# Patient Record
Sex: MALE | Race: WHITE | NOT HISPANIC OR LATINO | Employment: OTHER | URBAN - METROPOLITAN AREA
[De-identification: names, ages, dates, MRNs, and addresses within clinical notes are randomized per-mention and may not be internally consistent; named-entity substitution may affect disease eponyms.]

---

## 2021-01-10 ENCOUNTER — HOSPITAL ENCOUNTER (EMERGENCY)
Facility: CLINIC | Age: 86
Discharge: ANOTHER HOSPITAL | End: 2021-01-10
Attending: FAMILY MEDICINE | Admitting: FAMILY MEDICINE
Payer: MEDICARE

## 2021-01-10 ENCOUNTER — ANCILLARY PROCEDURE (OUTPATIENT)
Dept: ULTRASOUND IMAGING | Facility: CLINIC | Age: 86
End: 2021-01-10
Attending: FAMILY MEDICINE
Payer: COMMERCIAL

## 2021-01-10 ENCOUNTER — TRANSFERRED RECORDS (OUTPATIENT)
Dept: HEALTH INFORMATION MANAGEMENT | Facility: CLINIC | Age: 86
End: 2021-01-10

## 2021-01-10 ENCOUNTER — APPOINTMENT (OUTPATIENT)
Dept: GENERAL RADIOLOGY | Facility: CLINIC | Age: 86
End: 2021-01-10
Attending: FAMILY MEDICINE
Payer: MEDICARE

## 2021-01-10 VITALS
SYSTOLIC BLOOD PRESSURE: 140 MMHG | HEIGHT: 67 IN | DIASTOLIC BLOOD PRESSURE: 87 MMHG | TEMPERATURE: 97.9 F | HEART RATE: 78 BPM | BODY MASS INDEX: 27.62 KG/M2 | WEIGHT: 176 LBS | OXYGEN SATURATION: 94 % | RESPIRATION RATE: 19 BRPM

## 2021-01-10 DIAGNOSIS — I50.23 ACUTE ON CHRONIC SYSTOLIC HEART FAILURE (H): ICD-10-CM

## 2021-01-10 DIAGNOSIS — I21.4 NSTEMI (NON-ST ELEVATED MYOCARDIAL INFARCTION) (H): ICD-10-CM

## 2021-01-10 PROBLEM — N40.0 BPH (BENIGN PROSTATIC HYPERPLASIA): Status: ACTIVE | Noted: 2018-04-11

## 2021-01-10 PROBLEM — G43.719 INTRACTABLE CHRONIC MIGRAINE WITHOUT AURA: Status: ACTIVE | Noted: 2020-08-06

## 2021-01-10 LAB
ALBUMIN SERPL-MCNC: 3.3 G/DL (ref 3.4–5)
ALP SERPL-CCNC: 69 U/L (ref 40–150)
ALT SERPL W P-5'-P-CCNC: 41 U/L (ref 0–70)
ANION GAP SERPL CALCULATED.3IONS-SCNC: 5 MMOL/L (ref 3–14)
APTT PPP: 27 SEC (ref 22–37)
AST SERPL W P-5'-P-CCNC: 33 U/L (ref 0–45)
BASE EXCESS BLDV CALC-SCNC: 1.2 MMOL/L
BASOPHILS # BLD AUTO: 0.1 10E9/L (ref 0–0.2)
BASOPHILS NFR BLD AUTO: 0.8 %
BILIRUB SERPL-MCNC: 0.8 MG/DL (ref 0.2–1.3)
BUN SERPL-MCNC: 17 MG/DL (ref 7–30)
CALCIUM SERPL-MCNC: 9.1 MG/DL (ref 8.5–10.1)
CHLORIDE SERPL-SCNC: 107 MMOL/L (ref 94–109)
CO2 SERPL-SCNC: 26 MMOL/L (ref 20–32)
CREAT SERPL-MCNC: 1.21 MG/DL (ref 0.66–1.25)
D DIMER PPP FEU-MCNC: 0.5 UG/ML FEU (ref 0–0.5)
DIFFERENTIAL METHOD BLD: ABNORMAL
EOSINOPHIL # BLD AUTO: 0.4 10E9/L (ref 0–0.7)
EOSINOPHIL NFR BLD AUTO: 4.2 %
ERYTHROCYTE [DISTWIDTH] IN BLOOD BY AUTOMATED COUNT: 13.2 % (ref 10–15)
FLUAV RNA RESP QL NAA+PROBE: NEGATIVE
FLUBV RNA RESP QL NAA+PROBE: NEGATIVE
GFR SERPL CREATININE-BSD FRML MDRD: 55 ML/MIN/{1.73_M2}
GLUCOSE SERPL-MCNC: 161 MG/DL (ref 70–99)
HCO3 BLDV-SCNC: 29 MMOL/L (ref 21–28)
HCT VFR BLD AUTO: 53.2 % (ref 40–53)
HGB BLD-MCNC: 17.7 G/DL (ref 13.3–17.7)
IMM GRANULOCYTES # BLD: 0.1 10E9/L (ref 0–0.4)
IMM GRANULOCYTES NFR BLD: 1.1 %
INR PPP: 1.06 (ref 0.86–1.14)
LABORATORY COMMENT REPORT: NORMAL
LYMPHOCYTES # BLD AUTO: 2 10E9/L (ref 0.8–5.3)
LYMPHOCYTES NFR BLD AUTO: 21.2 %
MCH RBC QN AUTO: 32.5 PG (ref 26.5–33)
MCHC RBC AUTO-ENTMCNC: 33.3 G/DL (ref 31.5–36.5)
MCV RBC AUTO: 98 FL (ref 78–100)
MONOCYTES # BLD AUTO: 0.8 10E9/L (ref 0–1.3)
MONOCYTES NFR BLD AUTO: 8.7 %
NEUTROPHILS # BLD AUTO: 6 10E9/L (ref 1.6–8.3)
NEUTROPHILS NFR BLD AUTO: 64 %
NRBC # BLD AUTO: 0 10*3/UL
NRBC BLD AUTO-RTO: 0 /100
NT-PROBNP SERPL-MCNC: 394 PG/ML (ref 0–1800)
O2/TOTAL GAS SETTING VFR VENT: 1.5 %
PCO2 BLDV: 53 MM HG (ref 40–50)
PH BLDV: 7.34 PH (ref 7.32–7.43)
PLATELET # BLD AUTO: 153 10E9/L (ref 150–450)
PO2 BLDV: 24 MM HG (ref 25–47)
POTASSIUM SERPL-SCNC: 4.7 MMOL/L (ref 3.4–5.3)
PROT SERPL-MCNC: 7.1 G/DL (ref 6.8–8.8)
RBC # BLD AUTO: 5.44 10E12/L (ref 4.4–5.9)
RSV RNA SPEC QL NAA+PROBE: NORMAL
SARS-COV-2 RNA RESP QL NAA+PROBE: NEGATIVE
SODIUM SERPL-SCNC: 138 MMOL/L (ref 133–144)
SPECIMEN SOURCE: NORMAL
TROPONIN I SERPL-MCNC: 0.23 UG/L (ref 0–0.04)
WBC # BLD AUTO: 9.3 10E9/L (ref 4–11)

## 2021-01-10 PROCEDURE — 83880 ASSAY OF NATRIURETIC PEPTIDE: CPT | Performed by: FAMILY MEDICINE

## 2021-01-10 PROCEDURE — 93005 ELECTROCARDIOGRAM TRACING: CPT | Performed by: FAMILY MEDICINE

## 2021-01-10 PROCEDURE — 96374 THER/PROPH/DIAG INJ IV PUSH: CPT | Mod: 59 | Performed by: FAMILY MEDICINE

## 2021-01-10 PROCEDURE — 85025 COMPLETE CBC W/AUTO DIFF WBC: CPT | Performed by: FAMILY MEDICINE

## 2021-01-10 PROCEDURE — C9803 HOPD COVID-19 SPEC COLLECT: HCPCS | Performed by: FAMILY MEDICINE

## 2021-01-10 PROCEDURE — 87636 SARSCOV2 & INF A&B AMP PRB: CPT | Performed by: FAMILY MEDICINE

## 2021-01-10 PROCEDURE — 80053 COMPREHEN METABOLIC PANEL: CPT | Performed by: FAMILY MEDICINE

## 2021-01-10 PROCEDURE — 76604 US EXAM CHEST: CPT | Performed by: FAMILY MEDICINE

## 2021-01-10 PROCEDURE — 76604 US EXAM CHEST: CPT | Mod: 26 | Performed by: FAMILY MEDICINE

## 2021-01-10 PROCEDURE — 71045 X-RAY EXAM CHEST 1 VIEW: CPT

## 2021-01-10 PROCEDURE — 250N000013 HC RX MED GY IP 250 OP 250 PS 637: Performed by: FAMILY MEDICINE

## 2021-01-10 PROCEDURE — 93010 ELECTROCARDIOGRAM REPORT: CPT | Performed by: FAMILY MEDICINE

## 2021-01-10 PROCEDURE — 99285 EMERGENCY DEPT VISIT HI MDM: CPT | Mod: 25 | Performed by: FAMILY MEDICINE

## 2021-01-10 PROCEDURE — 85379 FIBRIN DEGRADATION QUANT: CPT | Performed by: FAMILY MEDICINE

## 2021-01-10 PROCEDURE — 85730 THROMBOPLASTIN TIME PARTIAL: CPT | Performed by: FAMILY MEDICINE

## 2021-01-10 PROCEDURE — 93308 TTE F-UP OR LMTD: CPT | Performed by: FAMILY MEDICINE

## 2021-01-10 PROCEDURE — 85610 PROTHROMBIN TIME: CPT | Performed by: FAMILY MEDICINE

## 2021-01-10 PROCEDURE — 93308 TTE F-UP OR LMTD: CPT | Mod: 26 | Performed by: FAMILY MEDICINE

## 2021-01-10 PROCEDURE — 82803 BLOOD GASES ANY COMBINATION: CPT | Performed by: FAMILY MEDICINE

## 2021-01-10 PROCEDURE — 250N000011 HC RX IP 250 OP 636: Performed by: FAMILY MEDICINE

## 2021-01-10 PROCEDURE — 96375 TX/PRO/DX INJ NEW DRUG ADDON: CPT | Performed by: FAMILY MEDICINE

## 2021-01-10 PROCEDURE — 84484 ASSAY OF TROPONIN QUANT: CPT | Performed by: FAMILY MEDICINE

## 2021-01-10 RX ORDER — LATANOPROST 50 UG/ML
1 SOLUTION/ DROPS OPHTHALMIC AT BEDTIME
COMMUNITY
Start: 2020-10-19

## 2021-01-10 RX ORDER — CLOPIDOGREL BISULFATE 75 MG/1
75 TABLET ORAL DAILY
Status: ON HOLD | COMMUNITY
End: 2024-01-03

## 2021-01-10 RX ORDER — ATORVASTATIN CALCIUM 80 MG/1
80 TABLET, FILM COATED ORAL AT BEDTIME
COMMUNITY
Start: 2020-10-22

## 2021-01-10 RX ORDER — FINASTERIDE 5 MG/1
1 TABLET, FILM COATED ORAL AT BEDTIME
COMMUNITY
Start: 2020-10-29

## 2021-01-10 RX ORDER — OMEPRAZOLE 40 MG/1
1 CAPSULE, DELAYED RELEASE ORAL DAILY
COMMUNITY
Start: 2020-11-07 | End: 2023-12-07

## 2021-01-10 RX ORDER — SACUBITRIL AND VALSARTAN 24; 26 MG/1; MG/1
1 TABLET, FILM COATED ORAL 2 TIMES DAILY
COMMUNITY
Start: 2021-01-07 | End: 2023-12-07

## 2021-01-10 RX ORDER — LEVOTHYROXINE SODIUM 100 UG/1
100 TABLET ORAL DAILY
COMMUNITY
Start: 2020-12-10

## 2021-01-10 RX ORDER — HEPARIN SODIUM 10000 [USP'U]/100ML
0-5000 INJECTION, SOLUTION INTRAVENOUS CONTINUOUS
Status: DISCONTINUED | OUTPATIENT
Start: 2021-01-10 | End: 2021-01-10 | Stop reason: HOSPADM

## 2021-01-10 RX ORDER — MULTIVIT WITH MINERALS/LUTEIN
1 TABLET ORAL DAILY
COMMUNITY

## 2021-01-10 RX ORDER — CITALOPRAM HYDROBROMIDE 20 MG/1
40 TABLET ORAL DAILY
COMMUNITY
Start: 2020-12-09 | End: 2023-12-07

## 2021-01-10 RX ORDER — FUROSEMIDE 10 MG/ML
20 INJECTION INTRAMUSCULAR; INTRAVENOUS ONCE
Status: COMPLETED | OUTPATIENT
Start: 2021-01-10 | End: 2021-01-10

## 2021-01-10 RX ORDER — PRAMIPEXOLE DIHYDROCHLORIDE 0.5 MG/1
1 TABLET ORAL EVERY EVENING
COMMUNITY
Start: 2020-11-10 | End: 2023-12-07

## 2021-01-10 RX ORDER — ACETAMINOPHEN 325 MG/1
650 TABLET ORAL EVERY 6 HOURS PRN
Status: DISCONTINUED | OUTPATIENT
Start: 2021-01-10 | End: 2021-01-10 | Stop reason: HOSPADM

## 2021-01-10 RX ORDER — NITROGLYCERIN 0.4 MG/1
1 TABLET SUBLINGUAL EVERY 5 MIN PRN
COMMUNITY
Start: 2020-07-31

## 2021-01-10 RX ORDER — TRIAZOLAM 0.25 MG
1 TABLET ORAL AT BEDTIME
COMMUNITY
Start: 2020-11-12 | End: 2023-12-07

## 2021-01-10 RX ORDER — CARVEDILOL 12.5 MG/1
1 TABLET ORAL 2 TIMES DAILY
COMMUNITY
Start: 2020-10-26

## 2021-01-10 RX ADMIN — NITROGLYCERIN 15 MG: 20 OINTMENT TOPICAL at 11:01

## 2021-01-10 RX ADMIN — FUROSEMIDE 20 MG: 10 INJECTION, SOLUTION INTRAMUSCULAR; INTRAVENOUS at 11:55

## 2021-01-10 RX ADMIN — ACETAMINOPHEN 650 MG: 325 TABLET, FILM COATED ORAL at 11:00

## 2021-01-10 RX ADMIN — HEPARIN SODIUM 950 UNITS/HR: 10000 INJECTION, SOLUTION INTRAVENOUS at 11:45

## 2021-01-10 ASSESSMENT — ENCOUNTER SYMPTOMS
HEADACHES: 0
PALPITATIONS: 0
DIARRHEA: 0
VOMITING: 0
CONSTIPATION: 0
SORE THROAT: 0
FREQUENCY: 0
BLOOD IN STOOL: 0
WHEEZING: 0
SINUS PRESSURE: 0
DYSURIA: 0
FEVER: 0
ABDOMINAL PAIN: 0
DIAPHORESIS: 0
CHILLS: 0
NAUSEA: 0
SHORTNESS OF BREATH: 1
COUGH: 0

## 2021-01-10 ASSESSMENT — MIFFLIN-ST. JEOR: SCORE: 1446.96

## 2021-01-10 NOTE — ED NOTES
Pt reports burning sensation in abdomen.  Pt was given muscle relaxer in November for weakness/pain in legs.   Pt reports fell from shower chair on yesterday and unable to get self up and had to crawl to door due to weakness in arms and shoulders.  Sob.   Pt placed on 1.5 liters of oxygen via nasal cannula due to oxygen sats dropped to 89% on room air.  Pt uses CPAP machine during the night.   Slight swelling in lower extremities.   povider in room.

## 2021-01-10 NOTE — ED PROVIDER NOTES
History     Chief Complaint   Patient presents with     Shortness of Breath     Chest Pain     HPI  Garry Mckay is a 86 year old male who with a history of hypertension coronary disease panic disorder secondary hyperparathyroidism stage III chronic kidney disease, and obstructive sleep apnea hyperlipidemia presents with 2 weeks of non-progressive shortness of breath no fever on presentation hypertensive on presentation mildly hypoxic.  His record in the care everywhere of history in October 2017 of with carotid artery occlusion.     Reviewing outside records I see the use of aspirin and Plavix, atorvastatin and carvedilol, losartan, nitrates, omeprazole.    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  He has no local care here other than occasionally seeing an Amanda randall at the [facility].  He is living with his daughter having come here November by driving here.  No history of VTE and noted no lower extremity swelling with this.  Presents with 2 weeks of dyspnea without significant cough chest pain.  He is experienced some arm ache but appears to be unrelated to this.  He has had no diaphoresis nausea vomiting sweats.  He sleeps on 2 pillows and that has not changed substantially.  He has no obvious new leg edema.  He has no marked decrease in walk distance, he has no weight gain that he is aware of recently.  He has no history of congestive heart failure but status post CABG in 1995 and 6-7 stents done since that time I believe the last 1 is in the last 3 years and apparently has had echocardiogram in the fall and he tells me this was wityhout significant change.    He has no upper respiratory symptoms, cough, fever.  Has no loss of taste or smell.  No exposure to Covid.  He has been  quarantining with his daughter.  .  He does note that he has been experiencing increased eructation.    He did have a fall yesterday that made it difficult to get up.  He tells me he fell in the shower from a shower chair no significant injury associated no head injury or neck injury.    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 08/06/2020     Priority: Medium     BPH (benign prostatic hyperplasia) 04/11/2018     Priority: Medium     Secondary hyperparathyroidism of renal origin (H) 06/21/2016     Priority: Medium     Stage 3 chronic kidney disease 06/21/2016     Priority: Medium     Glaucoma 05/22/2013     Priority: Medium     Benign essential hypertension 03/26/2013     Priority: Medium     Coronary atherosclerosis 03/26/2013     Priority: Medium     Panic disorder with agoraphobia 03/26/2013     Priority: Medium     Description: pt says he no longer has this       Hyperlipidemia 03/26/2013     Priority: Medium        Past Medical History:    No past medical history on file.    Past Surgical History:    No past surgical history on file.    Family History:    No family history on file.    Social History:  Marital Status:    Social History     Tobacco Use     Smoking status: Not on file   Substance Use Topics     Alcohol use: Not on file     Drug use: Not on file        Medications:    No current outpatient medications on file.        Review of Systems   Constitutional: Negative for chills, diaphoresis and fever.   HENT: Negative for ear pain, sinus pressure and sore throat.    Eyes: Negative for visual disturbance.   Respiratory: Positive for shortness of breath. Negative for cough and wheezing.    Cardiovascular: Negative for chest pain and palpitations.   Gastrointestinal: Negative for abdominal pain, blood in stool, constipation, diarrhea, nausea and vomiting.   Genitourinary: Negative for dysuria, frequency and  "urgency.   Skin: Negative for rash.   Neurological: Negative for headaches.   All other systems reviewed and are negative.      Physical Exam   BP: (!) 190/116  Pulse: 100  Temp: 97.9  F (36.6  C)  Resp: 20  Height: 170.2 cm (5' 7\")  Weight: 79.8 kg (176 lb)  SpO2: (!) 89 %      Physical Exam  Constitutional:       General: He is in acute distress.      Appearance: He is not diaphoretic.   Eyes:      Conjunctiva/sclera: Conjunctivae normal.   Neck:      Musculoskeletal: Neck supple.   Cardiovascular:      Rate and Rhythm: Normal rate and regular rhythm.      Heart sounds: No murmur.   Pulmonary:      Effort: Pulmonary effort is normal. No respiratory distress.      Breath sounds: No stridor. Rales present. No wheezing or rhonchi.   Abdominal:      General: Abdomen is flat. Bowel sounds are normal. There is distension.      Palpations: There is no mass.      Tenderness: There is no abdominal tenderness. There is no guarding.   Musculoskeletal:      Right lower leg: No edema.      Left lower leg: No edema.   Skin:     Coloration: Skin is not pale.      Findings: No rash.   Neurological:      General: No focal deficit present.      Mental Status: He is alert.      Motor: No weakness.         ED Course        Procedures                EKG Interpretation:      Interpreted by Arnoldo Montez MD  EKG done at 1005 hrs. demonstrates a sinus rhythm 94 bpm normal axis.  There is no ST change.  Other than T wave inversion laterally.  There appears to be very marginal lateral ST depression in these leads as well.  T wave flattening also in the inferior leads.  No ectopy.  Normal conduction.  Normal intervals.  This is with the exception of a borderline QRS at 116.  Impression sinus rhythm with lateral and inferior T wave change minimal ST depression.  And no old EKG to compare.      Critical Care time:  none                  Results for orders placed or performed during the hospital encounter of 01/10/21 (from the past 24 " hour(s))   POC US CHEST B-SCAN    Impression    [facility] Procedure Note      Limited Bedside ED Cardiac Ultrasound:    PROCEDURE: PERFORMED BY: Dr. Arnoldo Montez MD  INDICATIONS/SYMPTOM:  Shortness of Breath  PROBE: Cardiac phased array probe and High frequency linear probe  BODY LOCATION: Chest  FINDINGS:   The ultrasound was performed utilizing the parasternal long axis, parasternal short axis and apical 4 chamber views.  Cardiac contractility:  Present  Gross estimation of cardiac kinesis: depressed  Pericardial Effusion:  None  RV:LV ratio: LV > RV  IVC: Unable  INTERPRETATION:    Chamber size and motion were grossly normal with LV > RV, depressed left cardiac kinesis.  No pericardial effusion was found.  IVC visualized and findings indicate unable to estimate.  IMAGE DOCUMENTATION: Images were archived to PACs system.      [facility] Procedure Note      Limited Bedside ED Ultrasound of Thorax:    PROCEDURE: PERFORMED BY: Dr. Arnoldo Montez MD  INDICATIONS/SYMPTOM:  shortness of breath  PROBE: High frequency linear probe  BODY LOCATION: Chest  FINDINGS:  Images of both lung hemithoracies taken in 2D in multiple rib spaces        Right side:  Lung sliding artifact  Present     Comet tail artifacts  Absent - some artifacts but <3   Left side:  Lung sliding artifact  Present     Comet tail artifacts  Absent - some artifacts but <3   Hemothorax: Right side Absent     Left side Absent   Pleural effusion: Right side Absent      Left side Absent    INTERPRETATION: The exam was normal. There was no free fluid identified in the chest cavity. No evidence of pneumothorax, hemothorax or pleural effusion.  IMAGE DOCUMENTATION: Images were archived to PACs system.       CBC with platelets differential   Result Value Ref Range    WBC 9.3 4.0 - 11.0 10e9/L    RBC Count 5.44 4.4 - 5.9 10e12/L    Hemoglobin 17.7 13.3 - 17.7 g/dL    Hematocrit 53.2 (H) 40.0 - 53.0 %    MCV 98 78 - 100 fl    MCH 32.5 26.5 - 33.0  pg    MCHC 33.3 31.5 - 36.5 g/dL    RDW 13.2 10.0 - 15.0 %    Platelet Count 153 150 - 450 10e9/L    Diff Method Automated Method     % Neutrophils 64.0 %    % Lymphocytes 21.2 %    % Monocytes 8.7 %    % Eosinophils 4.2 %    % Basophils 0.8 %    % Immature Granulocytes 1.1 %    Nucleated RBCs 0 0 /100    Absolute Neutrophil 6.0 1.6 - 8.3 10e9/L    Absolute Lymphocytes 2.0 0.8 - 5.3 10e9/L    Absolute Monocytes 0.8 0.0 - 1.3 10e9/L    Absolute Eosinophils 0.4 0.0 - 0.7 10e9/L    Absolute Basophils 0.1 0.0 - 0.2 10e9/L    Abs Immature Granulocytes 0.1 0 - 0.4 10e9/L    Absolute Nucleated RBC 0.0    Comprehensive metabolic panel   Result Value Ref Range    Sodium 138 133 - 144 mmol/L    Potassium 4.7 3.4 - 5.3 mmol/L    Chloride 107 94 - 109 mmol/L    Carbon Dioxide 26 20 - 32 mmol/L    Anion Gap 5 3 - 14 mmol/L    Glucose 161 (H) 70 - 99 mg/dL    Urea Nitrogen 17 7 - 30 mg/dL    Creatinine 1.21 0.66 - 1.25 mg/dL    GFR Estimate 55 (L) >60 mL/min/[1.73_m2]    GFR Estimate If Black 63 >60 mL/min/[1.73_m2]    Calcium 9.1 8.5 - 10.1 mg/dL    Bilirubin Total 0.8 0.2 - 1.3 mg/dL    Albumin 3.3 (L) 3.4 - 5.0 g/dL    Protein Total 7.1 6.8 - 8.8 g/dL    Alkaline Phosphatase 69 40 - 150 U/L    ALT 41 0 - 70 U/L    AST 33 0 - 45 U/L   Troponin I   Result Value Ref Range    Troponin I ES 0.234 (HH) 0.000 - 0.045 ug/L   Blood gas venous   Result Value Ref Range    Ph Venous 7.34 7.32 - 7.43 pH    PCO2 Venous 53 (H) 40 - 50 mm Hg    PO2 Venous 24 (L) 25 - 47 mm Hg    Bicarbonate Venous 29 (H) 21 - 28 mmol/L    Base Excess Venous 1.2 mmol/L    FIO2 1.5    Symptomatic Influenza A/B & SARS-CoV2 (COVID-19) Virus PCR Multiplex    Specimen: Nasopharyngeal   Result Value Ref Range    Flu A/B & SARS-COV-2 PCR Source Nasopharyngeal     SARS-CoV-2 PCR Result NEGATIVE     Influenza A PCR Negative NEG^Negative    Influenza B PCR Negative NEG^Negative    Respiratory Syncytial Virus PCR (Note)     Flu A/B & SARS-CoV-2 PCR Comment (Note)    Nt  probnp inpatient   Result Value Ref Range    N-Terminal Pro BNP Inpatient 394 0 - 1,800 pg/mL   D dimer quantitative   Result Value Ref Range    D Dimer 0.5 0.0 - 0.50 ug/ml FEU   INR   Result Value Ref Range    INR 1.06 0.86 - 1.14   Partial thromboplastin time   Result Value Ref Range    PTT 27 22 - 37 sec   XR Chest Port 1 View    Narrative    CHEST ONE VIEW  1/10/2021 11:23 AM     HISTORY: Dyspnea    COMPARISON: None.      Impression    IMPRESSION: Cardiomegaly and cardiac device noted. No acute  infiltrates.    MORENO BLANKENSHIP MD       Medications - No data to display    Assessments & Plan (with Medical Decision Making)     NMDM: Garry Mckay is a 86 year old male m who presents with a history of coronary disease, hypertension, panic disorder, secondary hyperparathyroidism, stage III chronic kidney disease and obstructive sleep apnea.  His findings on bedside ultrasound demonstrate a enlarged left ventricle much larger than the right with decreased contractility overall.  He has possible minimal ischemic change laterally with Kennedy very marginal ST depression laterally T waves are inverted laterally and inferiorly at least flattened.  He is experiencing no chest pain.  His pressure on presentation was 190 systolic.  I suspect much of what he is describing may be congestive heart failure although I get a mixed exam.  I do hear rales on exam.  His IVC is very difficult to visualize and I am therefore holding on administering IV Lasix but I will apply nitroglycerin to the chest which will have both an antianginal effect as well as a decrease in blood pressure and a effect on systolic function with decreased preload.  Will perform our testing and imaging.  If his troponin is elevated to any significant extent would recommend that we transfer to the [facility] system in which his primary provider practices either [facility] or [facility] otherwise I would keep him to at this facility.  We will also test  for differential diagnosis including Covid, pneumonia, pulmonary embolism as he just traveled from [place] in the last 2 months by car.     Findings on laboratory testing consistent with possible NSTEMI with a troponin increased to 0.2.  The patient's blood pressure dropped on Nitropaste to approximately 130s 140.  He has had no chest pain while here.  His oxygen requirements have not increased affected by 1 L/min O2 currently.  He feels comfortable.  I spoke with Dr. Scott at [facility] who accepts for transfer but asks that I administer 20 mg IV Lasix.  We did discuss that I could not identify the IVC well and could not see overall fluid status well.  I see no obvious interstitial fluid and would be concerned of potential hypotension.  He does recommend the lasix.  I have given that but if noted for the nurses to wipe off nitroglycerin paste if he should have any hypotension with pressures dropping below 120 systolic.  The left-sided wall motion is significantly reduced weeks by my exam and I am suspicious this may be stunning although I have not seen his prior echo results.  There is significant dilatation of this chamber.  He is on heparin now.       I have reviewed the nursing notes.    I have reviewed the findings, diagnosis, plan and need for follow up with the patient.       New Prescriptions    No medications on file       Final diagnoses:   NSTEMI (non-ST elevated myocardial infarction) (H)   Acute on chronic systolic heart failure (H)       1/10/2021   [facility] EMERGENCY DEPT     Arnoldo Montez MD  01/10/21 4791

## 2021-01-10 NOTE — ED NOTES
DATE:  1/10/2021   TIME OF RECEIPT FROM LAB:  1126  LAB TEST:  trop  LAB VALUE:  0.234  RESULTS GIVEN WITH READ-BACK TO Dr. Montez  TIME LAB VALUE REPORTED TO PROVIDER:   1126

## 2023-12-07 ENCOUNTER — APPOINTMENT (OUTPATIENT)
Dept: CT IMAGING | Facility: CLINIC | Age: 88
DRG: 853 | End: 2023-12-07
Attending: EMERGENCY MEDICINE
Payer: MEDICARE

## 2023-12-07 ENCOUNTER — HOSPITAL ENCOUNTER (EMERGENCY)
Facility: CLINIC | Age: 88
Discharge: ANOTHER HOSPITAL | DRG: 853 | End: 2023-12-07
Attending: EMERGENCY MEDICINE | Admitting: EMERGENCY MEDICINE
Payer: MEDICARE

## 2023-12-07 VITALS
BODY MASS INDEX: 28.09 KG/M2 | OXYGEN SATURATION: 92 % | HEIGHT: 67 IN | SYSTOLIC BLOOD PRESSURE: 96 MMHG | HEART RATE: 85 BPM | DIASTOLIC BLOOD PRESSURE: 68 MMHG | WEIGHT: 179 LBS | RESPIRATION RATE: 16 BRPM

## 2023-12-07 DIAGNOSIS — K35.30 ACUTE APPENDICITIS WITH LOCALIZED PERITONITIS, WITHOUT PERFORATION, ABSCESS, OR GANGRENE: ICD-10-CM

## 2023-12-07 DIAGNOSIS — I25.5 ISCHEMIC CARDIOMYOPATHY: ICD-10-CM

## 2023-12-07 DIAGNOSIS — Z79.01 CHRONIC ANTICOAGULATION: ICD-10-CM

## 2023-12-07 PROBLEM — I50.9 HEART FAILURE (H): Status: ACTIVE | Noted: 2021-01-12

## 2023-12-07 PROBLEM — I50.42 CHRONIC COMBINED SYSTOLIC AND DIASTOLIC HEART FAILURE (H): Status: ACTIVE | Noted: 2022-11-16

## 2023-12-07 PROBLEM — I10 HYPERTENSION: Status: ACTIVE | Noted: 2021-01-10

## 2023-12-07 PROBLEM — F32.9 MAJOR DEPRESSION, MELANCHOLIC TYPE: Status: ACTIVE | Noted: 2021-10-17

## 2023-12-07 PROBLEM — E03.9 HYPOTHYROIDISM (ACQUIRED): Status: ACTIVE | Noted: 2017-11-24

## 2023-12-07 PROBLEM — I21.4 NSTEMI (NON-ST ELEVATED MYOCARDIAL INFARCTION) (H): Status: ACTIVE | Noted: 2023-12-07

## 2023-12-07 PROBLEM — M75.81 RIGHT ROTATOR CUFF TENDINITIS: Status: ACTIVE | Noted: 2017-08-19

## 2023-12-07 PROBLEM — D69.6 THROMBOCYTOPENIA (H): Status: ACTIVE | Noted: 2023-07-07

## 2023-12-07 PROBLEM — F41.9 ANXIETY: Chronic | Status: ACTIVE | Noted: 2021-01-10

## 2023-12-07 PROBLEM — Z95.1 HISTORY OF CORONARY ARTERY BYPASS SURGERY: Status: ACTIVE | Noted: 2017-11-24

## 2023-12-07 PROBLEM — I65.23 OCCLUSION AND STENOSIS OF BILATERAL CAROTID ARTERIES: Status: ACTIVE | Noted: 2017-10-19

## 2023-12-07 PROBLEM — M48.02 DEGENERATIVE CERVICAL SPINAL STENOSIS: Status: ACTIVE | Noted: 2018-02-28

## 2023-12-07 PROBLEM — I61.9 STROKE, HEMORRHAGIC (H): Status: ACTIVE | Noted: 2017-11-24

## 2023-12-07 PROBLEM — Z95.5 S/P CORONARY ARTERY STENT PLACEMENT: Status: ACTIVE | Noted: 2023-12-07

## 2023-12-07 PROBLEM — G47.33 OBSTRUCTIVE SLEEP APNEA: Status: ACTIVE | Noted: 2021-01-10

## 2023-12-07 PROBLEM — I65.23 BILATERAL CAROTID ARTERY STENOSIS: Status: ACTIVE | Noted: 2017-11-24

## 2023-12-07 PROBLEM — R35.1 NOCTURIA: Status: ACTIVE | Noted: 2018-04-11

## 2023-12-07 LAB
ALBUMIN SERPL BCG-MCNC: 4.2 G/DL (ref 3.5–5.2)
ALBUMIN SERPL BCG-MCNC: 4.2 G/DL (ref 3.5–5.2)
ALBUMIN UR-MCNC: 30 MG/DL
ALP SERPL-CCNC: 89 U/L (ref 40–150)
ALP SERPL-CCNC: 89 U/L (ref 40–150)
ALT SERPL W P-5'-P-CCNC: 29 U/L (ref 0–70)
ALT SERPL W P-5'-P-CCNC: 29 U/L (ref 0–70)
ANION GAP SERPL CALCULATED.3IONS-SCNC: 13 MMOL/L (ref 7–15)
ANION GAP SERPL CALCULATED.3IONS-SCNC: 15 MMOL/L (ref 7–15)
ANION GAP SERPL CALCULATED.3IONS-SCNC: 20 MMOL/L (ref 7–15)
APPEARANCE UR: ABNORMAL
AST SERPL W P-5'-P-CCNC: 38 U/L (ref 0–45)
AST SERPL W P-5'-P-CCNC: 38 U/L (ref 0–45)
BASOPHILS # BLD AUTO: 0 10E3/UL (ref 0–0.2)
BASOPHILS NFR BLD AUTO: 0 %
BILIRUB DIRECT SERPL-MCNC: 0.33 MG/DL (ref 0–0.3)
BILIRUB SERPL-MCNC: 1.4 MG/DL
BILIRUB SERPL-MCNC: 1.4 MG/DL
BILIRUB UR QL STRIP: NEGATIVE
BUN SERPL-MCNC: 22.9 MG/DL (ref 8–23)
BUN SERPL-MCNC: 23 MG/DL (ref 8–23)
BUN SERPL-MCNC: 29.8 MG/DL (ref 8–23)
CALCIUM SERPL-MCNC: 9.4 MG/DL (ref 8.8–10.2)
CALCIUM SERPL-MCNC: 9.8 MG/DL (ref 8.8–10.2)
CALCIUM SERPL-MCNC: 9.8 MG/DL (ref 8.8–10.2)
CHLORIDE SERPL-SCNC: 100 MMOL/L (ref 98–107)
CHLORIDE SERPL-SCNC: 100 MMOL/L (ref 98–107)
CHLORIDE SERPL-SCNC: 98 MMOL/L (ref 98–107)
COLOR UR AUTO: ABNORMAL
CREAT SERPL-MCNC: 1.37 MG/DL (ref 0.67–1.17)
CREAT SERPL-MCNC: 1.39 MG/DL (ref 0.67–1.17)
CREAT SERPL-MCNC: 1.8 MG/DL (ref 0.67–1.17)
DEPRECATED HCO3 PLAS-SCNC: 14 MMOL/L (ref 22–29)
DEPRECATED HCO3 PLAS-SCNC: 20 MMOL/L (ref 22–29)
DEPRECATED HCO3 PLAS-SCNC: 20 MMOL/L (ref 22–29)
EGFRCR SERPLBLD CKD-EPI 2021: 36 ML/MIN/1.73M2
EGFRCR SERPLBLD CKD-EPI 2021: 49 ML/MIN/1.73M2
EGFRCR SERPLBLD CKD-EPI 2021: 50 ML/MIN/1.73M2
EOSINOPHIL # BLD AUTO: 0 10E3/UL (ref 0–0.7)
EOSINOPHIL NFR BLD AUTO: 0 %
ERYTHROCYTE [DISTWIDTH] IN BLOOD BY AUTOMATED COUNT: 14 % (ref 10–15)
GLUCOSE SERPL-MCNC: 169 MG/DL (ref 70–99)
GLUCOSE SERPL-MCNC: 172 MG/DL (ref 70–99)
GLUCOSE SERPL-MCNC: 172 MG/DL (ref 70–99)
GLUCOSE UR STRIP-MCNC: NEGATIVE MG/DL
HCT VFR BLD AUTO: 49.3 % (ref 40–53)
HGB BLD-MCNC: 16.8 G/DL (ref 13.3–17.7)
HGB UR QL STRIP: NEGATIVE
HYALINE CASTS: 3 /LPF
IMM GRANULOCYTES # BLD: 0.1 10E3/UL
IMM GRANULOCYTES NFR BLD: 1 %
KETONES UR STRIP-MCNC: NEGATIVE MG/DL
LEUKOCYTE ESTERASE UR QL STRIP: NEGATIVE
LIPASE SERPL-CCNC: 20 U/L (ref 13–60)
LYMPHOCYTES # BLD AUTO: 0.7 10E3/UL (ref 0.8–5.3)
LYMPHOCYTES NFR BLD AUTO: 7 %
MCH RBC QN AUTO: 33.1 PG (ref 26.5–33)
MCHC RBC AUTO-ENTMCNC: 34.1 G/DL (ref 31.5–36.5)
MCV RBC AUTO: 97 FL (ref 78–100)
MONOCYTES # BLD AUTO: 0.9 10E3/UL (ref 0–1.3)
MONOCYTES NFR BLD AUTO: 9 %
MUCOUS THREADS #/AREA URNS LPF: PRESENT /LPF
NEUTROPHILS # BLD AUTO: 8.1 10E3/UL (ref 1.6–8.3)
NEUTROPHILS NFR BLD AUTO: 83 %
NITRATE UR QL: NEGATIVE
NRBC # BLD AUTO: 0 10E3/UL
NRBC BLD AUTO-RTO: 0 /100
NT-PROBNP SERPL-MCNC: 2613 PG/ML (ref 0–1800)
PH UR STRIP: 5 [PH] (ref 5–7)
PLATELET # BLD AUTO: 160 10E3/UL (ref 150–450)
POTASSIUM SERPL-SCNC: 5.1 MMOL/L (ref 3.4–5.3)
POTASSIUM SERPL-SCNC: 5.5 MMOL/L (ref 3.4–5.3)
POTASSIUM SERPL-SCNC: 5.6 MMOL/L (ref 3.4–5.3)
PROT SERPL-MCNC: 7.1 G/DL (ref 6.4–8.3)
PROT SERPL-MCNC: 7.1 G/DL (ref 6.4–8.3)
RADIOLOGIST FLAGS: ABNORMAL
RBC # BLD AUTO: 5.08 10E6/UL (ref 4.4–5.9)
RBC URINE: 1 /HPF
SODIUM SERPL-SCNC: 133 MMOL/L (ref 135–145)
SODIUM SERPL-SCNC: 133 MMOL/L (ref 135–145)
SODIUM SERPL-SCNC: 134 MMOL/L (ref 135–145)
SP GR UR STRIP: 1.02 (ref 1–1.03)
SQUAMOUS EPITHELIAL: <1 /HPF
UROBILINOGEN UR STRIP-MCNC: NORMAL MG/DL
WBC # BLD AUTO: 9.8 10E3/UL (ref 4–11)
WBC URINE: 4 /HPF

## 2023-12-07 PROCEDURE — 258N000003 HC RX IP 258 OP 636: Performed by: EMERGENCY MEDICINE

## 2023-12-07 PROCEDURE — 83690 ASSAY OF LIPASE: CPT | Performed by: EMERGENCY MEDICINE

## 2023-12-07 PROCEDURE — 82247 BILIRUBIN TOTAL: CPT | Performed by: EMERGENCY MEDICINE

## 2023-12-07 PROCEDURE — 80048 BASIC METABOLIC PNL TOTAL CA: CPT | Performed by: EMERGENCY MEDICINE

## 2023-12-07 PROCEDURE — 96365 THER/PROPH/DIAG IV INF INIT: CPT | Mod: 59 | Performed by: EMERGENCY MEDICINE

## 2023-12-07 PROCEDURE — 83880 ASSAY OF NATRIURETIC PEPTIDE: CPT | Performed by: EMERGENCY MEDICINE

## 2023-12-07 PROCEDURE — 96361 HYDRATE IV INFUSION ADD-ON: CPT | Performed by: EMERGENCY MEDICINE

## 2023-12-07 PROCEDURE — 93005 ELECTROCARDIOGRAM TRACING: CPT | Performed by: EMERGENCY MEDICINE

## 2023-12-07 PROCEDURE — 36415 COLL VENOUS BLD VENIPUNCTURE: CPT | Performed by: EMERGENCY MEDICINE

## 2023-12-07 PROCEDURE — 99285 EMERGENCY DEPT VISIT HI MDM: CPT | Mod: 25 | Performed by: EMERGENCY MEDICINE

## 2023-12-07 PROCEDURE — 250N000011 HC RX IP 250 OP 636: Mod: JZ | Performed by: EMERGENCY MEDICINE

## 2023-12-07 PROCEDURE — 93010 ELECTROCARDIOGRAM REPORT: CPT | Performed by: EMERGENCY MEDICINE

## 2023-12-07 PROCEDURE — 82040 ASSAY OF SERUM ALBUMIN: CPT | Performed by: EMERGENCY MEDICINE

## 2023-12-07 PROCEDURE — 85025 COMPLETE CBC W/AUTO DIFF WBC: CPT | Performed by: EMERGENCY MEDICINE

## 2023-12-07 PROCEDURE — 81001 URINALYSIS AUTO W/SCOPE: CPT | Performed by: EMERGENCY MEDICINE

## 2023-12-07 PROCEDURE — 96376 TX/PRO/DX INJ SAME DRUG ADON: CPT | Performed by: EMERGENCY MEDICINE

## 2023-12-07 PROCEDURE — 96374 THER/PROPH/DIAG INJ IV PUSH: CPT | Mod: 59 | Performed by: EMERGENCY MEDICINE

## 2023-12-07 PROCEDURE — 74177 CT ABD & PELVIS W/CONTRAST: CPT | Mod: MA

## 2023-12-07 PROCEDURE — 250N000011 HC RX IP 250 OP 636: Performed by: EMERGENCY MEDICINE

## 2023-12-07 PROCEDURE — 250N000009 HC RX 250: Performed by: EMERGENCY MEDICINE

## 2023-12-07 PROCEDURE — 96375 TX/PRO/DX INJ NEW DRUG ADDON: CPT | Performed by: EMERGENCY MEDICINE

## 2023-12-07 RX ORDER — CLINDAMYCIN PHOSPHATE 900 MG/50ML
900 INJECTION, SOLUTION INTRAVENOUS SEE ADMIN INSTRUCTIONS
Status: CANCELLED | OUTPATIENT
Start: 2023-12-07

## 2023-12-07 RX ORDER — GABAPENTIN 300 MG/1
300 CAPSULE ORAL AT BEDTIME
Status: ON HOLD | COMMUNITY
End: 2024-01-03

## 2023-12-07 RX ORDER — SACUBITRIL AND VALSARTAN 49; 51 MG/1; MG/1
1 TABLET, FILM COATED ORAL 2 TIMES DAILY
Status: ON HOLD | COMMUNITY
Start: 2023-10-12 | End: 2024-01-03

## 2023-12-07 RX ORDER — DEXTROSE MONOHYDRATE, SODIUM CHLORIDE, AND POTASSIUM CHLORIDE 50; 1.49; 4.5 G/1000ML; G/1000ML; G/1000ML
INJECTION, SOLUTION INTRAVENOUS CONTINUOUS
Status: DISCONTINUED | OUTPATIENT
Start: 2023-12-07 | End: 2023-12-08 | Stop reason: HOSPADM

## 2023-12-07 RX ORDER — EZETIMIBE 10 MG/1
10 TABLET ORAL DAILY
COMMUNITY

## 2023-12-07 RX ORDER — CLINDAMYCIN PHOSPHATE 900 MG/50ML
900 INJECTION, SOLUTION INTRAVENOUS
Status: CANCELLED | OUTPATIENT
Start: 2023-12-07

## 2023-12-07 RX ORDER — IOPAMIDOL 755 MG/ML
88 INJECTION, SOLUTION INTRAVASCULAR ONCE
Status: COMPLETED | OUTPATIENT
Start: 2023-12-07 | End: 2023-12-07

## 2023-12-07 RX ORDER — HYDROMORPHONE HYDROCHLORIDE 1 MG/ML
0.5 INJECTION, SOLUTION INTRAMUSCULAR; INTRAVENOUS; SUBCUTANEOUS ONCE
Status: COMPLETED | OUTPATIENT
Start: 2023-12-07 | End: 2023-12-07

## 2023-12-07 RX ORDER — FLUTICASONE PROPIONATE 50 MCG
SPRAY, SUSPENSION (ML) NASAL
COMMUNITY
Start: 2023-11-27 | End: 2023-12-07

## 2023-12-07 RX ORDER — ACETAMINOPHEN 325 MG/1
975 TABLET ORAL ONCE
Status: CANCELLED | OUTPATIENT
Start: 2023-12-07 | End: 2023-12-07

## 2023-12-07 RX ORDER — ESCITALOPRAM OXALATE 20 MG/1
20 TABLET ORAL DAILY
COMMUNITY

## 2023-12-07 RX ORDER — LANOLIN ALCOHOL/MO/W.PET/CERES
3 CREAM (GRAM) TOPICAL
COMMUNITY

## 2023-12-07 RX ORDER — AMPICILLIN AND SULBACTAM 2; 1 G/1; G/1
3 INJECTION, POWDER, FOR SOLUTION INTRAMUSCULAR; INTRAVENOUS ONCE
Status: COMPLETED | OUTPATIENT
Start: 2023-12-07 | End: 2023-12-07

## 2023-12-07 RX ORDER — FUROSEMIDE 20 MG
20 TABLET ORAL DAILY
COMMUNITY

## 2023-12-07 RX ORDER — GABAPENTIN 100 MG/1
200 CAPSULE ORAL EVERY MORNING
Status: ON HOLD | COMMUNITY
Start: 2023-04-24 | End: 2024-01-03

## 2023-12-07 RX ADMIN — SODIUM CHLORIDE 62 ML: 9 INJECTION, SOLUTION INTRAVENOUS at 18:47

## 2023-12-07 RX ADMIN — AMPICILLIN SODIUM AND SULBACTAM SODIUM 3 G: 2; 1 INJECTION, POWDER, FOR SOLUTION INTRAMUSCULAR; INTRAVENOUS at 20:55

## 2023-12-07 RX ADMIN — HYDROMORPHONE HYDROCHLORIDE 0.5 MG: 1 INJECTION, SOLUTION INTRAMUSCULAR; INTRAVENOUS; SUBCUTANEOUS at 18:24

## 2023-12-07 RX ADMIN — HYDROMORPHONE HYDROCHLORIDE 0.5 MG: 1 INJECTION, SOLUTION INTRAMUSCULAR; INTRAVENOUS; SUBCUTANEOUS at 23:36

## 2023-12-07 RX ADMIN — SODIUM CHLORIDE 500 ML: 9 INJECTION, SOLUTION INTRAVENOUS at 19:49

## 2023-12-07 RX ADMIN — IOPAMIDOL 88 ML: 755 INJECTION, SOLUTION INTRAVENOUS at 18:47

## 2023-12-07 RX ADMIN — POTASSIUM CHLORIDE, DEXTROSE MONOHYDRATE AND SODIUM CHLORIDE: 150; 5; 450 INJECTION, SOLUTION INTRAVENOUS at 23:36

## 2023-12-07 ASSESSMENT — ACTIVITIES OF DAILY LIVING (ADL)
ADLS_ACUITY_SCORE: 35

## 2023-12-07 NOTE — ED TRIAGE NOTES
Did try some miralax, states he feels like he's constipated, lower abd pain; started this am;      Triage Assessment (Adult)       Row Name 12/07/23 1125          Triage Assessment    Airway WDL WDL        Cardiac WDL    Cardiac WDL WDL        Cognitive/Neuro/Behavioral WDL    Cognitive/Neuro/Behavioral WDL WDL

## 2023-12-08 ENCOUNTER — ANESTHESIA EVENT (OUTPATIENT)
Dept: SURGERY | Facility: CLINIC | Age: 88
DRG: 853 | End: 2023-12-08
Payer: MEDICARE

## 2023-12-08 ENCOUNTER — APPOINTMENT (OUTPATIENT)
Dept: ULTRASOUND IMAGING | Facility: CLINIC | Age: 88
DRG: 853 | End: 2023-12-08
Attending: INTERNAL MEDICINE
Payer: MEDICARE

## 2023-12-08 ENCOUNTER — ANESTHESIA (OUTPATIENT)
Dept: SURGERY | Facility: CLINIC | Age: 88
DRG: 853 | End: 2023-12-08
Payer: MEDICARE

## 2023-12-08 ENCOUNTER — APPOINTMENT (OUTPATIENT)
Dept: GENERAL RADIOLOGY | Facility: CLINIC | Age: 88
DRG: 853 | End: 2023-12-08
Attending: EMERGENCY MEDICINE
Payer: MEDICARE

## 2023-12-08 ENCOUNTER — APPOINTMENT (OUTPATIENT)
Dept: GENERAL RADIOLOGY | Facility: CLINIC | Age: 88
DRG: 853 | End: 2023-12-08
Attending: ANESTHESIOLOGY
Payer: MEDICARE

## 2023-12-08 ENCOUNTER — HOSPITAL ENCOUNTER (INPATIENT)
Facility: CLINIC | Age: 88
LOS: 26 days | Discharge: NURSING, REHAB OR LONG-TERM CARE | DRG: 853 | End: 2024-01-03
Attending: EMERGENCY MEDICINE | Admitting: INTERNAL MEDICINE
Payer: MEDICARE

## 2023-12-08 DIAGNOSIS — Z95.5 S/P CORONARY ARTERY STENT PLACEMENT: ICD-10-CM

## 2023-12-08 DIAGNOSIS — A41.9 ACUTE SEPSIS (H): ICD-10-CM

## 2023-12-08 DIAGNOSIS — G47.09 OTHER INSOMNIA: ICD-10-CM

## 2023-12-08 DIAGNOSIS — K35.30 ACUTE APPENDICITIS WITH LOCALIZED PERITONITIS, WITHOUT PERFORATION, ABSCESS, OR GANGRENE: ICD-10-CM

## 2023-12-08 DIAGNOSIS — R05.3 CHRONIC COUGH: ICD-10-CM

## 2023-12-08 DIAGNOSIS — I21.4 NSTEMI (NON-ST ELEVATED MYOCARDIAL INFARCTION) (H): Primary | ICD-10-CM

## 2023-12-08 DIAGNOSIS — K21.00 GASTROESOPHAGEAL REFLUX DISEASE WITH ESOPHAGITIS, UNSPECIFIED WHETHER HEMORRHAGE: ICD-10-CM

## 2023-12-08 DIAGNOSIS — I50.42 CHRONIC COMBINED SYSTOLIC AND DIASTOLIC HEART FAILURE (H): ICD-10-CM

## 2023-12-08 DIAGNOSIS — K59.09 OTHER CONSTIPATION: ICD-10-CM

## 2023-12-08 DIAGNOSIS — N17.9 AKI (ACUTE KIDNEY INJURY) (H): ICD-10-CM

## 2023-12-08 LAB
ALLEN'S TEST: ABNORMAL
ANION GAP SERPL CALCULATED.3IONS-SCNC: 13 MMOL/L (ref 7–15)
ANION GAP SERPL CALCULATED.3IONS-SCNC: 15 MMOL/L (ref 7–15)
ATRIAL RATE - MUSE: 89 BPM
BASE EXCESS BLDA CALC-SCNC: -6 MMOL/L (ref -9–1.8)
BASOPHILS # BLD AUTO: 0.1 10E3/UL (ref 0–0.2)
BASOPHILS NFR BLD AUTO: 0 %
BUN SERPL-MCNC: 29.3 MG/DL (ref 8–23)
BUN SERPL-MCNC: 40 MG/DL (ref 8–23)
CA-I BLD-MCNC: 4.7 MG/DL (ref 4.4–5.2)
CALCIUM SERPL-MCNC: 8.6 MG/DL (ref 8.8–10.2)
CALCIUM SERPL-MCNC: 9 MG/DL (ref 8.8–10.2)
CHLORIDE SERPL-SCNC: 100 MMOL/L (ref 98–107)
CHLORIDE SERPL-SCNC: 104 MMOL/L (ref 98–107)
CREAT SERPL-MCNC: 1.96 MG/DL (ref 0.67–1.17)
CREAT SERPL-MCNC: 2.27 MG/DL (ref 0.67–1.17)
DEPRECATED HCO3 PLAS-SCNC: 18 MMOL/L (ref 22–29)
DEPRECATED HCO3 PLAS-SCNC: 20 MMOL/L (ref 22–29)
DIASTOLIC BLOOD PRESSURE - MUSE: NORMAL MMHG
EGFRCR SERPLBLD CKD-EPI 2021: 27 ML/MIN/1.73M2
EGFRCR SERPLBLD CKD-EPI 2021: 33 ML/MIN/1.73M2
EOSINOPHIL # BLD AUTO: 0.1 10E3/UL (ref 0–0.7)
EOSINOPHIL NFR BLD AUTO: 1 %
ERYTHROCYTE [DISTWIDTH] IN BLOOD BY AUTOMATED COUNT: 14 % (ref 10–15)
ERYTHROCYTE [DISTWIDTH] IN BLOOD BY AUTOMATED COUNT: 14.4 % (ref 10–15)
GLUCOSE BLDC GLUCOMTR-MCNC: 134 MG/DL (ref 70–99)
GLUCOSE BLDC GLUCOMTR-MCNC: 136 MG/DL (ref 70–99)
GLUCOSE BLDC GLUCOMTR-MCNC: 162 MG/DL (ref 70–99)
GLUCOSE BLDC GLUCOMTR-MCNC: 163 MG/DL (ref 70–99)
GLUCOSE SERPL-MCNC: 159 MG/DL (ref 70–99)
GLUCOSE SERPL-MCNC: 164 MG/DL (ref 70–99)
HCO3 BLD-SCNC: 20 MMOL/L (ref 21–28)
HCO3 BLDV-SCNC: 21 MMOL/L (ref 21–28)
HCO3 BLDV-SCNC: 22 MMOL/L (ref 21–28)
HCO3 BLDV-SCNC: 22 MMOL/L (ref 21–28)
HCT VFR BLD AUTO: 39.3 % (ref 40–53)
HCT VFR BLD AUTO: 47.5 % (ref 40–53)
HGB BLD-MCNC: 12.7 G/DL (ref 13.3–17.7)
HGB BLD-MCNC: 15.7 G/DL (ref 13.3–17.7)
HOLD SPECIMEN: NORMAL
IMM GRANULOCYTES # BLD: 0.2 10E3/UL
IMM GRANULOCYTES NFR BLD: 1 %
INTERPRETATION ECG - MUSE: NORMAL
LACTATE BLD-SCNC: 2.9 MMOL/L
LACTATE BLD-SCNC: 3.4 MMOL/L
LACTATE BLD-SCNC: 3.4 MMOL/L
LACTATE SERPL-SCNC: 2.5 MMOL/L (ref 0.7–2)
LACTATE SERPL-SCNC: 2.6 MMOL/L (ref 0.7–2)
LACTATE SERPL-SCNC: 2.9 MMOL/L (ref 0.7–2)
LACTATE SERPL-SCNC: 3 MMOL/L (ref 0.7–2)
LACTATE SERPL-SCNC: 3.2 MMOL/L (ref 0.7–2)
LYMPHOCYTES # BLD AUTO: 0.9 10E3/UL (ref 0.8–5.3)
LYMPHOCYTES NFR BLD AUTO: 7 %
MCH RBC QN AUTO: 32.5 PG (ref 26.5–33)
MCH RBC QN AUTO: 32.5 PG (ref 26.5–33)
MCHC RBC AUTO-ENTMCNC: 32.3 G/DL (ref 31.5–36.5)
MCHC RBC AUTO-ENTMCNC: 33.1 G/DL (ref 31.5–36.5)
MCV RBC AUTO: 101 FL (ref 78–100)
MCV RBC AUTO: 98 FL (ref 78–100)
MONOCYTES # BLD AUTO: 0.8 10E3/UL (ref 0–1.3)
MONOCYTES NFR BLD AUTO: 6 %
NEUTROPHILS # BLD AUTO: 10.8 10E3/UL (ref 1.6–8.3)
NEUTROPHILS NFR BLD AUTO: 85 %
NRBC # BLD AUTO: 0 10E3/UL
NRBC BLD AUTO-RTO: 0 /100
NT-PROBNP SERPL-MCNC: 3859 PG/ML (ref 0–1800)
O2/TOTAL GAS SETTING VFR VENT: 60 %
P AXIS - MUSE: 24 DEGREES
PCO2 BLD: 41 MM HG (ref 35–45)
PCO2 BLDV: 37 MM HG (ref 40–50)
PCO2 BLDV: 40 MM HG (ref 40–50)
PCO2 BLDV: 40 MM HG (ref 40–50)
PH BLD: 7.3 [PH] (ref 7.35–7.45)
PH BLDV: 7.33 [PH] (ref 7.32–7.43)
PH BLDV: 7.35 [PH] (ref 7.32–7.43)
PH BLDV: 7.38 [PH] (ref 7.32–7.43)
PLATELET # BLD AUTO: 130 10E3/UL (ref 150–450)
PLATELET # BLD AUTO: 183 10E3/UL (ref 150–450)
PO2 BLD: 75 MM HG (ref 80–105)
PO2 BLDV: 47 MM HG (ref 25–47)
PO2 BLDV: 47 MM HG (ref 25–47)
PO2 BLDV: 48 MM HG (ref 25–47)
POTASSIUM SERPL-SCNC: 5.3 MMOL/L (ref 3.4–5.3)
POTASSIUM SERPL-SCNC: 5.5 MMOL/L (ref 3.4–5.3)
PR INTERVAL - MUSE: 136 MS
QRS DURATION - MUSE: 116 MS
QT - MUSE: 392 MS
QTC - MUSE: 476 MS
R AXIS - MUSE: 9 DEGREES
RBC # BLD AUTO: 3.91 10E6/UL (ref 4.4–5.9)
RBC # BLD AUTO: 4.83 10E6/UL (ref 4.4–5.9)
SAO2 % BLDV: 80 % (ref 94–100)
SAO2 % BLDV: 81 % (ref 94–100)
SAO2 % BLDV: 82 % (ref 94–100)
SODIUM SERPL-SCNC: 135 MMOL/L (ref 135–145)
SODIUM SERPL-SCNC: 135 MMOL/L (ref 135–145)
SYSTOLIC BLOOD PRESSURE - MUSE: NORMAL MMHG
T AXIS - MUSE: 183 DEGREES
TROPONIN T SERPL HS-MCNC: 61 NG/L
TROPONIN T SERPL HS-MCNC: 70 NG/L
VENTRICULAR RATE- MUSE: 89 BPM
WBC # BLD AUTO: 12.8 10E3/UL (ref 4–11)
WBC # BLD AUTO: 16 10E3/UL (ref 4–11)

## 2023-12-08 PROCEDURE — 999N000141 HC STATISTIC PRE-PROCEDURE NURSING ASSESSMENT: Performed by: SPECIALIST

## 2023-12-08 PROCEDURE — 82330 ASSAY OF CALCIUM: CPT | Performed by: INTERNAL MEDICINE

## 2023-12-08 PROCEDURE — 250N000011 HC RX IP 250 OP 636: Performed by: SPECIALIST

## 2023-12-08 PROCEDURE — C9113 INJ PANTOPRAZOLE SODIUM, VIA: HCPCS | Performed by: INTERNAL MEDICINE

## 2023-12-08 PROCEDURE — 258N000003 HC RX IP 258 OP 636: Performed by: INTERNAL MEDICINE

## 2023-12-08 PROCEDURE — 36415 COLL VENOUS BLD VENIPUNCTURE: CPT | Performed by: INTERNAL MEDICINE

## 2023-12-08 PROCEDURE — 83605 ASSAY OF LACTIC ACID: CPT | Performed by: INTERNAL MEDICINE

## 2023-12-08 PROCEDURE — 36415 COLL VENOUS BLD VENIPUNCTURE: CPT | Performed by: EMERGENCY MEDICINE

## 2023-12-08 PROCEDURE — 99291 CRITICAL CARE FIRST HOUR: CPT | Mod: 24 | Performed by: INTERNAL MEDICINE

## 2023-12-08 PROCEDURE — 85025 COMPLETE CBC W/AUTO DIFF WBC: CPT | Performed by: EMERGENCY MEDICINE

## 2023-12-08 PROCEDURE — 360N000076 HC SURGERY LEVEL 3, PER MIN: Performed by: SPECIALIST

## 2023-12-08 PROCEDURE — 88304 TISSUE EXAM BY PATHOLOGIST: CPT | Mod: TC | Performed by: SPECIALIST

## 2023-12-08 PROCEDURE — 36415 COLL VENOUS BLD VENIPUNCTURE: CPT

## 2023-12-08 PROCEDURE — 80048 BASIC METABOLIC PNL TOTAL CA: CPT | Performed by: INTERNAL MEDICINE

## 2023-12-08 PROCEDURE — 93971 EXTREMITY STUDY: CPT | Mod: RT

## 2023-12-08 PROCEDURE — 85014 HEMATOCRIT: CPT | Performed by: INTERNAL MEDICINE

## 2023-12-08 PROCEDURE — 250N000009 HC RX 250: Performed by: SPECIALIST

## 2023-12-08 PROCEDURE — 82803 BLOOD GASES ANY COMBINATION: CPT

## 2023-12-08 PROCEDURE — 250N000011 HC RX IP 250 OP 636: Performed by: ANESTHESIOLOGY

## 2023-12-08 PROCEDURE — 83605 ASSAY OF LACTIC ACID: CPT | Performed by: PHYSICIAN ASSISTANT

## 2023-12-08 PROCEDURE — 93005 ELECTROCARDIOGRAM TRACING: CPT

## 2023-12-08 PROCEDURE — 370N000017 HC ANESTHESIA TECHNICAL FEE, PER MIN: Performed by: SPECIALIST

## 2023-12-08 PROCEDURE — 0DTJ4ZZ RESECTION OF APPENDIX, PERCUTANEOUS ENDOSCOPIC APPROACH: ICD-10-PCS | Performed by: SPECIALIST

## 2023-12-08 PROCEDURE — 80048 BASIC METABOLIC PNL TOTAL CA: CPT | Performed by: EMERGENCY MEDICINE

## 2023-12-08 PROCEDURE — 250N000025 HC SEVOFLURANE, PER MIN: Performed by: SPECIALIST

## 2023-12-08 PROCEDURE — 250N000013 HC RX MED GY IP 250 OP 250 PS 637: Performed by: SPECIALIST

## 2023-12-08 PROCEDURE — 272N000001 HC OR GENERAL SUPPLY STERILE: Performed by: SPECIALIST

## 2023-12-08 PROCEDURE — 99207 PR NO BILLABLE SERVICE THIS VISIT: CPT | Performed by: INTERNAL MEDICINE

## 2023-12-08 PROCEDURE — 258N000003 HC RX IP 258 OP 636: Performed by: EMERGENCY MEDICINE

## 2023-12-08 PROCEDURE — 250N000011 HC RX IP 250 OP 636: Mod: JZ | Performed by: PHYSICIAN ASSISTANT

## 2023-12-08 PROCEDURE — 250N000013 HC RX MED GY IP 250 OP 250 PS 637: Performed by: INTERNAL MEDICINE

## 2023-12-08 PROCEDURE — 250N000009 HC RX 250: Performed by: NURSE ANESTHETIST, CERTIFIED REGISTERED

## 2023-12-08 PROCEDURE — 99223 1ST HOSP IP/OBS HIGH 75: CPT | Mod: 57 | Performed by: SPECIALIST

## 2023-12-08 PROCEDURE — 83880 ASSAY OF NATRIURETIC PEPTIDE: CPT | Performed by: EMERGENCY MEDICINE

## 2023-12-08 PROCEDURE — 200N000001 HC R&B ICU

## 2023-12-08 PROCEDURE — 82803 BLOOD GASES ANY COMBINATION: CPT | Performed by: INTERNAL MEDICINE

## 2023-12-08 PROCEDURE — 99223 1ST HOSP IP/OBS HIGH 75: CPT | Mod: 25 | Performed by: INTERNAL MEDICINE

## 2023-12-08 PROCEDURE — 250N000011 HC RX IP 250 OP 636: Performed by: INTERNAL MEDICINE

## 2023-12-08 PROCEDURE — 44970 LAPAROSCOPY APPENDECTOMY: CPT | Mod: AS | Performed by: PHYSICIAN ASSISTANT

## 2023-12-08 PROCEDURE — 710N000010 HC RECOVERY PHASE 1, LEVEL 2, PER MIN: Performed by: SPECIALIST

## 2023-12-08 PROCEDURE — 250N000011 HC RX IP 250 OP 636: Performed by: NURSE ANESTHETIST, CERTIFIED REGISTERED

## 2023-12-08 PROCEDURE — 44970 LAPAROSCOPY APPENDECTOMY: CPT | Performed by: SPECIALIST

## 2023-12-08 PROCEDURE — 258N000003 HC RX IP 258 OP 636: Performed by: ANESTHESIOLOGY

## 2023-12-08 PROCEDURE — 3E033XZ INTRODUCTION OF VASOPRESSOR INTO PERIPHERAL VEIN, PERCUTANEOUS APPROACH: ICD-10-PCS | Performed by: INTERNAL MEDICINE

## 2023-12-08 PROCEDURE — 71045 X-RAY EXAM CHEST 1 VIEW: CPT

## 2023-12-08 PROCEDURE — 83605 ASSAY OF LACTIC ACID: CPT

## 2023-12-08 PROCEDURE — 82962 GLUCOSE BLOOD TEST: CPT

## 2023-12-08 PROCEDURE — 99285 EMERGENCY DEPT VISIT HI MDM: CPT | Mod: 25

## 2023-12-08 PROCEDURE — 999N000065 XR CHEST PORT 1 VIEW

## 2023-12-08 PROCEDURE — 4A1BXSH MONITORING OF GASTROINTESTINAL VASCULAR PERFUSION USING INDOCYANINE GREEN DYE, EXTERNAL APPROACH: ICD-10-PCS | Performed by: SPECIALIST

## 2023-12-08 PROCEDURE — 84484 ASSAY OF TROPONIN QUANT: CPT | Performed by: EMERGENCY MEDICINE

## 2023-12-08 PROCEDURE — 99418 PROLNG IP/OBS E/M EA 15 MIN: CPT | Performed by: INTERNAL MEDICINE

## 2023-12-08 PROCEDURE — 99223 1ST HOSP IP/OBS HIGH 75: CPT | Mod: AI | Performed by: INTERNAL MEDICINE

## 2023-12-08 PROCEDURE — 250N000011 HC RX IP 250 OP 636: Performed by: EMERGENCY MEDICINE

## 2023-12-08 PROCEDURE — 3E1M48Z IRRIGATION OF PERITONEAL CAVITY USING IRRIGATING SUBSTANCE, PERCUTANEOUS ENDOSCOPIC APPROACH: ICD-10-PCS | Performed by: SPECIALIST

## 2023-12-08 PROCEDURE — 258N000003 HC RX IP 258 OP 636: Performed by: NURSE ANESTHETIST, CERTIFIED REGISTERED

## 2023-12-08 RX ORDER — NALOXONE HYDROCHLORIDE 0.4 MG/ML
0.4 INJECTION, SOLUTION INTRAMUSCULAR; INTRAVENOUS; SUBCUTANEOUS
Status: DISCONTINUED | OUTPATIENT
Start: 2023-12-08 | End: 2024-01-03 | Stop reason: HOSPADM

## 2023-12-08 RX ORDER — PIPERACILLIN SODIUM, TAZOBACTAM SODIUM 2; .25 G/10ML; G/10ML
2.25 INJECTION, POWDER, LYOPHILIZED, FOR SOLUTION INTRAVENOUS EVERY 6 HOURS
Status: DISCONTINUED | OUTPATIENT
Start: 2023-12-08 | End: 2023-12-10

## 2023-12-08 RX ORDER — FINASTERIDE 5 MG/1
5 TABLET, FILM COATED ORAL AT BEDTIME
Status: DISCONTINUED | OUTPATIENT
Start: 2023-12-08 | End: 2024-01-03 | Stop reason: HOSPADM

## 2023-12-08 RX ORDER — GABAPENTIN 100 MG/1
100 CAPSULE ORAL EVERY 24 HOURS
Status: DISCONTINUED | OUTPATIENT
Start: 2023-12-08 | End: 2023-12-23

## 2023-12-08 RX ORDER — LIDOCAINE 40 MG/G
CREAM TOPICAL
Status: DISCONTINUED | OUTPATIENT
Start: 2023-12-08 | End: 2024-01-01

## 2023-12-08 RX ORDER — PROPOFOL 10 MG/ML
INJECTION, EMULSION INTRAVENOUS PRN
Status: DISCONTINUED | OUTPATIENT
Start: 2023-12-08 | End: 2023-12-08

## 2023-12-08 RX ORDER — BUPIVACAINE HYDROCHLORIDE 2.5 MG/ML
INJECTION, SOLUTION INFILTRATION; PERINEURAL PRN
Status: DISCONTINUED | OUTPATIENT
Start: 2023-12-08 | End: 2023-12-08 | Stop reason: HOSPADM

## 2023-12-08 RX ORDER — INDOCYANINE GREEN AND WATER 25 MG
KIT INJECTION PRN
Status: DISCONTINUED | OUTPATIENT
Start: 2023-12-08 | End: 2023-12-08

## 2023-12-08 RX ORDER — LATANOPROST 50 UG/ML
1 SOLUTION/ DROPS OPHTHALMIC AT BEDTIME
Status: DISCONTINUED | OUTPATIENT
Start: 2023-12-08 | End: 2024-01-03 | Stop reason: HOSPADM

## 2023-12-08 RX ORDER — SODIUM CHLORIDE, SODIUM LACTATE, POTASSIUM CHLORIDE, CALCIUM CHLORIDE 600; 310; 30; 20 MG/100ML; MG/100ML; MG/100ML; MG/100ML
INJECTION, SOLUTION INTRAVENOUS CONTINUOUS PRN
Status: DISCONTINUED | OUTPATIENT
Start: 2023-12-08 | End: 2023-12-08

## 2023-12-08 RX ORDER — AMPICILLIN AND SULBACTAM 2; 1 G/1; G/1
3 INJECTION, POWDER, FOR SOLUTION INTRAMUSCULAR; INTRAVENOUS ONCE
Qty: 3 G | Refills: 0 | Status: COMPLETED | OUTPATIENT
Start: 2023-12-08 | End: 2023-12-08

## 2023-12-08 RX ORDER — SODIUM CHLORIDE 9 MG/ML
INJECTION, SOLUTION INTRAVENOUS CONTINUOUS
Status: DISCONTINUED | OUTPATIENT
Start: 2023-12-08 | End: 2023-12-12

## 2023-12-08 RX ORDER — HYDROMORPHONE HCL IN WATER/PF 6 MG/30 ML
0.2 PATIENT CONTROLLED ANALGESIA SYRINGE INTRAVENOUS EVERY 5 MIN PRN
Status: DISCONTINUED | OUTPATIENT
Start: 2023-12-08 | End: 2023-12-08 | Stop reason: HOSPADM

## 2023-12-08 RX ORDER — ACETAMINOPHEN 325 MG/1
975 TABLET ORAL ONCE
Status: COMPLETED | OUTPATIENT
Start: 2023-12-08 | End: 2023-12-08

## 2023-12-08 RX ORDER — LIDOCAINE HYDROCHLORIDE 20 MG/ML
INJECTION, SOLUTION INFILTRATION; PERINEURAL PRN
Status: DISCONTINUED | OUTPATIENT
Start: 2023-12-08 | End: 2023-12-08

## 2023-12-08 RX ORDER — ESCITALOPRAM OXALATE 10 MG/1
20 TABLET ORAL DAILY
Status: DISCONTINUED | OUTPATIENT
Start: 2023-12-08 | End: 2023-12-15

## 2023-12-08 RX ORDER — NOREPINEPHRINE BITARTRATE 0.02 MG/ML
.01-.6 INJECTION, SOLUTION INTRAVENOUS CONTINUOUS
Status: DISCONTINUED | OUTPATIENT
Start: 2023-12-08 | End: 2023-12-11

## 2023-12-08 RX ORDER — LIDOCAINE 40 MG/G
CREAM TOPICAL
Status: DISCONTINUED | OUTPATIENT
Start: 2023-12-08 | End: 2023-12-08

## 2023-12-08 RX ORDER — SODIUM CHLORIDE, SODIUM LACTATE, POTASSIUM CHLORIDE, CALCIUM CHLORIDE 600; 310; 30; 20 MG/100ML; MG/100ML; MG/100ML; MG/100ML
INJECTION, SOLUTION INTRAVENOUS CONTINUOUS
Status: DISCONTINUED | OUTPATIENT
Start: 2023-12-08 | End: 2023-12-08 | Stop reason: HOSPADM

## 2023-12-08 RX ORDER — CLINDAMYCIN PHOSPHATE 900 MG/50ML
900 INJECTION, SOLUTION INTRAVENOUS SEE ADMIN INSTRUCTIONS
Status: DISCONTINUED | OUTPATIENT
Start: 2023-12-08 | End: 2023-12-08 | Stop reason: HOSPADM

## 2023-12-08 RX ORDER — GABAPENTIN 100 MG/1
100 CAPSULE ORAL EVERY 24 HOURS
Status: DISCONTINUED | OUTPATIENT
Start: 2023-12-08 | End: 2023-12-08

## 2023-12-08 RX ORDER — NITROGLYCERIN 0.4 MG/1
0.4 TABLET SUBLINGUAL EVERY 5 MIN PRN
Status: DISCONTINUED | OUTPATIENT
Start: 2023-12-08 | End: 2023-12-08

## 2023-12-08 RX ORDER — GABAPENTIN 100 MG/1
100 CAPSULE ORAL EVERY 24 HOURS
Status: ON HOLD | COMMUNITY
End: 2024-01-03

## 2023-12-08 RX ORDER — ASPIRIN 81 MG/1
81 TABLET ORAL DAILY
Status: DISCONTINUED | OUTPATIENT
Start: 2023-12-09 | End: 2023-12-17

## 2023-12-08 RX ORDER — GABAPENTIN 300 MG/1
300 CAPSULE ORAL AT BEDTIME
Status: DISCONTINUED | OUTPATIENT
Start: 2023-12-08 | End: 2023-12-23

## 2023-12-08 RX ORDER — AMOXICILLIN 250 MG
2 CAPSULE ORAL 2 TIMES DAILY PRN
Status: DISCONTINUED | OUTPATIENT
Start: 2023-12-08 | End: 2023-12-08

## 2023-12-08 RX ORDER — CLINDAMYCIN PHOSPHATE 900 MG/50ML
900 INJECTION, SOLUTION INTRAVENOUS
Status: DISCONTINUED | OUTPATIENT
Start: 2023-12-08 | End: 2023-12-08 | Stop reason: HOSPADM

## 2023-12-08 RX ORDER — CALCIUM CARBONATE 500 MG/1
1000 TABLET, CHEWABLE ORAL 4 TIMES DAILY PRN
Status: DISCONTINUED | OUTPATIENT
Start: 2023-12-08 | End: 2024-01-03 | Stop reason: HOSPADM

## 2023-12-08 RX ORDER — AMOXICILLIN 250 MG
1 CAPSULE ORAL 2 TIMES DAILY PRN
Status: DISCONTINUED | OUTPATIENT
Start: 2023-12-08 | End: 2023-12-08

## 2023-12-08 RX ORDER — FENTANYL CITRATE 50 UG/ML
50 INJECTION, SOLUTION INTRAMUSCULAR; INTRAVENOUS EVERY 5 MIN PRN
Status: DISCONTINUED | OUTPATIENT
Start: 2023-12-08 | End: 2023-12-08 | Stop reason: HOSPADM

## 2023-12-08 RX ORDER — MAGNESIUM HYDROXIDE 1200 MG/15ML
LIQUID ORAL PRN
Status: DISCONTINUED | OUTPATIENT
Start: 2023-12-08 | End: 2023-12-08 | Stop reason: HOSPADM

## 2023-12-08 RX ORDER — CEFAZOLIN SODIUM/WATER 2 G/20 ML
SYRINGE (ML) INTRAVENOUS PRN
Status: DISCONTINUED | OUTPATIENT
Start: 2023-12-08 | End: 2023-12-08

## 2023-12-08 RX ORDER — VASOPRESSIN IN 0.9 % NACL 2 UNIT/2ML
SYRINGE (ML) INTRAVENOUS PRN
Status: DISCONTINUED | OUTPATIENT
Start: 2023-12-08 | End: 2023-12-08

## 2023-12-08 RX ORDER — CALCIUM CHLORIDE 100 MG/ML
INJECTION INTRAVENOUS; INTRAVENTRICULAR PRN
Status: DISCONTINUED | OUTPATIENT
Start: 2023-12-08 | End: 2023-12-08

## 2023-12-08 RX ORDER — NALOXONE HYDROCHLORIDE 0.4 MG/ML
0.2 INJECTION, SOLUTION INTRAMUSCULAR; INTRAVENOUS; SUBCUTANEOUS
Status: DISCONTINUED | OUTPATIENT
Start: 2023-12-08 | End: 2024-01-03 | Stop reason: HOSPADM

## 2023-12-08 RX ORDER — LEVOTHYROXINE SODIUM 100 UG/1
100 TABLET ORAL DAILY
Status: DISCONTINUED | OUTPATIENT
Start: 2023-12-08 | End: 2023-12-13

## 2023-12-08 RX ORDER — LATANOPROST 50 UG/ML
1 SOLUTION/ DROPS OPHTHALMIC AT BEDTIME
Status: DISCONTINUED | OUTPATIENT
Start: 2023-12-08 | End: 2023-12-08

## 2023-12-08 RX ORDER — GABAPENTIN 100 MG/1
200 CAPSULE ORAL EVERY MORNING
Status: DISCONTINUED | OUTPATIENT
Start: 2023-12-09 | End: 2023-12-23

## 2023-12-08 RX ORDER — ONDANSETRON 4 MG/1
4 TABLET, ORALLY DISINTEGRATING ORAL EVERY 30 MIN PRN
Status: DISCONTINUED | OUTPATIENT
Start: 2023-12-08 | End: 2023-12-08 | Stop reason: HOSPADM

## 2023-12-08 RX ORDER — GABAPENTIN 100 MG/1
200 CAPSULE ORAL EVERY MORNING
Status: DISCONTINUED | OUTPATIENT
Start: 2023-12-08 | End: 2023-12-08

## 2023-12-08 RX ORDER — CARVEDILOL 12.5 MG/1
12.5 TABLET ORAL 2 TIMES DAILY
Status: DISCONTINUED | OUTPATIENT
Start: 2023-12-08 | End: 2024-01-03 | Stop reason: HOSPADM

## 2023-12-08 RX ORDER — ONDANSETRON 2 MG/ML
4 INJECTION INTRAMUSCULAR; INTRAVENOUS EVERY 30 MIN PRN
Status: DISCONTINUED | OUTPATIENT
Start: 2023-12-08 | End: 2023-12-08 | Stop reason: HOSPADM

## 2023-12-08 RX ORDER — NITROGLYCERIN 0.4 MG/1
0.4 TABLET SUBLINGUAL EVERY 5 MIN PRN
Status: DISCONTINUED | OUTPATIENT
Start: 2023-12-08 | End: 2023-12-26

## 2023-12-08 RX ORDER — HYDROMORPHONE HCL IN WATER/PF 6 MG/30 ML
0.4 PATIENT CONTROLLED ANALGESIA SYRINGE INTRAVENOUS EVERY 5 MIN PRN
Status: DISCONTINUED | OUTPATIENT
Start: 2023-12-08 | End: 2023-12-08 | Stop reason: HOSPADM

## 2023-12-08 RX ORDER — NOREPINEPHRINE BITARTRATE 0.02 MG/ML
INJECTION, SOLUTION INTRAVENOUS CONTINUOUS PRN
Status: DISCONTINUED | OUTPATIENT
Start: 2023-12-08 | End: 2023-12-08

## 2023-12-08 RX ORDER — ASPIRIN 81 MG/1
81 TABLET ORAL DAILY
Status: DISCONTINUED | OUTPATIENT
Start: 2023-12-08 | End: 2023-12-08

## 2023-12-08 RX ORDER — HYDROMORPHONE HCL IN WATER/PF 6 MG/30 ML
0.2 PATIENT CONTROLLED ANALGESIA SYRINGE INTRAVENOUS
Status: DISCONTINUED | OUTPATIENT
Start: 2023-12-08 | End: 2023-12-28

## 2023-12-08 RX ORDER — HYDROMORPHONE HCL IN WATER/PF 6 MG/30 ML
0.4 PATIENT CONTROLLED ANALGESIA SYRINGE INTRAVENOUS
Status: DISCONTINUED | OUTPATIENT
Start: 2023-12-08 | End: 2023-12-20

## 2023-12-08 RX ORDER — FENTANYL CITRATE 50 UG/ML
25 INJECTION, SOLUTION INTRAMUSCULAR; INTRAVENOUS EVERY 5 MIN PRN
Status: DISCONTINUED | OUTPATIENT
Start: 2023-12-08 | End: 2023-12-08 | Stop reason: HOSPADM

## 2023-12-08 RX ADMIN — INDOCYANINE GREEN AND WATER 7.5 MG: KIT at 15:09

## 2023-12-08 RX ADMIN — PHENYLEPHRINE HYDROCHLORIDE 100 MCG: 10 INJECTION INTRAVENOUS at 14:07

## 2023-12-08 RX ADMIN — SODIUM CHLORIDE 500 ML: 9 INJECTION, SOLUTION INTRAVENOUS at 07:46

## 2023-12-08 RX ADMIN — NOREPINEPHRINE BITARTRATE 32 MCG: 1 INJECTION, SOLUTION, CONCENTRATE INTRAVENOUS at 14:11

## 2023-12-08 RX ADMIN — Medication 2 G: at 14:16

## 2023-12-08 RX ADMIN — Medication 1 UNITS: at 14:22

## 2023-12-08 RX ADMIN — ESCITALOPRAM OXALATE 20 MG: 20 TABLET, FILM COATED ORAL at 11:04

## 2023-12-08 RX ADMIN — ROCURONIUM BROMIDE 50 MG: 50 INJECTION, SOLUTION INTRAVENOUS at 14:25

## 2023-12-08 RX ADMIN — LATANOPROST 1 DROP: 50 SOLUTION/ DROPS OPHTHALMIC at 21:42

## 2023-12-08 RX ADMIN — ACETAMINOPHEN 975 MG: 325 TABLET, FILM COATED ORAL at 12:33

## 2023-12-08 RX ADMIN — SODIUM CHLORIDE 500 ML: 9 INJECTION, SOLUTION INTRAVENOUS at 04:39

## 2023-12-08 RX ADMIN — NOREPINEPHRINE BITARTRATE 16 MCG: 1 INJECTION, SOLUTION, CONCENTRATE INTRAVENOUS at 14:20

## 2023-12-08 RX ADMIN — SODIUM CHLORIDE, POTASSIUM CHLORIDE, SODIUM LACTATE AND CALCIUM CHLORIDE: 600; 310; 30; 20 INJECTION, SOLUTION INTRAVENOUS at 15:08

## 2023-12-08 RX ADMIN — CALCIUM CHLORIDE INJECTION 500 MG: 100 INJECTION, SOLUTION INTRAVENOUS at 16:03

## 2023-12-08 RX ADMIN — SODIUM CHLORIDE 500 ML: 9 INJECTION, SOLUTION INTRAVENOUS at 01:06

## 2023-12-08 RX ADMIN — SUGAMMADEX 300 MG: 100 INJECTION, SOLUTION INTRAVENOUS at 15:51

## 2023-12-08 RX ADMIN — PIPERACILLIN AND TAZOBACTAM 2.25 G: 2; .25 INJECTION, POWDER, FOR SOLUTION INTRAVENOUS at 08:43

## 2023-12-08 RX ADMIN — PANTOPRAZOLE SODIUM 40 MG: 40 INJECTION, POWDER, FOR SOLUTION INTRAVENOUS at 08:45

## 2023-12-08 RX ADMIN — Medication 1 UNITS: at 14:28

## 2023-12-08 RX ADMIN — FENTANYL CITRATE 25 MCG: 50 INJECTION, SOLUTION INTRAMUSCULAR; INTRAVENOUS at 17:19

## 2023-12-08 RX ADMIN — PROPOFOL 100 MG: 10 INJECTION, EMULSION INTRAVENOUS at 14:10

## 2023-12-08 RX ADMIN — SODIUM CHLORIDE: 9 INJECTION, SOLUTION INTRAVENOUS at 06:41

## 2023-12-08 RX ADMIN — AMPICILLIN SODIUM AND SULBACTAM SODIUM 3 G: 2; 1 INJECTION, POWDER, FOR SOLUTION INTRAMUSCULAR; INTRAVENOUS at 02:54

## 2023-12-08 RX ADMIN — PHENYLEPHRINE HYDROCHLORIDE 0.5 MCG/KG/MIN: 10 INJECTION INTRAVENOUS at 14:06

## 2023-12-08 RX ADMIN — CLINDAMYCIN PHOSPHATE 900 MG: 900 INJECTION, SOLUTION INTRAVENOUS at 12:35

## 2023-12-08 RX ADMIN — NOREPINEPHRINE BITARTRATE 0.03 MCG/KG/MIN: 0.02 INJECTION, SOLUTION INTRAVENOUS at 14:13

## 2023-12-08 RX ADMIN — Medication 1 UNITS: at 14:13

## 2023-12-08 RX ADMIN — ASPIRIN 81 MG: 81 TABLET, COATED ORAL at 08:43

## 2023-12-08 RX ADMIN — PIPERACILLIN AND TAZOBACTAM 2.25 G: 2; .25 INJECTION, POWDER, FOR SOLUTION INTRAVENOUS at 20:53

## 2023-12-08 RX ADMIN — NOREPINEPHRINE BITARTRATE 16 MCG: 1 INJECTION, SOLUTION, CONCENTRATE INTRAVENOUS at 14:14

## 2023-12-08 RX ADMIN — FENTANYL CITRATE 25 MCG: 50 INJECTION, SOLUTION INTRAMUSCULAR; INTRAVENOUS at 17:14

## 2023-12-08 RX ADMIN — SODIUM CHLORIDE, POTASSIUM CHLORIDE, SODIUM LACTATE AND CALCIUM CHLORIDE: 600; 310; 30; 20 INJECTION, SOLUTION INTRAVENOUS at 13:56

## 2023-12-08 RX ADMIN — SUCCINYLCHOLINE CHLORIDE 140 MG: 20 INJECTION, SOLUTION INTRAMUSCULAR; INTRAVENOUS; PARENTERAL at 14:10

## 2023-12-08 RX ADMIN — Medication 1 UNITS: at 14:35

## 2023-12-08 RX ADMIN — LIDOCAINE HYDROCHLORIDE 60 MG: 20 INJECTION, SOLUTION INFILTRATION; PERINEURAL at 14:10

## 2023-12-08 ASSESSMENT — ACTIVITIES OF DAILY LIVING (ADL)
ADLS_ACUITY_SCORE: 35
ADLS_ACUITY_SCORE: 35
ADLS_ACUITY_SCORE: 30
ADLS_ACUITY_SCORE: 31
ADLS_ACUITY_SCORE: 30
ADLS_ACUITY_SCORE: 31
ADLS_ACUITY_SCORE: 30
ADLS_ACUITY_SCORE: 29
ADLS_ACUITY_SCORE: 35

## 2023-12-08 ASSESSMENT — COPD QUESTIONNAIRES: COPD: 0

## 2023-12-08 ASSESSMENT — LIFESTYLE VARIABLES: TOBACCO_USE: 0

## 2023-12-08 NOTE — PROGRESS NOTES
05:30- Received patient from ER,Left IV was infiltrated.    Dx; Abdominal pain,Sepsis,Acute Appendicitis    Orientations: Alert and oriented x 4  Vitals/Pain: Soft BP,NC at 4 LPM.Pain controlled with Dilaudid,given at ER  Tele: SR  Diet: NPO  Lungs: Diminished  Lines/Drains: R/PIV SL  Skin/Wounds: Scattered bruises  GI/: No BM on this shift,Not yet voided from ER,bladder scan with 200 ml urine.  Labs: Abnormal/Trends, Electrolyte Replacement- For repeat Lactic as per protocol.  Ambulation/Assist: Assist x2,lift,turn and repo  Sleep Quality: Not yet assessed,but patient has own CPAP  Plan: For Cardiology consult prior to surgery.

## 2023-12-08 NOTE — Clinical Note
The first balloon was inserted into the saphenous vein graft.Max pressure = 18 del. Total duration = 24 seconds.     Max pressure = 20 del. Total duration = 21 seconds.    Balloon reinflated a second time: Max pressure = 20 del. Total duration = 21 seconds.  Balloon reinflated a third time:

## 2023-12-08 NOTE — ED PROVIDER NOTES
History     Chief Complaint:  Hypotension       HPI   Garry Mckay is a 88 year old male with history of CHF who presents to the ED via EMS for evaluation of hypotension. He was a direct admit to the hospital for appendicitis and was having increased abdominal pain with hypotension during the transfer. Patient was transferred secondary to his heart history.  Cardiac evaluation was needed before surgery for his appendix. He states the lower abdominal pain occurring for the last couple of weeks. No use of oxygen at home. Denies shortness of breath or chest pain.      Independent Historian:    None    Review of External Notes:  Reviewed patient's ED visit notes from earlier today prior to his transfer.  Patient was hypoxic and needing oxygen at the outside facility.  Also patient had been hypotensive at the outside facility.  He was given Unasyn at 8:30 PM.    Medications:    aspirin (ASA) 81 MG EC tablet  atorvastatin (LIPITOR) 80 MG tablet  B Complex-C (SUPER B COMPLEX PO)  carvedilol (COREG) 12.5 MG tablet  clopidogrel (PLAVIX) 75 MG tablet  ENTRESTO 49-51 MG per tablet  escitalopram (LEXAPRO) 20 MG tablet  ezetimibe (ZETIA) 10 MG tablet  finasteride (PROSCAR) 5 MG tablet  furosemide (LASIX) 20 MG tablet  gabapentin (NEURONTIN) 100 MG capsule  gabapentin (NEURONTIN) 300 MG capsule  levothyroxine (SYNTHROID/LEVOTHROID) 100 MCG tablet  magnesium oxide (MAG-OX) 400 (241.3 Mg) MG tablet  melatonin 3 MG tablet  multivitamin (CENTRUM SILVER) tablet  nitroGLYcerin (NITROSTAT) 0.4 MG sublingual tablet  Vitamin D3 (CHOLECALCIFEROL) 125 MCG (5000 UT) tablet    Past Medical History:    Anxiety   Hypertension  BPH  Coronary arthrosclerosis  Panic disorder  Secondary hyperparathyroidism   Stage 3 chronic kidney disease  Glaucoma  Hyperlipidemia  Anemia  Insomnia  CAD  CARINA  Major depression  Thrombocytopenia  GERD  Stroke     Past Surgical History:    Cardiac catheterization  Pacemaker  Coronary Artery Bypass Graft        Physical Exam   Patient Vitals for the past 24 hrs:   BP Temp Temp src Pulse Resp SpO2 Weight   12/08/23 0440 101/52 -- -- 94 15 91 % --   12/08/23 0430 (!) 74/57 -- -- 93 15 91 % --   12/08/23 0400 90/59 -- -- 92 (!) 9 92 % --   12/08/23 0300 (!) 86/51 -- -- 92 22 94 % --   12/08/23 0245 92/50 -- -- 90 22 94 % --   12/08/23 0230 100/70 -- -- 89 21 94 % --   12/08/23 0215 106/49 -- -- 90 22 94 % --   12/08/23 0200 (!) 84/49 -- -- 89 21 94 % --   12/08/23 0145 95/57 -- -- 87 22 94 % --   12/08/23 0130 99/52 -- -- 85 20 93 % --   12/08/23 0125 98/53 -- -- 86 22 94 % --   12/08/23 0120 101/49 -- -- 87 24 93 % --   12/08/23 0115 95/56 -- -- 86 24 93 % --   12/08/23 0110 98/53 -- -- 88 23 93 % --   12/08/23 0105 95/53 -- -- 89 23 91 % --   12/08/23 0100 102/53 -- -- 89 25 93 % --   12/08/23 0055 (!) 75/52 -- -- 89 12 91 % --   12/08/23 0050 (!) 82/50 -- -- 87 22 93 % --   12/08/23 0045 (!) 86/45 -- -- 88 -- -- --   12/08/23 0044 (!) 86/48 98.8  F (37.1  C) Temporal 89 21 90 % --   12/08/23 0035 (!) 67/36 -- -- 90 24 (!) 87 % 84.6 kg (186 lb 8.2 oz)      Physical Exam  General: Resting comfortably when I enter the room  Eyes: Pupils equal, conjunctivae pink no scleral icterus or conjunctival injection  ENT:  Moist mucus membranes  Respiratory:  Lungs clear to auscultation bilaterally, no crackles/rubs/wheezes.  Good air movement  CV: Normal rate and rhythm, no murmurs  GI:  Abdomen is tender to palpation in the right and left lower quadrants.  Abdomen is soft.  Mild guarding.  Skin: Warm, dry.  No rashes or petechiae  Musculoskeletal: No peripheral edema or calf tenderness  Neuro: Alert and oriented to person/place/time  Psychiatric: Normal affect    Emergency Department Course   ECG  ECG taken at 0036, ECG read at 0044  Normal sinus rhythm  Nonspecific ST and T wave abnormality   Prolonged QT   Rate 89 bpm. RI interval 136 ms. QRS duration 116 ms. QT/QTc 392/476 ms. P-R-T axes 24 9 183.    Imaging:  XR Chest Port 1  View   Final Result   IMPRESSION: No acute abnormality.        Report per radiology    Laboratory:  Labs Ordered and Resulted from Time of ED Arrival to Time of ED Departure   BASIC METABOLIC PANEL - Abnormal       Result Value    Sodium 135      Potassium 5.3      Chloride 100      Carbon Dioxide (CO2) 20 (*)     Anion Gap 15      Urea Nitrogen 29.3 (*)     Creatinine 1.96 (*)     GFR Estimate 33 (*)     Calcium 9.0      Glucose 159 (*)    NT PROBNP INPATIENT - Abnormal    N terminal Pro BNP Inpatient 3,859 (*)    TROPONIN T, HIGH SENSITIVITY - Abnormal    Troponin T, High Sensitivity 70 (*)    GLUCOSE BY METER - Abnormal    GLUCOSE BY METER POCT 163 (*)    CBC WITH PLATELETS AND DIFFERENTIAL - Abnormal    WBC Count 12.8 (*)     RBC Count 4.83      Hemoglobin 15.7      Hematocrit 47.5      MCV 98      MCH 32.5      MCHC 33.1      RDW 14.0      Platelet Count 183      % Neutrophils 85      % Lymphocytes 7      % Monocytes 6      % Eosinophils 1      % Basophils 0      % Immature Granulocytes 1      NRBCs per 100 WBC 0      Absolute Neutrophils 10.8 (*)     Absolute Lymphocytes 0.9      Absolute Monocytes 0.8      Absolute Eosinophils 0.1      Absolute Basophils 0.1      Absolute Immature Granulocytes 0.2      Absolute NRBCs 0.0     ISTAT GASES LACTATE VENOUS POCT - Abnormal    Lactic Acid POCT 3.4 (*)     Bicarbonate Venous POCT 22      O2 Sat, Venous POCT 82 (*)     pCO2 Venous POCT 37 (*)     pH Venous POCT 7.38      pO2 Venous POCT 47     ISTAT GASES LACTATE VENOUS POCT - Abnormal    Lactic Acid POCT 3.4 (*)     Bicarbonate Venous POCT 21      O2 Sat, Venous POCT 80 (*)     pCO2 Venous POCT 40      pH Venous POCT 7.33      pO2 Venous POCT 47     ISTAT GASES LACTATE VENOUS POCT - Abnormal    Lactic Acid POCT 2.9 (*)     Bicarbonate Venous POCT 22      O2 Sat, Venous POCT 81 (*)     pCO2 Venous POCT 40      pH Venous POCT 7.35      pO2 Venous POCT 48 (*)    LACTIC ACID WHOLE BLOOD   LACTIC ACID WHOLE BLOOD       Procedures       Emergency Department Course & Assessments:       Interventions:  Medications   lidocaine 1 % 0.1-1 mL (has no administration in time range)   lidocaine (LMX4) cream (has no administration in time range)   sodium chloride (PF) 0.9% PF flush 3 mL (has no administration in time range)   sodium chloride (PF) 0.9% PF flush 3 mL (has no administration in time range)   sodium chloride 0.9 % infusion (has no administration in time range)   sodium chloride 0.9% BOLUS 500 mL (500 mLs Intravenous $New Bag 12/8/23 0439)   carvedilol (COREG) tablet 12.5 mg (has no administration in time range)   aspirin EC tablet 81 mg (has no administration in time range)   escitalopram (LEXAPRO) tablet 20 mg (has no administration in time range)   finasteride (PROSCAR) tablet 5 mg (has no administration in time range)   HYDROmorphone (DILAUDID) injection 0.2 mg (has no administration in time range)   HYDROmorphone (DILAUDID) injection 0.4 mg (has no administration in time range)   piperacillin-tazobactam (ZOSYN) 3.375 g vial to attach to  mL bag (has no administration in time range)   pantoprazole (PROTONIX) IV push injection 40 mg (has no administration in time range)   sodium chloride 0.9% BOLUS 500 mL (0 mLs Intravenous Stopped 12/8/23 0130)   ampicillin-sulbactam (UNASYN) 3 g vial to attach to  mL bag (3 g Intravenous $New Bag 12/8/23 0254)      Independent Interpretation (X-rays, CTs, rhythm strip):  Chest x-ray shows cardiomegaly, no consolidation.    Assessments/Consultations/Discussion of Management or Tests:   ED Course as of 12/08/23 0449   Fri Dec 08, 2023   0030 I obtained history and examined the patient as noted above.    0049 I rechecked the patient and explained findings.      Social Determinants of Health affecting care:  None     Disposition:  The patient was admitted to the hospital under the care of Dr. Shaw.     Impression & Plan    CMS Diagnoses: The patient has signs of Severe Sepsis  "       If one the following conditions is present, a 30 mL/kg bolus is recommended as part of the 6 hour bundle (IBW can be used for BMI >30, or document refusal/contraindication):      1.   Initial hypotension  defined as 2 bps < 90 or map < 65 in the 6hrs before or 3hrs after time zero.     2.  Lactate >4.      The patient has signs of Severe Sepsis as evidenced by:    1. 2 SIRS criteria, AND  2. Suspected infection, AND   3. Organ dysfunction:  SBP <90, MAP < 65, or SBP decrease of >40 from baseline due to infection and Lactic Acidosis with value >2.0    Time severe sepsis diagnosis confirmed: 01:03  12/08/23 as this was the time when Lactate resulted, and the level was > 2.0    3 Hour Severe Sepsis Bundle Completion:    1. Initial Lactic Acid Result:   Recent Labs   Lab Test 12/08/23  0228 12/08/23  0103 12/08/23  0053   LACT 2.9* 3.4* 3.4*     2. Blood Cultures before Antibiotics: Yes  3. Broad Spectrum Antibiotics Administered:  yes       Anti-infectives (From admission through now)      Start     Dose/Rate Route Frequency Ordered Stop    12/08/23 0450  piperacillin-tazobactam (ZOSYN) 3.375 g vial to attach to  mL bag        Note to Pharmacy: For [facility] and [facility]: For Zosyn-naive patients, use the \"Zosyn initial dose + extended infusion\" order panel.    3.375 g  over 30 Minutes Intravenous EVERY 6 HOURS 12/08/23 0448      12/08/23 0300  ampicillin-sulbactam (UNASYN) 3 g vial to attach to  mL bag         3 g  over 15-30 Minutes Intravenous ONCE 12/08/23 0223 12/08/23 0324            4. Is initial hypotension present?     Yes. (Definition - 2 SBPs <90, MAP <65, or decrease > 40 from baseline due to infection w/in 3 hrs of each other during the time period of 6 hrs before and 3 hrs after time zero)   Full 30 mL/kg bolus intentionally NOT administered to this patient due to CHF and acute Pulmonary Edema. The target volume to infuse in this patient is 500.    BMI Readings from Last 1 Encounters: "   23 29.21 kg/m      30 mL/kg fluids based on weight: 2,540 mL  30 mL/kg fluids based on IBW (must be >= 60 inches tall): 1,980 mL                    Severe Sepsis reassessment:  1. Repeat Lactic Acid Level within 6 hours of time zero: 2.9  2. MAP>65 after initial IVF bolus, will continue to monitor fluid status and vital signs    I attest to having performed a repeat sepsis exam and assessment of perfusion at 0228 and the results demonstrate improved perfusion.   and None      Medical Decision Makin-year-old male presents to the emergency department as a direct admit for appendicitis and CHF.  During transport EMS reports that the patient was becoming more hypotensive.  Patient was stopped in the emergency department before going to the floor for stabilization as his condition had changed.  Patient was given Unasyn at 830 this evening.  His other lab work from the outside facility earlier looked reassuring.  CT scan from the outside facility shows appendicitis without rupture or abscess.  It does show that the patient was hypoxic and hypotensive before leaving the facility.  On reevaluation here in the ED, patient's lactic acid is 3.4.  Patient is hypotensive.  He is also hypoxic on 2 L nasal cannula.  He is not acidotic.  His creatinine is worsening at 1.96.  Patient is not febrile.  His abdomen is tender, but not peritoneal.  I am going to give him a 500 mL bolus.  Patient has already received antibiotics.  I did order blood cultures.  Patient is fluid responsive to the 500 mL bolus.  His maps are now above 65.  I did not place a central line or start pressors.  I did order another dose of Unasyn as it has been 6 hours.  Repeat lactic acid is 2.9.  I think the patient is dry.  After the 500 mL bolus he is not requiring any additional oxygen.  I would continue fluid resuscitation.  I did speak with Dr. Shaw who originally accepted the patient for direct admit.  He agrees that the patient appears  much more stable now.  [place] [facility] [place]      Diagnosis:    ICD-10-CM    1. Acute appendicitis with localized peritonitis, without perforation, abscess, or gangrene  K35.30       2. Acute sepsis (H)  A41.9       3. FATIMAH (acute kidney injury) (H24)  N17.9            Discharge Medications:  New Prescriptions    No medications on file      Scribe Disclosure:  I, Lavern Phelps, am serving as a scribe at 12:44 AM on 12/8/2023 to document services personally performed by Mary Winkler MD based on my observations and the provider's statements to me.  12/8/2023   Mary Winkler MD Richardson, Elizabeth, MD  12/08/23 0503

## 2023-12-08 NOTE — BRIEF OP NOTE
[facility]    Brief Operative Note    Pre-operative diagnosis: Acute appendicitis with localized peritonitis, without perforation, abscess, or gangrene [K35.30]  Post-operative diagnosis Perforated appendicitis    Procedure: APPENDECTOMY, LAPAROSCOPIC, ICG INSPECTION OF BOWEL, N/A - Abdomen    Surgeon: Surgeon(s) and Role:     * Jeannette Garcia MD - Primary     * Garret Nieto PA-C - Assisting  Anesthesia: General     Estimated Blood Loss: See op note    Specimens:   ID Type Source Tests Collected by Time Destination   1 : APPENDIX Tissue Appendix SURGICAL PATHOLOGY EXAM Jeannette Garcia MD 12/8/2023  2:41 PM      Findings:  As above. Drain placed. ICG utilized to inspect colon due to borderline necrosis at appendiceal base. No immediate complications. See operative report for full details.      Radha Vera PA-C (dictating for Dr. Garcia, I did not personally assist with this surgery)  [facility]  [phone]

## 2023-12-08 NOTE — PROGRESS NOTES
"Sepsis Evaluation     I was called to see Garry Mckay due to  elevated lactic acid . He is known to have an infection.     PHYSICAL EXAM  Vital Signs:  Temp: 97.5  F (36.4  C) Temp src: Oral BP: 92/58 Pulse: 89   Resp: 23 SpO2: 93 % O2 Device: Nasal cannula Oxygen Delivery: 4 LPM    General: acutely ill appearing  Mental Status:  alert, awake, speech appropriate    Remainder of physical exam is significant for     Heart - slight tachy but reg, no loud murmur  Lungs - CTA ant/lat bilaterally,  no W/R/R ausc  Mild costal retractions bilaterally  Abd - + BS, abd distended throughout but still soft.  Reproducible tenderness in the lower quad and RLQ without clear R/G/R  Ext - right upper arm with some swelling -- left hand with mild swelling    DATA  Lactic Acid   Date Value Ref Range Status   12/08/2023 3.0 (H) 0.7 - 2.0 mmol/L Final   12/08/2023 3.2 (H) 0.7 - 2.0 mmol/L Final     Lactic Acid POCT   Date Value Ref Range Status   12/08/2023 2.9 (H) <=2.0 mmol/L Final   12/08/2023 3.4 (H) <=2.0 mmol/L Final       ASSESSMENT AND PLAN  Garry Mckay meets SIRS criteria but does NOT have a lactate >2 or other evidence of acute organ damage.  These vital sign, lab and physical exam findings constitute a diagnosis of SEPSIS.         Anti-infectives (From now, onward)      Start     Dose/Rate Route Frequency Ordered Stop    12/08/23 0900  piperacillin-tazobactam (ZOSYN) 2.25 g vial to attach to  ml bag        Note to Pharmacy: For [facility], [facility] and [facility]: For Zosyn-naive patients, use the \"Zosyn initial dose + extended infusion\" order panel.    2.25 g  over 30 Minutes Intravenous EVERY 6 HOURS 12/08/23 0448            Current antibiotic coverage is appropriate for source of infection.     Disposition: The patient will remain on the current unit. We will continue to monitor this patient closely..    Will give NS bolus x 1  Order US of the right arm this am  Awaiting surgery consult for acute appendicitis    Caroline MELGAR" DO Gloria  12/08/23, 7:42 AM    Sepsis Criteria   Sepsis: The body's generalized inflammatory state as a response to an infection. Sepsis Predictive Model includes >80 variable to alert to potential sepsis.  Severe Sepsis: Sepsis plus one or more variables of acute organ dysfunction (Note: lactic acid >2 or acute encephalopathy each qualify as organ dysfunction)  Septic Shock: Sepsis AND hypotension despite adequate volume resuscitation with crystalloid or lactic acid >=4  Note: HYPOTENSION is defined as 2 BP readings measured 3 hrs apart that have a SBP <90, MAP <65, or decrease >40 mmHg, occurring 6 hrs before or after t-zero

## 2023-12-08 NOTE — ED TRIAGE NOTES
Arrives by EMS from [facility]. He was a direct admit to the hospital and while enroute began having increased abdominal pain with hypotension.      Triage Assessment (Adult)       Row Name 12/08/23 0037          Triage Assessment    Airway WDL WDL        Respiratory WDL    Respiratory WDL WDL        Skin Circulation/Temperature WDL    Skin Circulation/Temperature WDL WDL        Cardiac WDL    Cardiac WDL WDL     Cardiac Rhythm NSR        Peripheral/Neurovascular WDL    Peripheral Neurovascular WDL WDL        Cognitive/Neuro/Behavioral WDL    Cognitive/Neuro/Behavioral WDL WDL

## 2023-12-08 NOTE — ANESTHESIA CARE TRANSFER NOTE
Patient: Garry Mckay    Procedure: Procedure(s):  APPENDECTOMY, LAPAROSCOPIC, ICG INSPECTION OF BOWEL       Diagnosis: Acute appendicitis with localized peritonitis, without perforation, abscess, or gangrene [K35.30]  Diagnosis Additional Information: No value filed.    Anesthesia Type:   General     Note:    Oropharynx: oropharynx clear of all foreign objects and spontaneously breathing  Level of Consciousness: awake  Oxygen Supplementation: face mask  Level of Supplemental Oxygen (L/min / FiO2): 6  Independent Airway: airway patency satisfactory and stable  Dentition: dentition unchanged  Vital Signs Stable: post-procedure vital signs reviewed and stable  Report to RN Given: handoff report given  Patient transferred to: PACU  Comments:     Neuromuscular blockade reversed with sugammadex, spontaneous respirations, adequate tidal volumes, oropharynx suctioned with soft flexible catheter, extubated atraumatically, extubated with suction, airway patent after extubation.  Oxygen via facemask at 6 liters per minute to PACU. Oxygen tubing connected to wall O2 in PACU, SpO2, NiBP, and EKG monitors and alarms on and functioning, report on patient's clinical status given to PACU RN, RN questions answered.             Handoff Report: Identifed the Patient, Identified the Reponsible Provider, Reviewed the pertinent medical history, Discussed the surgical course, Reviewed Intra-OP anesthesia mangement and issues during anesthesia, Set expectations for post-procedure period and Allowed opportunity for questions and acknowledgement of understanding      Vitals:  Vitals Value Taken Time   /53    Temp     Pulse 73 12/08/23 1621   Resp 20 12/08/23 1621   SpO2 92 % 12/08/23 1621   Vitals shown include unfiled device data.    Electronically Signed By: SELMA Kennedy CRNA  December 8, 2023  4:23 PM

## 2023-12-08 NOTE — OP NOTE
Operative Note    Name:  Garry Mckay  [facility]  Procedure Date:  12/8/2023       Procedure:  Procedure(s):  APPENDECTOMY, LAPAROSCOPIC, ICG INSPECTION OF BOWEL     Pre-Procedure Diagnosis:  Acute appendicitis with localized peritonitis, without perforation, abscess, or gangrene [K35.30]     Post-Procedure Diagnosis:    Ruptured appendicitis with extensive peritonitis, questionable area of cecal ischemia.    Surgeon(s):  Jeannette Garcia MD      Assistant: Garret Nieto PA-C       Anesthesia Type:  General       Findings:  Ruptured appendix with extensive peritonitis.  Concerning area on the cecum for ischemic colitis however there was green dye visible on ICG inspection.    Operative Report:    The patient was brought to the operating suite where they were placed in the supine position and prepped and draped in a sterile fashion after general anesthesia was administered.  A small incision was made below the umbilicus and a Veress needle passed into the abdominal cavity which was insufflated with carbon dioxide.  A 5 mm trocar port was then placed followed by the camera.  Another 5 mm port was placed in the upper abdomen.  I then was able to visualize that the appendix was grossly ruptured.  There is a significant amount of purulent material throughout the right lower quadrant with loops of bowel stuck to each other.  A 12 mm port was placed and with traction on the appendix the mesoappendix was taken down with  electrocautery.  The base of the appendix was then amputated with the endoGIA stapler. .  The appendix was pulled up through one of the  Port sites.  The port was replaced and the area irrigated until clear.  As I was manipulating the bowel to make sure we were irrigating out all of the purulent material in the right lower quadrant I encountered an area on the medial aspect of the cecum that did not appear normal.  My initial thought was that the bowel wall looked ischemic.   I continued to irrigate out areas while we switched out the camera to a Brandicted camera and then eventually administered ICG to the patient.  We then looked at this area again and although there was not brisk green colorization there is definitely green color in the area.  My feeling is that this area was likely partially ischemic because of his low flow state given his heart history and his sepsis.  I did not feel that given his significant cardiac history that he would tolerate doing a more extensive operation at this time.  Another stab incision was made in the right lower quadrant and a 15 round Arnaldo-Sofia drain passed down through this site and laid down into the right lower quadrant and particularly up then along the medial aspect of the cecum.  Again a full 2 L of saline was used to irrigate out the abdominal cavity as much as I could but there was extensive inflammation that was not isolated to the right lower quadrant.  Once I felt that I had removed as much of the purulent material as I could, all the ports removed and each site closed with subcuticular sutures of 4 Monocryl.  The drain was secured with a 2-0 silk.  Each site was infiltrated with quarter percent Marcaine.  Sterile dressings are placed.  The patient tolerated procedure well.    Estimated Blood Loss:   5 cc    Specimens:    ID Type Source Tests Collected by Time Destination   1 : APPENDIX Tissue Appendix SURGICAL PATHOLOGY EXAM Jeannette Garcia MD 12/8/2023  2:41 PM            Drains:   Closed/Suction Drain 1 Right Abdomen Bulb 15 Portuguese (Active)       Complications:    None    Jeannette Garcia MD     Date: 12/8/2023  Time: 4:38 PM

## 2023-12-08 NOTE — ANESTHESIA PROCEDURE NOTES
Arterial Line Procedure Note    Pre-Procedure   Staff -        Anesthesiologist:  Austen Burleson MD       Performed By: Anesthesiologist       Location: pre-op       Pre-Anesthestic Checklist: patient identified, IV checked, risks and benefits discussed, informed consent, monitors and equipment checked and pre-op evaluation  Timeout:       Correct Patient: Yes        Correct Procedure: Yes        Correct Site: Yes        Correct Position: Yes   Line Placement:   This line was placed Pre Induction starting at 12/8/2023 1:20 PM and ending at 12/8/2023 1:25 PM  Procedure   Procedure: arterial line       Diagnosis: Hemodynamic instability       Laterality: left       Insertion Site: radial.  Sterile Prep        All elements of maximal sterile barrier technique followed       Patient Prep/Sterile Barriers: hand hygiene, sterile gloves, hat, mask, draped, sterile gown, draped       Skin prep: Chloraprep  Insertion/Injection        Technique: ultrasound guided        1. Ultrasound was used to evaluate the access site.       2. Artery evaluated via ultrasound for patency/adequacy.       3. Using real-time ultrasound the needle/catheter was observed entering the artery/vein.       4. Permanent image was captured and entered into the patient's record.       Catheter Type/Size: 20 G, 12 cm  Narrative         Secured by: anchor securement device       Tegaderm dressing used.       Complications: None apparent,        Arterial waveform: Yes    Comments:  Ultrasound Interpretation arterial catheter    1. Ultrasound guidance was used to evaluate potential access sites.  2. Ultrasound was also used to verify the patency of the vessel specified above.   3. Ultrasound was used to visualize the needle entering the vessel.   4. The visualized structures were anatomically normal.  5. There were no apparent abnormal pathological findings.  6. A permanent ultrasound image was saved in the patient's record.

## 2023-12-08 NOTE — PLAN OF CARE
[id number] (OR 1120-current)    Orientations: A&Ox4  Vitals/Pain: VSS (Hypotensive 93/60), 4L NC. LS diminished. Pain (moderate in abdom)  Tele: SR w/ T wave abnorm and ans ST depress   Lines/Drains: PIV R SL and L infusing  mL/hr   Skin/Wounds: Blister RUE, Swelling RUE and L hand, scattered bruising. Dry/flaky bilateral feet.   GI/: Decreased urine output, No BM on shift  Labs: Abnormal/Trends: Lactic (2.9)  Electrolyte Replacement- N/A  Ambulation/Assist: Lift assist w/ low BP and transferring   Plan: Monitor RUE and L hand swelling. Family at bedside. RUE US - thrombosis. Appendectomy today. 500mL bolus given this AM 0800. Cardiology following.

## 2023-12-08 NOTE — CONSULTS
[facility]    Cardiology Consultation     Date of Admission:  12/8/2023    Assessment & Plan   #1 Acute appendicitis with lactate elevation at 3.5  #2 History of ischemic cardiomyopathy (25% EF) with ICD  #3 History of CAD with CABG in 1994 with last cath 1/2021 at [facility] showing patent CLAUDIA-RCA, LIMA occluded, vein-diagonal patent with placement of stent,  LAD, 40% circumflex, RCA occluded.   #4 Last stress testing 7/2023 in [place] with SPECT imaging, negative for ischemia.   #5 CARINA    It was a pleasure to be involved in the care of Mr. Mckay.  He presented quite ill and needs to have an appendectomy. There is nothing further from a cardiac standpoint that we would recommend at this time beyond having pacemaker team available for ICD to be turned off in the OR for cautery and then turned back on.  Would be careful with not administering too much fluid given his cardiomyopathy.     Will follow.  Please call with questions.     High complexity     Maikel Ladd MD    Primary Care Physician   [facility]    Reason for Consult   Reason for consult: I was asked by Dr. Shaw to evaluate this patient for pre-op clearance for appendectomy    History of Present Illness   Mr. Mckay is a 88 year old male with history of CABG in 1994 and known ischemic cardiomyopathy with ICD and EF 25-30%.  His last stress test was in July 2023 and that was negative for ischemia. He has had no concerning cardiac symptoms. He lives in [place].    He presented to [facility] with abdominal pain on 12/7/23 and in the emergency department a CT was done and showed acute appendicitis without abscess or rupture.  Given his cardiac history he was transferred to [facility].   His blood pressures were in the 70s systolic and responded to fluids.  His lactate was elevated at 3.5. He has acute on chronic renal failure with creatinine 1.96 up from baseline of 1.2.  His troponin has  been flat at 65-70 with no ischemic symptoms.    Past Medical History   Chronic HFrEF  Ischemic CM  CAD s/p CABG, stenting  HTN  HLD  Hx CVA  MDD/ANSON  Hypothyroidism   BPH  CARINA  GERD  glaucoma    Past Surgical History   CABG 1994    Prior to Admission Medications   Prior to Admission Medications   Prescriptions Last Dose Informant Patient Reported? Taking?   B Complex-C (SUPER B COMPLEX PO) 12/7/2023 at am Care Giver, Self Yes Yes   Sig: Take 1 tablet by mouth daily   Botulinum Toxin Type A (BOTOX) 200 units injection More than a month Care Giver, Self Yes Yes   Sig: every 3 months   ENTRESTO 49-51 MG per tablet 12/7/2023 at 1900 Care Giver, Self Yes Yes   Sig: Take 1 tablet by mouth 2 times daily   Vitamin D3 (CHOLECALCIFEROL) 125 MCG (5000 UT) tablet 12/7/2023 at am Care Giver, Self Yes Yes   Sig: Take 1 tablet by mouth daily   aspirin (ASA) 81 MG EC tablet 12/7/2023 at am Care Giver, Self Yes Yes   Sig: Take 1 tablet by mouth daily   atorvastatin (LIPITOR) 80 MG tablet 12/7/2023 at 1900 Care Giver, Self Yes Yes   Sig: Take 80 mg by mouth At Bedtime   carvedilol (COREG) 12.5 MG tablet 12/6/2023 at 1900 Care Giver, Self Yes Yes   Sig: Take 1 tablet by mouth 2 times daily   clopidogrel (PLAVIX) 75 MG tablet 12/7/2023 at am Care Giver, Self Yes Yes   Sig: Take 75 mg by mouth daily   escitalopram (LEXAPRO) 20 MG tablet 12/7/2023 at am Care Giver, Self Yes Yes   Sig: Take 20 mg by mouth daily   ezetimibe (ZETIA) 10 MG tablet 12/7/2023 at 1900 Care Giver, Self Yes Yes   Sig: Take 10 mg by mouth daily   finasteride (PROSCAR) 5 MG tablet 12/7/2023 at 1900 Care Giver, Self Yes Yes   Sig: Take 1 tablet by mouth At Bedtime    furosemide (LASIX) 20 MG tablet 12/7/2023 at am Care Giver, Self Yes Yes   Sig: Take 20 mg by mouth daily   gabapentin (NEURONTIN) 100 MG capsule 12/7/2023 at am Care Giver, Self Yes Yes   Sig: Take 200 mg by mouth every morning   gabapentin (NEURONTIN) 100 MG capsule 12/7/2023 at 1200  Yes Yes   Sig:  Take 100 mg by mouth every 24 hours At noon   gabapentin (NEURONTIN) 300 MG capsule 12/6/2023 at hs Care Giver, Self Yes Yes   Sig: Take 300 mg by mouth at bedtime   latanoprost (XALATAN) 0.005 % ophthalmic solution 12/6/2023 at hs Care Giver, Self Yes Yes   Sig: Place 1 drop into both eyes at bedtime   levothyroxine (SYNTHROID/LEVOTHROID) 100 MCG tablet 12/7/2023 at am Care Giver, Self Yes Yes   Sig: Take 100 mcg by mouth daily   magnesium oxide (MAG-OX) 400 (241.3 Mg) MG tablet 12/7/2023 at 1900 Care Giver, Self Yes Yes   Sig: Take 1 tablet by mouth At Bedtime    melatonin 3 MG tablet 12/6/2023 at hs Self, Care Giver Yes Yes   Sig: Take 3 mg by mouth nightly as needed for sleep   multivitamin (CENTRUM SILVER) tablet 12/7/2023 at am Care Giver, Self Yes Yes   Sig: Take 1 tablet by mouth daily   nitroGLYcerin (NITROSTAT) 0.4 MG sublingual tablet  at prn Care Giver, Self Yes Yes   Sig: Place 1 tablet under the tongue every 5 minutes as needed for chest pain      Facility-Administered Medications: None     Current Facility-Administered Medications   Medication Dose Route Frequency    aspirin  81 mg Oral Daily    carvedilol  12.5 mg Oral BID    escitalopram  20 mg Oral Daily    finasteride  5 mg Oral At Bedtime    pantoprazole  40 mg Intravenous Q24H    piperacillin-tazobactam  2.25 g Intravenous Q6H    sodium chloride (PF)  3 mL Intracatheter Q8H     Current Facility-Administered Medications   Medication Last Rate    sodium chloride 100 mL/hr at 12/08/23 0641     Allergies   Allergies   Allergen Reactions    Avelox [Moxifloxacin] Itching    Cephalexin     Hydroxyzine     Oxycodone     Ultram [Tramadol]        Social History      Retired  Never smoked    Family History   No early CAD        Review of Systems   A comprehensive review of system was performed and is negative other than that noted in the HPI or here.     Physical Exam   Vital Signs with Ranges  Temp:  [97.5  F (36.4  C)-98.8  F (37.1  C)] 97.5  F  "(36.4  C)  Pulse:  [63-94] 83  Resp:  [9-25] 25  BP: ()/(36-76) 93/60  SpO2:  [87 %-94 %] 93 %  Wt Readings from Last 4 Encounters:   12/08/23 84.6 kg (186 lb 8.2 oz)   12/07/23 81.2 kg (179 lb)   01/10/21 79.8 kg (176 lb)     No intake/output data recorded.      Vitals: BP 93/60 (BP Location: Left arm)   Pulse 83   Temp 97.5  F (36.4  C) (Oral)   Resp 25   Wt 84.6 kg (186 lb 8.2 oz)   SpO2 93%   BMI 29.21 kg/m      Physical Exam:   General - Alert and oriented to time place and person in no acute distress  Eyes - No scleral icterus  HEENT - Neck supple, moist mucous membranes  Cardiovascular -   Extremities - There is no edema  Respiratory - clear   Skin - No pallor or cyanosis  Gastrointestinal - tender   Psych - Appropriate affect   Neurological - No gross motor neurological focal deficits    No lab results found in last 7 days.    Invalid input(s): \"TROPONINIES\"    Recent Labs   Lab 12/08/23  0533 12/08/23  0047 12/08/23  0037 12/07/23  2228 12/07/23  1816 12/07/23  1531   WBC  --  12.8*  --   --  9.8  --    HGB  --  15.7  --   --  16.8  --    MCV  --  98  --   --  97  --    PLT  --  183  --   --  160  --    NA  --  135  --  133*  --  134*  133*   POTASSIUM  --  5.3  --  5.1  --  5.6*  5.5*   CHLORIDE  --  100  --  98  --  100  100   CO2  --  20*  --  20*  --  14*  20*   BUN  --  29.3*  --  29.8*  --  23.0  22.9   CR  --  1.96*  --  1.80*  --  1.39*  1.37*   GFRESTIMATED  --  33*  --  36*  --  49*  50*   ANIONGAP  --  15  --  15  --  20*  13   RHODA  --  9.0  --  9.4  --  9.8  9.8   * 159* 163* 169*  --  172*  172*   ALBUMIN  --   --   --   --   --  4.2  4.2   PROTTOTAL  --   --   --   --   --  7.1  7.1   BILITOTAL  --   --   --   --   --  1.4*  1.4*   ALKPHOS  --   --   --   --   --  89  89   ALT  --   --   --   --   --  29  29   AST  --   --   --   --   --  38  38   LIPASE  --   --   --   --   --  20     No results for input(s): \"CHOL\", \"HDL\", \"LDL\", \"TRIG\", \"CHOLHDLRATIO\" in " "the last 46965 hours.  Recent Labs   Lab 12/08/23  0047 12/07/23  1816   WBC 12.8* 9.8   HGB 15.7 16.8   HCT 47.5 49.3   MCV 98 97    160     Recent Labs   Lab 12/08/23  0228 12/08/23  0103 12/08/23  0053   PHV 7.35 7.33 7.38   PO2V 48* 47 47   PCO2V 40 40 37*   HCO3V 22 21 22     Recent Labs   Lab 12/08/23  0047 12/07/23  2228   NTBNPI 3,859* 2,613*     No results for input(s): \"DD\" in the last 168 hours.  No results for input(s): \"SED\", \"CRP\" in the last 168 hours.  Recent Labs   Lab 12/08/23  0047 12/07/23  1816    160     No results for input(s): \"TSH\" in the last 168 hours.  Recent Labs   Lab 12/07/23  1545   COLOR Mitzi*   APPEARANCE Slightly Cloudy*   URINEGLC Negative   URINEBILI Negative   URINEKETONE Negative   SG 1.016   UBLD Negative   URINEPH 5.0   PROTEIN 30*   NITRITE Negative   LEUKEST Negative   RBCU 1   WBCU 4       Imaging:  Recent Results (from the past 48 hour(s))   CT Abdomen Pelvis w Contrast   Result Value    Radiologist flags Acute appendicitis (AA)    Narrative    EXAM: CT ABDOMEN PELVIS W CONTRAST  LOCATION: [facility]  DATE: 12/7/2023    INDICATION: Mid and lower abdominal pain.  COMPARISON: None.  TECHNIQUE: CT scan of the abdomen and pelvis was performed following injection of IV contrast. Multiplanar reformats were obtained. Dose reduction techniques were used.  CONTRAST: 88 mL Isovue 370    FINDINGS:   LOWER CHEST: Bibasilar subsegmental atelectasis. Partially visualized CIED leads.    HEPATOBILIARY: Normal.    PANCREAS: Normal.    SPLEEN: Normal.    ADRENAL GLANDS: Normal.    KIDNEYS/BLADDER: Multiple simple renal cysts bilaterally; no follow-up indicated. No hydronephrosis. No nephrolithiasis. Urinary bladder appears normal.    BOWEL: Dilated appendix measuring up to 1.7 cm. 0.7 cm appendicolith at the base. Mild periappendiceal fat stranding. Trace adjacent free fluid. No extraluminal gas or focal fluid collection. Mild colonic " diverticulosis. No evidence of acute   diverticulitis. Small bowel appears normal. No bowel obstruction.    PERITONEUM/RETROPERITONEUM: Trace pelvic free fluid. No fluid collection. No intraperitoneal free air.    LYMPH NODES: No lymphadenopathy.    VASCULATURE: Moderate to severe atherosclerotic calcifications. Ectatic abdominal aorta measuring up to 2.7 cm.    PELVIC ORGANS: Minimal prostatic calcifications.    MUSCULOSKELETAL: Bilateral fat-containing inguinal hernias. Small fat-containing periumbilical hernia. Multilevel degenerative changes of the spine.      Impression    IMPRESSION:   1.  Acute appendicitis. 0.7 cm appendicolith at the base. Trace free fluid, likely reactive. No extraluminal gas or abscess.    2.  Ectatic abdominal aorta measuring 2.7 cm.      [Critical Result: Acute appendicitis]    Finding was identified on 12/7/2023 7:48 PM CST.     Dr. Reggie Lynch was contacted by me on 12/7/2023 7:55 PM CST and verbalized understanding of the critical result.    XR Chest Port 1 View    Narrative    EXAM: XR CHEST PORT 1 VIEW  LOCATION: [facility]  DATE: 12/8/2023    INDICATION: shortness of breath  COMPARISON: 01/10/2021.    FINDINGS: Sternal wires, mediastinal clips and a left subclavian cardiac device. There is no pneumothorax. The heart is enlarged. There is no pulmonary edema. The lungs are clear.      Impression    IMPRESSION: No acute abnormality.       Echo:  No results found for this or any previous visit (from the past 4320 hour(s)).    Clinically Significant Risk Factors Present on Admission        # Hyperkalemia: Highest K = 5.6 mmol/L in last 2 days, will monitor as appropriate      # Anion Gap Metabolic Acidosis: Highest Anion Gap = 20 mmol/L in last 2 days, will monitor and treat as appropriate    # Drug Induced Platelet Defect: home medication list includes an antiplatelet medication  # Acute Kidney Injury, unspecified: based on a >150% or 0.3 mg/dL increase in  "last creatinine compared to past 90 day average, will monitor renal function  # Hypertension: Noted on problem list   # Acute Respiratory Failure: Documented O2 saturation < 91%.  Continue supplemental oxygen as needed     # Overweight: Estimated body mass index is 29.21 kg/m  as calculated from the following:    Height as of 12/7/23: 1.702 m (5' 7\").    Weight as of this encounter: 84.6 kg (186 lb 8.2 oz).             Cardiomyopathy      Acute kidney failure, unspecified    Encephalopathy: Other encephalopathy    Chronic Fatigue and Other Debilities: Age-related physical debility                "

## 2023-12-08 NOTE — PROGRESS NOTES
[facility]    Medicine Progress Note - Hospitalist Service    Date of Admission:  12/8/2023    Assessment & Plan   Garyr Mckay is a 88 year old male visiting his family in the area from his home state of [place] who presented with abdominal pain.      Acute appendicitis      Severe sepsis (hypotension, tachy, lactic, source)       Infectious encephalopathy, mild    *Pt visiting from [place]. Presented to ED w/ abdominal pain and nausea     CT a/p with acute appendicitis, no perf or abscess.   Clinical concern for SIRS/early sepsis with leukocytosis (new on repeat serial labs earlier this am), elevated lactic acid, hypotension, acute renal failure    Lactic acid still elevated but trending dodwn  awaiting general surgery consult    Clinically appears to be somewhat guarded with abd pain to light palpation on my clinical exam this am    - continue IV zosyn q8h   - continue IVF with strict monitoring of I/O's  - NPO  - prn analgesics     2. FATIMAH  Hyperkalemia, improved  Baseline creatinine 1-1.1. Creatinine on presentation 1.8 and then increased to 1.96 on serial exam  Continue strict I/O's with IVF given and clinical sepsis    Awaiting am labs this am to review    3.  Right arm swelling    Noted to have more swollen, slightly tense right arm compared to the left arm on my exam this am  Mild swelling to the left hand also noted  There has not been any clear clinical concern for IV infiltration, per nursing and I don't appreciate this on my exam this am.    Mr. Mckay cannot tell me if this swelling is ?new or if there is a chronic component     Addendum - 1100  No DVT noted on RUE US  Superficial venous thrombus present in the right cephalic vein  Continue with elevation and supportive care  Remove any IV site on that arm       3. Chronic HFrEF  Ischemic CM s/p ICD  CAD s/p CABG, stenting 2002, 2003, 2005, 2021  HTN  HLD    PTA meds listed include carvedilol 12.5 mg BID, atorvastatin  80 mg daily, plavix 75 mg daily, entresto 49-51 BID, zetia 10 mg daily, ASA 81 mg daily, lasix 20 mg daily  *BNP in ED at 2613/ 3859 (1750 7/2023 from OSH).  Troponin 70. EKG w/ nonspecific t-w changes.   *suspect elevated BNP related to FATIMAH, doesn't appear in significant volume overload at this time.     Trop likely type II NSTEMI vs chronic elevation (troponin trending down on repeat lab earlier this am)    - continue with telemetry   - serial troponins  - hold plavix today  - continue ASA  - resume carvedilol with hold parameters  - continue to hold lasix and entresto with hypotension, FATIMAH  - also hold cholesterol meds for now  - cardiology consult for surgical clearance requested and is pending     4. Hx CVA  Per chart review.     5. MDD  ANSON  - resume escitalopram 20 mg daily   - resume gabapentin      Migraines- Gets botox.   Hypothyroidism- resume levothyroxine   BPH- resume finasteride   CARINA- resume CPAP w/ home settings - pt has his machine in the room, but was placed on oxygen only overnight    GERD- pantoprazole 40 mg IV daily  Glaucoma- resume eye gtts        Medical Decision Making       60 MINUTES SPENT BY ME on the date of service doing chart review, history, exam, documentation & further activities per the note.        PPE Worn:  Mask, gloves     Diet: NPO for Medical/Clinical Reasons Except for: Meds, Ice Chips    DVT Prophylaxis: Pneumatic Compression Devices for now while awaiting surgery    Hameed Catheter: Not present  Lines: None     Cardiac Monitoring: ACTIVE order. Indication: AMI (NSTEMI/ STEMI) (48 hours)  Code Status: Full Code      Clinically Significant Risk Factors Present on Admission        # Hyperkalemia: Highest K = 5.6 mmol/L in last 2 days, will monitor as appropriate      # Anion Gap Metabolic Acidosis: Highest Anion Gap = 20 mmol/L in last 2 days, will monitor and treat as appropriate    # Drug Induced Platelet Defect: home medication list includes an antiplatelet medication  #  "Acute Kidney Injury, unspecified: based on a >150% or 0.3 mg/dL increase in last creatinine compared to past 90 day average, will monitor renal function  # Hypertension: Noted on problem list   # Acute Respiratory Failure: Documented O2 saturation < 91%.  Continue supplemental oxygen as needed     # Overweight: Estimated body mass index is 29.21 kg/m  as calculated from the following:    Height as of 12/7/23: 1.702 m (5' 7\").    Weight as of this encounter: 84.6 kg (186 lb 8.2 oz).              Disposition Plan      Expected Discharge Date: 12/10/2023                  Caroline Castro DO  Hospitalist Service  [facility]  Securely message with [employer] (more info)  Text page via [employer] Paging/Directory   ______________________________________________________________________    Interval History     States that \"my stomach hurts\".  Slept on and off, fatigued.  No c/o chest pain or SOB.  On oxygen via nasal cannula but points out his home CPAP machine to me that is in the room.  No N/V.  Just doesn't feel well.    Physical Exam   Vital Signs: Temp: 97.5  F (36.4  C) Temp src: Oral BP: 105/70 Pulse: 87   Resp: 20 SpO2: 92 % O2 Device: Nasal cannula Oxygen Delivery: 4 LPM  Weight: 186 lbs 8.15 oz    GEN: sleeping when I enter the room but easily arousable to my voice and touch.  Speech is clear when awake but then falls asleep easily.    Mild costal retractions obsered - no conversational dyspnea.  HEENT:  Normocephalic/atraumatic, no scleral icterus, no nasal discharge, membranes appear very slightly dry  CV:  somewhat regular rate and rhythm, no clear loud murmur to ausc this am.  S1 + S2 noted, no S3 or S4.  LUNGS:  Clear to auscultation ant/lat bilaterally without rales/rhonchi/wheezing/retractions. Appears to have slight diminished air exchange at the bases bilaterally to lateral exam. Symmetric chest rise on inhalation noted.  ABD:  Bowel sounds present but slower, abd still soft but " reproducible tenderness illicited to light palpation of the RLQ and more suprapubic area  but without clear rebound/guarding/rigidity.  Moderate abd distension noted throughout.  EXT:  1+ pitting pretibial edema bilaterally  No cyanosis.    Right arm slightly tense and swollen forearm to mid upper arm (shoulder does not appear swollen) but without cyanosis.  No significant swelling of the right hand; left hand with mild swelling noted.        Medications    sodium chloride 100 mL/hr at 12/08/23 0641      aspirin  81 mg Oral Daily    carvedilol  12.5 mg Oral BID    escitalopram  20 mg Oral Daily    finasteride  5 mg Oral At Bedtime    pantoprazole  40 mg Intravenous Q24H    piperacillin-tazobactam  2.25 g Intravenous Q6H    sodium chloride (PF)  3 mL Intracatheter Q8H    sodium chloride (PF)  3 mL Intracatheter Q8H    sodium chloride (PF)  3 mL Intracatheter Q8H       Data     Labs and Imaging results below reviewed today.  Recent Labs   Lab 12/08/23 0047 12/07/23  1816   WBC 12.8* 9.8   HGB 15.7 16.8   HCT 47.5 49.3   MCV 98 97    160     Recent Labs   Lab 12/08/23  0533 12/08/23  0047 12/08/23 0037 12/07/23 2228 12/07/23  1531   NA  --  135  --  133* 134*  133*   POTASSIUM  --  5.3  --  5.1 5.6*  5.5*   CHLORIDE  --  100  --  98 100  100   CO2  --  20*  --  20* 14*  20*   ANIONGAP  --  15  --  15 20*  13   * 159* 163* 169* 172*  172*   BUN  --  29.3*  --  29.8* 23.0  22.9   CR  --  1.96*  --  1.80* 1.39*  1.37*   GFRESTIMATED  --  33*  --  36* 49*  50*   RHODA  --  9.0  --  9.4 9.8  9.8     7-Day Micro Results       No results found for the last 168 hours.          Recent Labs   Lab 12/08/23  0533 12/08/23  0047 12/08/23  0037 12/07/23 2228 12/07/23  1531   NA  --  135  --  133* 134*  133*   POTASSIUM  --  5.3  --  5.1 5.6*  5.5*   CHLORIDE  --  100  --  98 100  100   CO2  --  20*  --  20* 14*  20*   ANIONGAP  --  15  --  15 20*  13   * 159* 163* 169* 172*  172*   BUN  --   "29.3*  --  29.8* 23.0  22.9   CR  --  1.96*  --  1.80* 1.39*  1.37*   GFRESTIMATED  --  33*  --  36* 49*  50*   RHODA  --  9.0  --  9.4 9.8  9.8   PROTTOTAL  --   --   --   --  7.1  7.1   ALBUMIN  --   --   --   --  4.2  4.2   BILITOTAL  --   --   --   --  1.4*  1.4*   ALKPHOS  --   --   --   --  89  89   AST  --   --   --   --  38  38   ALT  --   --   --   --  29  29     No results for input(s): \"TROPONIN\", \"TROPI\", \"TROPR\", \"TROPONINIS\" in the last 168 hours.    Invalid input(s): \"TROPT\", \"TROP\", \"TROPONINIES\", \"TNIH\"  Recent Labs   Lab 12/07/23  1545   COLOR Mitzi*   APPEARANCE Slightly Cloudy*   URINEGLC Negative   URINEBILI Negative   URINEKETONE Negative   SG 1.016   UBLD Negative   URINEPH 5.0   PROTEIN 30*   NITRITE Negative   LEUKEST Negative   RBCU 1   WBCU 4       Recent Results (from the past 24 hour(s))   CT Abdomen Pelvis w Contrast   Result Value    Radiologist flags Acute appendicitis (AA)    Narrative    EXAM: CT ABDOMEN PELVIS W CONTRAST  LOCATION: [facility]  DATE: 12/7/2023    INDICATION: Mid and lower abdominal pain.  COMPARISON: None.  TECHNIQUE: CT scan of the abdomen and pelvis was performed following injection of IV contrast. Multiplanar reformats were obtained. Dose reduction techniques were used.  CONTRAST: 88 mL Isovue 370    FINDINGS:   LOWER CHEST: Bibasilar subsegmental atelectasis. Partially visualized CIED leads.    HEPATOBILIARY: Normal.    PANCREAS: Normal.    SPLEEN: Normal.    ADRENAL GLANDS: Normal.    KIDNEYS/BLADDER: Multiple simple renal cysts bilaterally; no follow-up indicated. No hydronephrosis. No nephrolithiasis. Urinary bladder appears normal.    BOWEL: Dilated appendix measuring up to 1.7 cm. 0.7 cm appendicolith at the base. Mild periappendiceal fat stranding. Trace adjacent free fluid. No extraluminal gas or focal fluid collection. Mild colonic diverticulosis. No evidence of acute   diverticulitis. Small bowel appears normal. No " bowel obstruction.    PERITONEUM/RETROPERITONEUM: Trace pelvic free fluid. No fluid collection. No intraperitoneal free air.    LYMPH NODES: No lymphadenopathy.    VASCULATURE: Moderate to severe atherosclerotic calcifications. Ectatic abdominal aorta measuring up to 2.7 cm.    PELVIC ORGANS: Minimal prostatic calcifications.    MUSCULOSKELETAL: Bilateral fat-containing inguinal hernias. Small fat-containing periumbilical hernia. Multilevel degenerative changes of the spine.      Impression    IMPRESSION:   1.  Acute appendicitis. 0.7 cm appendicolith at the base. Trace free fluid, likely reactive. No extraluminal gas or abscess.    2.  Ectatic abdominal aorta measuring 2.7 cm.      [Critical Result: Acute appendicitis]    Finding was identified on 12/7/2023 7:48 PM CST.     Dr. Reggie Lynch was contacted by me on 12/7/2023 7:55 PM CST and verbalized understanding of the critical result.    XR Chest Port 1 View    Narrative    EXAM: XR CHEST PORT 1 VIEW  LOCATION: [facility]  DATE: 12/8/2023    INDICATION: shortness of breath  COMPARISON: 01/10/2021.    FINDINGS: Sternal wires, mediastinal clips and a left subclavian cardiac device. There is no pneumothorax. The heart is enlarged. There is no pulmonary edema. The lungs are clear.      Impression    IMPRESSION: No acute abnormality.

## 2023-12-08 NOTE — PHARMACY-ADMISSION MEDICATION HISTORY
Admission medication history completed at [facility]. Please see Medication Scribe Admission Medication History note from 12/7/2023.

## 2023-12-08 NOTE — Clinical Note
The first balloon was inserted into the saphenous vein graft.Max pressure = 8 del. Total duration = 15 seconds.     Max pressure = 10 del. Total duration = 15 seconds.    Balloon reinflated a second time: Max pressure = 10 del. Total duration = 15 seconds.  Balloon reinflated a third time: Max pressure = 10 del. Total duration = 17 seconds.  Balloon reinflated a fourth time: Max pressure = 10 del. Total duration = 14 seconds.  Balloon r einflated a fourth time: Max pressure = 10 del. Total duration = 14 seconds.

## 2023-12-08 NOTE — ANESTHESIA PREPROCEDURE EVALUATION
Anesthesia Pre-Procedure Evaluation    Patient: Garry Mckay   MRN: [id number] : 1935        Procedure : Procedure(s):  APPENDECTOMY, LAPAROSCOPIC          No past medical history on file.   No past surgical history on file.   Allergies   Allergen Reactions    Avelox [Moxifloxacin] Itching    Cephalexin     Hydroxyzine     Oxycodone     Ultram [Tramadol]       Social History     Tobacco Use    Smoking status: Not on file    Smokeless tobacco: Not on file   Substance Use Topics    Alcohol use: Not on file      Wt Readings from Last 1 Encounters:   23 84.6 kg (186 lb 8.2 oz)        Anesthesia Evaluation        History of anesthetic complications       ROS/MED HX  ENT/Pulmonary:     (+) sleep apnea,                                   (-) tobacco use, asthma and COPD   Neurologic:       Cardiovascular:     (+) Dyslipidemia hypertension- Peripheral Vascular Disease-- Carotid Stenosis.  CAD - past MI CABG-date: . stent-      CHF etiology: ICM                          Previous cardiac testing   Echo: Date: Results:    Stress Test:  Date:  Results:  Impression    1. No jeopardized myocardium.    2. Apparent myocardial scarring throughout the inferior wall extending inferolaterally to involve 25% of the LV myocardium with segmental hypokinesis.    3. Fixed LV cavity dilatation and severely reduced post-rest LVEF of 26% are findings consistent with baseline LV dysfunction.    ECG Reviewed:  Date: Results:    Cath:  Date: Results:      METS/Exercise Tolerance:     Hematologic:       Musculoskeletal:       GI/Hepatic:    (-) GERD   Renal/Genitourinary:     (+) renal disease, type: CRI,            Endo:     (+)          thyroid problem, hypothyroidism,    Obesity,       Psychiatric/Substance Use:     (+) psychiatric history 1 and anxiety       Infectious Disease:       Malignancy:    (-) malignancy   Other:            Physical Exam    Airway        Mallampati: IV    Neck ROM: limited   Mouth opening: > 3  "cm    Respiratory Devices and Support     Nasal Canula 4  l/min.     Dental       (+) Multiple crowns, permanant bridges      Cardiovascular   cardiovascular exam normal    Comment: Slightly breathless.    Rhythm and rate: regular     Pulmonary           (+) decreased breath sounds           OUTSIDE LABS:  CBC:   Lab Results   Component Value Date    WBC 12.8 (H) 12/08/2023    WBC 9.8 12/07/2023    HGB 15.7 12/08/2023    HGB 16.8 12/07/2023    HCT 47.5 12/08/2023    HCT 49.3 12/07/2023     12/08/2023     12/07/2023     BMP:   Lab Results   Component Value Date     12/08/2023     (L) 12/07/2023    POTASSIUM 5.3 12/08/2023    POTASSIUM 5.1 12/07/2023    CHLORIDE 100 12/08/2023    CHLORIDE 98 12/07/2023    CO2 20 (L) 12/08/2023    CO2 20 (L) 12/07/2023    BUN 29.3 (H) 12/08/2023    BUN 29.8 (H) 12/07/2023    CR 1.96 (H) 12/08/2023    CR 1.80 (H) 12/07/2023     (H) 12/08/2023     (H) 12/08/2023     COAGS:   Lab Results   Component Value Date    PTT 27 01/10/2021    INR 1.06 01/10/2021     POC: No results found for: \"BGM\", \"HCG\", \"HCGS\"  HEPATIC:   Lab Results   Component Value Date    ALBUMIN 4.2 12/07/2023    ALBUMIN 4.2 12/07/2023    PROTTOTAL 7.1 12/07/2023    PROTTOTAL 7.1 12/07/2023    ALT 29 12/07/2023    ALT 29 12/07/2023    AST 38 12/07/2023    AST 38 12/07/2023    ALKPHOS 89 12/07/2023    ALKPHOS 89 12/07/2023    BILITOTAL 1.4 (H) 12/07/2023    BILITOTAL 1.4 (H) 12/07/2023     OTHER:   Lab Results   Component Value Date    LACT 2.9 (H) 12/08/2023    RHODA 9.0 12/08/2023    LIPASE 20 12/07/2023       Anesthesia Plan    ASA Status:  4    NPO Status:  NPO Appropriate    Anesthesia Type: General.     - Airway: ETT   Induction: Intravenous, Propofol.   Maintenance: Balanced.   Techniques and Equipment:     - Lines/Monitors: Arterial Line     Consents    Anesthesia Plan(s) and associated risks, benefits, and realistic alternatives discussed. Questions answered and " "patient/representative(s) expressed understanding.     - Discussed:     - Discussed with:  Patient      - Extended Intubation/Ventilatory Support Discussed: No.      - Patient is DNR/DNI Status: No     Use of blood products discussed: Yes.     - Discussed with: Patient.     - Consented: consented to blood products     Postoperative Care    Pain management: Multi-modal analgesia, IV analgesics.   PONV prophylaxis: Ondansetron (or other 5HT-3), Dexamethasone or Solumedrol     Comments:    Other Comments: Patient is counseled on the anesthesia plan and relevant anesthesia procedures including all risks and benefits. All patient questions were answered.   A line PTI.  Possible central line intra-op.   Advised of severe disease and potential for poor outcome.            Austen Burleson MD    I have reviewed the pertinent notes and labs in the chart from the past 30 days and (re)examined the patient.  Any updates or changes from those notes are reflected in this note.    # Hyperkalemia: Highest K = 5.6 mmol/L in last 2 days, will monitor as appropriate  # Hyponatremia: Lowest Na = 133 mmol/L in last 30 days, will monitor as appropriate     # Anion Gap Metabolic Acidosis: Highest Anion Gap = 20 mmol/L in last 2 days, will monitor and treat as appropriate     # Drug Induced Platelet Defect: home medication list includes an antiplatelet medication  # Overweight: Estimated body mass index is 29.21 kg/m  as calculated from the following:    Height as of 12/7/23: 1.702 m (5' 7\").    Weight as of this encounter: 84.6 kg (186 lb 8.2 oz).      "

## 2023-12-08 NOTE — ANESTHESIA PROCEDURE NOTES
Airway       Patient location: [facility] - Operating Room or Procedural Area.       Procedure Start/Stop Times: 12/8/2023 2:12 PM and 12/8/2023 2:12 PM  Staff -        CRNA: Manpreet Hardy APRN CRNA       Performed By: CRNAIndications and Patient Condition       Indications for airway management: princess-procedural and airway protection       Induction type:RSI       Mask difficulty assessment: 0 - not attempted    Final Airway Details       Final airway type: endotracheal airway       Successful airway: ETT - single, Oral and Single subglottic suction  Endotracheal Airway Details        ETT size (mm): 7.5       Cuffed: yes       Successful intubation technique: video laryngoscopy       VL Blade Size: Glidescope 4       Grade View of Cords: 1       Adjucts: stylet       Position: Center       Measured from: gums/teeth       Secured at (cm): 23       Bite block used: None    Post intubation assessment        Placement verified by: capnometry, equal breath sounds and chest rise        Number of attempts at approach: 1       Number of other approaches attempted: 0       Secured with: silk tape       Ease of procedure: easy       Dentition: Intact and Unchanged    Medication(s) Administered   Medication Administration Time: 12/8/2023 2:12 PM    Additional Comments         Routine princess-procedural airway protection.     Glidescope LoPro 4.    7.5 mm ID endotracheal tube with subglottic suction port.

## 2023-12-08 NOTE — CONSULTS
Consultation - Surgery  Garry Mckay,  1935, MRN [id number]    Admitting Dx: FATIMAH (acute kidney injury) (H24) [N17.9]  Acute sepsis (H) [A41.9]  Acute appendicitis with localized peritonitis, without perforation, abscess, or gangrene [K35.30]    PCP: [facility], [phone]   Code status:  Full Code       Extended Emergency Contact Information  Primary Emergency Contact: Maylin Mcqueen  Home Phone: [phone]  Mobile Phone: [phone]  Relation: Daughter       Assessment and Plan   Impression:   Acute appendicitis in a patient with significant other medical problems.  He is at exceedingly high risk for surgery, however, there really is no other option.  He has an appendicolith at the base of his appendix and that means that antibiotics alone are highly unlikely to be effective.  He is already showing peritoneal signs.  Awaiting cardiology clearance but have tentatively put him on the OR schedule for later this morning.  I do not feel there is an alternative.  I have explained to him that he is at high risk.  I will also attempt to talk to his daughter.      Plan:  Laparoscopic appendectomy.  In his situation this may go open as he is already fairly distended and insufflating his abdomen may decrease his cardiac return and he may not tolerate that well.  Will attempt to do it laparoscopically but there is a risk that this could go open.           Chief Complaint Acute sepsis (H)       HPI    We have been requested by Dr. Shaw to evaluate Garry Mckay for appendicitis.  This is a 88 year old year old male with known severe cardiac disease with congestive heart failure who is visiting from the [facility].  He presented to [facility] with abdominal pain yesterday.  CT scan shows appendicitis with a appendicolith at the base of the appendix.  Because of his severe cardiac disease he was transferred here.  He continues to complain of pain.       Medical  History  Patient Active Problem List   Diagnosis    Benign essential hypertension    BPH (benign prostatic hyperplasia)    Coronary atherosclerosis    Panic disorder with agoraphobia    Intractable chronic migraine without aura    Secondary hyperparathyroidism of renal origin (H24)    Stage 3 chronic kidney disease (H)    Glaucoma    Hyperlipidemia    Anemia    Chronic cough    Degenerative cervical spinal stenosis    Right rotator cuff tendinitis    Reflux esophagitis    Nocturia    Insomnia    Hypothyroidism (acquired)    Hypertension    S/P coronary artery stent placement    History of coronary artery bypass surgery    Heart failure (H)    Bilateral carotid artery stenosis    Obstructive sleep apnea    NSTEMI (non-ST elevated myocardial infarction) (H)    Ischemic cardiomyopathy    Stroke, hemorrhagic (H)    Anxiety    Major depression, melancholic type    Presence of permanent cardiac pacemaker    Occlusion and stenosis of bilateral carotid arteries    Thrombocytopenia (H24)    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     Social History       Allergies  Allergies   Allergen Reactions    Avelox [Moxifloxacin] Itching    Cephalexin     Hydroxyzine     Oxycodone     Ultram [Tramadol]     Family History  Reviewed, and family history is not on file.  The Family history is not pertinent to the patients chief complaint. Psychosocial Needs  Social History     Social History Narrative    Not on file     Additional psychosocial needs reviewed per nursing assessment.     Prior to Admission Medications   Current Outpatient Medications   Medication Instructions    aspirin (ASA) 81 MG EC tablet 1 tablet, Oral, DAILY    atorvastatin (LIPITOR) 80 mg, Oral, AT BEDTIME     B Complex-C (SUPER B COMPLEX PO) 1 tablet, Oral, DAILY    Botulinum Toxin Type A (BOTOX) 200 units injection EVERY 3 MONTHS    carvedilol (COREG) 12.5 MG tablet 1 tablet, Oral, 2 TIMES DAILY    clopidogrel (PLAVIX) 75 mg, Oral, DAILY    ENTRESTO 49-51 MG per tablet 1 tablet, Oral, 2 TIMES DAILY    escitalopram (LEXAPRO) 20 mg, Oral, DAILY    ezetimibe (ZETIA) 10 mg, Oral, DAILY    finasteride (PROSCAR) 5 MG tablet 1 tablet, Oral, AT BEDTIME    furosemide (LASIX) 20 mg, Oral, DAILY    gabapentin (NEURONTIN) 100 MG capsule 1-2 capsules, Oral, 2 TIMES DAILY, Take 2 capsules in the morning & one capsule at noon    gabapentin (NEURONTIN) 300 mg, Oral, AT BEDTIME    latanoprost (XALATAN) 0.005 % ophthalmic solution 1 drop, Both Eyes, AT BEDTIME    levothyroxine (SYNTHROID/LEVOTHROID) 100 mcg, Oral, DAILY    magnesium oxide (MAG-OX) 400 (241.3 Mg) MG tablet 1 tablet, Oral, AT BEDTIME    melatonin 3 mg, Oral, AT BEDTIME PRN    multivitamin (CENTRUM SILVER) tablet 1 tablet, Oral, DAILY    nitroGLYcerin (NITROSTAT) 0.4 MG sublingual tablet 1 tablet, Sublingual, EVERY 5 MIN PRN    Vitamin D3 (CHOLECALCIFEROL) 125 MCG (5000 UT) tablet 1 tablet, Oral, DAILY           Review of Systems:  Patient has significant cardiac disease with congestive heart failure and chronic shortness of breath.  He uses BiPAP at home. Physical Exam:  Temp:  [97.5  F (36.4  C)-98.8  F (37.1  C)] 97.5  F (36.4  C)  Pulse:  [63-94] 89  Resp:  [9-25] 23  BP: ()/(36-70) 92/58  SpO2:  [87 %-94 %] 93 %    General appearance: alert, appears stated age, and cooperative  Eyes: No scleral icterus  Lungs: Decreased breath sounds bilaterally  Heart: Somewhat tachycardic  Abdomen: Distended and markedly tender in the right lower quadrant.  Also tap tenderness elsewhere.  Extremities: extremities, peripheral pulses and reflexes normal    Skin: Somewhat diaphoretic.  Neurologic: Grossly normal       Pertinent Labs  Lab Results: personally reviewed.   Lab  Results   Component Value Date    WBC 12.8 12/08/2023    WBC 9.3 01/10/2021    HGB 15.7 12/08/2023    HGB 17.7 01/10/2021    HCT 47.5 12/08/2023    HCT 53.2 01/10/2021    MCV 98 12/08/2023    MCV 98 01/10/2021     12/08/2023     01/10/2021   BUN 29  Creatinine 1.9  BNP greater than 3800  Lactic acid 2.9     Pertinent Radiology  Radiology Results: Personally reviewed image/s and Personally reviewed impression/s

## 2023-12-08 NOTE — Clinical Note
The first balloon was inserted into the saphenous vein graft.Max pressure = 4 del. Total duration = 14 seconds.     Max pressure = 6 del. Total duration = 12 seconds.    Balloon reinflated a second time: Max pressure = 6 del. Total duration = 12 seconds.  Balloon reinflated a third time: Max pressure = 6 del. Total duration = 28 seconds.  Balloon reinflated a fourth time: Max pressure = 6 del. Total duration = 16 seconds.  Balloon reinf lated a fourth time: Max pressure = 8 del. Total duration = 17 seconds.

## 2023-12-08 NOTE — Clinical Note
The first balloon was inserted into the saphenous vein graft.Max pressure = 10 del. Total duration = 15 seconds.     Max pressure = 10 del. Total duration = 18 seconds.    Balloon reinflated a second time: Max pressure = 10 del. Total duration = 18 seconds.  Balloon reinflated a third time:

## 2023-12-08 NOTE — Clinical Note
The first balloon was inserted into the saphenous vein graft.Max pressure = 12 del. Total duration = 30 seconds.     Max pressure = 12 del. Total duration = 30 seconds.    SVG to OM.  Balloon reinflated a second time: Max pressure = 12 del. Total duration = 30 seconds.  Balloon reinflated a third time:

## 2023-12-08 NOTE — Clinical Note
Stent deployed in the saphenous vein graft. Max pressure = 14 del. Total duration = 28 seconds. Balloon reinflated a second time:

## 2023-12-08 NOTE — MEDICATION SCRIBE - ADMISSION MEDICATION HISTORY
Medication Scribe Admission Medication History    Admission medication history is complete. The information provided in this note is only as accurate as the sources available at the time of the update.    Information Source(s): Family member via in-person    Pertinent Information: List provided by patient and family member. Patient took am and pm medications today    Changes made to PTA medication list:  Added: None  Deleted: Citalopram 20 mg, Flonase, Omeprazole 40 mg, Mirapex, Triazolam  Changed: Melatonin 2.5 mg to 3 mg    Medication Affordability:  Not including over the counter (OTC) medications, was there a time in the past 3 months when you did not take your medications as prescribed because of cost?: No    Allergies reviewed with patient and updates made in EHR: yes    Medication History Completed By: Katie Craig 12/7/2023 8:52 PM    PTA Med List   Medication Sig Last Dose    aspirin (ASA) 81 MG EC tablet Take 1 tablet by mouth daily 12/7/2023 at am    atorvastatin (LIPITOR) 80 MG tablet Take 80 mg by mouth At Bedtime 12/7/2023 at 1900    B Complex-C (SUPER B COMPLEX PO) Take 1 tablet by mouth daily 12/7/2023 at am    Botulinum Toxin Type A (BOTOX) 200 units injection every 3 months More than a month    carvedilol (COREG) 12.5 MG tablet Take 1 tablet by mouth 2 times daily 12/6/2023 at 1900    clopidogrel (PLAVIX) 75 MG tablet Take 75 mg by mouth daily 12/7/2023 at am    ENTRESTO 49-51 MG per tablet Take 1 tablet by mouth 2 times daily 12/7/2023 at 1900    escitalopram (LEXAPRO) 20 MG tablet Take 20 mg by mouth daily 12/7/2023 at am    ezetimibe (ZETIA) 10 MG tablet Take 10 mg by mouth daily 12/7/2023 at 1900    finasteride (PROSCAR) 5 MG tablet Take 1 tablet by mouth At Bedtime  12/7/2023 at 1900    furosemide (LASIX) 20 MG tablet Take 20 mg by mouth daily 12/7/2023 at am    gabapentin (NEURONTIN) 100 MG capsule Take 1-2 capsules by mouth 2 times daily Take 2 capsules in the morning & one capsule at  noon 12/7/2023 at 1200    gabapentin (NEURONTIN) 300 MG capsule Take 300 mg by mouth at bedtime 12/6/2023 at hs    latanoprost (XALATAN) 0.005 % ophthalmic solution Place 1 drop into both eyes at bedtime 12/6/2023 at hs    levothyroxine (SYNTHROID/LEVOTHROID) 100 MCG tablet Take 100 mcg by mouth daily 12/7/2023 at am    magnesium oxide (MAG-OX) 400 (241.3 Mg) MG tablet Take 1 tablet by mouth At Bedtime  12/7/2023 at 1900    multivitamin (CENTRUM SILVER) tablet Take 1 tablet by mouth daily 12/7/2023 at am    nitroGLYcerin (NITROSTAT) 0.4 MG sublingual tablet Place 1 tablet under the tongue every 5 minutes as needed for chest pain Unknown at on hand    Vitamin D3 (CHOLECALCIFEROL) 125 MCG (5000 UT) tablet Take 1 tablet by mouth daily 12/7/2023 at am

## 2023-12-08 NOTE — ED PROVIDER NOTES
History     Chief Complaint   Patient presents with    Abdominal Pain     Did try some miralax, states he feels like he's constipated, lower abd pain; started this am;      HPI  History per patient, family and review of [facility] EMR and Care Everywhere EMR, including extensive chart review of multiple prior imaging studies, multiple clinic notes and multiple prior laboratory evaluations.   Garry Mckay is a 88 year old male who presents since with abdominal pain of insidious onset this morning, now severe, constant and poorly localized across the mid and lower abdomen.  He has nausea but no vomiting.  No fever or chills.  He believes he is constipated.  Normally takes fiber supplement daily and took MiraLAX earlier today.  Last BM was a small amount of hard to pass stool and a small amount of liquid this morning, without evidence of GI bleeding. No UTI signs or symptoms or hematuria.  No prior abdominal surgeries.  Visiting family here, lives in [place].    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) 11/16/2022     Priority: Medium    Major depression, melancholic type 10/17/2021     Priority: Medium    Heart failure (H) 01/12/2021     Priority: Medium    Hypertension 01/10/2021     Priority: Medium    Obstructive sleep apnea 01/10/2021     Priority: Medium    Anxiety 01/10/2021     Priority: Medium    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 1994      Bilateral carotid artery stenosis 11/24/2017     Priority: Medium     Formatting of this note might be different from the original. surg oct 2017      Stroke, hemorrhagic (H) 11/24/2017     Priority: Medium     Formatting of this note might be different from the original. Oct 2017 [place]      Occlusion and stenosis of bilateral carotid arteries 10/19/2017     Priority: Medium     Formatting of this note might be different from the original. surg oct 2017      Right rotator cuff tendinitis 08/19/2017     Priority: Medium    Secondary hyperparathyroidism of renal origin (H24) 06/21/2016     Priority: Medium    Stage 3 chronic kidney disease (H) 06/21/2016     Priority: Medium    Anemia 06/21/2016     Priority: Medium    Glaucoma 05/22/2013     Priority: Medium    Insomnia 05/22/2013     Priority: Medium    Chronic cough 03/27/2013     Priority: Medium    Presence of permanent cardiac pacemaker 03/27/2013     Priority: Medium    Benign essential hypertension 03/26/2013     Priority: Medium    Coronary atherosclerosis 03/26/2013     Priority: Medium    Panic disorder with agoraphobia 03/26/2013     Priority: Medium     Description: pt says he no longer has this      Hyperlipidemia 03/26/2013     Priority: Medium    Reflux esophagitis 03/22/2013     Priority: Medium    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 No pertinent past medical history.    Past Surgical History:    History reviewed. No pertinent surgical history.    Family History:    History  "reviewed. No pertinent family history.    Social History:  Marital Status:   [5]        Medications:    aspirin (ASA) 81 MG EC tablet  atorvastatin (LIPITOR) 80 MG tablet  B Complex-C (SUPER B COMPLEX PO)  Botulinum Toxin Type A (BOTOX) 200 units injection  carvedilol (COREG) 12.5 MG tablet  clopidogrel (PLAVIX) 75 MG tablet  ENTRESTO 49-51 MG per tablet  escitalopram (LEXAPRO) 20 MG tablet  ezetimibe (ZETIA) 10 MG tablet  finasteride (PROSCAR) 5 MG tablet  furosemide (LASIX) 20 MG tablet  gabapentin (NEURONTIN) 100 MG capsule  gabapentin (NEURONTIN) 300 MG capsule  latanoprost (XALATAN) 0.005 % ophthalmic solution  levothyroxine (SYNTHROID/LEVOTHROID) 100 MCG tablet  magnesium oxide (MAG-OX) 400 (241.3 Mg) MG tablet  melatonin 3 MG tablet  multivitamin (CENTRUM SILVER) tablet  nitroGLYcerin (NITROSTAT) 0.4 MG sublingual tablet  Vitamin D3 (CHOLECALCIFEROL) 125 MCG (5000 UT) tablet        Review of Systems  As mentioned in the HPI, in addition focused review of systems was negative.    Physical Exam   BP: 114/69  Pulse: 63  Resp: 16  Height: 170.2 cm (5' 7\")  Weight: 81.2 kg (179 lb)  SpO2: 94 %      Physical Exam  Vitals and nursing note reviewed.   Constitutional:       General: He is in acute distress (Appears uncomfortable due to pain, but not otherwise ill).      Appearance: Normal appearance. He is well-developed. He is not ill-appearing or diaphoretic.   HENT:      Head: Normocephalic and atraumatic.      Right Ear: External ear normal.      Left Ear: External ear normal.      Nose: Nose normal.      Mouth/Throat:      Mouth: Mucous membranes are moist.   Eyes:      General: No scleral icterus.     Extraocular Movements: Extraocular movements intact.      Conjunctiva/sclera: Conjunctivae normal.   Neck:      Trachea: No tracheal deviation.   Cardiovascular:      Rate and Rhythm: Normal rate and regular rhythm.      Heart sounds: Normal heart sounds. No murmur heard.     No friction rub. No gallop. "   Pulmonary:      Effort: Pulmonary effort is normal. No respiratory distress.      Breath sounds: Normal breath sounds. No wheezing, rhonchi or rales.   Abdominal:      General: Bowel sounds are decreased. There is no distension or abdominal bruit.      Palpations: Abdomen is soft. There is no mass or pulsatile mass.      Tenderness: There is abdominal tenderness (Diffuse lower abdominal pain, poorly localized). There is no right CVA tenderness, left CVA tenderness, guarding or rebound. Negative signs include McBurney's sign.      Hernia: No hernia is present.   Musculoskeletal:         General: No swelling or tenderness. Normal range of motion.      Cervical back: Normal range of motion and neck supple.      Right lower leg: No edema.      Left lower leg: No edema.   Skin:     General: Skin is warm and dry.      Coloration: Skin is not pale.      Findings: No erythema or rash.   Neurological:      General: No focal deficit present.      Mental Status: He is alert and oriented to person, place, and time.   Psychiatric:         Behavior: Behavior normal.      Comments: Flat affect.         ED Course           Procedures                 EKG Interpretation:      Interpreted by Reggie Lynch MD  Time reviewed: Upon completion  Symptoms at time of EKG: Preop EKG, history of heart failure and ischemic cardiomyopathy  Rhythm: Normal sinus   Rate: Normal  Axis: Normal  Ectopy: None  Conduction: Nonspecific interventricular conduction block  ST Segments/ T Waves: Non-specific ST-T wave changes, probable LVH with strain  Q Waves: None  Comparison to prior: Unchanged from 1/10/2021  Clinical Impression: no acute changes from baseline    Results for orders placed or performed during the hospital encounter of 12/07/23 (from the past 24 hour(s))   Basic metabolic panel   Result Value Ref Range    Sodium 133 (L) 135 - 145 mmol/L    Potassium 5.5 (H) 3.4 - 5.3 mmol/L    Chloride 100 98 - 107 mmol/L    Carbon Dioxide (CO2) 20  (L) 22 - 29 mmol/L    Anion Gap 13 7 - 15 mmol/L    Urea Nitrogen 22.9 8.0 - 23.0 mg/dL    Creatinine 1.37 (H) 0.67 - 1.17 mg/dL    GFR Estimate 50 (L) >60 mL/min/1.73m2    Calcium 9.8 8.8 - 10.2 mg/dL    Glucose 172 (H) 70 - 99 mg/dL   Comprehensive metabolic panel   Result Value Ref Range    Sodium 134 (L) 135 - 145 mmol/L    Potassium 5.6 (H) 3.4 - 5.3 mmol/L    Carbon Dioxide (CO2) 14 (L) 22 - 29 mmol/L    Anion Gap 20 (H) 7 - 15 mmol/L    Urea Nitrogen 23.0 8.0 - 23.0 mg/dL    Creatinine 1.39 (H) 0.67 - 1.17 mg/dL    GFR Estimate 49 (L) >60 mL/min/1.73m2    Calcium 9.8 8.8 - 10.2 mg/dL    Chloride 100 98 - 107 mmol/L    Glucose 172 (H) 70 - 99 mg/dL    Alkaline Phosphatase 89 40 - 150 U/L    AST 38 0 - 45 U/L    ALT 29 0 - 70 U/L    Protein Total 7.1 6.4 - 8.3 g/dL    Albumin 4.2 3.5 - 5.2 g/dL    Bilirubin Total 1.4 (H) <=1.2 mg/dL   Lipase   Result Value Ref Range    Lipase 20 13 - 60 U/L   Hepatic panel   Result Value Ref Range    Protein Total 7.1 6.4 - 8.3 g/dL    Albumin 4.2 3.5 - 5.2 g/dL    Bilirubin Total 1.4 (H) <=1.2 mg/dL    Alkaline Phosphatase 89 40 - 150 U/L    AST 38 0 - 45 U/L    ALT 29 0 - 70 U/L    Bilirubin Direct 0.33 (H) 0.00 - 0.30 mg/dL   UA with Microscopic reflex to Culture    Specimen: Urine, Clean Catch   Result Value Ref Range    Color Urine Mitzi (A) Colorless, Straw, Light Yellow, Yellow    Appearance Urine Slightly Cloudy (A) Clear    Glucose Urine Negative Negative mg/dL    Bilirubin Urine Negative Negative    Ketones Urine Negative Negative mg/dL    Specific Gravity Urine 1.016 1.003 - 1.035    Blood Urine Negative Negative    pH Urine 5.0 5.0 - 7.0    Protein Albumin Urine 30 (A) Negative mg/dL    Urobilinogen Urine Normal Normal, 2.0 mg/dL    Nitrite Urine Negative Negative    Leukocyte Esterase Urine Negative Negative    Mucus Urine Present (A) None Seen /LPF    RBC Urine 1 <=2 /HPF    WBC Urine 4 <=5 /HPF    Squamous Epithelials Urine <1 <=1 /HPF    Hyaline Casts Urine 3  (H) <=2 /LPF    Narrative    Urine Culture not indicated   CBC with platelets differential    Narrative    The following orders were created for panel order CBC with platelets differential.  Procedure                               Abnormality         Status                     ---------                               -----------         ------                     CBC with platelets and d...[[id number]]  Abnormal            Final result                 Please view results for these tests on the individual orders.   CBC with platelets and differential   Result Value Ref Range    WBC Count 9.8 4.0 - 11.0 10e3/uL    RBC Count 5.08 4.40 - 5.90 10e6/uL    Hemoglobin 16.8 13.3 - 17.7 g/dL    Hematocrit 49.3 40.0 - 53.0 %    MCV 97 78 - 100 fL    MCH 33.1 (H) 26.5 - 33.0 pg    MCHC 34.1 31.5 - 36.5 g/dL    RDW 14.0 10.0 - 15.0 %    Platelet Count 160 150 - 450 10e3/uL    % Neutrophils 83 %    % Lymphocytes 7 %    % Monocytes 9 %    % Eosinophils 0 %    % Basophils 0 %    % Immature Granulocytes 1 %    NRBCs per 100 WBC 0 <1 /100    Absolute Neutrophils 8.1 1.6 - 8.3 10e3/uL    Absolute Lymphocytes 0.7 (L) 0.8 - 5.3 10e3/uL    Absolute Monocytes 0.9 0.0 - 1.3 10e3/uL    Absolute Eosinophils 0.0 0.0 - 0.7 10e3/uL    Absolute Basophils 0.0 0.0 - 0.2 10e3/uL    Absolute Immature Granulocytes 0.1 <=0.4 10e3/uL    Absolute NRBCs 0.0 10e3/uL   CT Abdomen Pelvis w Contrast   Result Value Ref Range    Radiologist flags Acute appendicitis (AA)     Narrative    EXAM: CT ABDOMEN PELVIS W CONTRAST  LOCATION: [facility]  DATE: 12/7/2023    INDICATION: Mid and lower abdominal pain.  COMPARISON: None.  TECHNIQUE: CT scan of the abdomen and pelvis was performed following injection of IV contrast. Multiplanar reformats were obtained. Dose reduction techniques were used.  CONTRAST: 88 mL Isovue 370    FINDINGS:   LOWER CHEST: Bibasilar subsegmental atelectasis. Partially visualized CIED leads.    HEPATOBILIARY:  Normal.    PANCREAS: Normal.    SPLEEN: Normal.    ADRENAL GLANDS: Normal.    KIDNEYS/BLADDER: Multiple simple renal cysts bilaterally; no follow-up indicated. No hydronephrosis. No nephrolithiasis. Urinary bladder appears normal.    BOWEL: Dilated appendix measuring up to 1.7 cm. 0.7 cm appendicolith at the base. Mild periappendiceal fat stranding. Trace adjacent free fluid. No extraluminal gas or focal fluid collection. Mild colonic diverticulosis. No evidence of acute   diverticulitis. Small bowel appears normal. No bowel obstruction.    PERITONEUM/RETROPERITONEUM: Trace pelvic free fluid. No fluid collection. No intraperitoneal free air.    LYMPH NODES: No lymphadenopathy.    VASCULATURE: Moderate to severe atherosclerotic calcifications. Ectatic abdominal aorta measuring up to 2.7 cm.    PELVIC ORGANS: Minimal prostatic calcifications.    MUSCULOSKELETAL: Bilateral fat-containing inguinal hernias. Small fat-containing periumbilical hernia. Multilevel degenerative changes of the spine.      Impression    IMPRESSION:   1.  Acute appendicitis. 0.7 cm appendicolith at the base. Trace free fluid, likely reactive. No extraluminal gas or abscess.    2.  Ectatic abdominal aorta measuring 2.7 cm.      [Critical Result: Acute appendicitis]    Finding was identified on 12/7/2023 7:48 PM CST.     Dr. Reggie Lynch was contacted by me on 12/7/2023 7:55 PM CST and verbalized understanding of the critical result.        Medications   HYDROmorphone (PF) (DILAUDID) injection 0.5 mg (0.5 mg Intravenous $Given 12/7/23 1824)   iopamidol (ISOVUE-370) solution 88 mL (88 mLs Intravenous $Given 12/7/23 1847)   sodium chloride 0.9 % bag 500mL for CT scan flush use (62 mLs Intravenous $Given 12/7/23 1847)   sodium chloride 0.9% BOLUS 500 mL (500 mLs Intravenous $New Bag 12/7/23 1949)   ampicillin-sulbactam (UNASYN) 3 g vial to attach to  mL bag (0 g Intravenous Stopped 12/7/23 2133)       7:55 PM - I reviewed the CT results with  the interpreting Radiologist who reports patient has appendicitis without evidence of perforation or rupture.  I will consult the surgeon on-call.    8:01 PM - I reviewed the case consulted with Dr. Bowen, Surgeon on call.    Previous Records Reviewed:  2/20/16 Echocardiogram [facility], [place] [id number] [phone]   [facility]     Adult Echocardiogram   Interpretation Summary   Inferior and inferior lateral akinesis with moderate to severe global hypokinesis with LVEF = 25%. Moderate   concentric left ventricular hypertrophy.   Mildly dilated left atrium.   Trace aortic regurgitation.       7/6/23 - NM Mpi with lexiscan    Impression  1. No jeopardized myocardium.    2. Apparent myocardial scarring throughout the inferior wall extending inferolaterally to involve 25% of the LV myocardium with segmental hypokinesis.    3. Fixed LV cavity dilatation and severely reduced post-rest LVEF of 26% are findings consistent with baseline LV dysfunction.   A severe fixed perfusion defect is found throughout the inferior wall and the basal half of the inferolateral wall to involve 25% of the LV myocardium without reversibility at rest. There is associated segmental hypokinesis. Post-rest LVEF is severely reduced at 26%.       8:53 PM - I reviewed the case again with the surgeon on-call, Dr. Bowen.  She spoke with Kennedy Josue MD the Hospitalist on-call, and CRNA on-call.  They hospitalist on-call and CRNA/anesthesia: Uncomfortable managed the patient here due to his history of heart failure and potential for complicated postoperative course.    9:26 PM - I reviewed the case with Dr. Garcia, Surgeon on call at [facility].  She felt reasonable to transfer the patient there, and requested I contact the medicine service to arrange for her admission to their care with Cardiology consultation prior to surgical intervention.    9:56 PM -I  reviewed the case with Dr. Shaw, Medicine attending at [facility] and he accepted care of the patient in transfer there.    Assessments & Plan (with Medical Decision Making)   88-year-old male with ischemic cardiomyopathy, coronary artery disease, coronary artery stenting, coronary bypass grafting and chronic anticoagulation on Plavix and a baby aspirin daily who presents with lower abdominal pain beginning this morning, due to CT confirmed acute appendicitis without perforation or abscess.  No fever, no WBC.  Pain adequately controlled with IV Dilaudid.  He was gently hydrated and IV antibiotic therapy initiated with Unasyn.  His EMR reflects an unclear reaction to the cephalexin in the past, documented in October 2021, but he and family know of no adverse reaction to cephalexin and he has had no prior adverse reaction or allergic reaction to penicillin or penicillin type antibiotics.   Our surgeon on-call was consulted and she consulted the Hospitalist service and CRNA on-call for the anesthesia service and the Hospitalist on-call and CRNA were uncomfortable managing him here due to his severe heart failure and CAD with baseline EF 25%.  His care was accepted in transfer to [facility] for Cardiology consultation and input prior to operative intervention, and where tertiary care services are available for his management should he require complicated postoperative care.    I have reviewed the nursing notes.    I have reviewed the findings, diagnosis, plan and need for follow up with the patient.      Medical Decision Making: High complexity      New Prescriptions    No medications on file       Final diagnoses:   Acute appendicitis with localized peritonitis, without perforation, abscess, or gangrene   Ischemic cardiomyopathy   Chronic anticoagulation - On Plavix and a baby aspirin daily       12/7/2023   [facility] EMERGENCY DEPT       Reggie Lynch,  MD  12/07/23 2204       Reggie Lynch MD  12/08/23 0857

## 2023-12-08 NOTE — H&P
[facility]    History and Physical - Hospitalist Service       Date of Admission:  12/8/2023    Assessment & Plan      Garry Mckay is a 88 year old male admitted on 12/8/2023. He presents with abdominal pain.     Acute appendicitis  Severe sepsis (hypotension, tachy, lactic, source)  Infectious encephalopathy, mild  *Pt visiting from [place]. Presented to ED w/ abdominal pain since am, worsening. Mid-lower abdomen. Nausea no vomiting. No f/c. No diarrhea, ? Constipated. In ED initial blood pressure 114/69, afebrile. WBC 12.8. lactic 3.4->3.4->2.9. CXR clear. UA bland. CT a/p with acute appendicitis, no perf or abscess.   *on arrival was hypotensive ~70/-. Given 500 ml fluids with improvement in blood pressures. Continues to appear dry.  - general surgery consult  - repeat lactic  - zosyn  - gentle hydration, monitor for fluid overload  - NPO  - prn analgesics    FATIMAH  Hyperkalemia, improved  Baseline creatinine 1-1.1. Creatinine on presentation 1.8. suspect dehydration in setting of entresto, diuretics.   - gentle fluids  - avoid nephrotoxins  - monitor    Chronic HFrEF  Ischemic CM s/p ICD  CAD s/p CABG, stenting 2002, 2003, 2005, 2021  HTN  HLD  Carvedilol 12.5 mg BID, atorvastatin 80 mg daily, plavix 75 mg daily, entresto 49-51 BID, zetia 10 mg daily, ASA 81 mg daily, lasix 20 mg daily  *BNP in ED at 2613/ 3859 (1750 7/2023 from OSH).  Troponin 70. EKG w/ nonspecific t-w changes.   *suspect elevated BNP related to FATIMAH, doesn't appear in failure. Trop likely type II NSTEMI vs chronic elevation   - telemetry   - serial troponins  - hold plavix today  - continue ASA  - resume carvedilol with hold parameters  - hold lasix and entresto with hypotension, FATIMAH  - hold cholesterol meds for now  - cardiology consult for surgical clearance    Hx CVA  Per chart review.    MDD  ANSON  - resume escitalopram 20 mg daily   - resume gabapentin     Migraines- Gets botox.   Hypothyroidism- resume  "levothyroxine   BPH- resume finasteride   CARINA- resume CPAP w/ home settings  GERD- pantoprazole 40 mg IV daily  Glaucoma- resume eye gtts         Diet:  NPO  DVT Prophylaxis: Pneumatic Compression Devices   Hameed Catheter: Not present  Lines: None     Cardiac Monitoring: None  Code Status:  Full per previous, discuss further when more clear    Clinically Significant Risk Factors Present on Admission        # Hyperkalemia: Highest K = 5.6 mmol/L in last 2 days, will monitor as appropriate      # Anion Gap Metabolic Acidosis: Highest Anion Gap = 20 mmol/L in last 2 days, will monitor and treat as appropriate    # Drug Induced Platelet Defect: home medication list includes an antiplatelet medication  # Acute Kidney Injury, unspecified: based on a >150% or 0.3 mg/dL increase in last creatinine compared to past 90 day average, will monitor renal function  # Hypertension: Noted on problem list   # Acute Respiratory Failure: Documented O2 saturation < 91%.  Continue supplemental oxygen as needed     # Overweight: Estimated body mass index is 29.21 kg/m  as calculated from the following:    Height as of 12/7/23: 1.702 m (5' 7\").    Weight as of this encounter: 84.6 kg (186 lb 8.2 oz).              Disposition Plan      Expected Discharge Date: 12/10/2023                  Carlos Shaw MD  Hospitalist Service  [facility]  Securely message with [employer] (more info)  Text page via [facility] Paging/Directory     ______________________________________________________________________    Chief Complaint   Abdominal pain    History is obtained from the patient, electronic health record, and emergency department physician    History of Present Illness   Garry Mckay is a 88 year old male who is with abdominal pain.  Complex medical history including heart failure secondary to ischemic cardiomyopathy, coronary disease status post CABG and stenting, hypertension, CVA among others.  Patient is currently " visiting from [place].  He presented to [facility] with abdominal pain.  It was described as diffuse and severe across mid and lower abdomen.  He had nausea but no vomiting.  Denied fevers and chills at that time.  In the emergency department CT was done and showed acute appendicitis without abscess or rupture.  Given his complex cardiac history he was transferred to [facility] for cardiac evaluation prior to possible surgery.  On arrival at [facility] he was hypotensive and brought to the stable.  His blood pressures were in the 70s systolic.  This improved with 500 cc of fluid and he did not need any pressor support.  At the time my visit with the patient is fairly confused but interactive.  He is in no distress.  He denies any pain complaints other than his abdomen.  Further history is limited by his encephalopathy.      Past Medical History    Chronic HFrEF  Ischemic CM  CAD s/p CABG, stenting  HTN  HLD  Hx CVA  MDD/ANSON  Hypothyroidism   BPH  CARINA  GERD  glaucoma    Past Surgical History   No past surgical history on file.    Prior to Admission Medications   Prior to Admission Medications   Prescriptions Last Dose Informant Patient Reported? Taking?   B Complex-C (SUPER B COMPLEX PO)  Care Giver, Self Yes No   Sig: Take 1 tablet by mouth daily   Botulinum Toxin Type A (BOTOX) 200 units injection  Care Giver, Self Yes No   Sig: every 3 months   ENTRESTO 49-51 MG per tablet  Care Giver, Self Yes No   Sig: Take 1 tablet by mouth 2 times daily   Vitamin D3 (CHOLECALCIFEROL) 125 MCG (5000 UT) tablet  Care Giver, Self Yes No   Sig: Take 1 tablet by mouth daily   aspirin (ASA) 81 MG EC tablet  Care Giver, Self Yes No   Sig: Take 1 tablet by mouth daily   atorvastatin (LIPITOR) 80 MG tablet  Care Giver, Self Yes No   Sig: Take 80 mg by mouth At Bedtime   carvedilol (COREG) 12.5 MG tablet  Care Giver, Self Yes No   Sig: Take 1 tablet by mouth 2 times daily   clopidogrel (PLAVIX) 75 MG tablet  Care Giver, Self  Yes No   Sig: Take 75 mg by mouth daily   escitalopram (LEXAPRO) 20 MG tablet  Care Giver, Self Yes No   Sig: Take 20 mg by mouth daily   ezetimibe (ZETIA) 10 MG tablet  Care Giver, Self Yes No   Sig: Take 10 mg by mouth daily   finasteride (PROSCAR) 5 MG tablet  Care Giver, Self Yes No   Sig: Take 1 tablet by mouth At Bedtime    furosemide (LASIX) 20 MG tablet  Care Giver, Self Yes No   Sig: Take 20 mg by mouth daily   gabapentin (NEURONTIN) 100 MG capsule  Care Giver, Self Yes No   Sig: Take 1-2 capsules by mouth 2 times daily Take 2 capsules in the morning & one capsule at noon   gabapentin (NEURONTIN) 300 MG capsule  Care Giver, Self Yes No   Sig: Take 300 mg by mouth at bedtime   latanoprost (XALATAN) 0.005 % ophthalmic solution  Care Giver, Self Yes No   Sig: Place 1 drop into both eyes at bedtime   levothyroxine (SYNTHROID/LEVOTHROID) 100 MCG tablet  Care Giver, Self Yes No   Sig: Take 100 mcg by mouth daily   magnesium oxide (MAG-OX) 400 (241.3 Mg) MG tablet  Care Giver, Self Yes No   Sig: Take 1 tablet by mouth At Bedtime    melatonin 3 MG tablet  Self, Care Giver Yes No   Sig: Take 3 mg by mouth nightly as needed for sleep   multivitamin (CENTRUM SILVER) tablet  Care Giver, Self Yes No   Sig: Take 1 tablet by mouth daily   nitroGLYcerin (NITROSTAT) 0.4 MG sublingual tablet  Care Giver, Self Yes No   Sig: Place 1 tablet under the tongue every 5 minutes as needed for chest pain      Facility-Administered Medications: None        Review of Systems    Review of systems not obtained due to patient factors - mental status    Social History   I have reviewed this patient's social history and updated it with pertinent information if needed.    Per previous no hx alcohol or tobacco    Physical Exam   Vital Signs: Temp: 98.8  F (37.1  C) Temp src: Temporal BP: (!) 86/51 Pulse: 92   Resp: 22 SpO2: 94 % O2 Device: Nasal cannula Oxygen Delivery: 4 LPM  Weight: 186 lbs 8.15 oz    General Appearance: Alert,  encephalopathic, no distress  Respiratory: Clear bilaterally  Cardiovascular: RRR, no murmur. No edema  GI: diffusely tender to light touch  Skin: no rashes or lesions grossly    Other: CN grossly intact, DE [place] CRUZ      Medical Decision Making       90 MINUTES SPENT BY ME on the date of service doing chart review, history, exam, documentation & further activities per the note.      Data   ------------------------- PAST 24 HR DATA REVIEWED -----------------------------------------------    I have personally reviewed the following data over the past 24 hrs:    12.8 (H)  \   15.7   / 183     135 100 29.3 (H) /  159 (H)   5.3 20 (L) 1.96 (H) \     ALT: 29; 29 AST: 38; 38 AP: 89; 89 TBILI: 1.4 (H); 1.4 (H)   ALB: 4.2; 4.2 TOT PROTEIN: 7.1; 7.1 LIPASE: 20     Trop: 70 (H) BNP: 3,859 (H)     Procal: N/A CRP: N/A Lactic Acid: 2.9 (H)         Imaging results reviewed over the past 24 hrs:   Recent Results (from the past 24 hour(s))   CT Abdomen Pelvis w Contrast   Result Value    Radiologist flags Acute appendicitis (AA)    Narrative    EXAM: CT ABDOMEN PELVIS W CONTRAST  LOCATION: [facility]  DATE: 12/7/2023    INDICATION: Mid and lower abdominal pain.  COMPARISON: None.  TECHNIQUE: CT scan of the abdomen and pelvis was performed following injection of IV contrast. Multiplanar reformats were obtained. Dose reduction techniques were used.  CONTRAST: 88 mL Isovue 370    FINDINGS:   LOWER CHEST: Bibasilar subsegmental atelectasis. Partially visualized CIED leads.    HEPATOBILIARY: Normal.    PANCREAS: Normal.    SPLEEN: Normal.    ADRENAL GLANDS: Normal.    KIDNEYS/BLADDER: Multiple simple renal cysts bilaterally; no follow-up indicated. No hydronephrosis. No nephrolithiasis. Urinary bladder appears normal.    BOWEL: Dilated appendix measuring up to 1.7 cm. 0.7 cm appendicolith at the base. Mild periappendiceal fat stranding. Trace adjacent free fluid. No extraluminal gas or focal fluid collection. Mild  colonic diverticulosis. No evidence of acute   diverticulitis. Small bowel appears normal. No bowel obstruction.    PERITONEUM/RETROPERITONEUM: Trace pelvic free fluid. No fluid collection. No intraperitoneal free air.    LYMPH NODES: No lymphadenopathy.    VASCULATURE: Moderate to severe atherosclerotic calcifications. Ectatic abdominal aorta measuring up to 2.7 cm.    PELVIC ORGANS: Minimal prostatic calcifications.    MUSCULOSKELETAL: Bilateral fat-containing inguinal hernias. Small fat-containing periumbilical hernia. Multilevel degenerative changes of the spine.      Impression    IMPRESSION:   1.  Acute appendicitis. 0.7 cm appendicolith at the base. Trace free fluid, likely reactive. No extraluminal gas or abscess.    2.  Ectatic abdominal aorta measuring 2.7 cm.      [Critical Result: Acute appendicitis]    Finding was identified on 12/7/2023 7:48 PM CST.     Dr. Reggie Lynch was contacted by me on 12/7/2023 7:55 PM CST and verbalized understanding of the critical result.    XR Chest Port 1 View    Narrative    EXAM: XR CHEST PORT 1 VIEW  LOCATION: [facility]  DATE: 12/8/2023    INDICATION: shortness of breath  COMPARISON: 01/10/2021.    FINDINGS: Sternal wires, mediastinal clips and a left subclavian cardiac device. There is no pneumothorax. The heart is enlarged. There is no pulmonary edema. The lungs are clear.      Impression    IMPRESSION: No acute abnormality.

## 2023-12-09 LAB
ALBUMIN SERPL BCG-MCNC: 2.9 G/DL (ref 3.5–5.2)
ALP SERPL-CCNC: 54 U/L (ref 40–150)
ALT SERPL W P-5'-P-CCNC: 14 U/L (ref 0–70)
ANION GAP SERPL CALCULATED.3IONS-SCNC: 11 MMOL/L (ref 7–15)
AST SERPL W P-5'-P-CCNC: 29 U/L (ref 0–45)
BILIRUB SERPL-MCNC: 1 MG/DL
BUN SERPL-MCNC: 42.4 MG/DL (ref 8–23)
CA-I BLD-MCNC: 4.8 MG/DL (ref 4.4–5.2)
CALCIUM SERPL-MCNC: 8.4 MG/DL (ref 8.8–10.2)
CHLORIDE SERPL-SCNC: 105 MMOL/L (ref 98–107)
CREAT SERPL-MCNC: 2.14 MG/DL (ref 0.67–1.17)
DEPRECATED HCO3 PLAS-SCNC: 20 MMOL/L (ref 22–29)
EGFRCR SERPLBLD CKD-EPI 2021: 29 ML/MIN/1.73M2
ERYTHROCYTE [DISTWIDTH] IN BLOOD BY AUTOMATED COUNT: 14.2 % (ref 10–15)
GLUCOSE BLDC GLUCOMTR-MCNC: 105 MG/DL (ref 70–99)
GLUCOSE BLDC GLUCOMTR-MCNC: 115 MG/DL (ref 70–99)
GLUCOSE BLDC GLUCOMTR-MCNC: 130 MG/DL (ref 70–99)
GLUCOSE BLDC GLUCOMTR-MCNC: 135 MG/DL (ref 70–99)
GLUCOSE BLDC GLUCOMTR-MCNC: 147 MG/DL (ref 70–99)
GLUCOSE SERPL-MCNC: 139 MG/DL (ref 70–99)
HCT VFR BLD AUTO: 36.3 % (ref 40–53)
HGB BLD-MCNC: 11.9 G/DL (ref 13.3–17.7)
LACTATE SERPL-SCNC: 1.3 MMOL/L (ref 0.7–2)
MAGNESIUM SERPL-MCNC: 1.8 MG/DL (ref 1.7–2.3)
MCH RBC QN AUTO: 32.5 PG (ref 26.5–33)
MCHC RBC AUTO-ENTMCNC: 32.8 G/DL (ref 31.5–36.5)
MCV RBC AUTO: 99 FL (ref 78–100)
PHOSPHATE SERPL-MCNC: 3.9 MG/DL (ref 2.5–4.5)
PLATELET # BLD AUTO: 104 10E3/UL (ref 150–450)
POTASSIUM SERPL-SCNC: 4.7 MMOL/L (ref 3.4–5.3)
PROT SERPL-MCNC: 5 G/DL (ref 6.4–8.3)
RBC # BLD AUTO: 3.66 10E6/UL (ref 4.4–5.9)
SODIUM SERPL-SCNC: 136 MMOL/L (ref 135–145)
WBC # BLD AUTO: 10 10E3/UL (ref 4–11)

## 2023-12-09 PROCEDURE — 83605 ASSAY OF LACTIC ACID: CPT | Performed by: PHYSICIAN ASSISTANT

## 2023-12-09 PROCEDURE — 82330 ASSAY OF CALCIUM: CPT | Performed by: INTERNAL MEDICINE

## 2023-12-09 PROCEDURE — 85027 COMPLETE CBC AUTOMATED: CPT | Performed by: PHYSICIAN ASSISTANT

## 2023-12-09 PROCEDURE — 84100 ASSAY OF PHOSPHORUS: CPT | Performed by: INTERNAL MEDICINE

## 2023-12-09 PROCEDURE — 250N000013 HC RX MED GY IP 250 OP 250 PS 637: Performed by: PHYSICIAN ASSISTANT

## 2023-12-09 PROCEDURE — C9113 INJ PANTOPRAZOLE SODIUM, VIA: HCPCS | Performed by: PHYSICIAN ASSISTANT

## 2023-12-09 PROCEDURE — 250N000011 HC RX IP 250 OP 636: Mod: JZ | Performed by: PHYSICIAN ASSISTANT

## 2023-12-09 PROCEDURE — 99291 CRITICAL CARE FIRST HOUR: CPT | Mod: 24 | Performed by: INTERNAL MEDICINE

## 2023-12-09 PROCEDURE — 99232 SBSQ HOSP IP/OBS MODERATE 35: CPT | Performed by: INTERNAL MEDICINE

## 2023-12-09 PROCEDURE — 200N000001 HC R&B ICU

## 2023-12-09 PROCEDURE — 80053 COMPREHEN METABOLIC PANEL: CPT | Performed by: PHYSICIAN ASSISTANT

## 2023-12-09 PROCEDURE — 99233 SBSQ HOSP IP/OBS HIGH 50: CPT | Performed by: INTERNAL MEDICINE

## 2023-12-09 PROCEDURE — 258N000003 HC RX IP 258 OP 636: Performed by: PHYSICIAN ASSISTANT

## 2023-12-09 PROCEDURE — 83735 ASSAY OF MAGNESIUM: CPT | Performed by: INTERNAL MEDICINE

## 2023-12-09 RX ORDER — CLOPIDOGREL BISULFATE 75 MG/1
75 TABLET ORAL DAILY
Status: DISCONTINUED | OUTPATIENT
Start: 2023-12-10 | End: 2023-12-24

## 2023-12-09 RX ORDER — FUROSEMIDE 10 MG/ML
40 INJECTION INTRAMUSCULAR; INTRAVENOUS EVERY 24 HOURS
Status: DISCONTINUED | OUTPATIENT
Start: 2023-12-10 | End: 2023-12-12

## 2023-12-09 RX ORDER — FUROSEMIDE 10 MG/ML
20 INJECTION INTRAMUSCULAR; INTRAVENOUS EVERY 24 HOURS
Status: DISCONTINUED | OUTPATIENT
Start: 2023-12-10 | End: 2023-12-09

## 2023-12-09 RX ADMIN — ASPIRIN 81 MG: 81 TABLET, COATED ORAL at 08:36

## 2023-12-09 RX ADMIN — HYDROMORPHONE HYDROCHLORIDE 0.2 MG: 0.2 INJECTION, SOLUTION INTRAMUSCULAR; INTRAVENOUS; SUBCUTANEOUS at 05:22

## 2023-12-09 RX ADMIN — HYDROMORPHONE HYDROCHLORIDE 0.2 MG: 0.2 INJECTION, SOLUTION INTRAMUSCULAR; INTRAVENOUS; SUBCUTANEOUS at 00:57

## 2023-12-09 RX ADMIN — PIPERACILLIN AND TAZOBACTAM 2.25 G: 2; .25 INJECTION, POWDER, FOR SOLUTION INTRAVENOUS at 08:36

## 2023-12-09 RX ADMIN — HYDROMORPHONE HYDROCHLORIDE 0.2 MG: 0.2 INJECTION, SOLUTION INTRAMUSCULAR; INTRAVENOUS; SUBCUTANEOUS at 08:37

## 2023-12-09 RX ADMIN — GABAPENTIN 300 MG: 300 CAPSULE ORAL at 22:03

## 2023-12-09 RX ADMIN — PIPERACILLIN AND TAZOBACTAM 2.25 G: 2; .25 INJECTION, POWDER, FOR SOLUTION INTRAVENOUS at 22:03

## 2023-12-09 RX ADMIN — SODIUM CHLORIDE: 9 INJECTION, SOLUTION INTRAVENOUS at 04:53

## 2023-12-09 RX ADMIN — GABAPENTIN 100 MG: 100 CAPSULE ORAL at 12:41

## 2023-12-09 RX ADMIN — GABAPENTIN 200 MG: 100 CAPSULE ORAL at 08:37

## 2023-12-09 RX ADMIN — PIPERACILLIN AND TAZOBACTAM 2.25 G: 2; .25 INJECTION, POWDER, FOR SOLUTION INTRAVENOUS at 14:54

## 2023-12-09 RX ADMIN — PANTOPRAZOLE SODIUM 40 MG: 40 INJECTION, POWDER, FOR SOLUTION INTRAVENOUS at 08:35

## 2023-12-09 RX ADMIN — HYDROMORPHONE HYDROCHLORIDE 0.2 MG: 0.2 INJECTION, SOLUTION INTRAMUSCULAR; INTRAVENOUS; SUBCUTANEOUS at 18:49

## 2023-12-09 RX ADMIN — LATANOPROST 1 DROP: 50 SOLUTION/ DROPS OPHTHALMIC at 22:08

## 2023-12-09 RX ADMIN — LEVOTHYROXINE SODIUM 100 MCG: 100 TABLET ORAL at 08:29

## 2023-12-09 RX ADMIN — ESCITALOPRAM OXALATE 20 MG: 20 TABLET, FILM COATED ORAL at 08:37

## 2023-12-09 RX ADMIN — PIPERACILLIN AND TAZOBACTAM 2.25 G: 2; .25 INJECTION, POWDER, FOR SOLUTION INTRAVENOUS at 03:53

## 2023-12-09 RX ADMIN — HYDROMORPHONE HYDROCHLORIDE 0.4 MG: 0.2 INJECTION, SOLUTION INTRAMUSCULAR; INTRAVENOUS; SUBCUTANEOUS at 23:29

## 2023-12-09 ASSESSMENT — ACTIVITIES OF DAILY LIVING (ADL)
ADLS_ACUITY_SCORE: 37
ADLS_ACUITY_SCORE: 37
ADLS_ACUITY_SCORE: 33
ADLS_ACUITY_SCORE: 33
ADLS_ACUITY_SCORE: 31
ADLS_ACUITY_SCORE: 33
ADLS_ACUITY_SCORE: 31
ADLS_ACUITY_SCORE: 37
ADLS_ACUITY_SCORE: 31

## 2023-12-09 NOTE — ANESTHESIA POSTPROCEDURE EVALUATION
Patient: Garry Mckay    Procedure: Procedure(s):  APPENDECTOMY, LAPAROSCOPIC, ICG INSPECTION OF BOWEL       Anesthesia Type:  General    Note:  Disposition: ICU            ICU Sign Out: Anesthesiologist/ICU physician sign out WAS performed   Postop Pain Control: Uneventful            Sign Out: Well controlled pain   PONV: No   Neuro/Psych: Uneventful            Sign Out: Acceptable/Baseline neuro status   Airway/Respiratory: Uneventful            Sign Out: Acceptable/Baseline resp. status   CV/Hemodynamics: Uneventful            Sign Out: Acceptable CV status; No obvious hypovolemia; No obvious fluid overload   Other NRE: NONE   DID A NON-ROUTINE EVENT OCCUR? No           Last vitals:  Vitals Value Taken Time   /58 12/08/23 1621   Temp 36.1  C (97  F) 12/08/23 1715   Pulse 81 12/08/23 1742   Resp 45 12/08/23 1742   SpO2 93 % 12/08/23 1742   Vitals shown include unfiled device data.    Electronically Signed By: Kylee Steel MD  December 8, 2023  6:07 PM

## 2023-12-09 NOTE — PLAN OF CARE
Problem: Adult Inpatient Plan of Care  Goal: Plan of Care Review  Description: The Plan of Care Review/Shift note should be completed every shift.  The Outcome Evaluation is a brief statement about your assessment that the patient is improving, declining, or no change.  This information will be displayed automatically on your shift  note.  Outcome: Progressing  Flowsheets (Taken 12/9/2023 0642)  Outcome Evaluation:   Off levophed gtt since 1945 and meeting MAP Goal >60 per Dr Garsia. Remains on supplemental o2   titrated down to 6 L/min from 8. Oriented x 4 with forgetfulness. Abdominal pain with palpation and repositioning. Active bowel sounds.  Plan of Care Reviewed With: patient  Overall Patient Progress: improving  Goal: Absence of Hospital-Acquired Illness or Injury  Intervention: Identify and Manage Fall Risk  Recent Flowsheet Documentation  Taken 12/9/2023 0400 by Frankie Elliott, RN  Safety Promotion/Fall Prevention:   activity supervised   clutter free environment maintained   increased rounding and observation   increase visualization of patient   lighting adjusted   nonskid shoes/slippers when out of bed   room door open   room near nurse's station   room organization consistent   safety round/check completed   supervised activity   treat reversible contributory factors   treat underlying cause  Taken 12/9/2023 0000 by Frankie Elliott, RN  Safety Promotion/Fall Prevention:   activity supervised   clutter free environment maintained   increased rounding and observation   increase visualization of patient   lighting adjusted   nonskid shoes/slippers when out of bed   room door open   room near nurse's station   room organization consistent   safety round/check completed   supervised activity   treat reversible contributory factors   treat underlying cause  Taken 12/8/2023 2000 by Frankie Elliott, RN  Safety Promotion/Fall Prevention:   activity supervised   clutter free environment maintained   increased  rounding and observation   increase visualization of patient   lighting adjusted   nonskid shoes/slippers when out of bed   room door open   room near nurse's station   room organization consistent   safety round/check completed   supervised activity   treat reversible contributory factors   treat underlying cause  Intervention: Prevent Skin Injury  Recent Flowsheet Documentation  Taken 12/9/2023 0500 by Farnkie Elliott RN  Body Position:   turned   left  Taken 12/9/2023 0400 by Frankie Elliott RN  Skin Protection:   adhesive use limited   incontinence pads utilized   silicone foam dressing in place  Device Skin Pressure Protection:   absorbent pad utilized/changed   adhesive use limited  Taken 12/9/2023 0200 by Frankie Elliott RN  Body Position:   turned   right  Taken 12/9/2023 0000 by Frankie Elliott RN  Body Position:   turned   left  Skin Protection:   adhesive use limited   incontinence pads utilized   silicone foam dressing in place  Device Skin Pressure Protection:   absorbent pad utilized/changed   adhesive use limited  Taken 12/8/2023 2245 by Frankie Elliott RN  Body Position:   turned   right  Taken 12/8/2023 2030 by Frankie Elliott RN  Body Position:   turned   left  Intervention: Prevent and Manage VTE (Venous Thromboembolism) Risk  Recent Flowsheet Documentation  Taken 12/9/2023 0400 by Frankie Elliott RN  VTE Prevention/Management: SCDs (sequential compression devices) on  Taken 12/9/2023 0000 by Frankie Elliott RN  VTE Prevention/Management: SCDs (sequential compression devices) on  Taken 12/8/2023 2000 by Frankie Elliott RN  VTE Prevention/Management: SCDs (sequential compression devices) on  Intervention: Prevent Infection  Recent Flowsheet Documentation  Taken 12/9/2023 0400 by Frankie Elliott RN  Infection Prevention:   environmental surveillance performed   equipment surfaces disinfected   hand hygiene promoted   rest/sleep promoted   single patient room provided  Taken 12/9/2023 0000 by Earl  "Frankie RN  Infection Prevention:   environmental surveillance performed   equipment surfaces disinfected   hand hygiene promoted   rest/sleep promoted   single patient room provided  Taken 12/8/2023 2000 by Frankie Elliott, RN  Infection Prevention:   environmental surveillance performed   equipment surfaces disinfected   hand hygiene promoted   rest/sleep promoted   single patient room provided  Goal: Optimal Comfort and Wellbeing  Outcome: Not Progressing    A/o x 4 with forgetfulness. Responds well to re-direction/orientation. Afebrile. Switched over from oxymask to home cpap with 6 L/min bled into line to maintain o2 sats above 91%. Levophed gtt off since 1945 and meeting MAP goal of >60 per Dr Garsia. Abdominal pain with palpation and repositioning. Patient c/o need to urinate despite dominique catheter in place and patent. Explained to patient that dominique catheter is draining and that \"urge to urinate\" is likely referred pain from surgery. Irrigated dominique. Patient continued to c/o of needing to urinate but not being able to. Reinforced above education and patient responded, \"Are you trying to kill me? I told you the catheter is broken and I need to pee\". Removed catheter per pt request. Bladder scanned for 155mL at 0500. Received prn dilaudid x 2 (see mar ). Bilateral lung sounds diminished.  "

## 2023-12-09 NOTE — PROGRESS NOTES
Critical Care Progress Note      12/09/2023    Name: Garry Mckay MRN#: [id number]   Age: 88 year old YOB: 1935     Hsptl Day# 1  ICU DAY #    MV DAY #             Problem List:   Principal Problem:    Acute sepsis (H)  Active Problems:    Acute appendicitis with localized peritonitis, without perforation, abscess, or gangrene    S/P appendectomy with perforation and peritonitis -  Septic shock now imroving and currently off Levophed.                                    2.  Acute respiratory failure - down to  6 liters NC and continue focus on pulmonary hygiene and increasing mobilty.   3. History of cardiomyopathy with known EF 20%; resume Lasix in AM; Plavix in AM  4. FATIMAH creatinine 2.14 with known baseline about 1.2; continue to monitor expectantly  5. BPH  6. CARINA- home cpap   7. GERD  8. Glaucoma - xalatan    Case discussed with Dr. Castro and appreciate her input.          Summary/Hospital Course:           Assessment and plan :     Garry Mckay IS a 88 year old male admitted on 12/8/2023 for septic shock after removal of perforated appendix.   I have personally reviewed the daily labs, imaging studies, cultures and discussed the case with referring physician and consulting physicians.     My assessment and plan by system for this patient is as follows:    Neurology/Psychiatry:   1. Mild delirium but much improved today;\ moves all extremities to command    Cardiovascular:   1.Hemodynamics - compensated off vasopressors     Pulmonary/Ventilator Management:   1. Oxygenation ok today on NC: focus on pulmonary hygiene and increasing mobility     GI and Nutrition :   1. PO's when surgery feels ok; tolerating PO meds     Renal/Fluids/Electrolytes:   1. FATIMAH- creatinine 2.14 and k4.7; monitor expectantly and will resume Lasix in am.     Infectious Disease:   1. Continue zosyn for peritonitis     Endocrine:   1. Glucoses ok; back on synthroid and check TSH in am    Hematology/Oncology:   1.  Hb 11.9     IV/Access:   1. Venous access -   2. Arterial access -   3.  Plan  - central access required and necessary      ICU Prophylaxis:   1. DVT: Hep Subq/ LMWH/mechanical  2. VAP: HOB 30 degrees, chlorhexidine rinse  3. Stress Ulcer: PPI/H2 blocker  4. Restraints: Nonviolent soft two point restraints required and necessary for patient safety and continued cares and good effect as patient continues to pull at necessary lines, tubes despite education and distraction. Will readdress daily.   5. IV Access - central access required and necessary for continued patient cares  6. Feeding - deferred today  7. Family Update: discussed with family at bedside   8. Disposition - ICU care today        Key goals for next 24 hours:   1.  2.  3.               Interim History:              Key Medications:      aspirin  81 mg Oral Daily    [Held by provider] carvedilol  12.5 mg Oral BID    escitalopram  20 mg Oral Daily    [Held by provider] finasteride  5 mg Oral At Bedtime    gabapentin  100 mg Oral Q24H    gabapentin  200 mg Oral QAM    gabapentin  300 mg Oral At Bedtime    latanoprost  1 drop Both Eyes At Bedtime    levothyroxine  100 mcg Oral Daily    pantoprazole  40 mg Intravenous Q24H    piperacillin-tazobactam  2.25 g Intravenous Q6H    sodium chloride (PF)  3 mL Intracatheter Q8H      norepinephrine Stopped (12/08/23 1945)    sodium chloride 100 mL/hr at 12/09/23 0800               Physical Examination:   Temp:  [97  F (36.1  C)-99.3  F (37.4  C)] 98.6  F (37  C)  Pulse:  [72-90] 90  Resp:  [15-45] 23  BP: ()/(51-64) 130/64  MAP:  [57 mmHg-144 mmHg] 89 mmHg  Arterial Line BP: ()/() 152/51  SpO2:  [89 %-96 %] 95 %    Intake/Output Summary (Last 24 hours) at 12/9/2023 1235  Last data filed at 12/9/2023 1200  Gross per 24 hour   Intake 2957.39 ml   Output 840 ml   Net 2117.39 ml     Wt Readings from Last 4 Encounters:   12/09/23 85.5 kg (188 lb 7.9 oz)   12/07/23 81.2 kg (179 lb)   01/10/21 79.8 kg (176  lb)     Arterial Line BP: ()/() 152/51  MAP:  [57 mmHg-144 mmHg] 89 mmHg  BP - Mean:  [64-93] 93  Resp: 23    Recent Labs   Lab 12/08/23  1808   PH 7.30*   PCO2 41   PO2 75*   HCO3 20*   O2PER 60       GEN: no acute distress; comfortable    HEENT: head ncat, sclera anicteric, OP patent, trachea midline   PULM: unlabored  clear anteriorly    CV/COR: RRR S1S2 no gallop,  No rub, no murmur  ABD: soft mild surgical tenderness  EXT:  mild edema   warm  NEURO: grossly intact  SKIN: no obvious rash; no cyanosis or mottling   LINES: clean, dry intact         Data:   All data and imaging reviewed     ROUTINE ICU LABS (Last four results)  CMP  Recent Labs   Lab 12/09/23  1212 12/09/23  0810 12/09/23  0451 12/09/23  0445 12/08/23 2010 12/08/23  1807 12/08/23  0533 12/08/23  0047 12/08/23  0037 12/07/23  2228 12/07/23  1531   NA  --   --   --  136  --  135  --  135  --  133* 134*  133*   POTASSIUM  --   --   --  4.7  --  5.5*  --  5.3  --  5.1 5.6*  5.5*   CHLORIDE  --   --   --  105  --  104  --  100  --  98 100  100   CO2  --   --   --  20*  --  18*  --  20*  --  20* 14*  20*   ANIONGAP  --   --   --  11  --  13  --  15  --  15 20*  13   * 130* 147* 139*   < > 164*   < > 159*   < > 169* 172*  172*   BUN  --   --   --  42.4*  --  40.0*  --  29.3*  --  29.8* 23.0  22.9   CR  --   --   --  2.14*  --  2.27*  --  1.96*  --  1.80* 1.39*  1.37*   GFRESTIMATED  --   --   --  29*  --  27*  --  33*  --  36* 49*  50*   RHODA  --   --   --  8.4*  --  8.6*  --  9.0  --  9.4 9.8  9.8   MAG  --   --   --  1.8  --   --   --   --   --   --   --    PHOS  --   --   --  3.9  --   --   --   --   --   --   --    PROTTOTAL  --   --   --  5.0*  --   --   --   --   --   --  7.1  7.1   ALBUMIN  --   --   --  2.9*  --   --   --   --   --   --  4.2  4.2   BILITOTAL  --   --   --  1.0  --   --   --   --   --   --  1.4*  1.4*   ALKPHOS  --   --   --  54  --   --   --   --   --   --  89  89   AST  --   --   --  29  --   --    "--   --   --   --  38  38   ALT  --   --   --  14  --   --   --   --   --   --  29  29    < > = values in this interval not displayed.     CBC  Recent Labs   Lab 12/09/23  0445 12/08/23  1807 12/08/23  0047 12/07/23  1816   WBC 10.0 16.0* 12.8* 9.8   RBC 3.66* 3.91* 4.83 5.08   HGB 11.9* 12.7* 15.7 16.8   HCT 36.3* 39.3* 47.5 49.3   MCV 99 101* 98 97   MCH 32.5 32.5 32.5 33.1*   MCHC 32.8 32.3 33.1 34.1   RDW 14.2 14.4 14.0 14.0   * 130* 183 160     INRNo lab results found in last 7 days.  Arterial Blood Gas  Recent Labs   Lab 12/08/23 1808   PH 7.30*   PCO2 41   PO2 75*   HCO3 20*   O2PER 60       All cultures:  No results for input(s): \"CULT\" in the last 168 hours.  Recent Results (from the past 24 hour(s))   XR Chest Port 1 View    Narrative    EXAM: XR CHEST PORT 1 VIEW  LOCATION: [facility]  DATE: 12/8/2023    INDICATION: New IJ catheter placement.  COMPARISON: None.      Impression    IMPRESSION:     New left IJ catheter with tip near the confluence of the brachiocephalic veins, although likely coursing towards the right brachiocephalic vein. Recommend repositioning.    Redemonstrated left subclavian approach pacemaker/ICD, median sternotomy wires, and mediastinal surgical clips.    New left basilar bandlike opacity, most likely atelectasis.    Otherwise, no focal airspace disease. No pleural effusion or pneumothorax.    Stable cardiomegaly.       MD Cruzito    Billing: This patient is critically ill: Yes. Total critical care time today 35 min.            "

## 2023-12-09 NOTE — PROGRESS NOTES
Assessment and Plan:   Mr. Castañeda is an 88 y.o male admitted to the hospital with acute appendicitis. Our team was consulted for preoperative clearance. He is now s/p appendectomy without cardiac complications:     #1 Acute appendicitis now s/p appendectomy  #2 History of ischemic cardiomyopathy (25% EF) with ICD  #3 History of CAD with CABG in 1994 with last cath 1/2021 at [facility] showing patent CLAUDIA-RCA, LIMA occluded, vein-diagonal patent with placement of stent,  LAD, 40% circumflex, RCA occluded.   #4 Last stress testing 7/2023 in [place] with SPECT imaging, negative for ischemia.   #5 CARINA    Plan:   -Continue asa 81mg daily  -Reinitiate home CV medications as able ( carvedilol, entresto, ezetimibe, atorvastatin, furosemide)  -It appears he is on plavix 75mg at home in addition to aspirin, the reason for this is unclear. I'd continue to hold his plavix given the recent surgery and bleeding risk and have him re-review its appropriateness with his outpatient team.   -Our team will sign off, do not hesitate to reach out with questions or concerns    Jorge Abreu MD        Interval History:     doing well; no cp, SOB, LE edema          Medications:   I have reviewed this patient's current medications         Physical Exam:   Blood pressure 130/64, pulse 90, temperature 98.6  F (37  C), temperature source Axillary, resp. rate 23, weight 85.5 kg (188 lb 7.9 oz), SpO2 95%.  Vitals:    12/08/23 0035 12/09/23 0400   Weight: 84.6 kg (186 lb 8.2 oz) 85.5 kg (188 lb 7.9 oz)         Intake/Output Summary (Last 24 hours) at 12/9/2023 0957  Last data filed at 12/9/2023 0600  Gross per 24 hour   Intake 2357.39 ml   Output 840 ml   Net 1517.39 ml       12/04 0700 - 12/09 0659  In: 3107.39 [P.O.:240; I.V.:2867.39]  Out: 1040 [Urine:700; Drains:340]  Net: 2067.39    Exam:  GENERAL APPEARANCE ADULT: Appears Ill  RESP: On Nasal Cannula, Unable to comfortable maneuver patient for ausculatation   CV: JVD not evident  while upright, regular rate and rhythm, soft systolic murmur, laterally displaced apical impulse  EXTREMITIES: No LE edema         Data:   LABS (Last four results)  CMP  Recent Labs   Lab 12/09/23  0810 12/09/23  0451 12/09/23  0445 12/08/23  2359 12/08/23 2010 12/08/23  1807 12/08/23  0533 12/08/23  0047 12/08/23  0037 12/07/23  2228 12/07/23  1531   NA  --   --  136  --   --  135  --  135  --  133* 134*  133*   POTASSIUM  --   --  4.7  --   --  5.5*  --  5.3  --  5.1 5.6*  5.5*   CHLORIDE  --   --  105  --   --  104  --  100  --  98 100  100   CO2  --   --  20*  --   --  18*  --  20*  --  20* 14*  20*   ANIONGAP  --   --  11  --   --  13  --  15  --  15 20*  13   * 147* 139* 135*   < > 164*   < > 159*   < > 169* 172*  172*   BUN  --   --  42.4*  --   --  40.0*  --  29.3*  --  29.8* 23.0  22.9   CR  --   --  2.14*  --   --  2.27*  --  1.96*  --  1.80* 1.39*  1.37*   GFRESTIMATED  --   --  29*  --   --  27*  --  33*  --  36* 49*  50*   RHODA  --   --  8.4*  --   --  8.6*  --  9.0  --  9.4 9.8  9.8   MAG  --   --  1.8  --   --   --   --   --   --   --   --    PHOS  --   --  3.9  --   --   --   --   --   --   --   --    PROTTOTAL  --   --  5.0*  --   --   --   --   --   --   --  7.1  7.1   ALBUMIN  --   --  2.9*  --   --   --   --   --   --   --  4.2  4.2   BILITOTAL  --   --  1.0  --   --   --   --   --   --   --  1.4*  1.4*   ALKPHOS  --   --  54  --   --   --   --   --   --   --  89  89   AST  --   --  29  --   --   --   --   --   --   --  38  38   ALT  --   --  14  --   --   --   --   --   --   --  29  29    < > = values in this interval not displayed.     CBC  Recent Labs   Lab 12/09/23  0445 12/08/23  1807 12/08/23  0047 12/07/23 1816   WBC 10.0 16.0* 12.8* 9.8   RBC 3.66* 3.91* 4.83 5.08   HGB 11.9* 12.7* 15.7 16.8   HCT 36.3* 39.3* 47.5 49.3   MCV 99 101* 98 97   MCH 32.5 32.5 32.5 33.1*   MCHC 32.8 32.3 33.1 34.1   RDW 14.2 14.4 14.0 14.0   * 130* 183 160     INRNo lab results  found in last 7 days.  TROPONINS   Lab Results   Component Value Date    TROPI 0.234 (HH) 01/10/2021                                                                                                               Jorge Abreu MD

## 2023-12-09 NOTE — PROGRESS NOTES
[facility]    Medicine Progress Note - Hospitalist Service    Date of Admission:  12/8/2023    Assessment & Plan   Garry Mckay is a 88 year old male visiting his family in the area from his home state of [place] who presented with abdominal pain and found to have acute appendicitis.       Acute ruptured appendicitis with extensive peritonitis and suspected area of cecal ischemia      Severe sepsis (hypotension, tachy, lactic with acute infectious source)    *Pt visiting from [place]. Presented to ED w/ abdominal pain and nausea     CT a/p with acute appendicitis, no perf or abscess.   Clinical concern for SIRS/early sepsis with leukocytosis (new on repeat serial labs earlier this am), elevated lactic acid, hypotension, acute renal failure    12/8/23 - underwent lap appy with evidence of rupture and visualized area of cecum concerning for ischemia    Post-op course complicated with significant hypotension requiring continuous vasopressor agents and admission to the ICU from the PACU  (Currently off vasopressors this am)    - continue IV zosyn q8h      ESTEFANY drain in place     Further post-op care per general surgery service  - continue IVF with strict monitoring of I/O's  - still NPO  - prn analgesics     2. FATIMAH  Hyperkalemia, improved  Baseline creatinine 1-1.1. Creatinine on presentation 1.8 and then increased to 2.27 last evening post-op    Creat this am slightly improved to 2.14    Continue strict I/O's with IVF   Avoid nephrotoxic agents  BMP in the am    3.  Acute infectious encephalopathy    Increased confusion and disorientation on my clinical assessment this am   Not agitated  Continue supportive care and re-orientation  No gross neurological deficits noted    4. Superficial venous thrombus right cephalic vein with associated right arm swelling    Noted to have more swollen, slightly tense right arm compared to the left arm on exam 12/8/23  US findings with superficial venous  thrombus  IV site removal requested in that arm  Supportive care  Elevate arm as able while supine and up in chair    5. Chronic HFrEF  Ischemic CM s/p ICD  CAD s/p CABG, stenting 2002, 2003, 2005, 2021  HTN  HLD    PTA meds listed include carvedilol 12.5 mg BID, atorvastatin 80 mg daily, plavix 75 mg daily, entresto 49-51 BID, zetia 10 mg daily, ASA 81 mg daily, lasix 20 mg daily  *BNP in ED at 2613/ 3859 (1750 7/2023 from OSH).  Troponin 70. EKG w/ nonspecific t-w changes.   *suspect elevated BNP related to FATIMAH, doesn't appear in significant volume overload at this time.     Trop likely type II NSTEMI vs chronic elevation (troponin trending down on repeat lab earlier this am)    - continue with telemetry   - serial troponins  - holding plavix   - continue ASA 81mg/day  - PTA carvedilol has been held d/t hypotension  (Awaiting repeat am vitals in the am this am)    - has been on IVF and PTA lasix and entresto have been held with hypotension, FATIMAH    - also hold cholesterol meds for now    Cardiology consulted for surgical clearance 12/8/23 and continuing to follow (appreciated)     6. Hx CVA  Per chart review.     7. MDD  ANSON  - resume escitalopram 20 mg daily   - resume gabapentin      Migraines- Gets botox.   Hypothyroidism- resume levothyroxine   BPH- resume finasteride   CARINA- resume CPAP w/ home settings - pt has his machine in the room, but was placed on oxygen only overnight    GERD- pantoprazole 40 mg IV daily  Glaucoma- resume eye gtts        Medical Decision Making       51 MINUTES SPENT BY ME on the date of service doing chart review, history, exam, documentation & further activities per the note.        PPE Worn:  Mask, gloves     Diet: NPO for Medical/Clinical Reasons Except for: Meds    DVT Prophylaxis: Pneumatic Compression Devices for now while awaiting surgery    Hameed Catheter: Not present  Lines: PRESENT      CVC Triple Lumen Left Internal jugular-Site Assessment: WDL  Arterial Line 12/08/23  "Brachial-Site Assessment: WDL Except;Positional    Cardiac Monitoring: None  Code Status: Full Code      Clinically Significant Risk Factors        # Hyperkalemia: Highest K = 5.6 mmol/L in last 2 days, will monitor as appropriate    # Hypercalcemia: corrected calcium is >10.1, will monitor as appropriate   # Anion Gap Metabolic Acidosis: Highest Anion Gap = 20 mmol/L in last 2 days, will monitor and treat as appropriate  # Hypoalbuminemia: Lowest albumin = 2.9 g/dL at 12/9/2023  4:45 AM, will monitor as appropriate   # Thrombocytopenia: Lowest platelets = 104 in last 2 days, will monitor for bleeding    # Acute Kidney Injury, unspecified: based on a >150% or 0.3 mg/dL increase in last creatinine compared to past 90 day average, will monitor renal function  # Hypertension: Noted on problem list        # Overweight: Estimated body mass index is 29.52 kg/m  as calculated from the following:    Height as of 12/7/23: 1.702 m (5' 7\").    Weight as of this encounter: 85.5 kg (188 lb 7.9 oz)., PRESENT ON ADMISSION            Disposition Plan     Expected Discharge Date: 12/10/2023,  3:00 PM      Discharge Comments: OR 12/8          Caroline Castro,   Hospitalist Service  [facility]  Securely message with [employer] (more info)  Text page via [employer] Paging/Directory   ______________________________________________________________________    Interval History     \"I am confused\".  \"What floor am I on?  Am I in a bed?  Am I in the right place?\"    Able to answer basic yes/no questions this am - YES to some abd pain; NO - chest pain, cough, headache, SOB.    Physical Exam   Vital Signs: Temp: 98.5  F (36.9  C) Temp src: Axillary BP: 92/57 Pulse: 84   Resp: 23 SpO2: 94 % O2 Device: BiPAP/CPAP Oxygen Delivery: 6 LPM  Weight: 188 lbs 7.89 oz    GEN: on CPAP when I enter the room  Motioning to me to help him take off his CPAP when I enter the room   No clear conversational dyspnea when CPAP is " off  HEENT:  Normocephalic/atraumatic, no scleral icterus, no nasal discharge, mouth slightly dry  CV:  Regular rate and rhythm, no clear loud murmur to ausc this am.  S1 + S2 noted  LUNGS:  Clear to auscultation ant/lat bilaterally without rales/rhonchi/wheezing/retractions.  Symmetric chest rise on inhalation noted.  ABD:  slow but present bowel sounds. Distended but soft abd.  No clear R/G/R to light palpation throughout.   ESTEFANY drain in place with serosang fluid  EXT: PCD's in place bilaterally      Medications    norepinephrine Stopped (12/08/23 1945)    sodium chloride 100 mL/hr at 12/09/23 0453      aspirin  81 mg Oral Daily    [Held by provider] carvedilol  12.5 mg Oral BID    escitalopram  20 mg Oral Daily    [Held by provider] finasteride  5 mg Oral At Bedtime    gabapentin  100 mg Oral Q24H    gabapentin  200 mg Oral QAM    gabapentin  300 mg Oral At Bedtime    latanoprost  1 drop Both Eyes At Bedtime    levothyroxine  100 mcg Oral Daily    pantoprazole  40 mg Intravenous Q24H    piperacillin-tazobactam  2.25 g Intravenous Q6H    sodium chloride (PF)  3 mL Intracatheter Q8H       Data     Labs and Imaging results below reviewed today.  Recent Labs   Lab 12/09/23 0445 12/08/23 1807 12/08/23 0047   WBC 10.0 16.0* 12.8*   HGB 11.9* 12.7* 15.7   HCT 36.3* 39.3* 47.5   MCV 99 101* 98   * 130* 183     Recent Labs   Lab 12/09/23 0451 12/09/23 0445 12/08/23 2359 12/08/23 2010 12/08/23 1807 12/08/23  0533 12/08/23  0047   NA  --  136  --   --  135  --  135   POTASSIUM  --  4.7  --   --  5.5*  --  5.3   CHLORIDE  --  105  --   --  104  --  100   CO2  --  20*  --   --  18*  --  20*   ANIONGAP  --  11  --   --  13  --  15   * 139* 135*   < > 164*   < > 159*   BUN  --  42.4*  --   --  40.0*  --  29.3*   CR  --  2.14*  --   --  2.27*  --  1.96*   GFRESTIMATED  --  29*  --   --  27*  --  33*   RHODA  --  8.4*  --   --  8.6*  --  9.0    < > = values in this interval not displayed.     7-Day Micro  "Results       No results found for the last 168 hours.          Recent Labs   Lab 12/09/23  0451 12/09/23  0445 12/08/23  2359 12/08/23 2010 12/08/23  1807 12/08/23  0533 12/08/23  0047 12/07/23  2228 12/07/23  1531   NA  --  136  --   --  135  --  135   < > 134*  133*   POTASSIUM  --  4.7  --   --  5.5*  --  5.3   < > 5.6*  5.5*   CHLORIDE  --  105  --   --  104  --  100   < > 100  100   CO2  --  20*  --   --  18*  --  20*   < > 14*  20*   ANIONGAP  --  11  --   --  13  --  15   < > 20*  13   * 139* 135*   < > 164*   < > 159*   < > 172*  172*   BUN  --  42.4*  --   --  40.0*  --  29.3*   < > 23.0  22.9   CR  --  2.14*  --   --  2.27*  --  1.96*   < > 1.39*  1.37*   GFRESTIMATED  --  29*  --   --  27*  --  33*   < > 49*  50*   RHODA  --  8.4*  --   --  8.6*  --  9.0   < > 9.8  9.8   MAG  --  1.8  --   --   --   --   --   --   --    PHOS  --  3.9  --   --   --   --   --   --   --    PROTTOTAL  --  5.0*  --   --   --   --   --   --  7.1  7.1   ALBUMIN  --  2.9*  --   --   --   --   --   --  4.2  4.2   BILITOTAL  --  1.0  --   --   --   --   --   --  1.4*  1.4*   ALKPHOS  --  54  --   --   --   --   --   --  89  89   AST  --  29  --   --   --   --   --   --  38  38   ALT  --  14  --   --   --   --   --   --  29  29    < > = values in this interval not displayed.     No results for input(s): \"TROPONIN\", \"TROPI\", \"TROPR\", \"TROPONINIS\" in the last 168 hours.    Invalid input(s): \"TROPT\", \"TROP\", \"TROPONINIES\", \"TNIH\"  Recent Labs   Lab 12/07/23  1545   COLOR Mitzi*   APPEARANCE Slightly Cloudy*   URINEGLC Negative   URINEBILI Negative   URINEKETONE Negative   SG 1.016   UBLD Negative   URINEPH 5.0   PROTEIN 30*   NITRITE Negative   LEUKEST Negative   RBCU 1   WBCU 4       Recent Results (from the past 24 hour(s))   US Upper Extremity Venous Duplex Right    Narrative    US UPPER EXTREMITY VENOUS DUPLEX RIGHT  12/8/2023 10:15 AM     HISTORY: right arm swelling    COMPARISON: None.    TECHNIQUE: " Examination of the upper extremity veins was performed with  graded compression and 2-D ultrasound and color doppler spectral  waveform analysis.     FINDINGS:  There is no evidence for DVT in the right upper extremity.  There is superficial venous thrombus in the right cephalic vein at the  antecubital fossa.      Impression    IMPRESSION: Superficial venous thrombus in the right cephalic vein.    COLLINS CERDA MD         SYSTEM ID:  [id number]   XR Chest Port 1 View    Narrative    EXAM: XR CHEST PORT 1 VIEW  LOCATION: [facility]  DATE: 12/8/2023    INDICATION: New IJ catheter placement.  COMPARISON: None.      Impression    IMPRESSION:     New left IJ catheter with tip near the confluence of the brachiocephalic veins, although likely coursing towards the right brachiocephalic vein. Recommend repositioning.    Redemonstrated left subclavian approach pacemaker/ICD, median sternotomy wires, and mediastinal surgical clips.    New left basilar bandlike opacity, most likely atelectasis.    Otherwise, no focal airspace disease. No pleural effusion or pneumothorax.    Stable cardiomegaly.

## 2023-12-09 NOTE — PROGRESS NOTES
[facility]  GENERAL SURGERY Progress Note    Admission Date: 2023         Assessment and Plan:     Garry Mckay is a 88 year old male with perforated acute appendicitis S/P Procedure(s):  APPENDECTOMY, LAPAROSCOPIC, ICG INSPECTION OF BOWEL, 1 Day Post-Op.    - continue Zosyn  - Continue NPO but may have sips of clears.  - Up to chair  - monitor UOP/fluid balance.  - ok to transfer to floor when cleared by intensivist.  - continue close monitoring  - ok to restart antiplatelet therapy tonight.             Interval History:     In ICU. Doing fairly well, No specific complaints, family present. Pain controlled.  Drain output is clear, thin.                      Physical Exam:   Blood pressure 133/85, pulse 89, temperature 99.1  F (37.3  C), temperature source Axillary, resp. rate 23, weight 85.5 kg (188 lb 7.9 oz), SpO2 94%.  Temperature Temp  Av.3  F (36.8  C)  Min: 97  F (36.1  C)  Max: 99.3  F (37.4  C)   I/O last 3 completed shifts:  In: 3107.39 [P.O.:240; I.V.:2867.39]  Out: 1040 [Urine:700; Drains:340]  Constitutional:  Awake, alert, oriented, and in no apparent distress.   Lungs: No increased work of breathing       Abdomen: Soft, appropriately tender at incision(s), drain site ok.   Wounds: Clean, dry, and intact. Steri strips in place. No erythema or drainage.    Extremities: No edema or calf tenderness.          Data:     Recent Labs   Lab Test 23   WBC 10.0 16.0* 12.8*   HGB 11.9* 12.7* 15.7   HCT 36.3* 39.3* 47.5   * 130* 183      Recent Labs   Lab Test 23    135 135   POTASSIUM 4.7 5.5* 5.3   CHLORIDE 105 104 100   CO2 20* 18* 20*   BUN 42.4* 40.0* 29.3*   CR 2.14* 2.27* 1.96*     Recent Labs   Lab Test 23 23  1531 01/10/21  1020   BILITOTAL 1.0 1.4*  1.4* 0.8   DBIL  --  0.33*  --    ALT 14 29  29 41   AST 29 38  38 33   ALKPHOS 54 89  89 69    LIPASE  --  20  --        Eloy Wilburn PA-C  Office: [phone]  Pager: [phone]

## 2023-12-09 NOTE — PLAN OF CARE
"  Problem: Adult Inpatient Plan of Care  Goal: Optimal Comfort and Wellbeing  Outcome: Not Progressing  Intervention: Monitor Pain and Promote Comfort  Recent Flowsheet Documentation  Taken 12/8/2023 1800 by Park Luna, RN  Pain Management Interventions:   rest   repositioned     Problem: Adult Inpatient Plan of Care  Goal: Patient-Specific Goal (Individualized)  Description: You can add care plan individualizations to a care plan. Examples of Individualization might be:  \"Parent requests to be called daily at 9am for status\", \"I have a hard time hearing out of my right ear\", or \"Do not touch me to wake me up as it startles  me\".  Outcome: Not Progressing     Problem: Adult Inpatient Plan of Care  Goal: Absence of Hospital-Acquired Illness or Injury  Outcome: Not Progressing  Intervention: Prevent Skin Injury  Recent Flowsheet Documentation  Taken 12/8/2023 1800 by Park Luna, RN  Body Position:   turned   right   Goal Outcome Evaluation:      Plan of Care Reviewed With: child          Outcome Evaluation: Pt to ICU around 1800. On low dose levophed. ESTEFANY drain with sanginous output. Denying pain. Hameed placed in OR.      "

## 2023-12-10 ENCOUNTER — APPOINTMENT (OUTPATIENT)
Dept: GENERAL RADIOLOGY | Facility: CLINIC | Age: 88
DRG: 853 | End: 2023-12-10
Attending: INTERNAL MEDICINE
Payer: MEDICARE

## 2023-12-10 ENCOUNTER — APPOINTMENT (OUTPATIENT)
Dept: OCCUPATIONAL THERAPY | Facility: CLINIC | Age: 88
DRG: 853 | End: 2023-12-10
Attending: INTERNAL MEDICINE
Payer: MEDICARE

## 2023-12-10 ENCOUNTER — APPOINTMENT (OUTPATIENT)
Dept: PHYSICAL THERAPY | Facility: CLINIC | Age: 88
DRG: 853 | End: 2023-12-10
Attending: INTERNAL MEDICINE
Payer: MEDICARE

## 2023-12-10 ENCOUNTER — APPOINTMENT (OUTPATIENT)
Dept: CT IMAGING | Facility: CLINIC | Age: 88
DRG: 853 | End: 2023-12-10
Attending: INTERNAL MEDICINE
Payer: MEDICARE

## 2023-12-10 LAB
ALBUMIN SERPL BCG-MCNC: 3 G/DL (ref 3.5–5.2)
ANION GAP SERPL CALCULATED.3IONS-SCNC: 15 MMOL/L (ref 7–15)
BASE EXCESS BLDV CALC-SCNC: -5.7 MMOL/L (ref -7.7–1.9)
BUN SERPL-MCNC: 43.5 MG/DL (ref 8–23)
CALCIUM SERPL-MCNC: 9.3 MG/DL (ref 8.8–10.2)
CHLORIDE SERPL-SCNC: 106 MMOL/L (ref 98–107)
CREAT SERPL-MCNC: 2.01 MG/DL (ref 0.67–1.17)
DEPRECATED HCO3 PLAS-SCNC: 17 MMOL/L (ref 22–29)
EGFRCR SERPLBLD CKD-EPI 2021: 32 ML/MIN/1.73M2
ERYTHROCYTE [DISTWIDTH] IN BLOOD BY AUTOMATED COUNT: 14.1 % (ref 10–15)
GLUCOSE BLDC GLUCOMTR-MCNC: 149 MG/DL (ref 70–99)
GLUCOSE BLDC GLUCOMTR-MCNC: 157 MG/DL (ref 70–99)
GLUCOSE BLDC GLUCOMTR-MCNC: 161 MG/DL (ref 70–99)
GLUCOSE SERPL-MCNC: 141 MG/DL (ref 70–99)
HCO3 BLDV-SCNC: 20 MMOL/L (ref 21–28)
HCT VFR BLD AUTO: 39.8 % (ref 40–53)
HGB BLD-MCNC: 13.2 G/DL (ref 13.3–17.7)
LACTATE SERPL-SCNC: 1.3 MMOL/L (ref 0.7–2)
MAGNESIUM SERPL-MCNC: 2.1 MG/DL (ref 1.7–2.3)
MCH RBC QN AUTO: 33.2 PG (ref 26.5–33)
MCHC RBC AUTO-ENTMCNC: 33.2 G/DL (ref 31.5–36.5)
MCV RBC AUTO: 100 FL (ref 78–100)
O2/TOTAL GAS SETTING VFR VENT: 0 %
OXYHGB MFR BLDV: 70 % (ref 70–75)
PCO2 BLDV: 38 MM HG (ref 40–50)
PH BLDV: 7.32 [PH] (ref 7.32–7.43)
PHOSPHATE SERPL-MCNC: 3.7 MG/DL (ref 2.5–4.5)
PLATELET # BLD AUTO: 125 10E3/UL (ref 150–450)
PO2 BLDV: 41 MM HG (ref 25–47)
POTASSIUM SERPL-SCNC: 4.6 MMOL/L (ref 3.4–5.3)
RBC # BLD AUTO: 3.97 10E6/UL (ref 4.4–5.9)
SODIUM SERPL-SCNC: 138 MMOL/L (ref 135–145)
TROPONIN T SERPL HS-MCNC: 131 NG/L
TROPONIN T SERPL HS-MCNC: 137 NG/L
TSH SERPL DL<=0.005 MIU/L-ACNC: 0.92 UIU/ML (ref 0.3–4.2)
WBC # BLD AUTO: 14 10E3/UL (ref 4–11)

## 2023-12-10 PROCEDURE — 85027 COMPLETE CBC AUTOMATED: CPT | Performed by: INTERNAL MEDICINE

## 2023-12-10 PROCEDURE — 71045 X-RAY EXAM CHEST 1 VIEW: CPT

## 2023-12-10 PROCEDURE — 250N000011 HC RX IP 250 OP 636: Performed by: PHYSICIAN ASSISTANT

## 2023-12-10 PROCEDURE — 84443 ASSAY THYROID STIM HORMONE: CPT | Performed by: INTERNAL MEDICINE

## 2023-12-10 PROCEDURE — 84484 ASSAY OF TROPONIN QUANT: CPT | Performed by: INTERNAL MEDICINE

## 2023-12-10 PROCEDURE — 250N000011 HC RX IP 250 OP 636: Mod: JZ | Performed by: INTERNAL MEDICINE

## 2023-12-10 PROCEDURE — 250N000013 HC RX MED GY IP 250 OP 250 PS 637: Performed by: PHYSICIAN ASSISTANT

## 2023-12-10 PROCEDURE — 82805 BLOOD GASES W/O2 SATURATION: CPT | Performed by: INTERNAL MEDICINE

## 2023-12-10 PROCEDURE — 74176 CT ABD & PELVIS W/O CONTRAST: CPT | Mod: MG

## 2023-12-10 PROCEDURE — 93005 ELECTROCARDIOGRAM TRACING: CPT

## 2023-12-10 PROCEDURE — 250N000011 HC RX IP 250 OP 636: Mod: JZ | Performed by: PHYSICIAN ASSISTANT

## 2023-12-10 PROCEDURE — 200N000001 HC R&B ICU

## 2023-12-10 PROCEDURE — 97530 THERAPEUTIC ACTIVITIES: CPT | Mod: GO

## 2023-12-10 PROCEDURE — 99291 CRITICAL CARE FIRST HOUR: CPT | Mod: 24 | Performed by: INTERNAL MEDICINE

## 2023-12-10 PROCEDURE — 97162 PT EVAL MOD COMPLEX 30 MIN: CPT | Mod: GP

## 2023-12-10 PROCEDURE — 83605 ASSAY OF LACTIC ACID: CPT | Performed by: INTERNAL MEDICINE

## 2023-12-10 PROCEDURE — C9113 INJ PANTOPRAZOLE SODIUM, VIA: HCPCS | Performed by: PHYSICIAN ASSISTANT

## 2023-12-10 PROCEDURE — 97166 OT EVAL MOD COMPLEX 45 MIN: CPT | Mod: GO

## 2023-12-10 PROCEDURE — 97530 THERAPEUTIC ACTIVITIES: CPT | Mod: GP

## 2023-12-10 PROCEDURE — 80069 RENAL FUNCTION PANEL: CPT | Performed by: INTERNAL MEDICINE

## 2023-12-10 PROCEDURE — 258N000003 HC RX IP 258 OP 636: Performed by: INTERNAL MEDICINE

## 2023-12-10 PROCEDURE — 83735 ASSAY OF MAGNESIUM: CPT | Performed by: INTERNAL MEDICINE

## 2023-12-10 PROCEDURE — 93010 ELECTROCARDIOGRAM REPORT: CPT | Performed by: INTERNAL MEDICINE

## 2023-12-10 RX ORDER — PIPERACILLIN SODIUM, TAZOBACTAM SODIUM 3; .375 G/15ML; G/15ML
3.38 INJECTION, POWDER, LYOPHILIZED, FOR SOLUTION INTRAVENOUS EVERY 6 HOURS
Status: DISCONTINUED | OUTPATIENT
Start: 2023-12-10 | End: 2023-12-14

## 2023-12-10 RX ORDER — SODIUM CHLORIDE, SODIUM LACTATE, POTASSIUM CHLORIDE, CALCIUM CHLORIDE 600; 310; 30; 20 MG/100ML; MG/100ML; MG/100ML; MG/100ML
INJECTION, SOLUTION INTRAVENOUS CONTINUOUS
Status: DISCONTINUED | OUTPATIENT
Start: 2023-12-10 | End: 2023-12-10

## 2023-12-10 RX ADMIN — FUROSEMIDE 40 MG: 10 INJECTION, SOLUTION INTRAMUSCULAR; INTRAVENOUS at 09:31

## 2023-12-10 RX ADMIN — LATANOPROST 1 DROP: 50 SOLUTION/ DROPS OPHTHALMIC at 20:43

## 2023-12-10 RX ADMIN — HYDROMORPHONE HYDROCHLORIDE 0.4 MG: 0.2 INJECTION, SOLUTION INTRAMUSCULAR; INTRAVENOUS; SUBCUTANEOUS at 06:32

## 2023-12-10 RX ADMIN — PANTOPRAZOLE SODIUM 40 MG: 40 INJECTION, POWDER, FOR SOLUTION INTRAVENOUS at 08:11

## 2023-12-10 RX ADMIN — PIPERACILLIN AND TAZOBACTAM 2.25 G: 2; .25 INJECTION, POWDER, FOR SOLUTION INTRAVENOUS at 08:13

## 2023-12-10 RX ADMIN — PIPERACILLIN AND TAZOBACTAM 2.25 G: 2; .25 INJECTION, POWDER, FOR SOLUTION INTRAVENOUS at 03:26

## 2023-12-10 RX ADMIN — HYDROMORPHONE HYDROCHLORIDE 0.4 MG: 0.2 INJECTION, SOLUTION INTRAMUSCULAR; INTRAVENOUS; SUBCUTANEOUS at 20:43

## 2023-12-10 RX ADMIN — ASPIRIN 81 MG: 81 TABLET, COATED ORAL at 10:45

## 2023-12-10 RX ADMIN — PIPERACILLIN AND TAZOBACTAM 3.38 G: 3; .375 INJECTION, POWDER, FOR SOLUTION INTRAVENOUS at 15:46

## 2023-12-10 RX ADMIN — ALTEPLASE 2 MG: 2.2 INJECTION, POWDER, LYOPHILIZED, FOR SOLUTION INTRAVENOUS at 15:57

## 2023-12-10 RX ADMIN — GABAPENTIN 100 MG: 100 CAPSULE ORAL at 10:49

## 2023-12-10 RX ADMIN — HYDROMORPHONE HYDROCHLORIDE 0.2 MG: 0.2 INJECTION, SOLUTION INTRAMUSCULAR; INTRAVENOUS; SUBCUTANEOUS at 11:33

## 2023-12-10 RX ADMIN — HYDROMORPHONE HYDROCHLORIDE 0.2 MG: 0.2 INJECTION, SOLUTION INTRAMUSCULAR; INTRAVENOUS; SUBCUTANEOUS at 18:26

## 2023-12-10 RX ADMIN — PIPERACILLIN AND TAZOBACTAM 3.38 G: 3; .375 INJECTION, POWDER, FOR SOLUTION INTRAVENOUS at 20:42

## 2023-12-10 RX ADMIN — LEVOTHYROXINE SODIUM 100 MCG: 100 TABLET ORAL at 11:05

## 2023-12-10 RX ADMIN — HYDROMORPHONE HYDROCHLORIDE 0.4 MG: 0.2 INJECTION, SOLUTION INTRAMUSCULAR; INTRAVENOUS; SUBCUTANEOUS at 08:19

## 2023-12-10 RX ADMIN — ESCITALOPRAM OXALATE 20 MG: 20 TABLET, FILM COATED ORAL at 10:45

## 2023-12-10 RX ADMIN — ALTEPLASE 2 MG: 2.2 INJECTION, POWDER, LYOPHILIZED, FOR SOLUTION INTRAVENOUS at 18:20

## 2023-12-10 RX ADMIN — HYDROMORPHONE HYDROCHLORIDE 0.4 MG: 0.2 INJECTION, SOLUTION INTRAMUSCULAR; INTRAVENOUS; SUBCUTANEOUS at 03:26

## 2023-12-10 RX ADMIN — HYDROMORPHONE HYDROCHLORIDE 0.2 MG: 0.2 INJECTION, SOLUTION INTRAMUSCULAR; INTRAVENOUS; SUBCUTANEOUS at 14:54

## 2023-12-10 RX ADMIN — SODIUM CHLORIDE: 9 INJECTION, SOLUTION INTRAVENOUS at 11:40

## 2023-12-10 RX ADMIN — GABAPENTIN 300 MG: 300 CAPSULE ORAL at 20:41

## 2023-12-10 ASSESSMENT — ACTIVITIES OF DAILY LIVING (ADL)
ADLS_ACUITY_SCORE: 37

## 2023-12-10 NOTE — PLAN OF CARE
"  Problem: Adult Inpatient Plan of Care  Goal: Plan of Care Review  Description: The Plan of Care Review/Shift note should be completed every shift.  The Outcome Evaluation is a brief statement about your assessment that the patient is improving, declining, or no change.  This information will be displayed automatically on your shift  note.  Outcome: Progressing  Flowsheets (Taken 12/9/2023 1948)  Outcome Evaluation: Patient able to maintain BP goals without pressors. VSS. Patient's pain managed well with PRN dilaudid with good effect. Will continue to monitor.  Plan of Care Reviewed With: patient  Goal: Patient-Specific Goal (Individualized)  Description: You can add care plan individualizations to a care plan. Examples of Individualization might be:  \"Parent requests to be called daily at 9am for status\", \"I have a hard time hearing out of my right ear\", or \"Do not touch me to wake me up as it startles  me\".  Outcome: Progressing  Goal: Absence of Hospital-Acquired Illness or Injury  Outcome: Progressing  Intervention: Identify and Manage Fall Risk  Recent Flowsheet Documentation  Taken 12/9/2023 1600 by Daniel Valdivia, RN  Safety Promotion/Fall Prevention:   activity supervised   clutter free environment maintained   increased rounding and observation   increase visualization of patient   lighting adjusted   nonskid shoes/slippers when out of bed   room door open   room near nurse's station   room organization consistent   safety round/check completed   supervised activity   treat reversible contributory factors   treat underlying cause  Taken 12/9/2023 1200 by Daniel Valdivia, RN  Safety Promotion/Fall Prevention:   activity supervised   clutter free environment maintained   increased rounding and observation   increase visualization of patient   lighting adjusted   nonskid shoes/slippers when out of bed   room door open   room near nurse's station   room organization consistent   safety round/check completed   " supervised activity   treat reversible contributory factors   treat underlying cause  Taken 12/9/2023 0800 by Daniel Valdivia RN  Safety Promotion/Fall Prevention:   activity supervised   clutter free environment maintained   increased rounding and observation   increase visualization of patient   lighting adjusted   nonskid shoes/slippers when out of bed   room door open   room near nurse's station   room organization consistent   safety round/check completed   supervised activity   treat reversible contributory factors   treat underlying cause  Intervention: Prevent Skin Injury  Recent Flowsheet Documentation  Taken 12/9/2023 1800 by Daniel Valdivia RN  Body Position: turned  Taken 12/9/2023 1600 by Daniel Valdivia RN  Body Position: turned  Skin Protection:   adhesive use limited   incontinence pads utilized   silicone foam dressing in place  Device Skin Pressure Protection:   absorbent pad utilized/changed   adhesive use limited  Taken 12/9/2023 1400 by Daniel Valdivia RN  Body Position: turned  Taken 12/9/2023 1200 by Daniel Valdivia RN  Body Position: turned  Skin Protection:   adhesive use limited   incontinence pads utilized   silicone foam dressing in place  Device Skin Pressure Protection:   absorbent pad utilized/changed   adhesive use limited  Taken 12/9/2023 1030 by Daniel Valdivia RN  Body Position: turned  Taken 12/9/2023 0800 by Daniel Valdivia RN  Body Position: turned  Skin Protection:   adhesive use limited   incontinence pads utilized   silicone foam dressing in place  Device Skin Pressure Protection:   absorbent pad utilized/changed   adhesive use limited  Intervention: Prevent and Manage VTE (Venous Thromboembolism) Risk  Recent Flowsheet Documentation  Taken 12/9/2023 1600 by Daniel Valdivia RN  VTE Prevention/Management: SCDs (sequential compression devices) on  Taken 12/9/2023 1200 by Daniel Valdivia RN  VTE Prevention/Management: SCDs (sequential compression devices) on  Taken  12/9/2023 0800 by Daniel Valdivia RN  VTE Prevention/Management: SCDs (sequential compression devices) on  Intervention: Prevent Infection  Recent Flowsheet Documentation  Taken 12/9/2023 1600 by Daniel Valdivia RN  Infection Prevention:   environmental surveillance performed   equipment surfaces disinfected   hand hygiene promoted   rest/sleep promoted   single patient room provided  Taken 12/9/2023 1200 by Danile Valdivia RN  Infection Prevention:   environmental surveillance performed   equipment surfaces disinfected   hand hygiene promoted   rest/sleep promoted   single patient room provided  Taken 12/9/2023 0800 by Daniel Valdivia RN  Infection Prevention:   environmental surveillance performed   equipment surfaces disinfected   hand hygiene promoted   rest/sleep promoted   single patient room provided  Goal: Optimal Comfort and Wellbeing  Outcome: Progressing  Intervention: Monitor Pain and Promote Comfort  Recent Flowsheet Documentation  Taken 12/9/2023 1200 by Daniel Valdivia RN  Pain Management Interventions:   rest   repositioned  Taken 12/9/2023 0900 by Daniel Valdivia RN  Pain Management Interventions:   rest   repositioned  Intervention: Provide Person-Centered Care  Recent Flowsheet Documentation  Taken 12/9/2023 1600 by Daniel Valdivia RN  Trust Relationship/Rapport:   care explained   choices provided   emotional support provided   empathic listening provided   questions encouraged   questions answered   reassurance provided  Goal: Readiness for Transition of Care  Outcome: Progressing     Problem: Risk for Delirium  Goal: Optimal Coping  Outcome: Progressing  Intervention: Optimize Psychosocial Adjustment to Delirium  Recent Flowsheet Documentation  Taken 12/9/2023 1600 by Daniel Valdivia RN  Supportive Measures: active listening utilized  Taken 12/9/2023 1200 by Daniel Valdivia RN  Supportive Measures: active listening utilized  Taken 12/9/2023 0800 by Daniel Valdivia RN  Supportive  Measures: active listening utilized  Goal: Improved Behavioral Control  Outcome: Progressing  Intervention: Prevent and Manage Agitation  Recent Flowsheet Documentation  Taken 12/9/2023 1600 by Daniel Valdivia RN  Environment Familiarity/Consistency:   daily routine followed   familiar objects from home provided  Taken 12/9/2023 1200 by Daniel Valdivia RN  Environment Familiarity/Consistency:   daily routine followed   familiar objects from home provided  Taken 12/9/2023 0800 by Daniel Valdivia RN  Environment Familiarity/Consistency:   daily routine followed   familiar objects from home provided  Intervention: Minimize Safety Risk  Recent Flowsheet Documentation  Taken 12/9/2023 1600 by Daniel Valdivia RN  Communication Enhancement Strategies:   call light answered in person   extra time allowed for response   one-step directions provided   repetition utilized   verbal and visual cues paired  Enhanced Safety Measures: room near unit station  Trust Relationship/Rapport:   care explained   choices provided   emotional support provided   empathic listening provided   questions encouraged   questions answered   reassurance provided  Taken 12/9/2023 1200 by Daniel Valdivia RN  Communication Enhancement Strategies:   call light answered in person   extra time allowed for response   one-step directions provided   repetition utilized   verbal and visual cues paired  Enhanced Safety Measures: room near unit station  Taken 12/9/2023 0800 by Daniel Valdivia RN  Communication Enhancement Strategies:   call light answered in person   extra time allowed for response   one-step directions provided   repetition utilized   verbal and visual cues paired  Enhanced Safety Measures: room near unit station  Goal: Improved Attention and Thought Clarity  Outcome: Progressing  Intervention: Maximize Cognitive Function  Recent Flowsheet Documentation  Taken 12/9/2023 1600 by Daniel Valdivia RN  Sensory Stimulation Regulation:   care  clustered   lighting decreased  Reorientation Measures:   calendar in view   clock in view   hearing device use encouraged   reorientation provided  Taken 12/9/2023 1200 by Daniel Valdivia RN  Sensory Stimulation Regulation:   care clustered   lighting decreased  Reorientation Measures:   calendar in view   clock in view   hearing device use encouraged   reorientation provided  Taken 12/9/2023 0800 by Daniel Valdivia RN  Sensory Stimulation Regulation:   care clustered   lighting decreased  Reorientation Measures:   calendar in view   clock in view   hearing device use encouraged   reorientation provided  Goal: Improved Sleep  Outcome: Progressing     Problem: Pain Acute  Goal: Optimal Pain Control and Function  Outcome: Progressing  Intervention: Develop Pain Management Plan  Recent Flowsheet Documentation  Taken 12/9/2023 1200 by Daniel Valdivia RN  Pain Management Interventions:   rest   repositioned  Taken 12/9/2023 0900 by Daniel Valdivia RN  Pain Management Interventions:   rest   repositioned  Intervention: Prevent or Manage Pain  Recent Flowsheet Documentation  Taken 12/9/2023 1600 by Daniel Valdivia RN  Sensory Stimulation Regulation:   care clustered   lighting decreased  Medication Review/Management: medications reviewed  Taken 12/9/2023 1200 by Daniel Valdivia RN  Sensory Stimulation Regulation:   care clustered   lighting decreased  Medication Review/Management: medications reviewed  Taken 12/9/2023 0800 by Daniel Valdivia RN  Sensory Stimulation Regulation:   care clustered   lighting decreased  Medication Review/Management: medications reviewed  Intervention: Optimize Psychosocial Wellbeing  Recent Flowsheet Documentation  Taken 12/9/2023 1600 by Daniel Valdivia RN  Supportive Measures: active listening utilized  Taken 12/9/2023 1200 by Daniel Valdivia RN  Supportive Measures: active listening utilized  Taken 12/9/2023 0800 by Daniel Valdivia RN  Supportive Measures: active listening  utilized     Problem: Fall Injury Risk  Goal: Absence of Fall and Fall-Related Injury  Outcome: Progressing  Intervention: Identify and Manage Contributors  Recent Flowsheet Documentation  Taken 12/9/2023 1600 by Daniel Valdivia RN  Medication Review/Management: medications reviewed  Taken 12/9/2023 1200 by Daniel Valdivia RN  Medication Review/Management: medications reviewed  Taken 12/9/2023 0800 by Daniel Valdivia RN  Medication Review/Management: medications reviewed  Intervention: Promote Injury-Free Environment  Recent Flowsheet Documentation  Taken 12/9/2023 1600 by Daniel Valdivia RN  Safety Promotion/Fall Prevention:   activity supervised   clutter free environment maintained   increased rounding and observation   increase visualization of patient   lighting adjusted   nonskid shoes/slippers when out of bed   room door open   room near nurse's station   room organization consistent   safety round/check completed   supervised activity   treat reversible contributory factors   treat underlying cause  Taken 12/9/2023 1200 by Daniel Valdivia RN  Safety Promotion/Fall Prevention:   activity supervised   clutter free environment maintained   increased rounding and observation   increase visualization of patient   lighting adjusted   nonskid shoes/slippers when out of bed   room door open   room near nurse's station   room organization consistent   safety round/check completed   supervised activity   treat reversible contributory factors   treat underlying cause  Taken 12/9/2023 0800 by Daniel Valdivia RN  Safety Promotion/Fall Prevention:   activity supervised   clutter free environment maintained   increased rounding and observation   increase visualization of patient   lighting adjusted   nonskid shoes/slippers when out of bed   room door open   room near nurse's station   room organization consistent   safety round/check completed   supervised activity   treat reversible contributory factors   treat  underlying cause     Problem: Sepsis/Septic Shock  Goal: Optimal Coping  Outcome: Progressing  Intervention: Optimize Psychosocial Adjustment to Illness  Recent Flowsheet Documentation  Taken 12/9/2023 1600 by Daniel Valdivia RN  Supportive Measures: active listening utilized  Taken 12/9/2023 1200 by Daniel Valdivia RN  Supportive Measures: active listening utilized  Taken 12/9/2023 0800 by Daniel Valdivia RN  Supportive Measures: active listening utilized  Goal: Absence of Bleeding  Outcome: Progressing  Intervention: Monitor and Manage Bleeding  Recent Flowsheet Documentation  Taken 12/9/2023 1600 by Daniel Valdivia RN  Bleeding Precautions:   blood pressure closely monitored   coagulation study results reviewed   monitored for signs of bleeding  Bleeding Management: dressing monitored  Taken 12/9/2023 1200 by Daniel Valdivia RN  Bleeding Precautions:   blood pressure closely monitored   coagulation study results reviewed   monitored for signs of bleeding  Bleeding Management: dressing monitored  Taken 12/9/2023 0800 by Daniel Valdivia RN  Bleeding Precautions:   blood pressure closely monitored   coagulation study results reviewed   monitored for signs of bleeding  Bleeding Management: dressing monitored  Goal: Blood Glucose Level Within Targeted Range  Outcome: Progressing  Goal: Absence of Infection Signs and Symptoms  Outcome: Progressing  Intervention: Initiate Sepsis Management  Recent Flowsheet Documentation  Taken 12/9/2023 1600 by Daniel Valdivia RN  Infection Prevention:   environmental surveillance performed   equipment surfaces disinfected   hand hygiene promoted   rest/sleep promoted   single patient room provided  Taken 12/9/2023 1200 by Daniel Valdivia RN  Infection Prevention:   environmental surveillance performed   equipment surfaces disinfected   hand hygiene promoted   rest/sleep promoted   single patient room provided  Taken 12/9/2023 0800 by Daniel Valdivia RN  Infection  Prevention:   environmental surveillance performed   equipment surfaces disinfected   hand hygiene promoted   rest/sleep promoted   single patient room provided  Intervention: Promote Recovery  Recent Flowsheet Documentation  Taken 12/9/2023 1800 by Daniel Valdivia RN  Activity Management: activity adjusted per tolerance  Taken 12/9/2023 1600 by Daniel Valdivia RN  Activity Management: activity adjusted per tolerance  Taken 12/9/2023 1400 by Daniel Valdivia RN  Activity Management: activity adjusted per tolerance  Taken 12/9/2023 1200 by Daniel Valdivia RN  Activity Management: activity adjusted per tolerance  Taken 12/9/2023 1030 by Daniel Valdivia RN  Activity Management: activity adjusted per tolerance  Taken 12/9/2023 0800 by Daniel Valdivia RN  Activity Management: activity adjusted per tolerance  Goal: Optimal Nutrition Intake  Outcome: Progressing     Problem: Acute Kidney Injury/Impairment  Goal: Fluid and Electrolyte Balance  Outcome: Progressing  Goal: Improved Oral Intake  Outcome: Progressing  Goal: Effective Renal Function  Outcome: Progressing  Intervention: Monitor and Support Renal Function  Recent Flowsheet Documentation  Taken 12/9/2023 1600 by Daniel Valdivia RN  Medication Review/Management: medications reviewed  Taken 12/9/2023 1200 by Daniel Valdivia RN  Medication Review/Management: medications reviewed  Taken 12/9/2023 0800 by Daniel Valdivia RN  Medication Review/Management: medications reviewed     Problem: Appendectomy  Goal: Absence of Bleeding  Outcome: Progressing  Intervention: Monitor and Manage Bleeding  Recent Flowsheet Documentation  Taken 12/9/2023 1600 by Daniel Valdivia RN  Bleeding Management: dressing monitored  Taken 12/9/2023 1200 by Daniel Valdivia RN  Bleeding Management: dressing monitored  Taken 12/9/2023 0800 by Daniel Valdivia RN  Bleeding Management: dressing monitored  Goal: Effective Bowel Elimination  Outcome: Progressing  Goal: Fluid and Electrolyte  Balance  Outcome: Progressing  Goal: Absence of Infection Signs and Symptoms  Outcome: Progressing  Goal: Anesthesia/Sedation Recovery  Outcome: Progressing  Intervention: Optimize Anesthesia Recovery  Recent Flowsheet Documentation  Taken 12/9/2023 1600 by Daniel Valdivia, RN  Safety Promotion/Fall Prevention:   activity supervised   clutter free environment maintained   increased rounding and observation   increase visualization of patient   lighting adjusted   nonskid shoes/slippers when out of bed   room door open   room near nurse's station   room organization consistent   safety round/check completed   supervised activity   treat reversible contributory factors   treat underlying cause  Reorientation Measures:   calendar in view   clock in view   hearing device use encouraged   reorientation provided  Taken 12/9/2023 1200 by Daniel Valdivia, RN  Safety Promotion/Fall Prevention:   activity supervised   clutter free environment maintained   increased rounding and observation   increase visualization of patient   lighting adjusted   nonskid shoes/slippers when out of bed   room door open   room near nurse's station   room organization consistent   safety round/check completed   supervised activity   treat reversible contributory factors   treat underlying cause  Reorientation Measures:   calendar in view   clock in view   hearing device use encouraged   reorientation provided  Taken 12/9/2023 0800 by Daniel Valdivia, RN  Safety Promotion/Fall Prevention:   activity supervised   clutter free environment maintained   increased rounding and observation   increase visualization of patient   lighting adjusted   nonskid shoes/slippers when out of bed   room door open   room near nurse's station   room organization consistent   safety round/check completed   supervised activity   treat reversible contributory factors   treat underlying cause  Reorientation Measures:   calendar in view   clock in view   hearing device  use encouraged   reorientation provided  Goal: Acceptable Pain Control  Outcome: Progressing  Intervention: Prevent or Manage Pain  Recent Flowsheet Documentation  Taken 12/9/2023 1200 by Daniel Valdivia RN  Pain Management Interventions:   rest   repositioned  Taken 12/9/2023 0900 by Daniel Valdivia RN  Pain Management Interventions:   rest   repositioned  Goal: Nausea and Vomiting Relief  Outcome: Progressing  Goal: Effective Urinary Elimination  Outcome: Progressing  Goal: Effective Oxygenation and Ventilation  Outcome: Progressing  Intervention: Optimize Oxygenation and Ventilation  Recent Flowsheet Documentation  Taken 12/9/2023 1800 by Daniel Valdivia RN  Head of Bed (HOB) Positioning: HOB at 30 degrees  Taken 12/9/2023 1600 by Daniel Valdivia RN  Head of Bed (HOB) Positioning: HOB at 30 degrees  Taken 12/9/2023 1400 by Daniel Valdivia RN  Head of Bed (HOB) Positioning: HOB at 30 degrees  Taken 12/9/2023 1200 by Daniel Valdivia RN  Head of Bed (HOB) Positioning: HOB at 30 degrees  Taken 12/9/2023 1030 by Daniel Valdivia RN  Head of Bed (HOB) Positioning: HOB at 30 degrees  Taken 12/9/2023 0800 by Daniel Valdivia RN  Head of Bed ([facility]) Positioning: HOB at 30 degrees   Goal Outcome Evaluation:      Plan of Care Reviewed With: patient          Outcome Evaluation: Patient able to maintain BP goals without pressors. VSS. Patient's pain managed well with PRN dilaudid with good effect. Will continue to monitor.

## 2023-12-10 NOTE — PROGRESS NOTES
Spoke with Dr. Orozco and bedside RN.  Pt still with guarded clinical status and will need to remain in the ICU today.  Hospitalist service available to resume medical care once he is medically stable for transfer out of the ICU.

## 2023-12-10 NOTE — PROGRESS NOTES
General surgery note    Afebrile heart rate in the 90s, blood pressure adequate, saturating above 92% with decreasing oxygen requirement.  Abdomen is distended but soft  CT scan shows postoperative findings of ileus I suspect that third perforated appendicitis treatment.  Drain in place.  Drain with benign appearing output mostly serosanguineous.    Continue supportive care after appendectomy for perforated appendicitis with peritonitis.

## 2023-12-10 NOTE — PROGRESS NOTES
Critical Care Progress Note      12/10/2023    Name: Garry Mckay MRN#: [id number]   Age: 88 year old YOB: 1935     Hsptl Day# 2  ICU DAY #    MV DAY #             Problem List:   Principal Problem:    Acute sepsis (H)  Active Problems:    Acute appendicitis with localized peritonitis, without perforation, abscess, or gangrene    S/P appendectomy with perforation and peritonitis 12/8 -  Septic shock improved and off levophed.                                    2.  Acute respiratory failure - down to  2 liters NC and continue focus on pulmonary hygiene and increasing mobilty.   3. History of cardiomyopathy with known EF 20%; resume Plavix  4. FATIMAH creatinine 2.0 with known baseline about 1.2; continue to monitor expectantly; UO low and will continue to follow closely.   5. BPH  6. CARINA- home cpap   7. GERD  8. Glaucoma - xalatan    Overall, he continues with multiple organ failure but with some slow improvement over time, but remains at very high risk of decompensation. I discussed case with Dr. Mcgill from surgery- there is still concern that cecum could yet become necrotic and patient could develop again overwhelming shock and will continue to monitor closely in ICU.         Summary/Hospital Course:           Assessment and plan :     Garry Mckay IS a 88 year old male admitted on 12/8/2023 for septic shock.   I have personally reviewed the daily labs, imaging studies, cultures and discussed the case with referring physician and consulting physicians.     My assessment and plan by system for this patient is as follows:    Neurology/Psychiatry:   1.  Mild delirium, improved today; moves all extremities well to command    Cardiovascular:   1.Hemodynamics - compensated off support     Pulmonary/Ventilator Management:   1. On NC oxygen and compensated     GI and Nutrition :   1. Deferred today     Renal/Fluids/Electrolytes:   1. Creatinine 2.0; UO unchanged and on low side     Infectious Disease:    1. Continue zosyn for peritonitisi     Endocrine:   Glucoses ok    Hematology/Oncology:   1. Hb 13.2     IV/Access:   1. Venous access -   2. Arterial access -   3.  Plan  - central access required and necessary      ICU Prophylaxis:   1. DVT: Hep Subq/ LMWH/mechanical  2. VAP: HOB 30 degrees, chlorhexidine rinse  3. Stress Ulcer: PPI/H2 blocker  4. Restraints: Nonviolent soft two point restraints required and necessary for patient safety and continued cares and good effect as patient continues to pull at necessary lines, tubes despite education and distraction. Will readdress daily.   5. IV Access - central access required and necessary for continued patient cares  6. Feeding - deferred today  7. Family Update: discussed with family at bedside at length  8. Disposition - ICU care        Key goals for next 24 hours:   1.  2.  3.               Interim History:              Key Medications:      aspirin  81 mg Oral Daily    [Held by provider] carvedilol  12.5 mg Oral BID    clopidogrel  75 mg Oral Daily    escitalopram  20 mg Oral Daily    [Held by provider] finasteride  5 mg Oral At Bedtime    furosemide  40 mg Intravenous Q24H    gabapentin  100 mg Oral Q24H    gabapentin  200 mg Oral QAM    gabapentin  300 mg Oral At Bedtime    latanoprost  1 drop Both Eyes At Bedtime    levothyroxine  100 mcg Oral Daily    pantoprazole  40 mg Intravenous Q24H    piperacillin-tazobactam  2.25 g Intravenous Q6H    sodium chloride (PF)  3 mL Intracatheter Q8H      norepinephrine Stopped (12/08/23 1945)    sodium chloride 10 mL/hr at 12/10/23 1200               Physical Examination:   Temp:  [98.3  F (36.8  C)-99.9  F (37.7  C)] 99  F (37.2  C)  Pulse:  [] 93  Resp:  [10-29] 18  BP: (108-149)/() 130/86  MAP:  [77 mmHg-99 mmHg] 99 mmHg  Arterial Line BP: (131-150)/(49-70) 150/70  SpO2:  [91 %-96 %] 96 %    Intake/Output Summary (Last 24 hours) at 12/10/2023 1351  Last data filed at 12/10/2023 1200  Gross per 24 hour    Intake 866.5 ml   Output 1269 ml   Net -402.5 ml     Wt Readings from Last 4 Encounters:   12/10/23 86 kg (189 lb 9.5 oz)   12/07/23 81.2 kg (179 lb)   01/10/21 79.8 kg (176 lb)     Arterial Line BP: (131-150)/(49-70) 150/70  MAP:  [77 mmHg-99 mmHg] 99 mmHg  BP - Mean:  [] 101  Resp: 18    Recent Labs   Lab 12/10/23  0745 12/08/23  1808   PH  --  7.30*   PCO2  --  41   PO2  --  75*   HCO3  --  20*   O2PER 0 60       GEN: no acute distress   HEENT: head ncat, sclera anicteric, OP patent, trachea midline   PULM: unlabored synchronous with vent, clear anteriorly    CV/COR: RRR S1S2 no gallop,  No rub, no murmur  ABD: soft mod surgical tenderness   EXT:  trace edema   warm  NEURO: grossly intact  SKIN: no obvious rash  LINES: clean, dry intact         Data:   All data and imaging reviewed     ROUTINE ICU LABS (Last four results)  CMP  Recent Labs   Lab 12/10/23  1339 12/10/23  0810 12/10/23  0157 12/09/23  2211 12/09/23  0451 12/09/23  0445 12/08/23 2010 12/08/23  1807 12/08/23  0533 12/08/23  0047 12/07/23  2228 12/07/23  1531   NA  --   --  138  --   --  136  --  135  --  135   < > 134*  133*   POTASSIUM  --   --  4.6  --   --  4.7  --  5.5*  --  5.3   < > 5.6*  5.5*   CHLORIDE  --   --  106  --   --  105  --  104  --  100   < > 100  100   CO2  --   --  17*  --   --  20*  --  18*  --  20*   < > 14*  20*   ANIONGAP  --   --  15  --   --  11  --  13  --  15   < > 20*  13   * 157* 141* 115*   < > 139*   < > 164*   < > 159*   < > 172*  172*   BUN  --   --  43.5*  --   --  42.4*  --  40.0*  --  29.3*   < > 23.0  22.9   CR  --   --  2.01*  --   --  2.14*  --  2.27*  --  1.96*   < > 1.39*  1.37*   GFRESTIMATED  --   --  32*  --   --  29*  --  27*  --  33*   < > 49*  50*   RHODA  --   --  9.3  --   --  8.4*  --  8.6*  --  9.0   < > 9.8  9.8   MAG  --   --  2.1  --   --  1.8  --   --   --   --   --   --    PHOS  --   --  3.7  --   --  3.9  --   --   --   --   --   --    PROTTOTAL  --   --   --   --   --  " 5.0*  --   --   --   --   --  7.1  7.1   ALBUMIN  --   --  3.0*  --   --  2.9*  --   --   --   --   --  4.2  4.2   BILITOTAL  --   --   --   --   --  1.0  --   --   --   --   --  1.4*  1.4*   ALKPHOS  --   --   --   --   --  54  --   --   --   --   --  89  89   AST  --   --   --   --   --  29  --   --   --   --   --  38  38   ALT  --   --   --   --   --  14  --   --   --   --   --  29  29    < > = values in this interval not displayed.     CBC  Recent Labs   Lab 12/10/23  0157 12/09/23  0445 12/08/23  1807 12/08/23  0047   WBC 14.0* 10.0 16.0* 12.8*   RBC 3.97* 3.66* 3.91* 4.83   HGB 13.2* 11.9* 12.7* 15.7   HCT 39.8* 36.3* 39.3* 47.5    99 101* 98   MCH 33.2* 32.5 32.5 32.5   MCHC 33.2 32.8 32.3 33.1   RDW 14.1 14.2 14.4 14.0   * 104* 130* 183     INRNo lab results found in last 7 days.  Arterial Blood Gas  Recent Labs   Lab 12/10/23  0745 12/08/23  1808   PH  --  7.30*   PCO2  --  41   PO2  --  75*   HCO3  --  20*   O2PER 0 60       All cultures:  No results for input(s): \"CULT\" in the last 168 hours.  Recent Results (from the past 24 hour(s))   CT Abdomen Pelvis w/o Contrast    Narrative    EXAM: CT ABDOMEN PELVIS W/O CONTRAST  LOCATION: [facility]  DATE: 12/10/2023    INDICATION: increasing abdominal pain, pt s p appendectomy for perforated apendix  COMPARISON: 12/07/2023  TECHNIQUE: CT scan of the abdomen and pelvis was performed without IV contrast. Multiplanar reformats were obtained. Dose reduction techniques were used.  CONTRAST: None.    FINDINGS:     LOWER CHEST: Pleural effusions and bibasilar atelectasis. Partially visualized pacemaker wires.     HEPATOBILIARY: Normal liver parenchyma. No biliary dilation. Gallbladder is distended with layering sludge versus vicarious contrast excretion.     PANCREAS: Normal.     SPLEEN: Normal.     ADRENAL GLANDS: Normal.     KIDNEYS/BLADDER: Persistent nephrogram bilaterally. Benign renal cysts for which no further " follow-up imaging is recommended. No hydronephrosis. Retained contrast is noted within the urinary bladder.      BOWEL: Dilated small bowel with air-fluid levels measuring up to 3.8 cm. Colon is predominantly decompressed with some fluid in the cecum. No discrete transition point identified, although there is some caliber change in the right lower quadrant.   Appendectomy. Surgical drain terminates in the right lower quadrant. Colonic diverticulosis. Scattered free fluid and mesenteric stranding is seen throughout the lower abdomen and pelvis. No organized or drainable collection at this time.     LYMPH NODES: No pathologically enlarged lymph nodes.    VASCULATURE: Infrarenal abdominal aortic ectasia measuring up to 2.7 cm. severe aortoiliac calcifications.     PELVIC ORGANS: No pelvic masses.     MUSCULOSKELETAL: Multilevel degenerative disc disease of the thoracolumbar spine. Postsurgical changes are seen in the anterior abdominal wall. Mild anasarca.        Impression    IMPRESSION:  1.  Multiple dilated loops of small bowel with relative decompression of the colon. Although no discrete mechanical transition point is confidently identified, this configuration is most consistent with at least partial obstruction, with adynamic ileus   an additional consideration. Recommend general surgery consultation.  2.  Post surgical changes of recent appendectomy. Scattered free fluid and mesenteric stranding in the lower abdomen and pelvis. No organized or drainable collection at this time.  3.  Persistent bilateral nephrogram and retained contrast in the urinary bladder. Correlate with renal function.  4.  Mild anasarca and small bilateral pleural effusions.  5.  Chronic and ancillary findings as described.   XR Chest Port 1 View    Narrative    EXAM: XR CHEST PORT 1 VIEW  LOCATION: [facility]  DATE: 12/10/2023    INDICATION: hypoxia  COMPARISON: 12/8/2023      Impression    IMPRESSION: No acute  "cardiopulmonary abnormality. Improved aeration of the left lung base.    Stable enlarged cardiomediastinal silhouette. Left neck central venous catheter tip unchanged at the confluence of the brachiocephalic veins.   Clinically Significant Risk Factors        # Hyperkalemia: Highest K = 5.5 mmol/L in last 2 days, will monitor as appropriate    # Hypercalcemia: corrected calcium is >10.1, will monitor as appropriate    # Hypoalbuminemia: Lowest albumin = 2.9 g/dL at 12/9/2023  4:45 AM, will monitor as appropriate   # Thrombocytopenia: Lowest platelets = 104 in last 2 days, will monitor for bleeding   # Hypertension: Noted on problem list        # Overweight: Estimated body mass index is 29.69 kg/m  as calculated from the following:    Height as of 12/7/23: 1.702 m (5' 7\").    Weight as of this encounter: 86 kg (189 lb 9.5 oz)., PRESENT ON ADMISSION               MD Cruzito    Billing: This patient is critically ill: Yes. Total critical care time today 50 min.            "

## 2023-12-10 NOTE — PROGRESS NOTES
"   12/10/23 1534   Appointment Info   Signing Clinician's Name / Credentials (OT) Shameka Reynoso oTR/L   Living Environment   People in Home other (see comments);alone   Current Living Arrangements house   Home Accessibility stairs to enter home;stairs within home   Transportation Anticipated family or friend will provide;car, drives self   Living Environment Comments Pt lives in [place], here visiting family. Pt would return to daughter's house in [place] upon dischage-- her house is split level entry to bedroom downstairs or he could stay on main level and enter sliding door without any stairs. bathroom has walk in shower w/ seat.   Self-Care   Usual Activity Tolerance good   Current Activity Tolerance poor   Equipment Currently Used at Home cane, straight;shower chair   Fall history within last six months no   Activity/Exercise/Self-Care Comment Per daughter, pt very active- drives, is independent. Lives alone on his property in [facility] [place] [facility]. 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 He carries a cane around with him \"in case he falls to use it to stand back up again\", but doesn't use it to walk necessarily.   General Information   Onset of Illness/Injury or Date of Surgery 12/08/23   Referring Physician LOKI Orozco   Patient/Family Therapy Goal Statement (OT) None stated   Additional Occupational Profile Info/Pertinent History of Current Problem Admitted with abd pain, S/P lap appendectomy with perforation and peritonitis. EF 20%, known cardiomyopathy. Trops 131 today, MD not concerned.   Existing Precautions/Restrictions fall;oxygen therapy device and L/min  (2L)   General Observations and Info Sats 95% on 2L. ESTEFANY R abd.   Cognitive Status Examination   Cognitive Status Comments Oriented to self, month, [place] and appendicitis. [place] despite hearing aides B. Pleasant but continually wanting to be \"done with this\". Easy to distract with conversation.   Pain Assessment   Patient Currently in Pain Yes, see Vital Sign " "flowsheet  (Unable to rate but \"excrutiating\".)   Range of Motion Comprehensive   Comment, General Range of Motion Limited AROM in B shoulders to ~90. L appears weaker than R. 4/5 throughout. Question command following vs true weakness.   Bed Mobility   Comment (Bed Mobility) DEP rolling   Transfers   Transfer Comments DEP- ceiling lift.   Balance   Balance Comments Too painful to try EOB.   Activities of Daily Living   BADL Assessment/Intervention   (Currently DEP for ADL due to confusion, decreased activity tolerance, pain)   Clinical Impression   Criteria for Skilled Therapeutic Interventions Met (OT) Yes, treatment indicated   OT Diagnosis Decreased ADL   Influenced by the following impairments pain, decreased activity tolerance,   Assessment of Occupational Performance 3-5 Performance Deficits   Planned Therapy Interventions (OT) ADL retraining;IADL retraining;cognition;transfer training   Clinical Decision Making Complexity (OT) detailed assessment/moderate complexity   OT Total Evaluation Time   OT Eval, Moderate Complexity Minutes (25866) 10   OT Goals   Therapy Frequency (OT) Daily   OT Predicted Duration/Target Date for Goal Attainment 12/16/23   OT Goals Hygiene/Grooming;Upper Body Dressing;Lower Body Dressing;Toilet Transfer/Toileting;Cognition   OT: Hygiene/Grooming supervision/stand-by assist   OT: Upper Body Dressing Minimal assist   OT: Lower Body Dressing Moderate assist;using adaptive equipment   OT: Toilet Transfer/Toileting Moderate assist;toilet transfer;cleaning and garment management   OT: Cognitive Patient/caregiver will verbalize understanding of cognitive assessment results/recommendations as needed for safe discharge planning   Interventions   Interventions Quick Adds Therapeutic Activity;Self-Care/Home Management   Therapeutic Activities   Therapeutic Activity Minutes (74388) 19   Symptoms noted during/after treatment increased pain   Treatment Detail/Skilled Intervention Patient " benefitting from simple cueing to logroll B for NA to A with cleaning. Needing guidance to find bedrail to A with UEs. DEP. Second roll to each side to place ceiling lift with DEP. Patient painful, but easily distracted away from pain to participate. Co-eval'd with PT to maximize patients tolerance for activity.   OT Discharge Planning   OT Plan EOB with ceiling lift for grooming EOB.   OT Discharge Recommendation (DC Rec) Transitional Care Facility   OT Rationale for DC Rec Patient well below his very IND baseline, limited by confusion, pain, decreased activity tolerance.   OT Brief overview of current status DEP to chair with lift.   Total Session Time   Timed Code Treatment Minutes 19   Total Session Time (sum of timed and untimed services) 29

## 2023-12-10 NOTE — PROGRESS NOTES
"   12/10/23 9087   Appointment Info   Signing Clinician's Name / Credentials (PT) Skye Parikh, PT, DPT   Living Environment   People in Home other (see comments);alone   Current Living Arrangements house   Home Accessibility stairs to enter home;stairs within home   Transportation Anticipated family or friend will provide;car, drives self   Living Environment Comments Pt lives in [place], here visiting family. Pt would return to daughter's house in [place] upon dischage-- her house is split level entry to bedroom downstairs or he could stay on main level and enter sliding door without any stairs. bathroom has walk in shower w/ seat.   Self-Care   Usual Activity Tolerance good   Current Activity Tolerance poor   Regular Exercise Yes   Activity/Exercise Type other (see comments)  (very active on his land)   Equipment Currently Used at Home cane, straight;shower chair   Fall history within last six months no   Activity/Exercise/Self-Care Comment Per daughter, pt very active- drives, is independent. Lives alone on his property in [place]. He carries a cane around with him 'in case he falls to use it to stand back up again(?)', but doesn't use it to walk necessarily.   General Information   Onset of Illness/Injury or Date of Surgery 12/08/23   Referring Physician Yo Orozco MD   Patient/Family Therapy Goals Statement (PT) to get better   Pertinent History of Current Problem (include personal factors and/or comorbidities that impact the POC) \"admitted on 12/8/2023 for septic shock after removal of perforated appendix.\" FATIMAH, Chronic HFrEF  Ischemic CM s/p ICD  CAD s/p CABG, stenting 2002, 2003, 2005, 2021;  HTN;  HLD   Existing Precautions/Restrictions fall;abdominal   Weight-Bearing Status - LLE full weight-bearing   Weight-Bearing Status - RLE full weight-bearing   Cognition   Affect/Mental Status (Cognition) confused   Orientation Status (Cognition) verbal cues/prompts needed for orientation "   Cognitive Status Comments Pt remembers his name, 'ceferino', 'appendicitis', but also confused at times stating 'bye' and 'that's all'. [facility] w/ hearing aides present   Pain Assessment   Patient Currently in Pain Yes, see Vital Sign flowsheet  (pain w/ mobility-- pt reports back pain)   Integumentary/Edema   Integumentary/Edema Comments incision w/ bandaging, CDI; drain   Posture    Posture Comments affected by weakness and pain   Range of Motion (ROM)   Range of Motion ROM deficits secondary to surgical procedure;ROM deficits secondary to pain   Strength (Manual Muscle Testing)   Strength Comments limited d/t pain and confusion; able to lift legs against gravity somewhat but formall assessment difficult d/t cognition   Bed Mobility   Comment, (Bed Mobility) max-dependent assist of 2 rolling-- painful   Transfers   Comment, (Transfers) dependent w/ suzy lift   Gait/Stairs (Locomotion)   Comment, (Gait/Stairs) unable d/t weakness and tolerance   Clinical Impression   Criteria for Skilled Therapeutic Intervention Yes, treatment indicated   PT Diagnosis (PT) impaired functional mobility   Influenced by the following impairments decreased strength, activity tolerance, balance, cognition   Functional limitations due to impairments bed mobility,transfers, ambulation, stairs   Clinical Presentation (PT Evaluation Complexity) evolving   Clinical Presentation Rationale clinical judgement   Clinical Decision Making (Complexity) moderate complexity   Planned Therapy Interventions (PT) balance training;bed mobility training;gait training;home exercise program;neuromuscular re-education;patient/family education;stair training;strengthening;transfer training   Risk & Benefits of therapy have been explained evaluation/treatment results reviewed;care plan/treatment goals reviewed;risks/benefits reviewed;current/potential barriers reviewed;participants voiced agreement with care plan;participants included;patient;daughter   PT  Total Evaluation Time   PT Eval, Moderate Complexity Minutes (44869) 10   Physical Therapy Goals   PT Frequency 5x/week   PT Predicted Duration/Target Date for Goal Attainment 12/17/23   PT Goals Bed Mobility;Transfers;Gait;Stairs   PT: Bed Mobility Modified independent   PT: Transfers Modified independent;Sit to/from stand;Bed to/from chair;Assistive device   PT: Gait Supervision/stand-by assist;Assistive device;150 feet   Interventions   Interventions Quick Adds Therapeutic Activity   Therapeutic Activity   Therapeutic Activities: dynamic activities to improve functional performance Minutes (88015) 26   Symptoms Noted During/After Treatment Increased pain   Treatment Detail/Skilled Intervention Rolling in supine w/ max-dependent assist for cleaning, very painful at first-- improved over session after RN administered diluadid. Pt confused at times but relatively orientated. Rolling to place suzy sling w/ better tolerance-- max assist then mod-min to maintain position. Hoyered to recliner chair and set up comfortable w/ pillows. Attempted leg exercises in chair-- pt able to lift legs against gravity but closing eyes and stating 'thank you'/''bye'.   PT Discharge Planning   PT Plan progress all mobility w/ assist of 2   PT Discharge Recommendation (DC Rec) Transitional Care Facility   PT Rationale for DC Rec Patient well below baseline-- will benefit from rehab to address all functional mobility. Pt at baseline is very independent and lives alone, family in town plans to have him d/c to their home here when he's ready/after rehab stay.   PT Brief overview of current status max assist rolling, dependent transfer   PT Equipment Needed at Discharge   (pending progress)   Total Session Time   Timed Code Treatment Minutes 26   Total Session Time (sum of timed and untimed services) 36

## 2023-12-10 NOTE — PLAN OF CARE
Problem: Adult Inpatient Plan of Care  Goal: Plan of Care Review  Description: The Plan of Care Review/Shift note should be completed every shift.  The Outcome Evaluation is a brief statement about your assessment that the patient is improving, declining, or no change.  This information will be displayed automatically on your shift  note.  12/10/2023 0804 by Frankie Elliott, RN  Outcome: Not Progressing  Flowsheets (Taken 12/10/2023 0800)  Outcome Evaluation: Increased pain with palpation and movement. Increased output noted in ESTEFANY. Hypoactive bowel sounds with no bm since PTA. Bilateral lung sounds diminished. Increased ectopy  with EKG changes.  Plan of Care Reviewed With: patient  Overall Patient Progress: declining  Goal: Absence of Hospital-Acquired Illness or Injury  Intervention: Identify and Manage Fall Risk  Recent Flowsheet Documentation  Taken 12/10/2023 0400 by Frankie Elliott, RN  Safety Promotion/Fall Prevention:   activity supervised   clutter free environment maintained   increased rounding and observation   increase visualization of patient   lighting adjusted   nonskid shoes/slippers when out of bed   room door open   room near nurse's station   room organization consistent   safety round/check completed   supervised activity   treat reversible contributory factors   treat underlying cause  Taken 12/10/2023 0000 by Frankie Elliott, RN  Safety Promotion/Fall Prevention:   activity supervised   clutter free environment maintained   increased rounding and observation   increase visualization of patient   lighting adjusted   nonskid shoes/slippers when out of bed   room door open   room near nurse's station   room organization consistent   safety round/check completed   supervised activity   treat reversible contributory factors   treat underlying cause  Taken 12/9/2023 2000 by Frankie Elliott, RN  Safety Promotion/Fall Prevention:   activity supervised   clutter free environment maintained   increased  rounding and observation   increase visualization of patient   lighting adjusted   nonskid shoes/slippers when out of bed   room door open   room near nurse's station   room organization consistent   safety round/check completed   supervised activity   treat reversible contributory factors   treat underlying cause  Intervention: Prevent Skin Injury  Recent Flowsheet Documentation  Taken 12/10/2023 0630 by Frankie Elliott RN  Body Position: turned  Taken 12/10/2023 0400 by Frankie Elliott RN  Body Position: turned  Skin Protection:   adhesive use limited   incontinence pads utilized   silicone foam dressing in place  Device Skin Pressure Protection:   absorbent pad utilized/changed   adhesive use limited  Taken 12/10/2023 0200 by Frankie Elliott RN  Body Position: turned  Taken 12/10/2023 0000 by Frankie Elliott RN  Body Position: turned  Skin Protection:   adhesive use limited   incontinence pads utilized   silicone foam dressing in place  Device Skin Pressure Protection:   absorbent pad utilized/changed   adhesive use limited  Taken 12/9/2023 2200 by Frankie Elliott RN  Body Position: turned  Taken 12/9/2023 2000 by Frankie Elliott RN  Body Position: turned  Skin Protection:   adhesive use limited   incontinence pads utilized   silicone foam dressing in place  Device Skin Pressure Protection:   absorbent pad utilized/changed   adhesive use limited  Intervention: Prevent and Manage VTE (Venous Thromboembolism) Risk  Recent Flowsheet Documentation  Taken 12/10/2023 0400 by Frankie Elliott RN  VTE Prevention/Management: SCDs (sequential compression devices) on  Taken 12/10/2023 0000 by Frankie Elliott RN  VTE Prevention/Management: SCDs (sequential compression devices) on  Taken 12/9/2023 2000 by Frankie Elliott RN  VTE Prevention/Management: SCDs (sequential compression devices) on  Intervention: Prevent Infection  Recent Flowsheet Documentation  Taken 12/10/2023 0400 by Frankie Elliott RN  Infection Prevention:    environmental surveillance performed   equipment surfaces disinfected   hand hygiene promoted   rest/sleep promoted   single patient room provided  Taken 12/10/2023 0000 by Frankie Elliott RN  Infection Prevention:   environmental surveillance performed   equipment surfaces disinfected   hand hygiene promoted   rest/sleep promoted   single patient room provided  Taken 12/9/2023 2000 by Frankie Elliott RN  Infection Prevention:   environmental surveillance performed   equipment surfaces disinfected   hand hygiene promoted   rest/sleep promoted   single patient room provided  Goal: Optimal Comfort and Wellbeing  12/10/2023 0804 by Frankie Elliott RN  Outcome: Not Progressing  12/10/2023 0603 by Frankie Elliott RN  Outcome: Not Progressing  Intervention: Monitor Pain and Promote Comfort  Recent Flowsheet Documentation  Taken 12/9/2023 2330 by Frankie Elliott RN  Pain Management Interventions:   medication (see MAR)   care clustered   emotional support   pillow support provided   quiet environment facilitated   repositioned  Intervention: Provide Person-Centered Care  Recent Flowsheet Documentation  Taken 12/10/2023 0400 by Frankie Elliott RN  Trust Relationship/Rapport:   care explained   choices provided   emotional support provided   empathic listening provided   questions encouraged   questions answered   reassurance provided  Taken 12/10/2023 0000 by Frankie Elliott RN  Trust Relationship/Rapport:   care explained   choices provided   emotional support provided   empathic listening provided   questions encouraged   questions answered   reassurance provided  Taken 12/9/2023 2000 by Frankie Elliott RN  Trust Relationship/Rapport:   care explained   choices provided   emotional support provided   empathic listening provided   questions encouraged   questions answered   reassurance provided   Goal Outcome Evaluation:        Increased ectopy noted. Asymptomatic. EKG completed and reviewed with Dr Villalobos. Critical troponin  "of 131. Dr Villalobos updated and received order to recheck troponin 6hrs later (0800). Patient repositioned at 0630 and began yelling in pain. 0.4mg iv dilaudid administered. Patient continued yelling out in pain and yelling \"Help Me. Why does it hurt so bad? Why am I in pain like this\"? IV dilaudid ineffective for pain management. General Surg paged at 0720 with no return call. Provided update to am Intensivist. Provider examined patient at the bedside and placed order for STAT CTA.  "

## 2023-12-11 ENCOUNTER — APPOINTMENT (OUTPATIENT)
Dept: PHYSICAL THERAPY | Facility: CLINIC | Age: 88
DRG: 853 | End: 2023-12-11
Attending: HOSPITALIST
Payer: MEDICARE

## 2023-12-11 ENCOUNTER — APPOINTMENT (OUTPATIENT)
Dept: OCCUPATIONAL THERAPY | Facility: CLINIC | Age: 88
DRG: 853 | End: 2023-12-11
Attending: HOSPITALIST
Payer: MEDICARE

## 2023-12-11 LAB
ALBUMIN SERPL BCG-MCNC: 2.8 G/DL (ref 3.5–5.2)
ANION GAP SERPL CALCULATED.3IONS-SCNC: 12 MMOL/L (ref 7–15)
BUN SERPL-MCNC: 44.2 MG/DL (ref 8–23)
CALCIUM SERPL-MCNC: 9.3 MG/DL (ref 8.8–10.2)
CHLORIDE SERPL-SCNC: 109 MMOL/L (ref 98–107)
CREAT SERPL-MCNC: 1.66 MG/DL (ref 0.67–1.17)
CREAT SERPL-MCNC: 1.75 MG/DL (ref 0.67–1.17)
DEPRECATED HCO3 PLAS-SCNC: 19 MMOL/L (ref 22–29)
EGFRCR SERPLBLD CKD-EPI 2021: 37 ML/MIN/1.73M2
EGFRCR SERPLBLD CKD-EPI 2021: 40 ML/MIN/1.73M2
ERYTHROCYTE [DISTWIDTH] IN BLOOD BY AUTOMATED COUNT: 14.1 % (ref 10–15)
GLUCOSE BLDC GLUCOMTR-MCNC: 132 MG/DL (ref 70–99)
GLUCOSE BLDC GLUCOMTR-MCNC: 145 MG/DL (ref 70–99)
GLUCOSE SERPL-MCNC: 146 MG/DL (ref 70–99)
HCT VFR BLD AUTO: 40.2 % (ref 40–53)
HGB BLD-MCNC: 13.2 G/DL (ref 13.3–17.7)
MCH RBC QN AUTO: 32.4 PG (ref 26.5–33)
MCHC RBC AUTO-ENTMCNC: 32.8 G/DL (ref 31.5–36.5)
MCV RBC AUTO: 99 FL (ref 78–100)
PHOSPHATE SERPL-MCNC: 2.5 MG/DL (ref 2.5–4.5)
PLATELET # BLD AUTO: 122 10E3/UL (ref 150–450)
POTASSIUM SERPL-SCNC: 4.2 MMOL/L (ref 3.4–5.3)
RBC # BLD AUTO: 4.08 10E6/UL (ref 4.4–5.9)
SODIUM SERPL-SCNC: 140 MMOL/L (ref 135–145)
WBC # BLD AUTO: 14.6 10E3/UL (ref 4–11)

## 2023-12-11 PROCEDURE — 99233 SBSQ HOSP IP/OBS HIGH 50: CPT | Performed by: HOSPITALIST

## 2023-12-11 PROCEDURE — 250N000011 HC RX IP 250 OP 636: Mod: JZ | Performed by: INTERNAL MEDICINE

## 2023-12-11 PROCEDURE — 250N000013 HC RX MED GY IP 250 OP 250 PS 637: Performed by: INTERNAL MEDICINE

## 2023-12-11 PROCEDURE — 97530 THERAPEUTIC ACTIVITIES: CPT | Mod: GP | Performed by: PHYSICAL THERAPIST

## 2023-12-11 PROCEDURE — 85027 COMPLETE CBC AUTOMATED: CPT | Performed by: INTERNAL MEDICINE

## 2023-12-11 PROCEDURE — 36415 COLL VENOUS BLD VENIPUNCTURE: CPT | Performed by: INTERNAL MEDICINE

## 2023-12-11 PROCEDURE — C9113 INJ PANTOPRAZOLE SODIUM, VIA: HCPCS | Performed by: PHYSICIAN ASSISTANT

## 2023-12-11 PROCEDURE — 250N000013 HC RX MED GY IP 250 OP 250 PS 637: Performed by: PHYSICIAN ASSISTANT

## 2023-12-11 PROCEDURE — 99233 SBSQ HOSP IP/OBS HIGH 50: CPT | Mod: 24 | Performed by: INTERNAL MEDICINE

## 2023-12-11 PROCEDURE — 97530 THERAPEUTIC ACTIVITIES: CPT | Mod: GO

## 2023-12-11 PROCEDURE — 82040 ASSAY OF SERUM ALBUMIN: CPT | Performed by: INTERNAL MEDICINE

## 2023-12-11 PROCEDURE — 258N000003 HC RX IP 258 OP 636: Performed by: INTERNAL MEDICINE

## 2023-12-11 PROCEDURE — 200N000001 HC R&B ICU

## 2023-12-11 PROCEDURE — 82565 ASSAY OF CREATININE: CPT | Performed by: INTERNAL MEDICINE

## 2023-12-11 PROCEDURE — 250N000011 HC RX IP 250 OP 636: Performed by: PHYSICIAN ASSISTANT

## 2023-12-11 PROCEDURE — 250N000011 HC RX IP 250 OP 636: Mod: JZ | Performed by: PHYSICIAN ASSISTANT

## 2023-12-11 RX ORDER — ENOXAPARIN SODIUM 100 MG/ML
40 INJECTION SUBCUTANEOUS EVERY 24 HOURS
Status: DISCONTINUED | OUTPATIENT
Start: 2023-12-11 | End: 2024-01-03 | Stop reason: HOSPADM

## 2023-12-11 RX ADMIN — HYDROMORPHONE HYDROCHLORIDE 0.4 MG: 0.2 INJECTION, SOLUTION INTRAMUSCULAR; INTRAVENOUS; SUBCUTANEOUS at 05:41

## 2023-12-11 RX ADMIN — GABAPENTIN 200 MG: 100 CAPSULE ORAL at 08:32

## 2023-12-11 RX ADMIN — HYDROMORPHONE HYDROCHLORIDE 0.2 MG: 0.2 INJECTION, SOLUTION INTRAMUSCULAR; INTRAVENOUS; SUBCUTANEOUS at 12:03

## 2023-12-11 RX ADMIN — PIPERACILLIN AND TAZOBACTAM 3.38 G: 3; .375 INJECTION, POWDER, FOR SOLUTION INTRAVENOUS at 08:36

## 2023-12-11 RX ADMIN — GABAPENTIN 100 MG: 100 CAPSULE ORAL at 12:03

## 2023-12-11 RX ADMIN — PIPERACILLIN AND TAZOBACTAM 3.38 G: 3; .375 INJECTION, POWDER, FOR SOLUTION INTRAVENOUS at 14:42

## 2023-12-11 RX ADMIN — HYDROMORPHONE HYDROCHLORIDE 0.4 MG: 0.2 INJECTION, SOLUTION INTRAMUSCULAR; INTRAVENOUS; SUBCUTANEOUS at 01:09

## 2023-12-11 RX ADMIN — CARVEDILOL 12.5 MG: 12.5 TABLET, FILM COATED ORAL at 19:49

## 2023-12-11 RX ADMIN — LEVOTHYROXINE SODIUM 100 MCG: 100 TABLET ORAL at 08:32

## 2023-12-11 RX ADMIN — ENOXAPARIN SODIUM 40 MG: 40 INJECTION SUBCUTANEOUS at 13:14

## 2023-12-11 RX ADMIN — PIPERACILLIN AND TAZOBACTAM 3.38 G: 3; .375 INJECTION, POWDER, FOR SOLUTION INTRAVENOUS at 22:08

## 2023-12-11 RX ADMIN — PANTOPRAZOLE SODIUM 40 MG: 40 INJECTION, POWDER, FOR SOLUTION INTRAVENOUS at 08:22

## 2023-12-11 RX ADMIN — HYDROMORPHONE HYDROCHLORIDE 0.4 MG: 0.2 INJECTION, SOLUTION INTRAMUSCULAR; INTRAVENOUS; SUBCUTANEOUS at 22:08

## 2023-12-11 RX ADMIN — LATANOPROST 1 DROP: 50 SOLUTION/ DROPS OPHTHALMIC at 22:14

## 2023-12-11 RX ADMIN — ASPIRIN 81 MG: 81 TABLET, COATED ORAL at 08:32

## 2023-12-11 RX ADMIN — ESCITALOPRAM OXALATE 20 MG: 20 TABLET, FILM COATED ORAL at 08:32

## 2023-12-11 RX ADMIN — GABAPENTIN 300 MG: 300 CAPSULE ORAL at 22:07

## 2023-12-11 RX ADMIN — HYDROMORPHONE HYDROCHLORIDE 0.2 MG: 0.2 INJECTION, SOLUTION INTRAMUSCULAR; INTRAVENOUS; SUBCUTANEOUS at 08:39

## 2023-12-11 RX ADMIN — FUROSEMIDE 40 MG: 10 INJECTION, SOLUTION INTRAMUSCULAR; INTRAVENOUS at 08:28

## 2023-12-11 RX ADMIN — PIPERACILLIN AND TAZOBACTAM 3.38 G: 3; .375 INJECTION, POWDER, FOR SOLUTION INTRAVENOUS at 04:23

## 2023-12-11 RX ADMIN — FINASTERIDE 5 MG: 5 TABLET, FILM COATED ORAL at 22:08

## 2023-12-11 RX ADMIN — CLOPIDOGREL BISULFATE 75 MG: 75 TABLET ORAL at 08:32

## 2023-12-11 RX ADMIN — SODIUM CHLORIDE: 9 INJECTION, SOLUTION INTRAVENOUS at 11:03

## 2023-12-11 ASSESSMENT — ACTIVITIES OF DAILY LIVING (ADL)
ADLS_ACUITY_SCORE: 37
ADLS_ACUITY_SCORE: 37
ADLS_ACUITY_SCORE: 33
ADLS_ACUITY_SCORE: 30
ADLS_ACUITY_SCORE: 32
ADLS_ACUITY_SCORE: 37
ADLS_ACUITY_SCORE: 32
ADLS_ACUITY_SCORE: 37
ADLS_ACUITY_SCORE: 30
ADLS_ACUITY_SCORE: 33
ADLS_ACUITY_SCORE: 33
ADLS_ACUITY_SCORE: 32

## 2023-12-11 NOTE — PROGRESS NOTES
Critical Care  Note      12/11/2023    Name: Garry Mckay MRN#: [id number]   Age: 88 year old YOB: 1935     Hsptl Day# 3  ICU DAY #    MV DAY #             Problem List:   Principal Problem:    Acute sepsis (H)  Active Problems:    Acute appendicitis with localized peritonitis, without perforation, abscess, or gangrene  S/P appendectomy with perforation and peritonitis 12/8 -  Septic shock improved and off levophed.                                    2.  Acute respiratory failure - down to  2 liters NC and continue focus on pulmonary hygiene and increasing mobilty.   3. History of cardiomyopathy with known EF 20%; resume Plavix  4. FATIMAH creatinine 2.0 with known baseline about 1.2; continue to monitor expectantly; UO low and will continue to follow closely.   5. BPH  6. CARINA- home cpap   7. GERD  8. Glaucoma - xalatan         Summary/Hospital Course:       Interval/overnight events:  -He continues to be afebrile.  - Has intermittent episodes of confusion, but overall improved mental status per the family.  - Minimal drain output which is serosanguineous.  - Denies any nausea.  Not passing flatus.  No bowel movement yet.      Assessment and plan :     Garry Mckay IS a 88 year old male admitted on 12/8/2023 for septic shock.   I have personally reviewed the daily labs, imaging studies, cultures and discussed the case with referring physician and consulting physicians.     My assessment and plan by system for this patient is as follows:    Neurology/Psychiatry:   Mild delirium, which continues to improve.  Multifactorial from sepsis, medications and ICU delirium.    Cardiovascular:   1.Hemodynamics -Septic shock resolved.  Currently off vasopressors.  Resume home antihypertensives    Pulmonary/Ventilator Management:   1. On minimal supplemental oxygen.  Wean as tolerated.    GI and Nutrition :   N.p.o. except meds.  Continue n.p.o. due to ileus.  Consider NG tube decompression if he develops  "worsening pain, nausea or vomiting.  Monitor for bowel movements closely.    Renal/Fluids/Electrolytes:   1. Cr improving, Recovering from septic shock and probably FATIMAH.    Infectious Disease:   On Zosyn for peritonitis.    Endocrine:   No active issues.    Hematology/Oncology:   No active issues.     IV/Access:   1. Venous access - Central line left internal jugular, flushing fine, not drawing well    Plan  - central access required and necessary      ICU Prophylaxis:   1. DVT: LMWH  2. VAP: not intubated.  3. Stress Ulcer: PPI  4. Restraints: None  5. Wound care  - Drain place with serous drainage  6. Feeding - NPO  7. Family Update: Updated the family at bedside.  8. Disposition - Transfer to Hospitalist service today.    Clinically Significant Risk Factors           # Hypercalcemia: corrected calcium is >10.1, will monitor as appropriate    # Hypoalbuminemia: Lowest albumin = 2.8 g/dL at 12/11/2023  5:26 AM, will monitor as appropriate   # Thrombocytopenia: Lowest platelets = 122 in last 2 days, will monitor for bleeding   # Hypertension: Noted on problem list        # Overweight: Estimated body mass index is 29.21 kg/m  as calculated from the following:    Height as of 12/7/23: 1.702 m (5' 7\").    Weight as of this encounter: 84.6 kg (186 lb 8.2 oz)., PRESENT ON ADMISSION                                Key Medications:      aspirin  81 mg Oral Daily    [Held by provider] carvedilol  12.5 mg Oral BID    clopidogrel  75 mg Oral Daily    escitalopram  20 mg Oral Daily    [Held by provider] finasteride  5 mg Oral At Bedtime    furosemide  40 mg Intravenous Q24H    gabapentin  100 mg Oral Q24H    gabapentin  200 mg Oral QAM    gabapentin  300 mg Oral At Bedtime    latanoprost  1 drop Both Eyes At Bedtime    levothyroxine  100 mcg Oral Daily    pantoprazole  40 mg Intravenous Q24H    piperacillin-tazobactam  3.375 g Intravenous Q6H    sodium chloride (PF)  3 mL Intracatheter Q8H      norepinephrine Stopped (12/08/23 " 1945)    sodium chloride 50 mL/hr at 12/11/23 1103              Physical Examination:   Temp:  [97  F (36.1  C)-99.6  F (37.6  C)] 97  F (36.1  C)  Pulse:  [87-97] 89  Resp:  [7-24] 19  BP: (123-152)/() 144/88  SpO2:  [91 %-95 %] 91 %    Intake/Output Summary (Last 24 hours) at 12/11/2023 1202  Last data filed at 12/11/2023 0800  Gross per 24 hour   Intake 1014.33 ml   Output 1505 ml   Net -490.67 ml     Wt Readings from Last 4 Encounters:   12/11/23 84.6 kg (186 lb 8.2 oz)   12/07/23 81.2 kg (179 lb)   01/10/21 79.8 kg (176 lb)     BP - Mean:  [] 109  Resp: 19    Recent Labs   Lab 12/10/23  0745 12/08/23  1808   PH  --  7.30*   PCO2  --  41   PO2  --  75*   HCO3  --  20*   O2PER 0 60       GEN: no acute distress   HEENT: head ncat, sclera anicteric, OP patent, trachea midline   PULM: unlabored synchronous with vent, clear anteriorly    CV/COR: RRR S1S2 no gallop,  No rub, no murmur  ABD: soft nontender, hypoactive bowel sounds, no mass  EXT:  warm and well perfused x4  NEURO: PERRL, no obvious deficits  SKIN: no obvious rash  LINES: clean, dry intact         Data:   All data and imaging reviewed     ROUTINE ICU LABS (Last four results)  CMP  Recent Labs   Lab 12/11/23  0526 12/10/23  1556 12/10/23  1339 12/10/23  0810 12/10/23  0157 12/09/23  0451 12/09/23  0445 12/08/23  2010 12/08/23  1807 12/07/23  2228 12/07/23  1531     --   --   --  138  --  136  --  135   < > 134*  133*   POTASSIUM 4.2  --   --   --  4.6  --  4.7  --  5.5*   < > 5.6*  5.5*   CHLORIDE 109*  --   --   --  106  --  105  --  104   < > 100  100   CO2 19*  --   --   --  17*  --  20*  --  18*   < > 14*  20*   ANIONGAP 12  --   --   --  15  --  11  --  13   < > 20*  13   * 149* 161* 157* 141*   < > 139*   < > 164*   < > 172*  172*   BUN 44.2*  --   --   --  43.5*  --  42.4*  --  40.0*   < > 23.0  22.9   CR 1.75*  --   --   --  2.01*  --  2.14*  --  2.27*   < > 1.39*  1.37*   GFRESTIMATED 37*  --   --   --  32*  --   "29*  --  27*   < > 49*  50*   RHODA 9.3  --   --   --  9.3  --  8.4*  --  8.6*   < > 9.8  9.8   MAG  --   --   --   --  2.1  --  1.8  --   --   --   --    PHOS 2.5  --   --   --  3.7  --  3.9  --   --   --   --    PROTTOTAL  --   --   --   --   --   --  5.0*  --   --   --  7.1  7.1   ALBUMIN 2.8*  --   --   --  3.0*  --  2.9*  --   --   --  4.2  4.2   BILITOTAL  --   --   --   --   --   --  1.0  --   --   --  1.4*  1.4*   ALKPHOS  --   --   --   --   --   --  54  --   --   --  89  89   AST  --   --   --   --   --   --  29  --   --   --  38  38   ALT  --   --   --   --   --   --  14  --   --   --  29  29    < > = values in this interval not displayed.     CBC  Recent Labs   Lab 12/11/23  0526 12/10/23  0157 12/09/23  0445 12/08/23  1807   WBC 14.6* 14.0* 10.0 16.0*   RBC 4.08* 3.97* 3.66* 3.91*   HGB 13.2* 13.2* 11.9* 12.7*   HCT 40.2 39.8* 36.3* 39.3*   MCV 99 100 99 101*   MCH 32.4 33.2* 32.5 32.5   MCHC 32.8 33.2 32.8 32.3   RDW 14.1 14.1 14.2 14.4   * 125* 104* 130*     INRNo lab results found in last 7 days.  Arterial Blood Gas  Recent Labs   Lab 12/10/23  0745 12/08/23  1808   PH  --  7.30*   PCO2  --  41   PO2  --  75*   HCO3  --  20*   O2PER 0 60       All cultures:  No results for input(s): \"CULT\" in the last 168 hours.  No results found for this or any previous visit (from the past 24 hour(s)).      I personally spent 50 minutes on the date of the encounter doing chart review, history and exam, documentation and further activities per the note.                            WOO Pierce   of Medicine  [facility]  Pulmonary, Allergy, Critical Care and Sleep Medicine  Pager - [phone]    "

## 2023-12-11 NOTE — PROGRESS NOTES
General Surgery Note    Stable S/P Lap Appy  POD3    -Discussed surgery details again with family.  -If nausea or increasing abdominal pain (currently weaning Dilaudid dosing), would consider decompression with NGT placement.  -Monitoring closely for decompensation which could indicate cecum area is bigger concern.  Awaiting ROBF.  -Appreciate PT and nursing teams getting up into chair.  Pt was independent prior to surgery.      Doing a little better today than yesterday per family.  He is more coherent, though when I interview him he falls asleep in chair.      NAD, pleasant, sleepy, comfortable  BP (!) 144/88   Pulse 89   Temp 97  F (36.1  C) (Axillary)   Resp 19   Wt 84.6 kg (186 lb 8.2 oz)   SpO2 91%   BMI 29.21 kg/m      Intake/Output Summary (Last 24 hours) at 12/11/2023 1101  Last data filed at 12/11/2023 0800  Gross per 24 hour   Intake 1119.33 ml   Output 1928 ml   Net -808.67 ml     Drain looks serous  Abd: round distended some, some wincing with palpation      WBC 14.6  Hgb 13.2

## 2023-12-11 NOTE — PLAN OF CARE
Goal Outcome Evaluation:      Plan of Care Reviewed With: patient    Overall Patient Progress: no changeOverall Patient Progress: no change    Outcome Evaluation: pt C/o perineium pain and abd pain. managed w/ dilaudid. Neruos intact except confused/forgetful during night and more alert in the am. has been on BR for this shift, but can get up via lift. NS infusing. voiding via ecternal catheter. NPO x sip and with meds.      Problem: Risk for Delirium  Goal: Optimal Coping  Outcome: Not Progressing  Intervention: Optimize Psychosocial Adjustment to Delirium  Recent Flowsheet Documentation  Taken 12/10/2023 2000 by Lala Robison, RN  Supportive Measures: active listening utilized       Problem: Risk for Delirium  Goal: Improved Behavioral Control  Outcome: Not Progressing  Intervention: Prevent and Manage Agitation  Recent Flowsheet Documentation  Taken 12/10/2023 2000 by Lala Robison, RN  Environment Familiarity/Consistency:   daily routine followed   familiar objects from home provided  Intervention: Minimize Safety Risk  Recent Flowsheet Documentation  Taken 12/10/2023 2000 by Lala Robison, RN  Communication Enhancement Strategies:   call light answered in person   communication board used  Trust Relationship/Rapport:   care explained   reassurance provided   questions encouraged

## 2023-12-11 NOTE — PLAN OF CARE
Problem: Adult Inpatient Plan of Care  Goal: Plan of Care Review  Description: The Plan of Care Review/Shift note should be completed every shift.  The Outcome Evaluation is a brief statement about your assessment that the patient is improving, declining, or no change.  This information will be displayed automatically on your shift  note.  Outcome: Progressing  Flowsheets (Taken 12/11/2023 1728)  Plan of Care Reviewed With:   patient   child   family  Overall Patient Progress: no change     Problem: Appendectomy  Goal: Absence of Bleeding  Outcome: Progressing  Intervention: Monitor and Manage Bleeding  Recent Flowsheet Documentation  Taken 12/11/2023 1600 by Rosalinda Weiss, RN  Bleeding Management: dressing monitored  Taken 12/11/2023 1230 by Rosalinda Weiss, RN  Bleeding Management: dressing monitored   Goal Outcome Evaluation:      Plan of Care Reviewed With: patient, child, family    Overall Patient Progress: no changeOverall Patient Progress: no change    Neuro: Disoriented to situation. Confusion waxes and wanes. PERRLA. Tele SR w/ BBB. Up to chair w/ pennie steady, lift to get back into bed. Denies pain. LS diminished. 2 LPM NC, home CPAP at bedtime. Abdominal lap sites x3, steri strips. ESTEFANY x1, bulb to suction, serous output. Abdomen firm, distended. Hypo BS, - flatus. Denies nausea. NPO; tolerating sips of water, ice chips. External catheter in place, adequate output. L internal jugular. Daughter updated at bedside.

## 2023-12-11 NOTE — PROGRESS NOTES
[facility]  Hospitalist Progress Note        Derek Ricketts MD   12/11/2023        Interval History:        -Has been off Levophed since 12/8/23, BP stable  -hasn't had a bowel movement; not sure if passing gas  -denies any nausea or vomiting  -per family confusion improving         Assessment and Plan:        Garry Mckay is a 88 year old male with PMH of hypertension, dyslipidemia, anxiety/depression, BPH, hypothyroidism, h/o ischemic cardiomyopathy (with combined systolic and diastolic CHF), CKD stage III who was admitted on 12/8/23 with appendicitis with peritonitis. Underwent surgery on 12/8/23. Has been in ICU for septic shock.    Hospitalist service requested to resume care on 12/11/23.     Acute appendicitis with peritonitis and septic shock  septic shock resolved  S/p laparoscopic appendectomy (12/8/23)  postoperative ileus  -CT abdomen on admission was noted with acute appendicitis with 0.7 cm appendicolith, no abscess   -on presentation afebrile with WBC 9.8, subsequently WBC up to 16; lactic acid 2.6--->1.3  -surgery following, s/p laparoscopic appendectomy 12/8/23  -has been in ICU for septic shock needing Levophed; now off pressors since 12/8  -CT abdomen 12/10 noted with multiple dilated small bowel loops with related decompression of colon with findings consistent with partial obstruction/ adynamic ileus    -No nausea or vomiting; currently NPO with IVF 50 ML per hour; defer diet advancement per surgery; surgical drain in place  -will consider NG tube decompression if nausea or vomiting  -minimize opioids to help with ileus  -will continue empiric Zosyn    acute hypoxic respiratory failure  CARINA on home CPAP  -currently on 2 L oxygen; encourage incentive spirometry and wean oxygen as able     FATIMAH  Hyperkalemia, improved  Baseline creatinine 1-1.1. Creatinine on presentation 1.8. suspect dehydration in setting of entresto, diuretics.  -Creatinine peak 2.27. Trending down  "1.75  -holding PTA Entresto  -currently on IV fluids while NPO; will hold off on Lasix as well  -monitor BMP     Chronic HFrEF  Ischemic CM s/p ICD  CAD s/p CABG, stenting 2002, 2003, 2005, 2021  HTN  HLD  - PTA regimen: Carvedilol 12.5 mg BID, atorvastatin 80 mg daily, plavix 75 mg daily, entresto 49-51 BID, zetia 10 mg daily, ASA 81 mg daily, lasix 20 mg daily  *BNP in ED at 2613/ 3859 (1750 7/2023 from OSH).  Troponin 70. EKG w/ nonspecific t-w changes.   *suspect elevated BNP related to FATIMAH, doesn't appear in failure. Trop likely type II NSTEMI vs chronic elevation   -holding PTA Entresto and Lasix as above  -discontinue IV fluids and resume Lasix when taking PO  -continue aspirin; Plavix resumed on 12/11  -resumed PTA Coreg   -resume Lipitor when taking adequate PO     Hx CVA  Per chart review.  -Aspirin and Plavix as above; to resume Lipitor when taking PO     MDD  ANSON  -continue escitalopram 20 mg daily , gabapentin      Migraines- Gets botox.   Hypothyroidism-continue levothyroxine   BPH-continue finasteride   GERD- pantoprazole 40 mg IV daily  Glaucoma-continue eye gtts      DVT Prophylaxis: subcutaneous Lovenox   Code Status:  Full     Diet: NPO for Medical/Clinical Reasons Except for: Meds, Other; Specify: May have sips of clear liquids      Disposition:   -patient will remain in ICU per surgery  -will transfer to surgical floor when okay with surgery    Clinically Significant Risk Factors           # Hypercalcemia: corrected calcium is >10.1, will monitor as appropriate    # Hypoalbuminemia: Lowest albumin = 2.8 g/dL at 12/11/2023  5:26 AM, will monitor as appropriate       # Thrombocytopenia: Lowest platelets = 122 in last 2 days, will monitor for bleeding       # Hypertension: Noted on problem list          # Overweight: Estimated body mass index is 29.21 kg/m  as calculated from the following:    Height as of 12/7/23: 1.702 m (5' 7\").    Weight as of this encounter: 84.6 kg (186 lb 8.2 oz)., PRESENT " ON ADMISSION               Page Me (7 am to 6 pm)    Care plan discussed with patient and intensivist along with nursing; also discussed with his daughter and grandson present in room    55 minutes in total time spent today on my first encounter with the patient and resuming transfer of care from Intensivist              Physical Exam:      Blood pressure (!) 207/110, pulse 92, temperature 97  F (36.1  C), temperature source Axillary, resp. rate 23, weight 84.6 kg (186 lb 8.2 oz), SpO2 96%.  Vitals:    12/09/23 0400 12/10/23 0400 12/11/23 0400   Weight: 85.5 kg (188 lb 7.9 oz) 86 kg (189 lb 9.5 oz) 84.6 kg (186 lb 8.2 oz)     Vital Signs with Ranges  Temp:  [97  F (36.1  C)-99.6  F (37.6  C)] 97  F (36.1  C)  Pulse:  [87-97] 92  Resp:  [7-24] 23  BP: (123-207)/() 207/110  SpO2:  [91 %-96 %] 96 %  I/O's Last 24 hours  I/O last 3 completed shifts:  In: 1019.33 [P.O.:45; I.V.:974.33]  Out: 2168 [Urine:1650; Drains:518]    Constitutional: awake and oriented X 3 but drowsy; resting comfortably in no apparent distress       Oral cavity: Moist mucosa   Cardiovascular: Normal s1 s2, regular rate and rhythm, no murmur   Lungs: B/l clear to auscultation, no wheezes or crepitations   Abdomen: appears distended and slightly tense, mild diffuse tenderness, no guarding, rigidity or rebound; BS +   LE : No edema   Musculoskeletal/Neuro No focal neurological deficits noted   Psychiatry: somnolent                Medications:         aspirin  81 mg Oral Daily    carvedilol  12.5 mg Oral BID    clopidogrel  75 mg Oral Daily    enoxaparin ANTICOAGULANT  40 mg Subcutaneous Q24H    escitalopram  20 mg Oral Daily    finasteride  5 mg Oral At Bedtime    [Held by provider] furosemide  40 mg Intravenous Q24H    gabapentin  100 mg Oral Q24H    gabapentin  200 mg Oral QAM    gabapentin  300 mg Oral At Bedtime    latanoprost  1 drop Both Eyes At Bedtime    levothyroxine  100 mcg Oral Daily    pantoprazole  40 mg Intravenous Q24H     piperacillin-tazobactam  3.375 g Intravenous Q6H    sodium chloride (PF)  3 mL Intracatheter Q8H     PRN Meds: calcium carbonate, HYDROmorphone, HYDROmorphone, lidocaine 4%, lidocaine (buffered or not buffered), naloxone **OR** naloxone **OR** naloxone **OR** naloxone, nitroGLYcerin, sodium chloride (PF)         Data:      All new lab and imaging data was reviewed.   Recent Labs   Lab Test 12/11/23  0526 12/10/23  0157 12/09/23  0445 12/07/23  1816 01/10/21  1020   WBC 14.6* 14.0* 10.0   < > 9.3   HGB 13.2* 13.2* 11.9*   < > 17.7   MCV 99 100 99   < > 98   * 125* 104*   < > 153   INR  --   --   --   --  1.06    < > = values in this interval not displayed.      Recent Labs   Lab Test 12/11/23  0526 12/10/23  1556 12/10/23  1339 12/10/23  0810 12/10/23  0157 12/09/23  0451 12/09/23  0445     --   --   --  138  --  136   POTASSIUM 4.2  --   --   --  4.6  --  4.7   CHLORIDE 109*  --   --   --  106  --  105   CO2 19*  --   --   --  17*  --  20*   BUN 44.2*  --   --   --  43.5*  --  42.4*   CR 1.75*  --   --   --  2.01*  --  2.14*   ANIONGAP 12  --   --   --  15  --  11   RHODA 9.3  --   --   --  9.3  --  8.4*   * 149* 161*   < > 141*   < > 139*    < > = values in this interval not displayed.     Recent Labs   Lab Test 01/10/21  1020   TROPI 0.234*

## 2023-12-11 NOTE — PLAN OF CARE
"  Problem: Adult Inpatient Plan of Care  Goal: Patient-Specific Goal (Individualized)  Description: You can add care plan individualizations to a care plan. Examples of Individualization might be:  \"Parent requests to be called daily at 9am for status\", \"I have a hard time hearing out of my right ear\", or \"Do not touch me to wake me up as it startles  me\".  Outcome: Not Progressing  Goal: Absence of Hospital-Acquired Illness or Injury  Outcome: Not Progressing  Intervention: Identify and Manage Fall Risk  Recent Flowsheet Documentation  Taken 12/10/2023 1600 by Daniel Valdivia, RN  Safety Promotion/Fall Prevention:   activity supervised   clutter free environment maintained   increased rounding and observation   increase visualization of patient   lighting adjusted   nonskid shoes/slippers when out of bed   room door open   room near nurse's station   room organization consistent   safety round/check completed   supervised activity   treat reversible contributory factors   treat underlying cause  Taken 12/10/2023 1200 by Daniel Valdivia, RN  Safety Promotion/Fall Prevention:   activity supervised   clutter free environment maintained   increased rounding and observation   increase visualization of patient   lighting adjusted   nonskid shoes/slippers when out of bed   room door open   room near nurse's station   room organization consistent   safety round/check completed   supervised activity   treat reversible contributory factors   treat underlying cause  Taken 12/10/2023 0800 by Daniel Valdivia, RN  Safety Promotion/Fall Prevention:   activity supervised   clutter free environment maintained   increased rounding and observation   increase visualization of patient   lighting adjusted   nonskid shoes/slippers when out of bed   room door open   room near nurse's station   room organization consistent   safety round/check completed   supervised activity   treat reversible contributory factors   treat underlying " cause  Intervention: Prevent Skin Injury  Recent Flowsheet Documentation  Taken 12/10/2023 1600 by Daniel Valdivia RN  Body Position:   turned   position changed independently   heels elevated   upper extremity elevated  Skin Protection:   adhesive use limited   incontinence pads utilized   silicone foam dressing in place  Device Skin Pressure Protection:   absorbent pad utilized/changed   adhesive use limited  Taken 12/10/2023 1200 by Daniel Valdivia RN  Body Position:   turned   position changed independently   heels elevated   upper extremity elevated  Skin Protection:   adhesive use limited   incontinence pads utilized   silicone foam dressing in place  Device Skin Pressure Protection:   absorbent pad utilized/changed   adhesive use limited  Taken 12/10/2023 1000 by Daniel Valdivia RN  Body Position:   turned   position changed independently   heels elevated   upper extremity elevated  Taken 12/10/2023 0800 by Daniel Valdivia RN  Body Position:   turned   position changed independently   heels elevated   upper extremity elevated  Skin Protection:   adhesive use limited   incontinence pads utilized   silicone foam dressing in place  Device Skin Pressure Protection:   absorbent pad utilized/changed   adhesive use limited  Intervention: Prevent and Manage VTE (Venous Thromboembolism) Risk  Recent Flowsheet Documentation  Taken 12/10/2023 1600 by Daniel Valdivia RN  VTE Prevention/Management: SCDs (sequential compression devices) on  Taken 12/10/2023 1200 by Daniel Valdivia RN  VTE Prevention/Management: SCDs (sequential compression devices) on  Taken 12/10/2023 0800 by Daniel Valdivia RN  VTE Prevention/Management: SCDs (sequential compression devices) on  Intervention: Prevent Infection  Recent Flowsheet Documentation  Taken 12/10/2023 1600 by Daniel Valdivia RN  Infection Prevention:   environmental surveillance performed   equipment surfaces disinfected   hand hygiene promoted   rest/sleep promoted    single patient room provided  Taken 12/10/2023 1200 by Daniel Valdivia RN  Infection Prevention:   environmental surveillance performed   equipment surfaces disinfected   hand hygiene promoted   rest/sleep promoted   single patient room provided  Taken 12/10/2023 0800 by Daniel Valdivia RN  Infection Prevention:   environmental surveillance performed   equipment surfaces disinfected   hand hygiene promoted   rest/sleep promoted   single patient room provided  Goal: Optimal Comfort and Wellbeing  Outcome: Not Progressing  Intervention: Monitor Pain and Promote Comfort  Recent Flowsheet Documentation  Taken 12/10/2023 1826 by Daniel Valdivia RN  Pain Management Interventions:   medication (see MAR)   relaxation techniques promoted   repositioned  Taken 12/10/2023 0819 by Daniel Valdivia RN  Pain Management Interventions:   medication (see MAR)   relaxation techniques promoted   repositioned  Intervention: Provide Person-Centered Care  Recent Flowsheet Documentation  Taken 12/10/2023 1600 by Daniel Valdivia RN  Trust Relationship/Rapport:   care explained   choices provided   emotional support provided   empathic listening provided   questions encouraged   questions answered   reassurance provided  Taken 12/10/2023 1200 by Daniel Valdivia RN  Trust Relationship/Rapport:   care explained   choices provided   emotional support provided   empathic listening provided   questions encouraged   questions answered   reassurance provided  Taken 12/10/2023 0800 by Daniel Valdivia RN  Trust Relationship/Rapport:   care explained   choices provided   emotional support provided   empathic listening provided   questions encouraged   questions answered   reassurance provided  Goal: Readiness for Transition of Care  Outcome: Not Progressing     Problem: Risk for Delirium  Goal: Optimal Coping  Outcome: Not Progressing  Intervention: Optimize Psychosocial Adjustment to Delirium  Recent Flowsheet Documentation  Taken 12/10/2023  1600 by Daniel Valdivia RN  Supportive Measures: active listening utilized  Taken 12/10/2023 1200 by Daniel Valdivia RN  Supportive Measures: active listening utilized  Taken 12/10/2023 0800 by Daniel Valdivia RN  Supportive Measures: active listening utilized  Goal: Improved Behavioral Control  Outcome: Not Progressing  Intervention: Prevent and Manage Agitation  Recent Flowsheet Documentation  Taken 12/10/2023 1600 by Daniel Valdivia RN  Environment Familiarity/Consistency:   daily routine followed   familiar objects from home provided  Taken 12/10/2023 1200 by Daniel Valdivia RN  Environment Familiarity/Consistency:   daily routine followed   familiar objects from home provided  Taken 12/10/2023 0800 by Daniel Valdivia RN  Environment Familiarity/Consistency:   daily routine followed   familiar objects from home provided  Intervention: Minimize Safety Risk  Recent Flowsheet Documentation  Taken 12/10/2023 1600 by Daniel Valdivia RN  Communication Enhancement Strategies:   call light answered in person   extra time allowed for response   one-step directions provided   repetition utilized   verbal and visual cues paired  Enhanced Safety Measures: room near unit station  Trust Relationship/Rapport:   care explained   choices provided   emotional support provided   empathic listening provided   questions encouraged   questions answered   reassurance provided  Taken 12/10/2023 1200 by Daniel Valdivia RN  Communication Enhancement Strategies:   call light answered in person   extra time allowed for response   one-step directions provided   repetition utilized   verbal and visual cues paired  Enhanced Safety Measures: room near unit station  Trust Relationship/Rapport:   care explained   choices provided   emotional support provided   empathic listening provided   questions encouraged   questions answered   reassurance provided  Taken 12/10/2023 0800 by Daniel Valdivia RN  Communication Enhancement  Strategies:   call light answered in person   extra time allowed for response   one-step directions provided   repetition utilized   verbal and visual cues paired  Enhanced Safety Measures: room near unit station  Trust Relationship/Rapport:   care explained   choices provided   emotional support provided   empathic listening provided   questions encouraged   questions answered   reassurance provided  Goal: Improved Attention and Thought Clarity  Outcome: Not Progressing  Intervention: Maximize Cognitive Function  Recent Flowsheet Documentation  Taken 12/10/2023 1600 by Daniel Valdivia RN  Sensory Stimulation Regulation:   care clustered   lighting decreased  Reorientation Measures:   calendar in view   clock in view   hearing device use encouraged   reorientation provided  Taken 12/10/2023 1200 by Daniel Valdivia RN  Sensory Stimulation Regulation:   care clustered   lighting decreased  Reorientation Measures:   calendar in view   clock in view   hearing device use encouraged   reorientation provided  Taken 12/10/2023 0800 by Daniel Valdivia RN  Sensory Stimulation Regulation:   care clustered   lighting decreased  Reorientation Measures:   calendar in view   clock in view   hearing device use encouraged   reorientation provided  Goal: Improved Sleep  Outcome: Not Progressing     Problem: Pain Acute  Goal: Optimal Pain Control and Function  Outcome: Not Progressing  Intervention: Develop Pain Management Plan  Recent Flowsheet Documentation  Taken 12/10/2023 1826 by Daniel Valdivia RN  Pain Management Interventions:   medication (see MAR)   relaxation techniques promoted   repositioned  Taken 12/10/2023 0819 by Daniel Valdivia RN  Pain Management Interventions:   medication (see MAR)   relaxation techniques promoted   repositioned  Intervention: Prevent or Manage Pain  Recent Flowsheet Documentation  Taken 12/10/2023 1600 by Daniel Valdivia RN  Sensory Stimulation Regulation:   care clustered   lighting  decreased  Medication Review/Management: medications reviewed  Taken 12/10/2023 1200 by Daniel Valdivia RN  Sensory Stimulation Regulation:   care clustered   lighting decreased  Medication Review/Management: medications reviewed  Taken 12/10/2023 0800 by Daniel Valdivia RN  Sensory Stimulation Regulation:   care clustered   lighting decreased  Medication Review/Management: medications reviewed  Intervention: Optimize Psychosocial Wellbeing  Recent Flowsheet Documentation  Taken 12/10/2023 1600 by Daniel Valdivia RN  Supportive Measures: active listening utilized  Taken 12/10/2023 1200 by Daniel Valdivia RN  Supportive Measures: active listening utilized  Taken 12/10/2023 0800 by Daniel Valdivia RN  Supportive Measures: active listening utilized     Problem: Fall Injury Risk  Goal: Absence of Fall and Fall-Related Injury  Outcome: Not Progressing  Intervention: Identify and Manage Contributors  Recent Flowsheet Documentation  Taken 12/10/2023 1600 by Daniel Valdivia RN  Medication Review/Management: medications reviewed  Taken 12/10/2023 1200 by Daniel Valdivia RN  Medication Review/Management: medications reviewed  Taken 12/10/2023 0800 by Daniel Valdivia RN  Medication Review/Management: medications reviewed  Intervention: Promote Injury-Free Environment  Recent Flowsheet Documentation  Taken 12/10/2023 1600 by Daniel Valdivia RN  Safety Promotion/Fall Prevention:   activity supervised   clutter free environment maintained   increased rounding and observation   increase visualization of patient   lighting adjusted   nonskid shoes/slippers when out of bed   room door open   room near nurse's station   room organization consistent   safety round/check completed   supervised activity   treat reversible contributory factors   treat underlying cause  Taken 12/10/2023 1200 by Daniel Valdivia RN  Safety Promotion/Fall Prevention:   activity supervised   clutter free environment maintained   increased rounding  and observation   increase visualization of patient   lighting adjusted   nonskid shoes/slippers when out of bed   room door open   room near nurse's station   room organization consistent   safety round/check completed   supervised activity   treat reversible contributory factors   treat underlying cause  Taken 12/10/2023 0800 by Daniel Valdivia RN  Safety Promotion/Fall Prevention:   activity supervised   clutter free environment maintained   increased rounding and observation   increase visualization of patient   lighting adjusted   nonskid shoes/slippers when out of bed   room door open   room near nurse's station   room organization consistent   safety round/check completed   supervised activity   treat reversible contributory factors   treat underlying cause     Problem: Sepsis/Septic Shock  Goal: Optimal Coping  Outcome: Not Progressing  Intervention: Optimize Psychosocial Adjustment to Illness  Recent Flowsheet Documentation  Taken 12/10/2023 1600 by Daniel Valdivia RN  Supportive Measures: active listening utilized  Taken 12/10/2023 1200 by Daniel Valdivia RN  Supportive Measures: active listening utilized  Taken 12/10/2023 0800 by Daniel Valdivia RN  Supportive Measures: active listening utilized  Goal: Absence of Bleeding  Outcome: Not Progressing  Intervention: Monitor and Manage Bleeding  Recent Flowsheet Documentation  Taken 12/10/2023 1600 by Daniel Valdivia RN  Bleeding Precautions:   blood pressure closely monitored   coagulation study results reviewed   monitored for signs of bleeding  Bleeding Management: dressing monitored  Taken 12/10/2023 1200 by Daniel Valdivia RN  Bleeding Precautions:   blood pressure closely monitored   coagulation study results reviewed   monitored for signs of bleeding  Bleeding Management: dressing monitored  Taken 12/10/2023 0800 by Daniel Valdivia RN  Bleeding Precautions:   blood pressure closely monitored   coagulation study results reviewed   monitored for  signs of bleeding  Bleeding Management: dressing monitored  Goal: Blood Glucose Level Within Targeted Range  Outcome: Not Progressing  Goal: Absence of Infection Signs and Symptoms  Outcome: Not Progressing  Intervention: Initiate Sepsis Management  Recent Flowsheet Documentation  Taken 12/10/2023 1600 by Daniel Valdivia RN  Infection Prevention:   environmental surveillance performed   equipment surfaces disinfected   hand hygiene promoted   rest/sleep promoted   single patient room provided  Taken 12/10/2023 1200 by Daniel Valdivia RN  Infection Prevention:   environmental surveillance performed   equipment surfaces disinfected   hand hygiene promoted   rest/sleep promoted   single patient room provided  Taken 12/10/2023 0800 by Daniel Valdivia RN  Infection Prevention:   environmental surveillance performed   equipment surfaces disinfected   hand hygiene promoted   rest/sleep promoted   single patient room provided  Intervention: Promote Recovery  Recent Flowsheet Documentation  Taken 12/10/2023 1600 by Daniel Valdivia RN  Activity Management: up in chair  Taken 12/10/2023 1500 by Daniel Valdivia RN  Activity Management: up in chair  Taken 12/10/2023 1200 by Daniel Valdivia RN  Activity Management: activity adjusted per tolerance  Taken 12/10/2023 1000 by Daniel Valdivia RN  Activity Management: activity adjusted per tolerance  Taken 12/10/2023 0800 by Daniel Valdivia RN  Activity Management: activity adjusted per tolerance  Goal: Optimal Nutrition Intake  Outcome: Not Progressing     Problem: Acute Kidney Injury/Impairment  Goal: Fluid and Electrolyte Balance  Outcome: Not Progressing  Goal: Improved Oral Intake  Outcome: Not Progressing  Goal: Effective Renal Function  Outcome: Not Progressing  Intervention: Monitor and Support Renal Function  Recent Flowsheet Documentation  Taken 12/10/2023 1600 by Daniel Valdivia RN  Medication Review/Management: medications reviewed  Taken 12/10/2023 1200 by  Daniel Valdivia RN  Medication Review/Management: medications reviewed  Taken 12/10/2023 0800 by Daniel Valdivia RN  Medication Review/Management: medications reviewed     Problem: Appendectomy  Goal: Absence of Bleeding  Outcome: Not Progressing  Intervention: Monitor and Manage Bleeding  Recent Flowsheet Documentation  Taken 12/10/2023 1600 by Daniel Valdivia RN  Bleeding Management: dressing monitored  Taken 12/10/2023 1200 by Daniel Valdivia RN  Bleeding Management: dressing monitored  Taken 12/10/2023 0800 by Daniel Valdivia RN  Bleeding Management: dressing monitored  Goal: Effective Bowel Elimination  Outcome: Not Progressing  Goal: Fluid and Electrolyte Balance  Outcome: Not Progressing  Goal: Absence of Infection Signs and Symptoms  Outcome: Not Progressing  Goal: Anesthesia/Sedation Recovery  Outcome: Not Progressing  Intervention: Optimize Anesthesia Recovery  Recent Flowsheet Documentation  Taken 12/10/2023 1600 by Daniel Valdivia RN  Safety Promotion/Fall Prevention:   activity supervised   clutter free environment maintained   increased rounding and observation   increase visualization of patient   lighting adjusted   nonskid shoes/slippers when out of bed   room door open   room near nurse's station   room organization consistent   safety round/check completed   supervised activity   treat reversible contributory factors   treat underlying cause  Reorientation Measures:   calendar in view   clock in view   hearing device use encouraged   reorientation provided  Taken 12/10/2023 1200 by Daniel Valdivia RN  Safety Promotion/Fall Prevention:   activity supervised   clutter free environment maintained   increased rounding and observation   increase visualization of patient   lighting adjusted   nonskid shoes/slippers when out of bed   room door open   room near nurse's station   room organization consistent   safety round/check completed   supervised activity   treat reversible contributory  factors   treat underlying cause  Reorientation Measures:   calendar in view   clock in view   hearing device use encouraged   reorientation provided  Taken 12/10/2023 0800 by Daniel Valdivia RN  Safety Promotion/Fall Prevention:   activity supervised   clutter free environment maintained   increased rounding and observation   increase visualization of patient   lighting adjusted   nonskid shoes/slippers when out of bed   room door open   room near nurse's station   room organization consistent   safety round/check completed   supervised activity   treat reversible contributory factors   treat underlying cause  Reorientation Measures:   calendar in view   clock in view   hearing device use encouraged   reorientation provided  Goal: Acceptable Pain Control  Outcome: Not Progressing  Intervention: Prevent or Manage Pain  Recent Flowsheet Documentation  Taken 12/10/2023 1826 by Daniel Valdivia RN  Pain Management Interventions:   medication (see MAR)   relaxation techniques promoted   repositioned  Taken 12/10/2023 0819 by Daniel Valdivia RN  Pain Management Interventions:   medication (see MAR)   relaxation techniques promoted   repositioned  Goal: Nausea and Vomiting Relief  Outcome: Not Progressing  Goal: Effective Urinary Elimination  Outcome: Not Progressing  Goal: Effective Oxygenation and Ventilation  Outcome: Not Progressing  Intervention: Optimize Oxygenation and Ventilation  Recent Flowsheet Documentation  Taken 12/10/2023 1600 by Daniel Valdivia RN  Head of Bed (HOB) Positioning: HOB at 30 degrees  Taken 12/10/2023 1200 by Daniel Valdivia RN  Head of Bed (HOB) Positioning: HOB at 30 degrees  Taken 12/10/2023 1000 by Daniel Valdivia RN  Head of Bed (HOB) Positioning: HOB at 30 degrees  Taken 12/10/2023 0800 by Daniel Valdivia RN  Head of Bed (HOB) Positioning: HOB at 30 degrees   Goal Outcome Evaluation:      Plan of Care Reviewed With: patient          Outcome Evaluation: Pt's continued to have  increased levels of pain to abdomin requiring more PRN dilaudid than compared to yesterday. Gen Surg and the MD Dichter aware. Pt was also more restless today r/2 is continued inability to full empty his bladder. Neuro assessment mostly unchanged mostly unchanghed although slightly more forgetful. refer to flowsheet for further detail regarding assessment.

## 2023-12-12 ENCOUNTER — APPOINTMENT (OUTPATIENT)
Dept: OCCUPATIONAL THERAPY | Facility: CLINIC | Age: 88
DRG: 853 | End: 2023-12-12
Attending: HOSPITALIST
Payer: MEDICARE

## 2023-12-12 ENCOUNTER — APPOINTMENT (OUTPATIENT)
Dept: PHYSICAL THERAPY | Facility: CLINIC | Age: 88
DRG: 853 | End: 2023-12-12
Attending: HOSPITALIST
Payer: MEDICARE

## 2023-12-12 LAB
ATRIAL RATE - MUSE: 95 BPM
DIASTOLIC BLOOD PRESSURE - MUSE: NORMAL MMHG
GLUCOSE BLDC GLUCOMTR-MCNC: 144 MG/DL (ref 70–99)
GLUCOSE BLDC GLUCOMTR-MCNC: 148 MG/DL (ref 70–99)
INTERPRETATION ECG - MUSE: NORMAL
P AXIS - MUSE: NORMAL DEGREES
PR INTERVAL - MUSE: 160 MS
QRS DURATION - MUSE: 116 MS
QT - MUSE: 370 MS
QTC - MUSE: 464 MS
R AXIS - MUSE: 49 DEGREES
SYSTOLIC BLOOD PRESSURE - MUSE: NORMAL MMHG
T AXIS - MUSE: 144 DEGREES
VENTRICULAR RATE- MUSE: 95 BPM

## 2023-12-12 PROCEDURE — 250N000011 HC RX IP 250 OP 636: Performed by: INTERNAL MEDICINE

## 2023-12-12 PROCEDURE — 250N000013 HC RX MED GY IP 250 OP 250 PS 637: Performed by: PHYSICIAN ASSISTANT

## 2023-12-12 PROCEDURE — 250N000013 HC RX MED GY IP 250 OP 250 PS 637: Performed by: INTERNAL MEDICINE

## 2023-12-12 PROCEDURE — 250N000011 HC RX IP 250 OP 636: Mod: JZ | Performed by: INTERNAL MEDICINE

## 2023-12-12 PROCEDURE — 97535 SELF CARE MNGMENT TRAINING: CPT | Mod: GO

## 2023-12-12 PROCEDURE — 250N000011 HC RX IP 250 OP 636: Performed by: PHYSICIAN ASSISTANT

## 2023-12-12 PROCEDURE — 200N000001 HC R&B ICU

## 2023-12-12 PROCEDURE — 250N000011 HC RX IP 250 OP 636: Mod: JZ | Performed by: PHYSICIAN ASSISTANT

## 2023-12-12 PROCEDURE — 99233 SBSQ HOSP IP/OBS HIGH 50: CPT | Performed by: INTERNAL MEDICINE

## 2023-12-12 PROCEDURE — 97110 THERAPEUTIC EXERCISES: CPT | Mod: GP | Performed by: PHYSICAL THERAPIST

## 2023-12-12 PROCEDURE — C9113 INJ PANTOPRAZOLE SODIUM, VIA: HCPCS | Performed by: PHYSICIAN ASSISTANT

## 2023-12-12 PROCEDURE — 97530 THERAPEUTIC ACTIVITIES: CPT | Mod: GP | Performed by: PHYSICAL THERAPIST

## 2023-12-12 RX ORDER — ONDANSETRON 2 MG/ML
4 INJECTION INTRAMUSCULAR; INTRAVENOUS EVERY 6 HOURS PRN
Status: DISCONTINUED | OUTPATIENT
Start: 2023-12-12 | End: 2023-12-26

## 2023-12-12 RX ORDER — PROCHLORPERAZINE MALEATE 5 MG
5 TABLET ORAL EVERY 6 HOURS PRN
Status: DISCONTINUED | OUTPATIENT
Start: 2023-12-12 | End: 2024-01-03 | Stop reason: HOSPADM

## 2023-12-12 RX ORDER — AMOXICILLIN 250 MG
1 CAPSULE ORAL 2 TIMES DAILY
Status: DISCONTINUED | OUTPATIENT
Start: 2023-12-12 | End: 2023-12-28

## 2023-12-12 RX ORDER — PROCHLORPERAZINE 25 MG
12.5 SUPPOSITORY, RECTAL RECTAL EVERY 12 HOURS PRN
Status: DISCONTINUED | OUTPATIENT
Start: 2023-12-12 | End: 2024-01-03 | Stop reason: HOSPADM

## 2023-12-12 RX ORDER — BISACODYL 10 MG
10 SUPPOSITORY, RECTAL RECTAL DAILY PRN
Status: DISCONTINUED | OUTPATIENT
Start: 2023-12-12 | End: 2024-01-03 | Stop reason: HOSPADM

## 2023-12-12 RX ORDER — ONDANSETRON 4 MG/1
4 TABLET, ORALLY DISINTEGRATING ORAL EVERY 6 HOURS PRN
Status: DISCONTINUED | OUTPATIENT
Start: 2023-12-12 | End: 2023-12-26

## 2023-12-12 RX ADMIN — PIPERACILLIN AND TAZOBACTAM 3.38 G: 3; .375 INJECTION, POWDER, FOR SOLUTION INTRAVENOUS at 16:31

## 2023-12-12 RX ADMIN — GABAPENTIN 100 MG: 100 CAPSULE ORAL at 12:35

## 2023-12-12 RX ADMIN — ONDANSETRON 4 MG: 2 INJECTION INTRAMUSCULAR; INTRAVENOUS at 19:04

## 2023-12-12 RX ADMIN — FINASTERIDE 5 MG: 5 TABLET, FILM COATED ORAL at 21:13

## 2023-12-12 RX ADMIN — HYDROMORPHONE HYDROCHLORIDE 0.2 MG: 0.2 INJECTION, SOLUTION INTRAMUSCULAR; INTRAVENOUS; SUBCUTANEOUS at 18:55

## 2023-12-12 RX ADMIN — HYDROMORPHONE HYDROCHLORIDE 0.2 MG: 0.2 INJECTION, SOLUTION INTRAMUSCULAR; INTRAVENOUS; SUBCUTANEOUS at 17:15

## 2023-12-12 RX ADMIN — PIPERACILLIN AND TAZOBACTAM 3.38 G: 3; .375 INJECTION, POWDER, FOR SOLUTION INTRAVENOUS at 04:26

## 2023-12-12 RX ADMIN — CLOPIDOGREL BISULFATE 75 MG: 75 TABLET ORAL at 08:06

## 2023-12-12 RX ADMIN — ESCITALOPRAM OXALATE 20 MG: 20 TABLET, FILM COATED ORAL at 08:10

## 2023-12-12 RX ADMIN — PROCHLORPERAZINE EDISYLATE 5 MG: 5 INJECTION INTRAMUSCULAR; INTRAVENOUS at 22:38

## 2023-12-12 RX ADMIN — DEXTROSE AND SODIUM CHLORIDE: 5; 450 INJECTION, SOLUTION INTRAVENOUS at 12:36

## 2023-12-12 RX ADMIN — ASPIRIN 81 MG: 81 TABLET, COATED ORAL at 08:09

## 2023-12-12 RX ADMIN — BISACODYL 10 MG: 10 SUPPOSITORY RECTAL at 12:30

## 2023-12-12 RX ADMIN — PIPERACILLIN AND TAZOBACTAM 3.38 G: 3; .375 INJECTION, POWDER, FOR SOLUTION INTRAVENOUS at 10:11

## 2023-12-12 RX ADMIN — DOCUSATE SODIUM 50 MG AND SENNOSIDES 8.6 MG 1 TABLET: 8.6; 5 TABLET, FILM COATED ORAL at 21:13

## 2023-12-12 RX ADMIN — GABAPENTIN 200 MG: 100 CAPSULE ORAL at 08:09

## 2023-12-12 RX ADMIN — CARVEDILOL 12.5 MG: 12.5 TABLET, FILM COATED ORAL at 08:09

## 2023-12-12 RX ADMIN — PANTOPRAZOLE SODIUM 40 MG: 40 INJECTION, POWDER, FOR SOLUTION INTRAVENOUS at 08:06

## 2023-12-12 RX ADMIN — HYDROMORPHONE HYDROCHLORIDE 0.4 MG: 0.2 INJECTION, SOLUTION INTRAMUSCULAR; INTRAVENOUS; SUBCUTANEOUS at 08:06

## 2023-12-12 RX ADMIN — LEVOTHYROXINE SODIUM 100 MCG: 100 TABLET ORAL at 08:10

## 2023-12-12 RX ADMIN — ENOXAPARIN SODIUM 40 MG: 40 INJECTION SUBCUTANEOUS at 12:35

## 2023-12-12 RX ADMIN — CARVEDILOL 12.5 MG: 12.5 TABLET, FILM COATED ORAL at 21:13

## 2023-12-12 RX ADMIN — PIPERACILLIN AND TAZOBACTAM 3.38 G: 3; .375 INJECTION, POWDER, FOR SOLUTION INTRAVENOUS at 21:58

## 2023-12-12 RX ADMIN — GABAPENTIN 300 MG: 300 CAPSULE ORAL at 21:13

## 2023-12-12 RX ADMIN — LATANOPROST 1 DROP: 50 SOLUTION/ DROPS OPHTHALMIC at 21:49

## 2023-12-12 RX ADMIN — HYDROMORPHONE HYDROCHLORIDE 0.4 MG: 0.2 INJECTION, SOLUTION INTRAMUSCULAR; INTRAVENOUS; SUBCUTANEOUS at 05:50

## 2023-12-12 RX ADMIN — DOCUSATE SODIUM 50 MG AND SENNOSIDES 8.6 MG 1 TABLET: 8.6; 5 TABLET, FILM COATED ORAL at 12:23

## 2023-12-12 ASSESSMENT — ACTIVITIES OF DAILY LIVING (ADL)
ADLS_ACUITY_SCORE: 34
ADLS_ACUITY_SCORE: 40
ADLS_ACUITY_SCORE: 34
ADLS_ACUITY_SCORE: 40
ADLS_ACUITY_SCORE: 34

## 2023-12-12 NOTE — PROGRESS NOTES
[facility]    Internal Medicine Hospitalist Progress Note  12/12/2023  I evaluated patient on the above date.    Tu Bhakta Jr., MD  [phone] (p)  Text Page  Vocera        Assessment & Plan New actions/orders today (12/12/2023) are underlined. All lab results in the assessment and plan were reviewed.    Garry Mckay is a 88 year old male with PMH of hypertension; CAD; CHF (ischemic cardiomyopathy, combined systolic and diastolic), s/p ICD; PAD; dyslipidemia; CARINA on CPAP; anxiety/depression; BPH; hypothyroidism; and CKD stage III; who presented to OSH 12/7/2023 with abdominal pain and found with appendicitis with peritonitis with sepsis. Transferred to [facility] 12/8/2023.     Underwent surgery on 12/8/2023 and required ICU management for septic shock. Ultimately improved, and Hospitalist service assumed care on 12/11/2023.       # Acute appendicitis with peritonitis and septic shock - s/p laparoscopic appendectomy 12/8/2023.  # Postoperative ileus.  * Initial presentation to OSH 12/7 as above. CT abdomen noted with acute appendicitis with 0.7 cm appendicolith, no abscess. Started on ampicillin-sulbactam. Subsequently transferred to [facility] 12/8.  * On 12/8, underwent above surgery. Weaned off pressors. Antibiotics changed to pipericillin-tazobactam.  * On 12/10, CT abdomen noted with multiple dilated small bowel loops with related decompression of colon with findings consistent with partial obstruction/ adynamic ileus.  * On 12/11, pt w/o N/V.  - Continue NPO; advance diet as able per Surgery.  - Continue pipericillin-tazobactam (started 12/8).  - Continue IVF's - change from NS to D5 1/2 NS at 75 ml/hr.  - Continue PRN IV hydromorphone; minimize opioids as able given ileus.  - If nausea, consider NG tube; order PRN anti-emetics for now.  - Continue to encourage regular ambulation as able.    # Acute hypoxic respiratory failure, suspect multifactorial related to atelectasis,  recent abdominal surgery.  # CARINA on home CPAP.  * Initial presentation as above. CXR at OSH negative.  * On 12/8, CXR showed new left basilar bandlike opacity, most likely atelectasis.   * On 12/11, down to 2L O2.  - Continue O2, wean as able.  - Continue CPAP while sleeping.  - Continue to encourage incentive spirometry.     FATIMAH on CKD3, suspect prerenal, meds (including sacubitril-valsartan, diuretics).  Hyperkalemia, improved  * Baseline creatinine 1-1.1.   * Creatinine 1.39 --> 1.8 at OSH.  * Cr up to 2.27 and K up to 5.5 on 12/8.  * Cr and K subsequently improved with IVF's.  * On IV furosemide 12/10-12/11.  Recent Labs   Lab 12/11/23  1914 12/11/23  0526 12/10/23  0157 12/09/23  0445 12/08/23  1807 12/08/23  0047   CR 1.66* 1.75* 2.01* 2.14* 2.27* 1.96*   - Continue IVF's.  - Continue to monitor BMP - repeat in am.  - Avoid nephrotoxic medications.    # Troponin elevation, suspect demand ischemia (type 2 NSTEMI).  # Hypertension (benign essential).  # CAD s/p CABG (1994), stenting (2002, 2003, 2005, 2021)  # Chronic HFrEF (systolic and diastolic), s/p ICD.  # HLD.  [PTA: carvedilol 12.5 mg BID; sacubitril-valsartan 49-51 mg BID; furosemide 20 mg daily.]  * Last cath 1/2021 at [facility] showed patent CLAUDIA-RCA, LIMA occluded, vein-diagonal patent with placement of stent,  LAD, 40% circumflex, RCA occluded. Last stress testing 7/2023 negative for ischemia. Echo 7/2023 showed LVEF 30-35%, global hypokinesis LV, moderate-severe cLVH, grade 1 diastolic dysfunction; RV systolic function mildly reduced.   * Initial presentation as above. BNP in ED at 2613, troponin 70. EKG w/ nonspecific t-w changes.   * PTA sacubitril-valsartan and furosemide held on admit given FATIMAH.  * Started on IV furosemide in ICU 12/10, held 12/11.  - Continue ASA, clopidogrel.  - Continue carvedilol.  - Continue hold sacubitril-valsartan and furosemide.  - Resume atorvastatin once taking more PO.  - Continue to monitor i/o's, daily  "wts.    Anemia, suspect blood loss component.  * Hgb normal on initially evaluation.  * Underwent surgery 12/8 as above.  * Hgb down to 11.9 on 12/9.  Recent Labs   Lab 12/11/23  0526 12/10/23  0157 12/09/23  0445 12/08/23  1807 12/08/23  0047 12/07/23  1816   HGB 13.2* 13.2* 11.9* 12.7* 15.7 16.8   - Continue to monitor CBC - repeat in am.  - Consider prbc transfusion if hgb </= 7.0 or if significant bleeding with hemodynamic instability or if symptomatic.    Thrombocytopenia, unclear etiology, possibly due to sepsis, medication effect or consumptive or other cause.  * Plts normal on initially evaluation.  * Plts subsequently down to 104K on 12/9.  Recent Labs   Lab 12/11/23  0526 12/10/23  0157 12/09/23  0445 12/08/23  1807 12/08/23  0047 12/07/23  1816   * 125* 104* 130* 183 160   - Continue to monitor CBC - repeat in am.  - Consider platelet transfusion if needs a procedure and less than 50,000; or if less than 10,000.    Hx CVA.  - Continue ASA, clopidogrel.  - Resume atorvastatin once taking more PO.     Depression/anxiety.  - Continue escitalopram 20 mg daily, gabapentin.    Hypothyroidism.  - Continue levothyroxine.    GERD.  - Continue pantoprazole.    BPH.  - Continue finasteride     Migraines.  * Gets botox.   - Continue to monitor clinically.    Clinically Significant Risk Factors           # Hypercalcemia: corrected calcium is >10.1, will monitor as appropriate    # Hypoalbuminemia: Lowest albumin = 2.8 g/dL at 12/11/2023  5:26 AM, will monitor as appropriate   # Thrombocytopenia: Lowest platelets = 122 in last 2 days, will monitor for bleeding   # Hypertension: Noted on problem list        # Overweight: Estimated body mass index is 29.07 kg/m  as calculated from the following:    Height as of 12/7/23: 1.702 m (5' 7\").    Weight as of this encounter: 84.2 kg (185 lb 10 oz).               COVID-19 testing.  COVID-19 PCR Results           No data to display              COVID-19 Antibody Results, " Testing for Immunity           No data to display                Diet: NPO for Medical/Clinical Reasons Except for: Meds, Other; Specify: May have sips of clear liquids    Prophylaxis: PCD's, ambulation.   Hameed Catheter: Not present  Lines: PRESENT      CVC Triple Lumen Left Internal jugular-Site Assessment: WDL    Code Status: Full Code    Disposition Plan   Expected discharge: 1-2d recommended to  home vs TCU  pending  above .  Entered: Tu Bhakta MD 12/12/2023, 7:20 AM     - Transfer out of ICU.    I spent approximately 50 minutes bedside and on the inpatient unit today managing the care of patient. Over 50% of my time on the unit was spent in extensive chart review, counseling the patient/family and/or coordinating care regarding services listed in this note.      Interval History   Belched while getting up into chair with PT.  No flatus yet.  Feels hungry.    -Data reviewed today: I reviewed all new labs and imaging over the last 24 hours. I personally reviewed no images or EKG's today.    Physical Exam    , Blood pressure (!) 135/112, pulse 85, temperature 99  F (37.2  C), temperature source Axillary, resp. rate 17, weight 84.2 kg (185 lb 10 oz), SpO2 93%. O2 Device: Nasal cannula Oxygen Delivery: 2 LPM  Vitals:    12/10/23 0400 12/11/23 0400 12/12/23 0000   Weight: 86 kg (189 lb 9.5 oz) 84.6 kg (186 lb 8.2 oz) 84.2 kg (185 lb 10 oz)     Vital Signs with Ranges  Temp:  [97  F (36.1  C)-99.2  F (37.3  C)] 99  F (37.2  C)  Pulse:  [80-93] 85  Resp:  [13-23] 17  BP: (131-207)/() 135/112  SpO2:  [90 %-96 %] 93 %  Patient Vitals for the past 24 hrs:   BP Temp Temp src Pulse Resp SpO2 Weight   12/12/23 0600 (!) 135/112 -- -- 85 17 93 % --   12/12/23 0400 (!) 150/91 -- Axillary 84 15 92 % --   12/12/23 0200 (!) 143/84 -- -- 80 13 91 % --   12/12/23 0000 131/79 99  F (37.2  C) Axillary 81 13 90 % 84.2 kg (185 lb 10 oz)   12/11/23 2200 (!) 140/88 -- -- 90 20 94 % --   12/11/23 2000 (!) 149/98 99.2  F  (37.3  C) Axillary 93 20 94 % --   12/11/23 1600 137/85 98.4  F (36.9  C) Axillary 90 21 94 % --   12/11/23 1400 (!) 144/84 -- -- 89 19 95 % --   12/11/23 1239 137/78 -- -- 90 13 94 % --   12/11/23 1203 -- 97.6  F (36.4  C) Axillary -- -- 96 % --   12/11/23 1200 (!) 207/110 -- -- 92 23 96 % --   12/11/23 1100 (!) 204/106 -- -- 90 16 94 % --   12/11/23 1000 (!) 148/90 -- -- 91 14 95 % --   12/11/23 0900 (!) 144/88 -- -- 89 19 91 % --   12/11/23 0800 (!) 146/89 97  F (36.1  C) Axillary 93 14 95 % --     I/O's Last 24 hours  I/O last 3 completed shifts:  In: 1100 [I.V.:1100]  Out: 2345 [Urine:2200; Drains:145]    Constitutional: Awake, alert, fatigued.  Respiratory: Diminished in bases. No crackles or wheezes.  Cardiovascular: RRR, no m/r/g.  GI: Distended, nt to light tough, hypoactive.  Skin/Integumen: No bilateral lower extremity edema.  Other:        Data    Labs reviewed.  Recent Labs   Lab 12/12/23  0619 12/11/23  1914 12/11/23  1701 12/11/23  1240 12/11/23  0526 12/10/23  0810 12/10/23  0157 12/09/23  0451 12/09/23  0445 12/07/23  1816 12/07/23  1531   WBC  --   --   --   --  14.6*  --  14.0*  --  10.0   < >  --    HGB  --   --   --   --  13.2*  --  13.2*  --  11.9*   < >  --    MCV  --   --   --   --  99  --  100  --  99   < >  --    PLT  --   --   --   --  122*  --  125*  --  104*   < >  --    NA  --   --   --   --  140  --  138  --  136   < > 134*  133*   POTASSIUM  --   --   --   --  4.2  --  4.6  --  4.7   < > 5.6*  5.5*   CHLORIDE  --   --   --   --  109*  --  106  --  105   < > 100  100   CO2  --   --   --   --  19*  --  17*  --  20*   < > 14*  20*   BUN  --   --   --   --  44.2*  --  43.5*  --  42.4*   < > 23.0  22.9   CR  --  1.66*  --   --  1.75*  --  2.01*  --  2.14*   < > 1.39*  1.37*   ANIONGAP  --   --   --   --  12  --  15  --  11   < > 20*  13   RHODA  --   --   --   --  9.3  --  9.3  --  8.4*   < > 9.8  9.8   *  --  145* 132* 146*   < > 141*   < > 139*   < > 172*  172*   ALBUMIN   "--   --   --   --  2.8*  --  3.0*  --  2.9*  --  4.2  4.2   PROTTOTAL  --   --   --   --   --   --   --   --  5.0*  --  7.1  7.1   BILITOTAL  --   --   --   --   --   --   --   --  1.0  --  1.4*  1.4*   ALKPHOS  --   --   --   --   --   --   --   --  54  --  89  89   ALT  --   --   --   --   --   --   --   --  14  --  29  29   AST  --   --   --   --   --   --   --   --  29  --  38  38   LIPASE  --   --   --   --   --   --   --   --   --   --  20    < > = values in this interval not displayed.     Recent Labs   Lab Test 12/10/23  0733 12/10/23  0157 12/08/23  0448 12/08/23  0047 12/08/23 0047 12/07/23  2228 01/10/21  1020   NT-PROBNP, INPATIENT  --   --   --   --  3,859* 2,613* 394   TROPONIN T HIGH SENSITIVITY 137* 131* 61*   < > 70*  --   --     < > = values in this interval not displayed.     Recent Labs   Lab 12/12/23  0619 12/11/23  1701 12/11/23  1240 12/11/23  0526 12/10/23  1556 12/10/23  1339   * 145* 132* 146* 149* 161*      No lab results found.     No results for input(s): \"INR\", \"[id number]\" in the last 168 hours.  Recent Labs   Lab 12/11/23  0526 12/10/23  0745 12/10/23  0157 12/09/23  0445 12/08/23  1807 12/08/23  0822 12/08/23  0544 12/08/23  0448 12/08/23  0053 12/08/23  0047 12/07/23  1816   WBC 14.6*  --  14.0* 10.0 16.0*  --   --   --   --  12.8* 9.8   LACT  --  1.3  --  1.3 2.5*  2.6* 2.9* 3.0* 3.2*   < >  --   --     < > = values in this interval not displayed.       MICRO:  CULTURES (INCLUDING BLOOD AND URINE):  No lab results found in last 7 days.    No results found for this or any previous visit (from the past 24 hour(s)).    Medications   All medications were reviewed.    Infusions:   sodium chloride 50 mL/hr at 12/11/23 1103     Scheduled Medications:   aspirin  81 mg Oral Daily    carvedilol  12.5 mg Oral BID    clopidogrel  75 mg Oral Daily    enoxaparin ANTICOAGULANT  40 mg Subcutaneous Q24H    escitalopram  20 mg Oral Daily    finasteride  5 mg Oral At Bedtime    [Held " by provider] furosemide  40 mg Intravenous Q24H    gabapentin  100 mg Oral Q24H    gabapentin  200 mg Oral QAM    gabapentin  300 mg Oral At Bedtime    latanoprost  1 drop Both Eyes At Bedtime    levothyroxine  100 mcg Oral Daily    pantoprazole  40 mg Intravenous Q24H    piperacillin-tazobactam  3.375 g Intravenous Q6H    sodium chloride (PF)  3 mL Intracatheter Q8H     PRN Medications:  calcium carbonate, HYDROmorphone, HYDROmorphone, lidocaine 4%, lidocaine (buffered or not buffered), naloxone **OR** naloxone **OR** naloxone **OR** naloxone, nitroGLYcerin, sodium chloride (PF)

## 2023-12-12 NOTE — PROGRESS NOTES
General Surgery Progress Note    Admission Date: 12/8/2023  Today's Date: 12/12/2023         Assessment:      Garry Mckay is a 88 year old male   S/P Lap Appy for Perforated Appendicitis, and vascular assessment of questionable area on cecum through ICG administration  POD #4     Diagnosis: Perforated Appendicitis, Post Op Ileus         Plan:   Suppository today  Senokot BID  Up to chair.  Increase activity as able  Wean narcotics as able  Stick with ice chips and sips until passing flatus, then Clear liquids.  Likely transfer out of ICU today.        Interval History:   More alert today.  Feels his is improving slowly.  Pain improving but feels weak.  Not necessarily hungry but feels he needs something to help with weakness.  No nausea with ice chips/sips.            Physical Exam:   BP (!) 143/83   Pulse 109   Temp 97.5  F (36.4  C) (Oral)   Resp 17   Wt 84.2 kg (185 lb 10 oz)   SpO2 93%   BMI 29.07 kg/m    I/O last 3 completed shifts:  In: 1100 [I.V.:1100]  Out: 2345 [Urine:2200; Drains:145]  General: NAD, pleasant, alert and oriented x3  Abdomen: round, tender to central abdomen, moderately distended, + bowel sounds.  Incision: clean, dry, and intact  ESTEFANY: mostly serosang with some flecks of white exudate, some leaking around tubing as well.    LABS:  Results for orders placed or performed during the hospital encounter of 12/08/23 (from the past 24 hour(s))   Glucose by meter   Result Value Ref Range    GLUCOSE BY METER POCT 132 (H) 70 - 99 mg/dL   Glucose by meter   Result Value Ref Range    GLUCOSE BY METER POCT 145 (H) 70 - 99 mg/dL   Creatinine   Result Value Ref Range    Creatinine 1.66 (H) 0.67 - 1.17 mg/dL    GFR Estimate 40 (L) >60 mL/min/1.73m2   Glucose by meter   Result Value Ref Range    GLUCOSE BY METER POCT 148 (H) 70 - 99 mg/dL           Garret Nieto PA-C  Pager: [phone]  [facility]: [phone]

## 2023-12-12 NOTE — PLAN OF CARE
Problem: Adult Inpatient Plan of Care  Goal: Plan of Care Review  Description: The Plan of Care Review/Shift note should be completed every shift.  The Outcome Evaluation is a brief statement about your assessment that the patient is improving, declining, or no change.  This information will be displayed automatically on your shift  note.  Outcome: Progressing  Flowsheets (Taken 12/12/2023 4515)  Outcome Evaluation: Pt intermittently disoriented to situation, time. Dilaudid given x2 with good effect for c/o abd pain. Able to sleep inbetween cares. Bowel sounds active in all quadrants. Tolerating sips of water and ice chips. Adequate urine output via external catheter. Requiring NC 2L to keep sats > 90%.  Plan of Care Reviewed With: patient  Overall Patient Progress: improving   Goal Outcome Evaluation:      Plan of Care Reviewed With: patient    Overall Patient Progress: improvingOverall Patient Progress: improving    Outcome Evaluation: Pt intermittently disoriented to situation, time. Dilaudid given x2 with good effect for c/o abd pain. Able to sleep inbetween cares. Bowel sounds active in all quadrants. Tolerating sips of water and ice chips. Adequate urine output via external catheter. Requiring NC 2L to keep sats > 90%.

## 2023-12-13 ENCOUNTER — APPOINTMENT (OUTPATIENT)
Dept: PHYSICAL THERAPY | Facility: CLINIC | Age: 88
DRG: 853 | End: 2023-12-13
Attending: HOSPITALIST
Payer: MEDICARE

## 2023-12-13 ENCOUNTER — APPOINTMENT (OUTPATIENT)
Dept: OCCUPATIONAL THERAPY | Facility: CLINIC | Age: 88
DRG: 853 | End: 2023-12-13
Attending: HOSPITALIST
Payer: MEDICARE

## 2023-12-13 LAB
ANION GAP SERPL CALCULATED.3IONS-SCNC: 10 MMOL/L (ref 7–15)
BASOPHILS # BLD AUTO: ABNORMAL 10*3/UL
BASOPHILS # BLD MANUAL: 0 10E3/UL (ref 0–0.2)
BASOPHILS NFR BLD AUTO: ABNORMAL %
BASOPHILS NFR BLD MANUAL: 0 %
BUN SERPL-MCNC: 34.5 MG/DL (ref 8–23)
CALCIUM SERPL-MCNC: 8.9 MG/DL (ref 8.8–10.2)
CHLORIDE SERPL-SCNC: 109 MMOL/L (ref 98–107)
CREAT SERPL-MCNC: 1.41 MG/DL (ref 0.67–1.17)
DEPRECATED HCO3 PLAS-SCNC: 25 MMOL/L (ref 22–29)
EGFRCR SERPLBLD CKD-EPI 2021: 49 ML/MIN/1.73M2
EOSINOPHIL # BLD AUTO: ABNORMAL 10*3/UL
EOSINOPHIL # BLD MANUAL: 0.4 10E3/UL (ref 0–0.7)
EOSINOPHIL NFR BLD AUTO: ABNORMAL %
EOSINOPHIL NFR BLD MANUAL: 3 %
ERYTHROCYTE [DISTWIDTH] IN BLOOD BY AUTOMATED COUNT: 14.2 % (ref 10–15)
GLUCOSE BLDC GLUCOMTR-MCNC: 190 MG/DL (ref 70–99)
GLUCOSE SERPL-MCNC: 154 MG/DL (ref 70–99)
HCT VFR BLD AUTO: 41.5 % (ref 40–53)
HGB BLD-MCNC: 13.3 G/DL (ref 13.3–17.7)
IMM GRANULOCYTES # BLD: ABNORMAL 10*3/UL
IMM GRANULOCYTES NFR BLD: ABNORMAL %
LYMPHOCYTES # BLD AUTO: ABNORMAL 10*3/UL
LYMPHOCYTES # BLD MANUAL: 1.4 10E3/UL (ref 0.8–5.3)
LYMPHOCYTES NFR BLD AUTO: ABNORMAL %
LYMPHOCYTES NFR BLD MANUAL: 11 %
MCH RBC QN AUTO: 32.4 PG (ref 26.5–33)
MCHC RBC AUTO-ENTMCNC: 32 G/DL (ref 31.5–36.5)
MCV RBC AUTO: 101 FL (ref 78–100)
METAMYELOCYTES # BLD MANUAL: 0.4 10E3/UL
METAMYELOCYTES NFR BLD MANUAL: 3 %
MONOCYTES # BLD AUTO: ABNORMAL 10*3/UL
MONOCYTES # BLD MANUAL: 1.2 10E3/UL (ref 0–1.3)
MONOCYTES NFR BLD AUTO: ABNORMAL %
MONOCYTES NFR BLD MANUAL: 9 %
MYELOCYTES # BLD MANUAL: 0.1 10E3/UL
MYELOCYTES NFR BLD MANUAL: 1 %
NEUTROPHILS # BLD AUTO: ABNORMAL 10*3/UL
NEUTROPHILS # BLD MANUAL: 9.5 10E3/UL (ref 1.6–8.3)
NEUTROPHILS NFR BLD AUTO: ABNORMAL %
NEUTROPHILS NFR BLD MANUAL: 73 %
NRBC # BLD AUTO: 0 10E3/UL
NRBC BLD AUTO-RTO: 0 /100
PATH REPORT.COMMENTS IMP SPEC: NORMAL
PATH REPORT.COMMENTS IMP SPEC: NORMAL
PATH REPORT.FINAL DX SPEC: NORMAL
PATH REPORT.GROSS SPEC: NORMAL
PATH REPORT.MICROSCOPIC SPEC OTHER STN: NORMAL
PATH REPORT.RELEVANT HX SPEC: NORMAL
PHOTO IMAGE: NORMAL
PLAT MORPH BLD: ABNORMAL
PLATELET # BLD AUTO: 133 10E3/UL (ref 150–450)
POTASSIUM SERPL-SCNC: 3.5 MMOL/L (ref 3.4–5.3)
RBC # BLD AUTO: 4.1 10E6/UL (ref 4.4–5.9)
RBC MORPH BLD: ABNORMAL
SMUDGE CELLS BLD QL SMEAR: PRESENT
SODIUM SERPL-SCNC: 144 MMOL/L (ref 135–145)
WBC # BLD AUTO: 13 10E3/UL (ref 4–11)

## 2023-12-13 PROCEDURE — C9113 INJ PANTOPRAZOLE SODIUM, VIA: HCPCS | Performed by: INTERNAL MEDICINE

## 2023-12-13 PROCEDURE — 120N000001 HC R&B MED SURG/OB

## 2023-12-13 PROCEDURE — 250N000011 HC RX IP 250 OP 636: Performed by: INTERNAL MEDICINE

## 2023-12-13 PROCEDURE — 88304 TISSUE EXAM BY PATHOLOGIST: CPT | Mod: 26 | Performed by: PATHOLOGY

## 2023-12-13 PROCEDURE — 99233 SBSQ HOSP IP/OBS HIGH 50: CPT | Performed by: INTERNAL MEDICINE

## 2023-12-13 PROCEDURE — 250N000011 HC RX IP 250 OP 636: Mod: JZ | Performed by: INTERNAL MEDICINE

## 2023-12-13 PROCEDURE — 97530 THERAPEUTIC ACTIVITIES: CPT | Mod: GP

## 2023-12-13 PROCEDURE — 36415 COLL VENOUS BLD VENIPUNCTURE: CPT | Performed by: INTERNAL MEDICINE

## 2023-12-13 PROCEDURE — 97535 SELF CARE MNGMENT TRAINING: CPT | Mod: GO

## 2023-12-13 PROCEDURE — 85007 BL SMEAR W/DIFF WBC COUNT: CPT | Performed by: INTERNAL MEDICINE

## 2023-12-13 PROCEDURE — 80048 BASIC METABOLIC PNL TOTAL CA: CPT | Performed by: INTERNAL MEDICINE

## 2023-12-13 PROCEDURE — 97116 GAIT TRAINING THERAPY: CPT | Mod: GP

## 2023-12-13 PROCEDURE — 250N000009 HC RX 250: Performed by: INTERNAL MEDICINE

## 2023-12-13 PROCEDURE — 85027 COMPLETE CBC AUTOMATED: CPT | Performed by: INTERNAL MEDICINE

## 2023-12-13 RX ORDER — LEVOTHYROXINE SODIUM ANHYDROUS 100 UG/5ML
75 INJECTION, POWDER, LYOPHILIZED, FOR SOLUTION INTRAVENOUS DAILY
Status: DISCONTINUED | OUTPATIENT
Start: 2023-12-13 | End: 2023-12-21

## 2023-12-13 RX ADMIN — DEXTROSE AND SODIUM CHLORIDE: 5; 450 INJECTION, SOLUTION INTRAVENOUS at 08:34

## 2023-12-13 RX ADMIN — PIPERACILLIN AND TAZOBACTAM 3.38 G: 3; .375 INJECTION, POWDER, FOR SOLUTION INTRAVENOUS at 16:29

## 2023-12-13 RX ADMIN — ENOXAPARIN SODIUM 40 MG: 40 INJECTION SUBCUTANEOUS at 13:31

## 2023-12-13 RX ADMIN — LATANOPROST 1 DROP: 50 SOLUTION/ DROPS OPHTHALMIC at 21:51

## 2023-12-13 RX ADMIN — PIPERACILLIN AND TAZOBACTAM 3.38 G: 3; .375 INJECTION, POWDER, FOR SOLUTION INTRAVENOUS at 21:42

## 2023-12-13 RX ADMIN — HYDROMORPHONE HYDROCHLORIDE 0.2 MG: 0.2 INJECTION, SOLUTION INTRAMUSCULAR; INTRAVENOUS; SUBCUTANEOUS at 18:25

## 2023-12-13 RX ADMIN — PANTOPRAZOLE SODIUM 40 MG: 40 INJECTION, POWDER, FOR SOLUTION INTRAVENOUS at 08:35

## 2023-12-13 RX ADMIN — PIPERACILLIN AND TAZOBACTAM 3.38 G: 3; .375 INJECTION, POWDER, FOR SOLUTION INTRAVENOUS at 04:36

## 2023-12-13 RX ADMIN — DEXTROSE AND SODIUM CHLORIDE: 5; 450 INJECTION, SOLUTION INTRAVENOUS at 21:50

## 2023-12-13 RX ADMIN — LEVOTHYROXINE SODIUM 75 MCG: 20 INJECTION, SOLUTION INTRAVENOUS at 16:13

## 2023-12-13 RX ADMIN — PIPERACILLIN AND TAZOBACTAM 3.38 G: 3; .375 INJECTION, POWDER, FOR SOLUTION INTRAVENOUS at 10:58

## 2023-12-13 ASSESSMENT — ACTIVITIES OF DAILY LIVING (ADL)
ADLS_ACUITY_SCORE: 38
ADLS_ACUITY_SCORE: 42
ADLS_ACUITY_SCORE: 40
ADLS_ACUITY_SCORE: 40
ADLS_ACUITY_SCORE: 42
ADLS_ACUITY_SCORE: 40
ADLS_ACUITY_SCORE: 40
ADLS_ACUITY_SCORE: 42
ADLS_ACUITY_SCORE: 40
ADLS_ACUITY_SCORE: 40
ADLS_ACUITY_SCORE: 42
ADLS_ACUITY_SCORE: 42

## 2023-12-13 NOTE — PROGRESS NOTES
Hospitalist service cross-cover note:     Paged regarding episode of vomiting. Has ileus, will order NG to LIS and make NPO.     ADDENDUM: Updated by RN that patient is passing gas, had BM and is currently asymptomatic. Can hold on NG tube and resume clears.     ADDENDUM: RN reported new episode of biliary vomit, will place NG to LIS and make NPO     Milan Ortiz MD   Hospitalist  [phone]

## 2023-12-13 NOTE — PLAN OF CARE
Goal Outcome Evaluation:      Plan of Care Reviewed With: patient    Overall Patient Progress: no changeOverall Patient Progress: no change     Orientation: AOx3  Vitals/Pain: VSS on 2 Lpm/nasal cannula. Pt denies pain. No emesis during the shift.  Tele: NSR with BBB  Lines/Drains: NG tube to LIS for decompression with output, 1 PIV x SL, external catheter with 200 mL urine output, noted leakage with copious amount of urine. ESTEFANY drain with 90 mL output, bile in appearance, also noted leakage in site.  LS: clear  GI/: BS hypoactive, x0 BM this shift. Pt having adequate urine output throughout shift. NPO but may have ice chips.  Labs: Abnormal/Trends, Electrolyte Replacement- N/A  Ambulation/Assist: A2 Full Lift  Skin/Wounds: Scattered bruises, blanchable redness on coccyx  Plan: Decompress abdomen - if no emesis, NG tube will be removed today and back on clear liquid diet.

## 2023-12-13 NOTE — PROGRESS NOTES
[facility]    Internal Medicine Hospitalist Progress Note  12/13/2023  I evaluated patient on the above date.    Tu Bhakta Jr., MD  [phone] (p)  Text Page  Vocera        Assessment & Plan New actions/orders today (12/13/2023) are underlined. All lab results in the assessment and plan were reviewed.    Garry Mckay is a 88 year old male with PMH of hypertension; CAD; CHF (ischemic cardiomyopathy, combined systolic and diastolic), s/p ICD; PAD; dyslipidemia; CARINA on CPAP; anxiety/depression; BPH; hypothyroidism; and CKD stage III; who presented to OSH 12/7/2023 with abdominal pain and found with appendicitis with peritonitis with sepsis. Transferred to [facility] 12/8/2023.     Underwent surgery on 12/8/2023 and required ICU management for septic shock. Ultimately improved, and Hospitalist service assumed care on 12/11/2023.       # Acute appendicitis with peritonitis and septic shock - s/p laparoscopic appendectomy 12/8/2023.  # Postoperative ileus.  * Initial presentation to OSH 12/7 as above. CT abdomen noted with acute appendicitis with 0.7 cm appendicolith, no abscess. Started on ampicillin-sulbactam. Subsequently transferred to [facility] 12/8.  * On 12/8, underwent above surgery. Weaned off pressors. Antibiotics changed to pipericillin-tazobactam.  * On 12/10, CT abdomen noted with multiple dilated small bowel loops with related decompression of colon with findings consistent with partial obstruction/ adynamic ileus.  * On 12/11, pt w/o N/V.   * On 12/12, diet advanced to clears as pt seemed to be improving with +flatus and BM. Later, developed more N/V and NG placed.  - Continue NPO; advance diet as able per Surgery.  - Continue NG at LIS.  - Continue pipericillin-tazobactam (started 12/8).  - Continue IVF's - restart D5 1/2 NS at 75 ml/hr (stopped 12/12 as diet was advanced).  - Continue PRN IV hydromorphone; minimize opioids as able given ileus.  - Continue PRN anti-emetics  for now.  - Continue to encourage regular ambulation as able.    # Acute hypoxic respiratory failure, suspect multifactorial related to atelectasis, recent abdominal surgery.  # CARINA on home CPAP.  * Initial presentation as above. CXR at OSH negative.  * On 12/8, CXR showed new left basilar bandlike opacity, most likely atelectasis.   * On 12/11, down to 2L O2.  - Continue O2, wean as able.  - Continue CPAP while sleeping.  - Continue to encourage incentive spirometry.     # FATIMAH on CKD3, suspect prerenal, meds (including sacubitril-valsartan, diuretics).  # Hyperkalemia, improved  * Baseline creatinine 1-1.1.   * Creatinine 1.39 --> 1.8 at OSH.  * Cr up to 2.27 and K up to 5.5 on 12/8.  * Cr and K subsequently improved with IVF's.  * On IV furosemide 12/10-12/11.  Recent Labs   Lab 12/13/23  0532 12/11/23  1914 12/11/23  0526 12/10/23  0157 12/09/23  0445 12/08/23  1807   CR 1.41* 1.66* 1.75* 2.01* 2.14* 2.27*   - Continue IVF's.  - Continue to monitor BMP - repeat in am.  - Avoid nephrotoxic medications.    # Troponin elevation, suspect demand ischemia (type 2 NSTEMI).  # Hypertension (benign essential).  # CAD s/p CABG (1994), stenting (2002, 2003, 2005, 2021)  # Chronic HFrEF (systolic and diastolic), s/p ICD.  # HLD.  [PTA: carvedilol 12.5 mg BID; sacubitril-valsartan 49-51 mg BID; furosemide 20 mg daily.]  * Last cath 1/2021 at [facility] showed patent CLAUDIA-RCA, LIMA occluded, vein-diagonal patent with placement of stent,  LAD, 40% circumflex, RCA occluded. Last stress testing 7/2023 negative for ischemia. Echo 7/2023 showed LVEF 30-35%, global hypokinesis LV, moderate-severe cLVH, grade 1 diastolic dysfunction; RV systolic function mildly reduced.   * Initial presentation as above. BNP in ED at 2613, troponin 70. EKG w/ nonspecific t-w changes.   * PTA sacubitril-valsartan and furosemide held on admit given FATIMAH.  * Started on IV furosemide in ICU 12/10, held 12/11.  - Hold ASA, clopidogrel, carvedilol for  "now.  - Continue hold sacubitril-valsartan and furosemide.  - Resume atorvastatin once taking more PO.  - Continue to monitor i/o's, daily wts.    Anemia, suspect blood loss component.  * Hgb normal on initially evaluation.  * Underwent surgery 12/8 as above.  * Hgb down to 11.9 on 12/9.  Recent Labs   Lab 12/13/23  0532 12/11/23  0526 12/10/23  0157 12/09/23  0445 12/08/23  1807 12/08/23  0047   HGB 13.3 13.2* 13.2* 11.9* 12.7* 15.7   - Continue to monitor CBC - repeat in am.  - Consider prbc transfusion if hgb </= 7.0 or if significant bleeding with hemodynamic instability or if symptomatic.    Thrombocytopenia, unclear etiology, possibly due to sepsis, medication effect or consumptive or other cause.  * Plts normal on initially evaluation.  * Plts subsequently down to 104K on 12/9.  Recent Labs   Lab 12/13/23  0532 12/11/23  0526 12/10/23  0157 12/09/23  0445 12/08/23  1807 12/08/23  0047   * 122* 125* 104* 130* 183   - Continue to monitor CBC - repeat in am.  - Consider platelet transfusion if needs a procedure and less than 50,000; or if less than 10,000.    Hx CVA.  - Hold ASA, clopidogrelfor now.  - Continue to hold atorvastatin.     Depression/anxiety.  - Hold escitalopram 20 mg daily and gabapentin for now.    Hypothyroidism.  - Continue levothyroxine.    GERD.  - Continue pantoprazole IV.    BPH.  - Hold finasteride for now.    Migraines.  * Gets botox.   - Continue to monitor clinically.    Clinically Significant Risk Factors              # Hypoalbuminemia: Lowest albumin = 2.8 g/dL at 12/11/2023  5:26 AM, will monitor as appropriate     # Hypertension: Noted on problem list        # Overweight: Estimated body mass index is 29.07 kg/m  as calculated from the following:    Height as of 12/7/23: 1.702 m (5' 7\").    Weight as of this encounter: 84.2 kg (185 lb 10 oz).               COVID-19 testing.  COVID-19 PCR Results           No data to display              COVID-19 Antibody Results, Testing for " Immunity           No data to display                Diet: NPO for Medical/Clinical Reasons Except for: No Exceptions    Prophylaxis: PCD's, ambulation.   Hameed Catheter: Not present  Lines: None     Code Status: Full Code    Disposition Plan   Expected discharge: 1-2d recommended to  home vs TCU  pending  above .  Entered: Tu Bhakta MD 12/13/2023, 7:35 AM         Interval History   Improved yesterday with +flatus and BM's.  Diet was advanced to clears.  However, during the night, developed recurrent N/V and NG was placed.    -Data reviewed today: I reviewed all new labs and imaging over the last 24 hours. I personally reviewed no images or EKG's today.    Physical Exam    , Blood pressure 133/77, pulse 79, temperature 97.8  F (36.6  C), temperature source Axillary, resp. rate 26, weight 84.2 kg (185 lb 10 oz), SpO2 97%. O2 Device: Nasal cannula Oxygen Delivery: 3 LPM  Vitals:    12/10/23 0400 12/11/23 0400 12/12/23 0000   Weight: 86 kg (189 lb 9.5 oz) 84.6 kg (186 lb 8.2 oz) 84.2 kg (185 lb 10 oz)     Vital Signs with Ranges  Temp:  [97.5  F (36.4  C)-99.2  F (37.3  C)] 97.8  F (36.6  C)  Pulse:  [] 79  Resp:  [11-26] 26  BP: (119-155)/(70-94) 133/77  SpO2:  [90 %-97 %] 97 %  Patient Vitals for the past 24 hrs:   BP Temp Temp src Pulse Resp SpO2   12/13/23 0733 -- 97.8  F (36.6  C) Axillary -- -- --   12/13/23 0400 133/77 -- -- 79 26 97 %   12/13/23 0200 119/73 -- -- 78 14 96 %   12/13/23 0056 120/70 99.2  F (37.3  C) -- 81 15 96 %   12/13/23 0000 119/72 -- -- 84 21 94 %   12/12/23 2300 -- -- -- 85 22 90 %   12/12/23 2200 (!) 155/94 -- -- 81 19 92 %   12/12/23 2100 -- -- -- 82 15 95 %   12/12/23 2000 (!) 144/83 98.1  F (36.7  C) Axillary 81 19 92 %   12/12/23 1900 -- -- -- 89 11 94 %   12/12/23 1855 -- -- -- 84 22 94 %   12/12/23 1800 (!) 147/80 -- -- 80 21 94 %   12/12/23 1600 (!) 143/83 97.8  F (36.6  C) Oral 78 20 92 %   12/12/23 1500 -- -- -- 78 22 94 %   12/12/23 1400 135/86 -- -- 75 16 92 %    12/12/23 1300 -- -- -- 80 19 92 %   12/12/23 1200 138/87 98.4  F (36.9  C) Axillary 81 16 93 %   12/12/23 1100 121/75 -- -- 84 15 93 %   12/12/23 1000 128/78 -- -- 84 17 94 %   12/12/23 0900 -- -- -- 82 20 93 %   12/12/23 0809 (!) 143/83 -- -- 109 -- --   12/12/23 0800 (!) 143/83 97.5  F (36.4  C) Oral 86 15 93 %     I/O's Last 24 hours  I/O last 3 completed shifts:  In: 1350 [P.O.:920; I.V.:430]  Out: 2290 [Urine:900; Emesis/NG output:860; Drains:530]    Constitutional: Sleeping soundly.  Respiratory: Diminished in bases. No crackles or wheezes.  Cardiovascular: RRR, no m/r/g.  GI: Distended, but softer, hypoactive.  Skin/Integumen: No bilateral lower extremity edema.  Other:        Data    Labs reviewed.  Recent Labs   Lab 12/13/23  0532 12/13/23  0034 12/12/23  1238 12/12/23  0619 12/11/23  1914 12/11/23  1240 12/11/23  0526 12/10/23  0810 12/10/23  0157 12/09/23  0451 12/09/23  0445 12/07/23  1816 12/07/23  1531   WBC 13.0*  --   --   --   --   --  14.6*  --  14.0*  --  10.0   < >  --    HGB 13.3  --   --   --   --   --  13.2*  --  13.2*  --  11.9*   < >  --    *  --   --   --   --   --  99  --  100  --  99   < >  --    *  --   --   --   --   --  122*  --  125*  --  104*   < >  --      --   --   --   --   --  140  --  138  --  136   < > 134*  133*   POTASSIUM 3.5  --   --   --   --   --  4.2  --  4.6  --  4.7   < > 5.6*  5.5*   CHLORIDE 109*  --   --   --   --   --  109*  --  106  --  105   < > 100  100   CO2 25  --   --   --   --   --  19*  --  17*  --  20*   < > 14*  20*   BUN 34.5*  --   --   --   --   --  44.2*  --  43.5*  --  42.4*   < > 23.0  22.9   CR 1.41*  --   --   --  1.66*  --  1.75*  --  2.01*  --  2.14*   < > 1.39*  1.37*   ANIONGAP 10  --   --   --   --   --  12  --  15  --  11   < > 20*  13   RHODA 8.9  --   --   --   --   --  9.3  --  9.3  --  8.4*   < > 9.8  9.8   * 190* 144*   < >  --    < > 146*   < > 141*   < > 139*   < > 172*  172*   ALBUMIN  --   --   --  "  --   --   --  2.8*  --  3.0*  --  2.9*  --  4.2  4.2   PROTTOTAL  --   --   --   --   --   --   --   --   --   --  5.0*  --  7.1  7.1   BILITOTAL  --   --   --   --   --   --   --   --   --   --  1.0  --  1.4*  1.4*   ALKPHOS  --   --   --   --   --   --   --   --   --   --  54  --  89  89   ALT  --   --   --   --   --   --   --   --   --   --  14  --  29  29   AST  --   --   --   --   --   --   --   --   --   --  29  --  38  38   LIPASE  --   --   --   --   --   --   --   --   --   --   --   --  20    < > = values in this interval not displayed.     Recent Labs   Lab Test 12/10/23  0733 12/10/23  0157 12/08/23  0448 12/08/23  0047 12/08/23  0047 12/07/23 2228 01/10/21  1020   NT-PROBNP, INPATIENT  --   --   --   --  3,859* 2,613* 394   TROPONIN T HIGH SENSITIVITY 137* 131* 61*   < > 70*  --   --     < > = values in this interval not displayed.     Recent Labs   Lab 12/13/23  0532 12/13/23  0034 12/12/23  1238 12/12/23  0619 12/11/23  1701 12/11/23  1240   * 190* 144* 148* 145* 132*      No lab results found.     No results for input(s): \"INR\", \"[id number]\" in the last 168 hours.  Recent Labs   Lab 12/13/23  0532 12/11/23  0526 12/10/23  0745 12/10/23  0157 12/09/23  0445 12/08/23  1807 12/08/23  0822 12/08/23  0544 12/08/23  0448 12/08/23  0053 12/08/23  0047   WBC 13.0* 14.6*  --  14.0* 10.0 16.0*  --   --   --   --  12.8*   LACT  --   --  1.3  --  1.3 2.5*  2.6* 2.9* 3.0* 3.2*   < >  --     < > = values in this interval not displayed.       MICRO:  CULTURES (INCLUDING BLOOD AND URINE):  No lab results found in last 7 days.    No results found for this or any previous visit (from the past 24 hour(s)).    Medications   All medications were reviewed.    Infusions:      Scheduled Medications:   aspirin  81 mg Oral Daily    carvedilol  12.5 mg Oral BID    clopidogrel  75 mg Oral Daily    enoxaparin ANTICOAGULANT  40 mg Subcutaneous Q24H    escitalopram  20 mg Oral Daily    finasteride  5 mg Oral At " Bedtime    gabapentin  100 mg Oral Q24H    gabapentin  200 mg Oral QAM    gabapentin  300 mg Oral At Bedtime    latanoprost  1 drop Both Eyes At Bedtime    levothyroxine  100 mcg Oral Daily    pantoprazole  40 mg Intravenous Q24H    piperacillin-tazobactam  3.375 g Intravenous Q6H    senna-docusate  1 tablet Oral BID    sodium chloride (PF)  3 mL Intracatheter Q8H     PRN Medications:  bisacodyl, calcium carbonate, HYDROmorphone, HYDROmorphone, lidocaine 4%, lidocaine (buffered or not buffered), naloxone **OR** naloxone **OR** naloxone **OR** naloxone, nitroGLYcerin, ondansetron **OR** ondansetron, prochlorperazine **OR** prochlorperazine **OR** prochlorperazine, sodium chloride (PF)

## 2023-12-13 NOTE — PROVIDER NOTIFICATION
Page to Dr. Ortiz at 1935:    [facility] , C.W, pt in with appendicitis/ileus, has tx orders to [facility], at 1900 pt had one small emesis, from previous notes consider putting NG to LIS in, do you want this done or continue to monitor? rhianna Vizcarra [phone]    Repage at 2015:    [facility] , C.W, just realized pt had two moderate sized BMs today and has active bowel sounds, not c/o nausea anymore- do you still want NG/ Thanks Carmita MAYO [phone]    Response- no NG necessary right now per MD

## 2023-12-13 NOTE — PROGRESS NOTES
General Surgery Progress Note    Admission Date: 12/8/2023  Today's Date: 12/13/2023         Assessment:      Garry Mckay is a 88 year old male   S/P Lap Appy for Perforated Appendicitis, and vascular assessment of questionable area on cecum through ICG administration  POD #5     Diagnosis: Perforated Appendicitis, Post Op Ileus         Plan:   Noted events of last night.  NGT placed.  Keep to LIS.  Ok for sips/chips and meds.  Clamp for 1 hour after meds given.    Continue bowel regimen  Monitor ESTEFANY drain.  Looks serous to me but some question if bile present.  If clearly bile, or worsening abdominal exam, would need CT to assess abdomen         Interval History:   Feels hungry and may have to have a BM.  Pain is tolerable.  Nausea better with NGT.  Having dreams of food.  No other concerns.          Physical Exam:   BP (!) 151/92   Pulse 81   Temp 97.8  F (36.6  C) (Axillary)   Resp 20   Wt 84.2 kg (185 lb 10 oz)   SpO2 97%   BMI 29.07 kg/m    I/O last 3 completed shifts:  In: 1350 [P.O.:920; I.V.:430]  Out: 2290 [Urine:900; Emesis/NG output:860; Drains:530]  General: NAD, pleasant, alert and oriented x3  Abdomen: round, tender to central abdomen and this radiates to RUQ, distended but little less than yesterday.  Incision: clean, dry, and intact  ESTEFANY: appears serous to me.  Leaking around drain some too.    LABS:  Results for orders placed or performed during the hospital encounter of 12/08/23 (from the past 24 hour(s))   Glucose by meter   Result Value Ref Range    GLUCOSE BY METER POCT 144 (H) 70 - 99 mg/dL   Glucose by meter   Result Value Ref Range    GLUCOSE BY METER POCT 190 (H) 70 - 99 mg/dL   CBC with Platelets & Differential    Narrative    The following orders were created for panel order CBC with Platelets & Differential.  Procedure                               Abnormality         Status                     ---------                               -----------         ------                      CBC with platelets and d...[[id number]]  Abnormal            Final result               Manual Differential[[id number]]          Abnormal            Final result                 Please view results for these tests on the individual orders.   Basic metabolic panel   Result Value Ref Range    Sodium 144 135 - 145 mmol/L    Potassium 3.5 3.4 - 5.3 mmol/L    Chloride 109 (H) 98 - 107 mmol/L    Carbon Dioxide (CO2) 25 22 - 29 mmol/L    Anion Gap 10 7 - 15 mmol/L    Urea Nitrogen 34.5 (H) 8.0 - 23.0 mg/dL    Creatinine 1.41 (H) 0.67 - 1.17 mg/dL    GFR Estimate 49 (L) >60 mL/min/1.73m2    Calcium 8.9 8.8 - 10.2 mg/dL    Glucose 154 (H) 70 - 99 mg/dL   CBC with platelets and differential   Result Value Ref Range    WBC Count 13.0 (H) 4.0 - 11.0 10e3/uL    RBC Count 4.10 (L) 4.40 - 5.90 10e6/uL    Hemoglobin 13.3 13.3 - 17.7 g/dL    Hematocrit 41.5 40.0 - 53.0 %     (H) 78 - 100 fL    MCH 32.4 26.5 - 33.0 pg    MCHC 32.0 31.5 - 36.5 g/dL    RDW 14.2 10.0 - 15.0 %    Platelet Count 133 (L) 150 - 450 10e3/uL    % Neutrophils      % Lymphocytes      % Monocytes      % Eosinophils      % Basophils      % Immature Granulocytes      NRBCs per 100 WBC 0 <1 /100    Absolute Neutrophils      Absolute Lymphocytes      Absolute Monocytes      Absolute Eosinophils      Absolute Basophils      Absolute Immature Granulocytes      Absolute NRBCs 0.0 10e3/uL   Manual Differential   Result Value Ref Range    % Neutrophils 73 %    % Lymphocytes 11 %    % Monocytes 9 %    % Eosinophils 3 %    % Basophils 0 %    % Metamyelocytes 3 %    % Myelocytes 1 %    Absolute Neutrophils 9.5 (H) 1.6 - 8.3 10e3/uL    Absolute Lymphocytes 1.4 0.8 - 5.3 10e3/uL    Absolute Monocytes 1.2 0.0 - 1.3 10e3/uL    Absolute Eosinophils 0.4 0.0 - 0.7 10e3/uL    Absolute Basophils 0.0 0.0 - 0.2 10e3/uL    Absolute Metamyelocytes 0.4 (H) <=0.0 10e3/uL    Absolute Myelocytes 0.1 (H) <=0.0 10e3/uL    RBC Morphology Confirmed RBC Indices     Platelet Assessment   Automated Count Confirmed. Platelet morphology is normal.     Automated Count Confirmed. Platelet morphology is normal.    Smudge Cells Present (A) None Seen       Garret Nieto PA-C  Pager: [phone]  [facility]: [phone]

## 2023-12-13 NOTE — PLAN OF CARE
Goal Outcome Evaluation:      Plan of Care Reviewed With: patient, child    Overall Patient Progress: no changeOverall Patient Progress: no change    Outcome Evaluation: Pt transferred to [facility] around 0045. A/O x4, intermittent confusion. Lethargic but follows commands. NG placed for multiple episodes of vomiting and abdominal distension, pt was made NPO, antiemetics given. Bowel sounds active, passing flatus, no BM from 2074-4704. External urinary catheter in place for incontinence with adequate output. ESTEFANY to RLQ with stable serosang output, dressing changed. RIJ CVC removed. Updated daughter on plan of care and patients transfer.    Problem: Adult Inpatient Plan of Care  Goal: Absence of Hospital-Acquired Illness or Injury  Intervention: Prevent Skin Injury  Recent Flowsheet Documentation  Taken 12/13/2023 0000 by Sowmya Smith RN  Body Position:   turned   right   heels elevated  Skin Protection:   adhesive use limited   incontinence pads utilized   pulse oximeter probe site changed   silicone foam dressing in place   skin to device areas padded   skin to skin areas padded  Device Skin Pressure Protection:   adhesive use limited   absorbent pad utilized/changed   positioning supports utilized   pressure points protected   skin-to-device areas padded   skin-to-skin areas padded   tubing/devices free from skin contact  Taken 12/12/2023 2200 by Sowmya Smith RN  Body Position:   turned   left   heels elevated  Taken 12/12/2023 2000 by Sowmya Smith RN  Body Position:   turned   right   heels elevated  Skin Protection:   adhesive use limited   incontinence pads utilized   pulse oximeter probe site changed   silicone foam dressing in place   skin to device areas padded   skin to skin areas padded  Device Skin Pressure Protection:   adhesive use limited   absorbent pad utilized/changed   positioning supports utilized   pressure points protected   skin-to-device areas padded   skin-to-skin areas padded    tubing/devices free from skin contact     Problem: Pain Acute  Goal: Optimal Pain Control and Function  Intervention: Optimize Psychosocial Wellbeing  Recent Flowsheet Documentation  Taken 12/13/2023 0000 by Sowmya Smith RN  Supportive Measures:   active listening utilized   self-care encouraged   verbalization of feelings encouraged  Taken 12/12/2023 2000 by Sowmya Smith RN  Supportive Measures:   active listening utilized   self-care encouraged   verbalization of feelings encouraged

## 2023-12-13 NOTE — PLAN OF CARE
"  Problem: Adult Inpatient Plan of Care  Goal: Plan of Care Review  Description: The Plan of Care Review/Shift note should be completed every shift.  The Outcome Evaluation is a brief statement about your assessment that the patient is improving, declining, or no change.  This information will be displayed automatically on your shift  note.  Outcome: Progressing  Flowsheets (Taken 12/12/2023 2015)  Outcome Evaluation: Patient had two BM, diet advanced. Vitals stable. Pain well controlled with PRN meds. Adequate for transfer out of ICU.  Plan of Care Reviewed With: child  Overall Patient Progress: improving  Goal: Patient-Specific Goal (Individualized)  Description: You can add care plan individualizations to a care plan. Examples of Individualization might be:  \"Parent requests to be called daily at 9am for status\", \"I have a hard time hearing out of my right ear\", or \"Do not touch me to wake me up as it startles  me\".  Outcome: Progressing  Goal: Absence of Hospital-Acquired Illness or Injury  Outcome: Progressing  Intervention: Identify and Manage Fall Risk  Recent Flowsheet Documentation  Taken 12/12/2023 1600 by Tu Berry, RN  Safety Promotion/Fall Prevention:   activity supervised   patient and family education   clutter free environment maintained   room door open   room near nurse's station   room organization consistent   safety round/check completed   treat reversible contributory factors   treat underlying cause  Taken 12/12/2023 1200 by Tu Berry, RN  Safety Promotion/Fall Prevention:   activity supervised   patient and family education   clutter free environment maintained   room door open   room near nurse's station   room organization consistent   safety round/check completed   treat reversible contributory factors   treat underlying cause  Taken 12/12/2023 0800 by Tu Berry, RN  Safety Promotion/Fall Prevention:   activity supervised   patient and family education   clutter free " environment maintained   room door open   room near nurse's station   room organization consistent   safety round/check completed   treat reversible contributory factors   treat underlying cause  Intervention: Prevent Skin Injury  Recent Flowsheet Documentation  Taken 12/12/2023 1800 by Tu Berry, RN  Body Position:   turned   left   side-lying   upper extremity elevated   lower extremity elevated   heels elevated  Taken 12/12/2023 1600 by Tu Berry, RN  Body Position:   turned   left   side-lying   upper extremity elevated   lower extremity elevated   heels elevated  Skin Protection:   adhesive use limited   incontinence pads utilized   pulse oximeter probe site changed   silicone foam dressing in place   skin to device areas padded   skin to skin areas padded  Device Skin Pressure Protection:   adhesive use limited   absorbent pad utilized/changed   positioning supports utilized   pressure points protected   skin-to-device areas padded   skin-to-skin areas padded   tubing/devices free from skin contact  Taken 12/12/2023 1400 by Tu Berry, RN  Body Position:   turned   left   side-lying   upper extremity elevated   lower extremity elevated   heels elevated  Taken 12/12/2023 1200 by Tu Berry, RN  Body Position:   turned   side-lying   upper extremity elevated   lower extremity elevated   heels elevated  Skin Protection:   adhesive use limited   incontinence pads utilized   pulse oximeter probe site changed   silicone foam dressing in place   skin to device areas padded   skin to skin areas padded  Device Skin Pressure Protection:   adhesive use limited   absorbent pad utilized/changed   positioning supports utilized   pressure points protected   skin-to-device areas padded   skin-to-skin areas padded   tubing/devices free from skin contact  Taken 12/12/2023 1000 by Tu Berry, RN  Body Position:   turned   weight shifting   upper extremity elevated  Taken 12/12/2023 0800 by Jamal  Tu BAPTISTE, RN  Body Position:   turned   left   side-lying   upper extremity elevated   lower extremity elevated   heels elevated  Skin Protection:   adhesive use limited   incontinence pads utilized   pulse oximeter probe site changed   silicone foam dressing in place   skin to device areas padded   skin to skin areas padded  Device Skin Pressure Protection:   adhesive use limited   absorbent pad utilized/changed   positioning supports utilized   pressure points protected   skin-to-device areas padded   skin-to-skin areas padded   tubing/devices free from skin contact  Intervention: Prevent and Manage VTE (Venous Thromboembolism) Risk  Recent Flowsheet Documentation  Taken 12/12/2023 1600 by Tu Berry, RN  VTE Prevention/Management: SCDs (sequential compression devices) on  Taken 12/12/2023 1200 by Tu Berry, RN  VTE Prevention/Management: SCDs (sequential compression devices) on  Taken 12/12/2023 0800 by Tu Berry, RN  VTE Prevention/Management: SCDs (sequential compression devices) on  Intervention: Prevent Infection  Recent Flowsheet Documentation  Taken 12/12/2023 1600 by Tu Berry, RN  Infection Prevention:   rest/sleep promoted   hand hygiene promoted   environmental surveillance performed   single patient room provided   equipment surfaces disinfected   personal protective equipment utilized  Taken 12/12/2023 1200 by Tu Berry, RN  Infection Prevention:   rest/sleep promoted   hand hygiene promoted   environmental surveillance performed   single patient room provided   equipment surfaces disinfected   personal protective equipment utilized  Taken 12/12/2023 0800 by Tu Berry, RN  Infection Prevention:   rest/sleep promoted   hand hygiene promoted   environmental surveillance performed   single patient room provided   equipment surfaces disinfected   personal protective equipment utilized  Goal: Optimal Comfort and Wellbeing  Outcome: Progressing  Intervention: Monitor Pain  and Promote Comfort  Recent Flowsheet Documentation  Taken 12/12/2023 1600 by Tu Berry, RN  Pain Management Interventions:   medication (see MAR)   rest   repositioned   relaxation techniques promoted   quiet environment facilitated   pillow support provided   care clustered  Taken 12/12/2023 1200 by Tu Berry, RN  Pain Management Interventions:   medication (see MAR)   rest   repositioned   relaxation techniques promoted   quiet environment facilitated   pillow support provided   care clustered  Taken 12/12/2023 0800 by Tu Berry, RN  Pain Management Interventions:   medication (see MAR)   rest   repositioned   relaxation techniques promoted   quiet environment facilitated   pillow support provided   care clustered  Intervention: Provide Person-Centered Care  Recent Flowsheet Documentation  Taken 12/12/2023 1600 by Tu Berry, RN  Trust Relationship/Rapport:   care explained   choices provided   emotional support provided   questions encouraged   questions answered   empathic listening provided   reassurance provided   thoughts/feelings acknowledged  Taken 12/12/2023 1200 by Tu Berry, RN  Trust Relationship/Rapport:   care explained   choices provided   emotional support provided   questions encouraged   questions answered   empathic listening provided   reassurance provided   thoughts/feelings acknowledged  Taken 12/12/2023 0800 by Tu Berry, GAEL  Trust Relationship/Rapport:   care explained   choices provided   emotional support provided   questions encouraged   questions answered   empathic listening provided   reassurance provided   thoughts/feelings acknowledged  Goal: Readiness for Transition of Care  Outcome: Progressing   Goal Outcome Evaluation:      Plan of Care Reviewed With: child    Overall Patient Progress: improvingOverall Patient Progress: improving    Outcome Evaluation: Patient had two BM, diet advanced. Vitals stable. Pain well controlled with PRN meds.  Adequate for transfer out of ICU.

## 2023-12-13 NOTE — PROVIDER NOTIFICATION
2 skin tears on R forearm. MD notified. Received order to defer to the AM provider. Left sticky note for the team.

## 2023-12-14 ENCOUNTER — APPOINTMENT (OUTPATIENT)
Dept: CT IMAGING | Facility: CLINIC | Age: 88
DRG: 853 | End: 2023-12-14
Attending: HOSPITALIST
Payer: MEDICARE

## 2023-12-14 ENCOUNTER — APPOINTMENT (OUTPATIENT)
Dept: OCCUPATIONAL THERAPY | Facility: CLINIC | Age: 88
DRG: 853 | End: 2023-12-14
Attending: HOSPITALIST
Payer: MEDICARE

## 2023-12-14 LAB
ANION GAP SERPL CALCULATED.3IONS-SCNC: 8 MMOL/L (ref 7–15)
BASOPHILS # BLD AUTO: ABNORMAL 10*3/UL
BASOPHILS # BLD MANUAL: 0.2 10E3/UL (ref 0–0.2)
BASOPHILS NFR BLD AUTO: ABNORMAL %
BASOPHILS NFR BLD MANUAL: 1 %
BUN SERPL-MCNC: 24.9 MG/DL (ref 8–23)
CALCIUM SERPL-MCNC: 8.4 MG/DL (ref 8.8–10.2)
CHLORIDE SERPL-SCNC: 111 MMOL/L (ref 98–107)
CREAT SERPL-MCNC: 1.19 MG/DL (ref 0.67–1.17)
DEPRECATED HCO3 PLAS-SCNC: 22 MMOL/L (ref 22–29)
EGFRCR SERPLBLD CKD-EPI 2021: 59 ML/MIN/1.73M2
EOSINOPHIL # BLD AUTO: ABNORMAL 10*3/UL
EOSINOPHIL # BLD MANUAL: 0.8 10E3/UL (ref 0–0.7)
EOSINOPHIL NFR BLD AUTO: ABNORMAL %
EOSINOPHIL NFR BLD MANUAL: 5 %
ERYTHROCYTE [DISTWIDTH] IN BLOOD BY AUTOMATED COUNT: 14 % (ref 10–15)
GLUCOSE BLDC GLUCOMTR-MCNC: 162 MG/DL (ref 70–99)
GLUCOSE SERPL-MCNC: 182 MG/DL (ref 70–99)
HCT VFR BLD AUTO: 41.8 % (ref 40–53)
HGB BLD-MCNC: 13.5 G/DL (ref 13.3–17.7)
IMM GRANULOCYTES # BLD: ABNORMAL 10*3/UL
IMM GRANULOCYTES NFR BLD: ABNORMAL %
LYMPHOCYTES # BLD AUTO: ABNORMAL 10*3/UL
LYMPHOCYTES # BLD MANUAL: 2.1 10E3/UL (ref 0.8–5.3)
LYMPHOCYTES NFR BLD AUTO: ABNORMAL %
LYMPHOCYTES NFR BLD MANUAL: 13 %
MCH RBC QN AUTO: 32.1 PG (ref 26.5–33)
MCHC RBC AUTO-ENTMCNC: 32.3 G/DL (ref 31.5–36.5)
MCV RBC AUTO: 100 FL (ref 78–100)
MONOCYTES # BLD AUTO: ABNORMAL 10*3/UL
MONOCYTES # BLD MANUAL: 0.6 10E3/UL (ref 0–1.3)
MONOCYTES NFR BLD AUTO: ABNORMAL %
MONOCYTES NFR BLD MANUAL: 4 %
NEUTROPHILS # BLD AUTO: ABNORMAL 10*3/UL
NEUTROPHILS # BLD MANUAL: 12.2 10E3/UL (ref 1.6–8.3)
NEUTROPHILS NFR BLD AUTO: ABNORMAL %
NEUTROPHILS NFR BLD MANUAL: 77 %
NRBC # BLD AUTO: 0 10E3/UL
NRBC BLD AUTO-RTO: 0 /100
PLAT MORPH BLD: ABNORMAL
PLATELET # BLD AUTO: 152 10E3/UL (ref 150–450)
POTASSIUM SERPL-SCNC: 3.3 MMOL/L (ref 3.4–5.3)
RBC # BLD AUTO: 4.2 10E6/UL (ref 4.4–5.9)
RBC MORPH BLD: ABNORMAL
SODIUM SERPL-SCNC: 141 MMOL/L (ref 135–145)
WBC # BLD AUTO: 15.8 10E3/UL (ref 4–11)

## 2023-12-14 PROCEDURE — 80048 BASIC METABOLIC PNL TOTAL CA: CPT | Performed by: INTERNAL MEDICINE

## 2023-12-14 PROCEDURE — 250N000011 HC RX IP 250 OP 636: Mod: JZ | Performed by: INTERNAL MEDICINE

## 2023-12-14 PROCEDURE — 250N000011 HC RX IP 250 OP 636: Performed by: INTERNAL MEDICINE

## 2023-12-14 PROCEDURE — C9113 INJ PANTOPRAZOLE SODIUM, VIA: HCPCS | Performed by: INTERNAL MEDICINE

## 2023-12-14 PROCEDURE — 99233 SBSQ HOSP IP/OBS HIGH 50: CPT | Performed by: INTERNAL MEDICINE

## 2023-12-14 PROCEDURE — 250N000009 HC RX 250: Performed by: INTERNAL MEDICINE

## 2023-12-14 PROCEDURE — G1010 CDSM STANSON: HCPCS

## 2023-12-14 PROCEDURE — 74177 CT ABD & PELVIS W/CONTRAST: CPT | Mod: MG

## 2023-12-14 PROCEDURE — 85027 COMPLETE CBC AUTOMATED: CPT | Performed by: INTERNAL MEDICINE

## 2023-12-14 PROCEDURE — G0463 HOSPITAL OUTPT CLINIC VISIT: HCPCS

## 2023-12-14 PROCEDURE — 97535 SELF CARE MNGMENT TRAINING: CPT | Mod: GO

## 2023-12-14 PROCEDURE — 36415 COLL VENOUS BLD VENIPUNCTURE: CPT | Performed by: INTERNAL MEDICINE

## 2023-12-14 PROCEDURE — 99207 PR NO BILLABLE SERVICE THIS VISIT: CPT | Performed by: NURSE PRACTITIONER

## 2023-12-14 PROCEDURE — 120N000001 HC R&B MED SURG/OB

## 2023-12-14 PROCEDURE — 85007 BL SMEAR W/DIFF WBC COUNT: CPT | Performed by: INTERNAL MEDICINE

## 2023-12-14 RX ORDER — PIPERACILLIN SODIUM, TAZOBACTAM SODIUM 4; .5 G/20ML; G/20ML
4.5 INJECTION, POWDER, LYOPHILIZED, FOR SOLUTION INTRAVENOUS EVERY 6 HOURS
Status: DISCONTINUED | OUTPATIENT
Start: 2023-12-14 | End: 2023-12-21

## 2023-12-14 RX ORDER — IOPAMIDOL 755 MG/ML
89 INJECTION, SOLUTION INTRAVASCULAR ONCE
Status: COMPLETED | OUTPATIENT
Start: 2023-12-14 | End: 2023-12-14

## 2023-12-14 RX ADMIN — NALOXONE HYDROCHLORIDE 0.2 MG: 0.4 INJECTION, SOLUTION INTRAMUSCULAR; INTRAVENOUS; SUBCUTANEOUS at 23:14

## 2023-12-14 RX ADMIN — LATANOPROST 1 DROP: 50 SOLUTION/ DROPS OPHTHALMIC at 20:26

## 2023-12-14 RX ADMIN — LEVOTHYROXINE SODIUM 75 MCG: 20 INJECTION, SOLUTION INTRAVENOUS at 13:47

## 2023-12-14 RX ADMIN — PIPERACILLIN AND TAZOBACTAM 4.5 G: 4; .5 INJECTION, POWDER, FOR SOLUTION INTRAVENOUS at 22:09

## 2023-12-14 RX ADMIN — HYDROMORPHONE HYDROCHLORIDE 0.2 MG: 0.2 INJECTION, SOLUTION INTRAMUSCULAR; INTRAVENOUS; SUBCUTANEOUS at 20:22

## 2023-12-14 RX ADMIN — PIPERACILLIN AND TAZOBACTAM 4.5 G: 4; .5 INJECTION, POWDER, FOR SOLUTION INTRAVENOUS at 09:36

## 2023-12-14 RX ADMIN — PANTOPRAZOLE SODIUM 40 MG: 40 INJECTION, POWDER, FOR SOLUTION INTRAVENOUS at 08:09

## 2023-12-14 RX ADMIN — HYDROMORPHONE HYDROCHLORIDE 0.2 MG: 0.2 INJECTION, SOLUTION INTRAMUSCULAR; INTRAVENOUS; SUBCUTANEOUS at 04:51

## 2023-12-14 RX ADMIN — IOPAMIDOL 89 ML: 755 INJECTION, SOLUTION INTRAVENOUS at 13:23

## 2023-12-14 RX ADMIN — PIPERACILLIN AND TAZOBACTAM 4.5 G: 4; .5 INJECTION, POWDER, FOR SOLUTION INTRAVENOUS at 16:00

## 2023-12-14 RX ADMIN — HYDROMORPHONE HYDROCHLORIDE 0.2 MG: 0.2 INJECTION, SOLUTION INTRAMUSCULAR; INTRAVENOUS; SUBCUTANEOUS at 22:45

## 2023-12-14 RX ADMIN — HYDROMORPHONE HYDROCHLORIDE 0.2 MG: 0.2 INJECTION, SOLUTION INTRAMUSCULAR; INTRAVENOUS; SUBCUTANEOUS at 15:03

## 2023-12-14 RX ADMIN — SODIUM CHLORIDE 65 ML: 9 INJECTION, SOLUTION INTRAVENOUS at 13:25

## 2023-12-14 RX ADMIN — DEXTROSE AND SODIUM CHLORIDE: 5; 450 INJECTION, SOLUTION INTRAVENOUS at 13:47

## 2023-12-14 RX ADMIN — ENOXAPARIN SODIUM 40 MG: 40 INJECTION SUBCUTANEOUS at 13:47

## 2023-12-14 RX ADMIN — PIPERACILLIN AND TAZOBACTAM 3.38 G: 3; .375 INJECTION, POWDER, FOR SOLUTION INTRAVENOUS at 04:07

## 2023-12-14 ASSESSMENT — ACTIVITIES OF DAILY LIVING (ADL)
ADLS_ACUITY_SCORE: 41
ADLS_ACUITY_SCORE: 41
ADLS_ACUITY_SCORE: 40
ADLS_ACUITY_SCORE: 40
ADLS_ACUITY_SCORE: 41
ADLS_ACUITY_SCORE: 40
ADLS_ACUITY_SCORE: 41
ADLS_ACUITY_SCORE: 40
ADLS_ACUITY_SCORE: 40
ADLS_ACUITY_SCORE: 41

## 2023-12-14 NOTE — PROGRESS NOTES
A&O x4, VSS. Pain controlled with IV dilaudid x1. Tele: SR w/ BBB and frequent PACs/PVCs. D5 1/2 NS infusing at 75 mL/hr. Zosyn infused per orders. NPO maintained, NG to low intermittent suction with brown/bile output. LS: dim. BS: audible. +void, +bm. Up A-1-2 GB/W.

## 2023-12-14 NOTE — PROGRESS NOTES
General surgery note:    CT reviewed. No evidence of bowel ischemia. New pelvic fluid collections. NGT back to LIS. Will consult IR tomorrow to evaluate if the right pelvic fluid collection is amenable to drainage.     Maylin Waters MD   PGY4 General Surgery Resident  [facility]

## 2023-12-14 NOTE — PROGRESS NOTES
CLINICAL NUTRITION SERVICES  -  ASSESSMENT NOTE      RECOMMENDATIONS FOR MD/PROVIDER TO ORDER:   Recommend consideration of TPN if unable to start advancing diet w/in 48-72 hours     Recommendations Ordered by Registered Dietitian (RD):   RD introduced self to pt, discussed role in care. Visited w/ pt and his family this AM-- discussed diet, diet advancement per NPO. Pt interested in ONS when diet allows.      Malnutrition:   % Weight Loss:  None noted  % Intake:  </= 50% for >/= 5 days (severe malnutrition)  Subcutaneous Fat Loss:  None observed  Muscle Loss:  Patellar region mild depletion and Posterior calf region mild depletion-- may be partially age-related  Fluid Retention:  None noted    Malnutrition Diagnosis: Patient does not meet two of the established criteria necessary for diagnosing malnutrition but is at risk for malnutrition         REASON FOR ASSESSMENT  Garry Mckay is a 88 year old male seen by Registered Dietitian for:   LOS  NPO/Clear Liquids.      NUTRITION HISTORY  Information obtained from chart review and d/w pt and his family    PMH of HTN, HLD, Chronic HFrEF, Ischemic CM s/p ICD, CAD s/p CABG (1994), s/p stenting (2002, 2003, 2005, 2021), CKD stage 3, MDD, ANSON, migraines, hypothyroidism, BPH, CARINA, GERD, glaucoma, Hx of CVA (2017)     Social hx-- here visiting family, from [place]     Per care everywhere-- pt seen by MELLO at [facility] in 7/2023 for low sodium dit education.     Admitted for: Acute ruptured appendicitis --> c/b Severe sepsis, FATIMAH, Post-operative ileus     Pt reported he was eating well prior to admission. Followed a low sodium diet. Weighed himself weekly to monitor his fluid status. Wt has been stable around 179#. Last meal was Wednesday (12/6)- had Chinese food.       CURRENT NUTRITION ORDERS  Diet: NPO for Medical/Clinical Reasons Except for: Ice Chips, Meds      12/8: NPO  12/12: CLD --> NPO    Current Intake/Tolerance:  Pt has essentially been NPO for  "admit x6 days, briefly on CLD for 12/12-- received 1 clear liquid meal and subsequently developed worsening abdominal pain.   Abdominal pain improving, \"eager for liquids again\" per chart review.   Visited w/ pt and his family this AM-- discussed diet, diet advancement per NPO. Pt interested in ONS when diet allows.   Pt received broth, jello, tea for dinner on 12/12 per health touch. 50% intake documented on 12/12 per nursing flow sheet.    D5 @ 125 mL/hr on 12/7  D5 re-started @ 75 mL/hr 12/13-present = 306 kcal/day (90 g dextrose)      NUTRITION FOCUSED PHYSICAL ASSESSMENT FOR DIAGNOSING MALNUTRITION)  Yes         Observed:    Muscle wasting (refer to documentation in Malnutrition section)     Obtained from Chart/Interdisciplinary Team:  None noted     ANTHROPOMETRICS  Height: 5' 7\"  Weight: 179 lbs 10.8 oz  Body mass index is 28.14 kg/m .  Weight Status:  Overweight BMI 25-29.9  IBW: 67.3 kg  % IBW: 121%  Weight History:   No significant wt loss noted prior to admission  12/14/23 81.5 kg (179 lb 10.8 oz)   12/07/23 81.2 kg (179 lb)   01/10/21 79.8 kg (176 lb)     Care everywhere--  79.3 kg (174 lb 14.4 oz)  07/07/2023   84.4 kg (186 lb)   09/08/2022   84.8 kg (187 lb)   07/08/2022   81.2 kg (179 lb)   11/30/2021   83.9 kg (185 lb)   06/21/2021   83.9 kg (185 lb)   05/11/2021   84.4 kg (186 lb)   01/15/2021       LABS  Component Value Date   POTASSIUM 3.3 (L) 12/14/2023   BUN 24.9 (H) 12/14/2023   CR 1.19 (H) 12/14/2023     Lab 12/14/23  0536 12/13/23  0532 12/13/23  0034 12/12/23  1238 12/12/23  0619 12/11/23  1701   * 154* 190* 144* 148* 145*        MEDICATIONS   levothyroxine  75 mcg Intravenous Daily    pantoprazole  40 mg Intravenous Q24H      dextrose 5% and 0.45% NaCl 75 mL/hr at 12/13/23 2150       ASSESSED NUTRITION NEEDS PER APPROVED PRACTICE GUIDELINES:  Dosing Weight: 71 kg (adjusted, based on 81.5 kg-- 12/14)  Estimated Energy Needs: 7639-9828 kcal (25-30 Kcal/Kg)  Justification: " maintenance  Estimated Protein Needs:  grams protein (1.2-1.5 g pro/Kg)  Justification: post-op, hypercatabolism with acute illness  Estimated Fluid Needs: 1 mL/kcal  Justification: maintenance      MALNUTRITION:  % Weight Loss:  None noted  % Intake:  </= 50% for >/= 5 days (severe malnutrition)  Subcutaneous Fat Loss:  None observed  Muscle Loss:  Patellar region mild depletion and Posterior calf region mild depletion-- may be partially age-related  Fluid Retention:  None noted    Malnutrition Diagnosis: Patient does not meet two of the established criteria necessary for diagnosing malnutrition but is at risk for malnutrition        NUTRITION DIAGNOSIS:  Inadequate oral intake related to slow diet advancement as evidenced by primarily NPO x6 days        NUTRITION INTERVENTIONS  Recommendations / Nutrition Prescription  RD introduced self to pt, discussed role in care. Visited w/ pt and his family this AM-- discussed diet, diet advancement per NPO. Pt interested in ONS when diet allows.   Recommend consideration of TPN if unable to start advancing diet w/in 48-72 hours    Implementation  Nutrition education       Nutrition Goals  Diet advancement vs start nutrition support w/in 2-3 days        MONITORING AND EVALUATION:  Progress towards goals will be monitored and evaluated per protocol and Practice Guidelines      Yusra Arthur RD, LD   Pager: [phone]

## 2023-12-14 NOTE — PLAN OF CARE
Goal Outcome Evaluation:      Plan of Care Reviewed With: patient    Overall Patient Progress: no changeOverall Patient Progress: no change      Outcome evaluation: PT alert & oriented x4. Continues on LIS via NG. No vomiting or nausea this shift. ESTEFANY drain continues to leak through gauze and dressing change q2h. Tele SR. Patient RA throughout night. External cath worked on and off throughout the night.See MAR for pain medication. 2 BM's noted this shift.

## 2023-12-14 NOTE — CONSULTS
Care Management Initial Consult    General Information  Assessment completed with: Patient, Children,    Type of CM/SW Visit: Initial Assessment    Primary Care Provider verified and updated as needed:     Readmission within the last 30 days:        Reason for Consult: discharge planning, facility placement  Advance Care Planning:            Communication Assessment  Patient's communication style: spoken language (English or Bilingual)    Hearing Difficulty or Deaf: yes   Wear Glasses or Blind: yes    Cognitive  Cognitive/Neuro/Behavioral: WDL  Level of Consciousness: alert  Arousal Level: arouses to repeated stimulation  Orientation: disoriented to, place, time  Mood/Behavior: calm, cooperative  Best Language: 0 - No aphasia  Speech: garbled    Living Environment:   People in home: child(maris), adult     Current living Arrangements: house      Able to return to prior arrangements: no       Family/Social Support:  Care provided by: self  Provides care for: no one  Marital Status:   Children          Description of Support System: Involved, Supportive    Support Assessment: Adequate family and caregiver support    Current Resources:   Patient receiving home care services: No     Community Resources: None  Equipment currently used at home: cane, straight, shower chair  Supplies currently used at home:      Employment/Financial:  Employment Status: retired        Financial Concerns:     Referral to Financial Worker: No       Does the patient's insurance plan have a 3 day qualifying hospital stay waiver?  No    Lifestyle & Psychosocial Needs:  Social Determinants of Health     Food Insecurity: Not on file   Depression: Not on file   Housing Stability: Not on file   Tobacco Use: Not on file   Financial Resource Strain: Not on file   Alcohol Use: Not on file   Transportation Needs: Not on file   Physical Activity: Not on file   Interpersonal Safety: Not on file   Stress: Not on file   Social Connections: Not on file        Functional Status:  Prior to admission patient needed assistance:              Mental Health Status:  Mental Health Status: No Current Concerns       Chemical Dependency Status:  Chemical Dependency Status: No Current Concerns             Values/Beliefs:  Spiritual, Cultural Beliefs, Christianity Practices, Values that affect care: no               Additional Information:  SW/CM consult for discharge planning. SW reviewed chart. PT/OT recommend TCU. SW met with pt and his son, Girma and daughter, Maylin. Pt is from [place]. He is in town visiting his daughter who lives in [place]. He is typically able to care for himself independently. Discussed TCU and provided Maylin with a list of facilities near her home from Medicare.gov. Pt has been to TCU before in [place]. They would like referrals to [facility], [facility], [facility], and [facility]. Noted pt still has an NG tube. Will hold on sending referrals until this is removed as most TCU's can not accommodate this level of care. SW following.     LAURA Simpson, Northern Light A.R. Gould HospitalSW  [phone] Desk phone  [phone] Cell/text (Preferred)  [facility]

## 2023-12-14 NOTE — CONSULTS
[facility]  WO Nurse Inpatient Assessment     Consulted for:  Right forearm    Summary:  Skin tears vs MARSI (medical adhesive related injury), superficial.  [facility] will place wound care recs and sign off.     Patient History (according to provider note(s):      Garry Mckay is a 88 year old male with PMH of hypertension; CAD; CHF (ischemic cardiomyopathy, combined systolic and diastolic), s/p ICD; PAD; dyslipidemia; CARINA on CPAP; anxiety/depression; BPH; hypothyroidism; and CKD stage III; who presented to [facility] 12/7/2023 with abdominal pain and found with appendicitis with peritonitis with sepsis. Transferred to [facility] 12/8/2023.      Underwent surgery on 12/8/2023 and required ICU management for septic shock. Ultimately improved, and Hospitalist service assumed care on 12/11/2023.    Areas Assessed:      Areas visualized during today's visit: Upper extremities     Wound location: Right inner forearm    Last photo: 12-14-23      Wound due to: Skin Tear and Medical Adhesive Related Skin Injury (MARSI)  Wound history/plan of care: unclear  Wound base: semi-moist red and tan crusting tissue     Palpation of the wound bed: normal      Drainage: small     Description of drainage: serosanguinous     Measurements (length x width x depth, in cm): approx 3 x 8 x 0.1cm cluster  Periwound skin: Ecchymosis, Edematous, and Skin stripping      Color: pink      Temperature: normal   Odor: none  Pain: mild, tender  Pain interventions prior to dressing change: slow and gentle cares   Treatment goal: Heal  and Protection  STATUS: initial assessment  Supplies ordered: supplies stored on unit       Treatment Plan:     Right forearm wound(s): Every 3 days and prn:  Cleanse with wound cleanser.  Swab periwound with no-sting skin barrier, let dry.  Cover with Optifoam Gentle Border.      Orders: Written    RECOMMEND PRIMARY TEAM ORDER: None, at this time  Education provided: plan of care  Discussed plan of  care with: Patient and Nurse  WO nurse follow-up plan: signing off  Notify WOC if wound(s) deteriorate.  Nursing to notify the Provider(s) and re-consult the WO Nurse if new skin concern.    DATA:     Current support surface: Standard  Standard gel/foam mattress (IsoFlex, Atmos air, etc)  Containment of urine/stool: Primofit external catheter  BMI: Body mass index is 28.14 kg/m .   Active diet order: Orders Placed This Encounter      NPO for Medical/Clinical Reasons Except for: Ice Chips, Meds     Output: I/O last 3 completed shifts:  In: 632.5 [I.V.:632.5]  Out: 1500 [Urine:950; Emesis/NG output:450; Drains:100]     Labs:   Recent Labs   Lab 12/14/23  0536 12/13/23  0532 12/11/23  0526   ALBUMIN  --   --  2.8*   HGB 13.5   < > 13.2*   WBC 15.8*   < > 14.6*    < > = values in this interval not displayed.     Pressure injury risk assessment:   Sensory Perception: 3-->slightly limited  Moisture: 3-->occasionally moist  Activity: 3-->walks occasionally  Mobility: 3-->slightly limited  Nutrition: 2-->probably inadequate  Friction and Shear: 2-->potential problem  Inderjit Score: 16    Colleen Jaeger, RN CWOCN  -Securely message with Incent[employer] ([facility] [employer])   -[facility] Office Phone: [phone] (messages checked periodically Mon-Fri 8a-4p)

## 2023-12-14 NOTE — PROGRESS NOTES
[facility]    Internal Medicine Hospitalist Progress Note  12/14/2023  I evaluated patient on the above date.    Tu Bhakat Jr., MD  [phone] (p)  Text Page  Vocera        Assessment & Plan New actions/orders today (12/14/2023) are underlined. All lab results in the assessment and plan were reviewed.    Garry Mckay is a 88 year old male with PMH of hypertension; CAD; CHF (ischemic cardiomyopathy, combined systolic and diastolic), s/p ICD; PAD; dyslipidemia; CARINA on CPAP; anxiety/depression; BPH; hypothyroidism; and CKD stage III; who presented to OSH 12/7/2023 with abdominal pain and found with appendicitis with peritonitis with sepsis. Transferred to [facility] 12/8/2023.     Underwent surgery on 12/8/2023 and required ICU management for septic shock. Ultimately improved, and Hospitalist service assumed care on 12/11/2023.       # Acute appendicitis with peritonitis and septic shock - s/p laparoscopic appendectomy 12/8/2023.  # Pelvic fluid collections, question abscesses.  # Postoperative ileus vs SBO.  # Nutrition.  * Initial presentation to OSH 12/7 as above. CT abdomen noted with acute appendicitis with 0.7 cm appendicolith, no abscess. Started on ampicillin-sulbactam. Subsequently transferred to [facility] 12/8.  * On 12/8, underwent above surgery. Weaned off pressors. Antibiotics changed to pipericillin-tazobactam.  * On 12/10, CT abdomen noted with multiple dilated small bowel loops with related decompression of colon with findings consistent with partial obstruction/ adynamic ileus.  * On 12/11, pt w/o N/V.   * On 12/12, diet advanced to clears as pt seemed to be improving with +flatus and BM. Later, developed more N/V and NG placed.  * On 12/14, WBC elevated. Repeat abdominal CT showed prior appendectomy with findings suggestive of peritonitis, developing rim enhancing pelvic fluid collections, abscesses possible; increasing dilation of proximal small bowel with transition  point  in the right lower quadrant, suggestive of partial mechanical obstruction, developing adhesion is possible, no evidence of nadine bowel ischemia or acute perforation; distended gallbladder without additional CT evidence of acute cholecystitis.  Recent Labs   Lab 12/14/23  0536 12/13/23  0532 12/11/23  0526 12/10/23  0745 12/10/23  0157 12/09/23  0445 12/08/23  1807 12/08/23  0822 12/08/23  0544 12/08/23  0448 12/08/23  0228 12/08/23  0103 12/08/23  0053 12/08/23  0047 12/07/23  1816   WBC 15.8* 13.0* 14.6*  --  14.0* 10.0 16.0*  --   --   --   --   --   --  12.8* 9.8   LACT  --   --   --  1.3  --  1.3 2.5*  2.6* 2.9* 3.0* 3.2* 2.9* 3.4*   < >  --   --     < > = values in this interval not displayed.   - Continue NPO; advance diet as able per Surgery; may need to start TPN if diet unable to be advanced.  - Continue NG at LIS.  - Continue pipericillin-tazobactam (started 12/8).  - Continue IVF's - D5 1/2 NS at 75 ml/hr.  - Continue PRN IV hydromorphone; minimize opioids as able given ileus.  - Continue PRN anti-emetics for now.  - Continue to encourage regular ambulation as able.  - Appreciate Surgery help.    # Acute hypoxic respiratory failure, suspect multifactorial related to atelectasis, recent abdominal surgery, resolved.  # CARINA on home CPAP.  * Initial presentation as above. CXR at OSH negative.  * On 12/8, CXR showed new left basilar bandlike opacity, most likely atelectasis.   * On 12/11, down to 2L O2.  * On 12/14, off O2.  - Continue CPAP while sleeping.  - Continue to encourage incentive spirometry.  - Continue PRN O2.     # FATIMAH on CKD3, suspect prerenal, meds (including sacubitril-valsartan, diuretics).  # Hyperkalemia, resolved.  * Baseline creatinine 1-1.1.   * Creatinine 1.39 --> 1.8 at OSH.  * Cr up to 2.27 and K up to 5.5 on 12/8.  * Cr and K subsequently improved with IVF's.  * On IV furosemide 12/10-12/11.  Recent Labs   Lab 12/14/23  0536 12/13/23  0532 12/11/23  1914 12/11/23  0573  12/10/23  0157 12/09/23  0445 12/08/23  1807 12/08/23  0047 12/07/23  2228    144  --  140 138 136 135 135 133*   BUN 24.9* 34.5*  --  44.2* 43.5* 42.4* 40.0* 29.3* 29.8*   CR 1.19* 1.41* 1.66* 1.75* 2.01* 2.14* 2.27* 1.96* 1.80*   POTASSIUM 3.3* 3.5  --  4.2 4.6 4.7 5.5* 5.3 5.1   CO2 22 25  --  19* 17* 20* 18* 20* 20*   ANIONGAP 8 10  --  12 15 11 13 15 15   - Continue IVF's.  - Continue to monitor BMP - repeat in am.  - Avoid nephrotoxic medications.    # Troponin elevation, suspect demand ischemia (type 2 NSTEMI).  # Hypertension (benign essential).  # CAD s/p CABG (1994), stenting (2002, 2003, 2005, 2021)  # Chronic HFrEF (systolic and diastolic), s/p ICD.  # HLD.  [PTA: carvedilol 12.5 mg BID; sacubitril-valsartan 49-51 mg BID; furosemide 20 mg daily.]  * Last cath 1/2021 at [facility] showed patent CLAUDIA-RCA, LIMA occluded, vein-diagonal patent with placement of stent,  LAD, 40% circumflex, RCA occluded. Last stress testing 7/2023 negative for ischemia. Echo 7/2023 showed LVEF 30-35%, global hypokinesis LV, moderate-severe cLVH, grade 1 diastolic dysfunction; RV systolic function mildly reduced.   * Initial presentation as above. BNP in ED at 2613, troponin 70. EKG w/ nonspecific t-w changes.   * PTA sacubitril-valsartan and furosemide held on admit given FATIMAH.  * Started on IV furosemide in ICU 12/10, held 12/11.  * Meds subsequently restarted but held again 12/13 due to NPO.  - Continue to hold hold ASA, clopidogrel, atorvastatin, carvedilol, furosemide and sacubitril-valsartan for now.  - Continue to monitor i/o's, daily wts.    Anemia, suspect blood loss component.  * Hgb normal on initially evaluation.  * Underwent surgery 12/8 as above.  * Hgb down to 11.9 on 12/9.  Recent Labs   Lab 12/14/23  0536 12/13/23  0532 12/11/23  0526 12/10/23  0157 12/09/23  0445 12/08/23  1807   HGB 13.5 13.3 13.2* 13.2* 11.9* 12.7*   - Continue to monitor CBC - repeat in am.  - Consider prbc transfusion if hgb </= 7.0 or  "if significant bleeding with hemodynamic instability or if symptomatic.    Thrombocytopenia, unclear etiology, possibly due to sepsis, medication effect or consumptive or other cause.  * Plts normal on initially evaluation.  * Plts subsequently down to 104K on 12/9  * Plts normalized 12/14  Recent Labs   Lab 12/14/23  0536 12/13/23  0532 12/11/23  0526 12/10/23  0157 12/09/23  0445 12/08/23  1807    133* 122* 125* 104* 130*   - Continue to monitor CBC - repeat in am.  - Consider platelet transfusion if needs a procedure and less than 50,000; or if less than 10,000.    Hypokalemia.  * Potassium low at times this hospitalization. Treated with replacement.  Recent Labs   Lab 12/14/23  0536 12/13/23  0532 12/11/23  0526 12/10/23  0157 12/09/23  0445 12/08/23  1807   POTASSIUM 3.3* 3.5 4.2 4.6 4.7 5.5*   MAG  --   --   --  2.1 1.8  --    PHOS  --   --  2.5 3.7 3.9  --    - Continue PRN K replacement.    Hx CVA.  - Continue to hold ASA and clopidogrel for now.  - Continue to hold atorvastatin.     Depression/anxiety.  - Continue to hold escitalopram and gabapentin for now.    Hypothyroidism.  - Continue levothyroxine.    GERD.  - Continue pantoprazole IV.    BPH.  - Continue to hold finasteride for now.    Migraines.  * Gets botox.   - Continue to monitor clinically.    Clinically Significant Risk Factors        # Hypokalemia: Lowest K = 3.3 mmol/L in last 2 days, will replace as needed       # Hypoalbuminemia: Lowest albumin = 2.8 g/dL at 12/11/2023  5:26 AM, will monitor as appropriate     # Hypertension: Noted on problem list        # Overweight: Estimated body mass index is 28.14 kg/m  as calculated from the following:    Height as of this encounter: 1.702 m (5' 7\").    Weight as of this encounter: 81.5 kg (179 lb 10.8 oz).               COVID-19 testing.  COVID-19 PCR Results           No data to display              COVID-19 Antibody Results, Testing for Immunity           No data to display          " "      Diet: NPO for Medical/Clinical Reasons Except for: Ice Chips, Meds    Prophylaxis: PCD's, ambulation. Enoxaparin.  Hameed Catheter: Not present  Lines: None     Code Status: Full Code    Disposition Plan   Expected discharge: 2-3d recommended to  home vs TCU  pending  above .  Entered: Tu Bhakta MD 12/14/2023, 2:01 PM       Communication.  - I d/w pt's family 12/14.    Interval History   Feeling OK.  No nausea.  Feels hungry.  Noted had BM today, endorsed flatus.    -Data reviewed today: I reviewed all new labs and imaging over the last 24 hours. I personally reviewed no images or EKG's today.    Physical Exam    , Blood pressure (!) 145/97, pulse 84, temperature 97.5  F (36.4  C), temperature source Oral, resp. rate 20, height 1.702 m (5' 7\"), weight 81.5 kg (179 lb 10.8 oz), SpO2 96%. O2 Device: None (Room air)    Vitals:    12/11/23 0400 12/12/23 0000 12/14/23 0606   Weight: 84.6 kg (186 lb 8.2 oz) 84.2 kg (185 lb 10 oz) 81.5 kg (179 lb 10.8 oz)     Vital Signs with Ranges  Temp:  [97.4  F (36.3  C)-98.6  F (37  C)] 97.5  F (36.4  C)  Pulse:  [70-84] 84  Resp:  [16-23] 20  BP: (119-151)/(67-99) 145/97  SpO2:  [92 %-98 %] 96 %  Patient Vitals for the past 24 hrs:   BP Temp Temp src Pulse Resp SpO2 Height Weight   12/14/23 1200 (!) 145/97 -- -- 84 20 96 % -- --   12/14/23 1137 -- 97.5  F (36.4  C) Oral -- -- -- -- --   12/14/23 1100 -- -- -- -- -- -- 1.702 m (5' 7\") --   12/14/23 1000 135/76 -- -- 79 23 92 % -- --   12/14/23 0808 -- 98.6  F (37  C) Oral -- -- -- -- --   12/14/23 0800 (!) 148/89 -- -- 80 22 94 % -- --   12/14/23 0606 132/82 -- -- 70 18 96 % -- 81.5 kg (179 lb 10.8 oz)   12/14/23 0600 132/82 -- -- 78 19 96 % -- --   12/14/23 0400 (!) 151/78 -- -- 83 18 97 % -- --   12/14/23 0200 137/81 -- -- 80 -- 97 % -- --   12/14/23 0000 124/67 -- -- 81 16 95 % -- --   12/13/23 2300 -- 98.2  F (36.8  C) Oral -- -- -- -- --   12/13/23 2200 122/68 -- -- 73 -- 94 % -- --   12/13/23 2059 -- 97.4  F " (36.3  C) Axillary 75 -- 96 % -- --   12/13/23 2000 124/74 -- -- 75 -- 95 % -- --   12/13/23 1800 119/74 -- -- 71 -- 94 % -- --   12/13/23 1600 (!) 119/99 -- -- 70 -- 98 % -- --   12/13/23 1544 -- 97.5  F (36.4  C) Axillary -- -- -- -- --     I/O's Last 24 hours  I/O last 3 completed shifts:  In: 632.5 [I.V.:632.5]  Out: 1500 [Urine:950; Emesis/NG output:450; Drains:100]    Constitutional: Awake, alert, oriented, pleasant.  Respiratory: Diminished in bases. No crackles or wheezes.  Cardiovascular: RRR, no m/r/g.  GI: Distended, nt to light touch, hypoactive.  Skin/Integumen:   Other:        Data    Labs reviewed.  Recent Labs   Lab 12/14/23  0536 12/13/23  0532 12/13/23  0034 12/12/23  0619 12/11/23  1914 12/11/23  1240 12/11/23  0526 12/10/23  0810 12/10/23  0157 12/09/23  0451 12/09/23  0445 12/07/23  1816 12/07/23  1531   WBC 15.8* 13.0*  --   --   --   --  14.6*  --  14.0*  --  10.0   < >  --    HGB 13.5 13.3  --   --   --   --  13.2*  --  13.2*  --  11.9*   < >  --     101*  --   --   --   --  99  --  100  --  99   < >  --     133*  --   --   --   --  122*  --  125*  --  104*   < >  --     144  --   --   --   --  140  --  138  --  136   < > 134*  133*   POTASSIUM 3.3* 3.5  --   --   --   --  4.2  --  4.6  --  4.7   < > 5.6*  5.5*   CHLORIDE 111* 109*  --   --   --   --  109*  --  106  --  105   < > 100  100   CO2 22 25  --   --   --   --  19*  --  17*  --  20*   < > 14*  20*   BUN 24.9* 34.5*  --   --   --   --  44.2*  --  43.5*  --  42.4*   < > 23.0  22.9   CR 1.19* 1.41*  --   --  1.66*  --  1.75*  --  2.01*  --  2.14*   < > 1.39*  1.37*   ANIONGAP 8 10  --   --   --   --  12  --  15  --  11   < > 20*  13   RHODA 8.4* 8.9  --   --   --   --  9.3  --  9.3  --  8.4*   < > 9.8  9.8   * 154* 190*   < >  --    < > 146*   < > 141*   < > 139*   < > 172*  172*   ALBUMIN  --   --   --   --   --   --  2.8*  --  3.0*  --  2.9*  --  4.2  4.2   PROTTOTAL  --   --   --   --   --   --   --  "  --   --   --  5.0*  --  7.1  7.1   BILITOTAL  --   --   --   --   --   --   --   --   --   --  1.0  --  1.4*  1.4*   ALKPHOS  --   --   --   --   --   --   --   --   --   --  54  --  89  89   ALT  --   --   --   --   --   --   --   --   --   --  14  --  29  29   AST  --   --   --   --   --   --   --   --   --   --  29  --  38  38   LIPASE  --   --   --   --   --   --   --   --   --   --   --   --  20    < > = values in this interval not displayed.     Recent Labs   Lab Test 12/10/23  0733 12/10/23  0157 12/08/23  0448 12/08/23  0047 12/08/23  0047 12/07/23  2228 01/10/21  1020   NT-PROBNP, INPATIENT  --   --   --   --  3,859* 2,613* 394   TROPONIN T HIGH SENSITIVITY 137* 131* 61*   < > 70*  --   --     < > = values in this interval not displayed.     Recent Labs   Lab 12/14/23  0536 12/13/23  0532 12/13/23  0034 12/12/23  1238 12/12/23  0619 12/11/23  1701   * 154* 190* 144* 148* 145*      No lab results found.     No results for input(s): \"INR\", \"[id number]\" in the last 168 hours.  Recent Labs   Lab 12/14/23  0536 12/13/23  0532 12/11/23  0526 12/10/23  0745 12/10/23  0157 12/09/23  0445 12/08/23  1807 12/08/23  0822 12/08/23  0544 12/08/23  0448   WBC 15.8* 13.0* 14.6*  --  14.0* 10.0 16.0*  --   --   --    LACT  --   --   --  1.3  --  1.3 2.5*  2.6* 2.9* 3.0* 3.2*       MICRO:  CULTURES (INCLUDING BLOOD AND URINE):  No lab results found in last 7 days.    Recent Results (from the past 24 hour(s))   CT Abdomen Pelvis w Contrast    Narrative    CT ABDOMEN PELVIS W CONTRAST 12/14/2023 1:34 PM    CLINICAL HISTORY: Worsening leukocytosis, concern for necrotic cecum  during appendectomy    TECHNIQUE: CT scan of the abdomen and pelvis was performed following  injection of IV contrast. Multiplanar reformats were obtained. Dose  reduction techniques were used.  CONTRAST: 89mL Isovue-370    COMPARISON: CT 12/10/2023    FINDINGS:   LOWER CHEST: Enlarged heart with pacemaker leads partially " visualized.  Small bilateral pleural effusions with associated compressive  atelectasis. Calcified granulomas are unchanged.    HEPATOBILIARY: Likely hepatic steatosis. Gallbladder is distended  without definite wall thickening or surrounding fat stranding. No  evidence of biliary obstruction.    PANCREAS: Normal.    SPLEEN: Normal.    ADRENAL GLANDS: Normal.    KIDNEYS/BLADDER: Bilateral renal cysts, no specific follow-up  recommended. No hydronephrosis. Urinary bladder is decompressed but  otherwise unremarkable.    BOWEL: Prior appendectomy with stable position of surgical drain in  the right lower quadrant. Increasing dilation of proximal loops of  small bowel with transition point in the right lower quadrant in area  of edematous small bowel (series 3 image 162). Mild mesenteric edema  without evidence of nadine bowel wall hyperenhancement or pneumatosis.  No large volume pneumoperitoneum. Nasogastric tube with tip and  sidehole in the stomach.    PELVIC ORGANS: Prostate and seminal vesicles are unremarkable.    ADDITIONAL FINDINGS: Diffuse peritoneal thickening with  hyperenhancement and associated fat stranding. Developing rim  enhancing fluid collections as follows:  - Right pelvic sidewall collection measures 6.4 x 2.8 cm (series 3  image 176).  - Left pelvic sidewall collection measures 3.3 x 1.9 cm (series 3  image 162).  - Dependent pelvic collection measures 4.6 x 2.9 cm (series 3 image  26). This likely communicates with right pelvic sidewall collection.    Moderate calcified atherosclerosis. Ectasia of the infrarenal  abdominal aorta without nadine aneurysmal dilation.    MUSCULOSKELETAL: No acute bony abnormality. Mild body wall edema.      Impression    IMPRESSION:   1.  Prior appendectomy with findings suggestive of peritonitis.  Developing rim enhancing pelvic fluid collections as above, abscesses  are possible.  2.  Increasing dilation of proximal small bowel with transition point  in the right  lower quadrant, suggestive of partial mechanical  obstruction, developing adhesion is possible. No evidence of nadine  bowel ischemia or acute perforation.  3.  Distended gallbladder without additional CT evidence of acute  cholecystitis.    QUIN VALENZUELA MD         SYSTEM ID:  [id number]       Medications   All medications were reviewed.    Infusions:   dextrose 5% and 0.45% NaCl 75 mL/hr at 12/14/23 1347       Scheduled Medications:   [Held by provider] aspirin  81 mg Oral Daily    [Held by provider] carvedilol  12.5 mg Oral BID    [Held by provider] clopidogrel  75 mg Oral Daily    enoxaparin ANTICOAGULANT  40 mg Subcutaneous Q24H    [Held by provider] escitalopram  20 mg Oral Daily    [Held by provider] finasteride  5 mg Oral At Bedtime    [Held by provider] gabapentin  100 mg Oral Q24H    [Held by provider] gabapentin  200 mg Oral QAM    [Held by provider] gabapentin  300 mg Oral At Bedtime    latanoprost  1 drop Both Eyes At Bedtime    levothyroxine  75 mcg Intravenous Daily    pantoprazole  40 mg Intravenous Q24H    piperacillin-tazobactam  4.5 g Intravenous Q6H    [Held by provider] senna-docusate  1 tablet Oral BID    sodium chloride (PF)  3 mL Intracatheter Q8H     PRN Medications:  bisacodyl, calcium carbonate, HYDROmorphone, HYDROmorphone, lidocaine 4%, lidocaine (buffered or not buffered), naloxone **OR** naloxone **OR** naloxone **OR** naloxone, nitroGLYcerin, ondansetron **OR** ondansetron, prochlorperazine **OR** prochlorperazine **OR** prochlorperazine, sodium chloride (PF)

## 2023-12-14 NOTE — PROGRESS NOTES
[facility]    General Surgery  Daily Progress Note       Assessment and Plan:   Garry Mckay is a 88 year old male   S/P Lap Appy for Perforated Appendicitis, and vascular assessment of questionable area on cecum through ICG administration  POD #6     Diagnosis: Perforated Appendicitis, Post Op Ileus    PLAN:  - Worsening leukocytosis. On Day 7 of zosyn. Vitals wnl.   **ADDENDUM** Discussed with Dr. Coon, reimaging with CT abdomen/pelvis today due to high risk of abscess formation and perforated cecum.  - NG tube placed overnight 12/12 due to nausea, 450 ml out yesterday and having BM's. 4:1 clamping trials today, recording output. Possible removal tomorrow morning pending progress and imaging results. Protonix for GI prophylaxis.  - On electrolyte replacement protocol for hypokalemia. Cr improving.  - Limit narcotics as able.  - Ambulate QID to assist with bowel function, PCDs for DVT prophylaxis. Continue lovenox for DVT chemoprophylaxis. Plavix held since NG replaced.  - Encourage deep breathe and IS.   - Medical management per primary team.        Interval History:   Garry Mckay is seen on surgical rounds. His greatest complaint is uncomfortable NG tube. Enjoys ice chips and eager for liquids again although worsening abdominal pain when this was started two days ago. Still abdominal pain but improving. Three BM's overnight, no flatus when not having BM's. Hypertensive at times. Afebrile and off of supplemental oxygen.          Physical Exam:   Temp: 98.6  F (37  C) Temp src: Oral BP: (!) 148/89 Pulse: 80   Resp: 22 SpO2: 94 % O2 Device: None (Room air)      I/O last 3 completed shifts:  In: 632.5 [I.V.:632.5]  Out: 1500 [Urine:950; Emesis/NG output:450; Drains:100]    GENERAL: VS reviewed, alert, oriented, no acute distress  LUNGS: Normal respiratory effort, no wheezing  ABDOMEN:  Distended but soft, tender to central abdomen, ESTEFANY drain with 100 ml/24 hours- output is  serous  INCISION: Staples in place. Incisions are clean, dry, and intact. No surrounding erythema.  EXTREMITIES: Moving all extremities, no gross deformities  NEUROLOGICAL: Grossly non-focal, mood & affect appropriate    Data   Recent Labs   Lab 12/14/23  0536 12/13/23  0532 12/13/23  0034 12/12/23  0619 12/11/23  1914 12/11/23  1240 12/11/23  0526 12/10/23  0810 12/10/23  0157 12/09/23  0451 12/09/23  0445 12/07/23  1816 12/07/23  1531   WBC 15.8* 13.0*  --   --   --   --  14.6*  --  14.0*  --  10.0   < >  --    HGB 13.5 13.3  --   --   --   --  13.2*  --  13.2*  --  11.9*   < >  --     101*  --   --   --   --  99  --  100  --  99   < >  --     133*  --   --   --   --  122*  --  125*  --  104*   < >  --     144  --   --   --   --  140  --  138  --  136   < > 134*  133*   POTASSIUM 3.3* 3.5  --   --   --   --  4.2  --  4.6  --  4.7   < > 5.6*  5.5*   CHLORIDE 111* 109*  --   --   --   --  109*  --  106  --  105   < > 100  100   CO2 22 25  --   --   --   --  19*  --  17*  --  20*   < > 14*  20*   BUN 24.9* 34.5*  --   --   --   --  44.2*  --  43.5*  --  42.4*   < > 23.0  22.9   CR 1.19* 1.41*  --   --  1.66*  --  1.75*  --  2.01*  --  2.14*   < > 1.39*  1.37*   ANIONGAP 8 10  --   --   --   --  12  --  15  --  11   < > 20*  13   RHODA 8.4* 8.9  --   --   --   --  9.3  --  9.3  --  8.4*   < > 9.8  9.8   * 154* 190*   < >  --    < > 146*   < > 141*   < > 139*   < > 172*  172*   ALBUMIN  --   --   --   --   --   --  2.8*  --  3.0*  --  2.9*  --  4.2  4.2   PROTTOTAL  --   --   --   --   --   --   --   --   --   --  5.0*  --  7.1  7.1   BILITOTAL  --   --   --   --   --   --   --   --   --   --  1.0  --  1.4*  1.4*   ALKPHOS  --   --   --   --   --   --   --   --   --   --  54  --  89  89   ALT  --   --   --   --   --   --   --   --   --   --  14  --  29  29   AST  --   --   --   --   --   --   --   --   --   --  29  --  38  38    < > = values in this interval not displayed.        Amie Stewart PA-C    Please use [employer] to page 7:30am-4pm, page on-call surgeon after 4pm  Office number: [phone]

## 2023-12-15 ENCOUNTER — APPOINTMENT (OUTPATIENT)
Dept: ULTRASOUND IMAGING | Facility: CLINIC | Age: 88
DRG: 853 | End: 2023-12-15
Attending: STUDENT IN AN ORGANIZED HEALTH CARE EDUCATION/TRAINING PROGRAM
Payer: MEDICARE

## 2023-12-15 ENCOUNTER — APPOINTMENT (OUTPATIENT)
Dept: CT IMAGING | Facility: CLINIC | Age: 88
DRG: 853 | End: 2023-12-15
Attending: RADIOLOGY
Payer: MEDICARE

## 2023-12-15 ENCOUNTER — APPOINTMENT (OUTPATIENT)
Dept: GENERAL RADIOLOGY | Facility: CLINIC | Age: 88
DRG: 853 | End: 2023-12-15
Attending: STUDENT IN AN ORGANIZED HEALTH CARE EDUCATION/TRAINING PROGRAM
Payer: MEDICARE

## 2023-12-15 ENCOUNTER — APPOINTMENT (OUTPATIENT)
Dept: OCCUPATIONAL THERAPY | Facility: CLINIC | Age: 88
DRG: 853 | End: 2023-12-15
Attending: HOSPITALIST
Payer: MEDICARE

## 2023-12-15 DIAGNOSIS — N17.9 AKI (ACUTE KIDNEY INJURY) (H): ICD-10-CM

## 2023-12-15 DIAGNOSIS — A41.9 ACUTE SEPSIS (H): ICD-10-CM

## 2023-12-15 DIAGNOSIS — K35.30 ACUTE APPENDICITIS WITH LOCALIZED PERITONITIS, WITHOUT PERFORATION, ABSCESS, OR GANGRENE: ICD-10-CM

## 2023-12-15 LAB
ANION GAP SERPL CALCULATED.3IONS-SCNC: 11 MMOL/L (ref 7–15)
BUN SERPL-MCNC: 17.7 MG/DL (ref 8–23)
CALCIUM SERPL-MCNC: 8.5 MG/DL (ref 8.8–10.2)
CHLORIDE SERPL-SCNC: 112 MMOL/L (ref 98–107)
CREAT SERPL-MCNC: 1.15 MG/DL (ref 0.67–1.17)
DEPRECATED HCO3 PLAS-SCNC: 20 MMOL/L (ref 22–29)
EGFRCR SERPLBLD CKD-EPI 2021: 62 ML/MIN/1.73M2
ERYTHROCYTE [DISTWIDTH] IN BLOOD BY AUTOMATED COUNT: 13.9 % (ref 10–15)
GLUCOSE BLDC GLUCOMTR-MCNC: 144 MG/DL (ref 70–99)
GLUCOSE BLDC GLUCOMTR-MCNC: 175 MG/DL (ref 70–99)
GLUCOSE SERPL-MCNC: 174 MG/DL (ref 70–99)
GRAM STAIN RESULT: NORMAL
GRAM STAIN RESULT: NORMAL
HCT VFR BLD AUTO: 42.3 % (ref 40–53)
HGB BLD-MCNC: 13.9 G/DL (ref 13.3–17.7)
MAGNESIUM SERPL-MCNC: 1.9 MG/DL (ref 1.7–2.3)
MCH RBC QN AUTO: 32.3 PG (ref 26.5–33)
MCHC RBC AUTO-ENTMCNC: 32.9 G/DL (ref 31.5–36.5)
MCV RBC AUTO: 98 FL (ref 78–100)
PHOSPHATE SERPL-MCNC: 2.2 MG/DL (ref 2.5–4.5)
PLATELET # BLD AUTO: 185 10E3/UL (ref 150–450)
POTASSIUM SERPL-SCNC: 3.2 MMOL/L (ref 3.4–5.3)
POTASSIUM SERPL-SCNC: 3.4 MMOL/L (ref 3.4–5.3)
RBC # BLD AUTO: 4.3 10E6/UL (ref 4.4–5.9)
SODIUM SERPL-SCNC: 143 MMOL/L (ref 135–145)
TRIGL SERPL-MCNC: 217 MG/DL
WBC # BLD AUTO: 17.7 10E3/UL (ref 4–11)

## 2023-12-15 PROCEDURE — 999N000040 HC STATISTIC CONSULT NO CHARGE VASC ACCESS

## 2023-12-15 PROCEDURE — 83735 ASSAY OF MAGNESIUM: CPT

## 2023-12-15 PROCEDURE — 999N000154 HC STATISTIC RADIOLOGY XRAY, US, CT, MAR, NM

## 2023-12-15 PROCEDURE — 250N000011 HC RX IP 250 OP 636: Mod: JZ | Performed by: INTERNAL MEDICINE

## 2023-12-15 PROCEDURE — 84100 ASSAY OF PHOSPHORUS: CPT

## 2023-12-15 PROCEDURE — 999N000065 XR CHEST PORT 1 VIEW

## 2023-12-15 PROCEDURE — 99233 SBSQ HOSP IP/OBS HIGH 50: CPT | Performed by: STUDENT IN AN ORGANIZED HEALTH CARE EDUCATION/TRAINING PROGRAM

## 2023-12-15 PROCEDURE — 258N000002 HC RX IP 258 OP 250: Performed by: STUDENT IN AN ORGANIZED HEALTH CARE EDUCATION/TRAINING PROGRAM

## 2023-12-15 PROCEDURE — 0W9J30Z DRAINAGE OF PELVIC CAVITY WITH DRAINAGE DEVICE, PERCUTANEOUS APPROACH: ICD-10-PCS | Performed by: RADIOLOGY

## 2023-12-15 PROCEDURE — 250N000011 HC RX IP 250 OP 636: Performed by: RADIOLOGY

## 2023-12-15 PROCEDURE — 76705 ECHO EXAM OF ABDOMEN: CPT

## 2023-12-15 PROCEDURE — 84132 ASSAY OF SERUM POTASSIUM: CPT | Performed by: INTERNAL MEDICINE

## 2023-12-15 PROCEDURE — 250N000009 HC RX 250: Performed by: STUDENT IN AN ORGANIZED HEALTH CARE EDUCATION/TRAINING PROGRAM

## 2023-12-15 PROCEDURE — 85027 COMPLETE CBC AUTOMATED: CPT | Performed by: PHYSICIAN ASSISTANT

## 2023-12-15 PROCEDURE — 250N000011 HC RX IP 250 OP 636: Mod: JZ | Performed by: HOSPITALIST

## 2023-12-15 PROCEDURE — 999N000190 HC STATISTIC VAT ROUNDS

## 2023-12-15 PROCEDURE — 36569 INSJ PICC 5 YR+ W/O IMAGING: CPT

## 2023-12-15 PROCEDURE — 272N000451 HC KIT SHRLOCK 5FR POWER PICC DOUBLE LUMEN

## 2023-12-15 PROCEDURE — 36415 COLL VENOUS BLD VENIPUNCTURE: CPT | Performed by: INTERNAL MEDICINE

## 2023-12-15 PROCEDURE — 87205 SMEAR GRAM STAIN: CPT | Performed by: RADIOLOGY

## 2023-12-15 PROCEDURE — 120N000001 HC R&B MED SURG/OB

## 2023-12-15 PROCEDURE — 250N000011 HC RX IP 250 OP 636: Performed by: INTERNAL MEDICINE

## 2023-12-15 PROCEDURE — 80048 BASIC METABOLIC PNL TOTAL CA: CPT | Performed by: INTERNAL MEDICINE

## 2023-12-15 PROCEDURE — 272N000431 CT ABDOMEN PERITONEUM ABSCESS DRAIN W CATH PLACE

## 2023-12-15 PROCEDURE — 84478 ASSAY OF TRIGLYCERIDES: CPT | Performed by: STUDENT IN AN ORGANIZED HEALTH CARE EDUCATION/TRAINING PROGRAM

## 2023-12-15 PROCEDURE — 87076 CULTURE ANAEROBE IDENT EACH: CPT | Performed by: RADIOLOGY

## 2023-12-15 PROCEDURE — 97530 THERAPEUTIC ACTIVITIES: CPT | Mod: GO

## 2023-12-15 PROCEDURE — C9113 INJ PANTOPRAZOLE SODIUM, VIA: HCPCS | Performed by: INTERNAL MEDICINE

## 2023-12-15 PROCEDURE — 93005 ELECTROCARDIOGRAM TRACING: CPT

## 2023-12-15 PROCEDURE — 250N000009 HC RX 250

## 2023-12-15 PROCEDURE — 250N000011 HC RX IP 250 OP 636: Mod: JZ | Performed by: STUDENT IN AN ORGANIZED HEALTH CARE EDUCATION/TRAINING PROGRAM

## 2023-12-15 PROCEDURE — 3E0436Z INTRODUCTION OF NUTRITIONAL SUBSTANCE INTO CENTRAL VEIN, PERCUTANEOUS APPROACH: ICD-10-PCS | Performed by: SURGERY

## 2023-12-15 PROCEDURE — B4185 PARENTERAL SOL 10 GM LIPIDS: HCPCS | Mod: JZ | Performed by: STUDENT IN AN ORGANIZED HEALTH CARE EDUCATION/TRAINING PROGRAM

## 2023-12-15 PROCEDURE — 250N000009 HC RX 250: Performed by: INTERNAL MEDICINE

## 2023-12-15 RX ORDER — MAGNESIUM SULFATE HEPTAHYDRATE 40 MG/ML
2 INJECTION, SOLUTION INTRAVENOUS ONCE
Status: COMPLETED | OUTPATIENT
Start: 2023-12-15 | End: 2023-12-16

## 2023-12-15 RX ORDER — WATER 10 ML/10ML
INJECTION INTRAMUSCULAR; INTRAVENOUS; SUBCUTANEOUS
Status: COMPLETED
Start: 2023-12-15 | End: 2023-12-15

## 2023-12-15 RX ORDER — NALOXONE HYDROCHLORIDE 0.4 MG/ML
0.2 INJECTION, SOLUTION INTRAMUSCULAR; INTRAVENOUS; SUBCUTANEOUS
Status: DISCONTINUED | OUTPATIENT
Start: 2023-12-15 | End: 2023-12-15

## 2023-12-15 RX ORDER — NICOTINE POLACRILEX 4 MG
15-30 LOZENGE BUCCAL
Status: DISCONTINUED | OUTPATIENT
Start: 2023-12-15 | End: 2023-12-22

## 2023-12-15 RX ORDER — FLUMAZENIL 0.1 MG/ML
0.2 INJECTION, SOLUTION INTRAVENOUS
Status: DISCONTINUED | OUTPATIENT
Start: 2023-12-15 | End: 2023-12-15

## 2023-12-15 RX ORDER — HALOPERIDOL 5 MG/ML
.5-1 INJECTION INTRAMUSCULAR EVERY 6 HOURS PRN
Status: DISCONTINUED | OUTPATIENT
Start: 2023-12-15 | End: 2023-12-19

## 2023-12-15 RX ORDER — POTASSIUM CHLORIDE 7.45 MG/ML
10 INJECTION INTRAVENOUS
Status: COMPLETED | OUTPATIENT
Start: 2023-12-15 | End: 2023-12-15

## 2023-12-15 RX ORDER — NALOXONE HYDROCHLORIDE 0.4 MG/ML
0.4 INJECTION, SOLUTION INTRAMUSCULAR; INTRAVENOUS; SUBCUTANEOUS
Status: DISCONTINUED | OUTPATIENT
Start: 2023-12-15 | End: 2023-12-15

## 2023-12-15 RX ORDER — SODIUM CHLORIDE 450 MG/100ML
INJECTION, SOLUTION INTRAVENOUS CONTINUOUS
Status: DISCONTINUED | OUTPATIENT
Start: 2023-12-15 | End: 2023-12-16

## 2023-12-15 RX ORDER — HALOPERIDOL 5 MG/ML
0.5 INJECTION INTRAMUSCULAR ONCE
Status: COMPLETED | OUTPATIENT
Start: 2023-12-15 | End: 2023-12-15

## 2023-12-15 RX ORDER — POTASSIUM CHLORIDE 7.45 MG/ML
10 INJECTION INTRAVENOUS
Status: COMPLETED | OUTPATIENT
Start: 2023-12-16 | End: 2023-12-16

## 2023-12-15 RX ORDER — FENTANYL CITRATE 50 UG/ML
25-50 INJECTION, SOLUTION INTRAMUSCULAR; INTRAVENOUS EVERY 5 MIN PRN
Status: DISCONTINUED | OUTPATIENT
Start: 2023-12-15 | End: 2023-12-15

## 2023-12-15 RX ORDER — DEXTROSE MONOHYDRATE 100 MG/ML
INJECTION, SOLUTION INTRAVENOUS CONTINUOUS PRN
Status: DISCONTINUED | OUTPATIENT
Start: 2023-12-15 | End: 2024-01-03 | Stop reason: HOSPADM

## 2023-12-15 RX ORDER — ESCITALOPRAM OXALATE 10 MG/1
20 TABLET ORAL DAILY
Status: DISCONTINUED | OUTPATIENT
Start: 2023-12-15 | End: 2024-01-03 | Stop reason: HOSPADM

## 2023-12-15 RX ORDER — DEXTROSE MONOHYDRATE 25 G/50ML
25-50 INJECTION, SOLUTION INTRAVENOUS
Status: DISCONTINUED | OUTPATIENT
Start: 2023-12-15 | End: 2023-12-22

## 2023-12-15 RX ORDER — HALOPERIDOL 5 MG/ML
1 INJECTION INTRAMUSCULAR ONCE
Status: COMPLETED | OUTPATIENT
Start: 2023-12-15 | End: 2023-12-15

## 2023-12-15 RX ADMIN — PANTOPRAZOLE SODIUM 40 MG: 40 INJECTION, POWDER, FOR SOLUTION INTRAVENOUS at 07:53

## 2023-12-15 RX ADMIN — ALTEPLASE 2 MG: 2.2 INJECTION, POWDER, LYOPHILIZED, FOR SOLUTION INTRAVENOUS at 22:24

## 2023-12-15 RX ADMIN — OLIVE OIL AND SOYBEAN OIL 250 ML: 16; 4 INJECTION, EMULSION INTRAVENOUS at 20:03

## 2023-12-15 RX ADMIN — ASCORBIC ACID, VITAMIN A PALMITATE, CHOLECALCIFEROL, THIAMINE HYDROCHLORIDE, RIBOFLAVIN-5 PHOSPHATE SODIUM, PYRIDOXINE HYDROCHLORIDE, NIACINAMIDE, DEXPANTHENOL, ALPHA-TOCOPHEROL ACETATE, VITAMIN K1, FOLIC ACID, BIOTIN, CYANOCOBALAMIN: 200; 3300; 200; 6; 3.6; 6; 40; 15; 10; 150; 600; 60; 5 INJECTION, SOLUTION INTRAVENOUS at 20:24

## 2023-12-15 RX ADMIN — POTASSIUM CHLORIDE 10 MEQ: 7.46 INJECTION, SOLUTION INTRAVENOUS at 23:58

## 2023-12-15 RX ADMIN — POTASSIUM CHLORIDE 10 MEQ: 7.46 INJECTION, SOLUTION INTRAVENOUS at 11:50

## 2023-12-15 RX ADMIN — HALOPERIDOL LACTATE 0.5 MG: 5 INJECTION, SOLUTION INTRAMUSCULAR at 22:24

## 2023-12-15 RX ADMIN — PIPERACILLIN AND TAZOBACTAM 4.5 G: 4; .5 INJECTION, POWDER, FOR SOLUTION INTRAVENOUS at 22:23

## 2023-12-15 RX ADMIN — LIDOCAINE HYDROCHLORIDE ANHYDROUS 2 ML: 10 INJECTION, SOLUTION INFILTRATION at 18:18

## 2023-12-15 RX ADMIN — SODIUM CHLORIDE: 4.5 INJECTION, SOLUTION INTRAVENOUS at 15:51

## 2023-12-15 RX ADMIN — MAGNESIUM SULFATE HEPTAHYDRATE 2 G: 40 INJECTION, SOLUTION INTRAVENOUS at 23:51

## 2023-12-15 RX ADMIN — PIPERACILLIN AND TAZOBACTAM 4.5 G: 4; .5 INJECTION, POWDER, FOR SOLUTION INTRAVENOUS at 16:23

## 2023-12-15 RX ADMIN — PIPERACILLIN AND TAZOBACTAM 4.5 G: 4; .5 INJECTION, POWDER, FOR SOLUTION INTRAVENOUS at 10:13

## 2023-12-15 RX ADMIN — LEVOTHYROXINE SODIUM 75 MCG: 20 INJECTION, SOLUTION INTRAVENOUS at 17:24

## 2023-12-15 RX ADMIN — HALOPERIDOL LACTATE 1 MG: 5 INJECTION, SOLUTION INTRAMUSCULAR at 04:15

## 2023-12-15 RX ADMIN — FENTANYL CITRATE 25 MCG: 50 INJECTION, SOLUTION INTRAMUSCULAR; INTRAVENOUS at 14:47

## 2023-12-15 RX ADMIN — HYDROMORPHONE HYDROCHLORIDE 0.2 MG: 0.2 INJECTION, SOLUTION INTRAMUSCULAR; INTRAVENOUS; SUBCUTANEOUS at 16:23

## 2023-12-15 RX ADMIN — HALOPERIDOL LACTATE 0.5 MG: 5 INJECTION, SOLUTION INTRAMUSCULAR at 02:13

## 2023-12-15 RX ADMIN — PIPERACILLIN AND TAZOBACTAM 4.5 G: 4; .5 INJECTION, POWDER, FOR SOLUTION INTRAVENOUS at 03:55

## 2023-12-15 RX ADMIN — POTASSIUM CHLORIDE 10 MEQ: 7.46 INJECTION, SOLUTION INTRAVENOUS at 15:52

## 2023-12-15 RX ADMIN — POTASSIUM CHLORIDE 10 MEQ: 7.46 INJECTION, SOLUTION INTRAVENOUS at 12:54

## 2023-12-15 RX ADMIN — ENOXAPARIN SODIUM 40 MG: 40 INJECTION SUBCUTANEOUS at 12:54

## 2023-12-15 RX ADMIN — LATANOPROST 1 DROP: 50 SOLUTION/ DROPS OPHTHALMIC at 22:43

## 2023-12-15 RX ADMIN — HALOPERIDOL LACTATE 1 MG: 5 INJECTION, SOLUTION INTRAMUSCULAR at 07:54

## 2023-12-15 RX ADMIN — WATER 10 ML: 1 INJECTION INTRAMUSCULAR; INTRAVENOUS; SUBCUTANEOUS at 22:43

## 2023-12-15 RX ADMIN — DEXTROSE AND SODIUM CHLORIDE: 5; 450 INJECTION, SOLUTION INTRAVENOUS at 03:55

## 2023-12-15 ASSESSMENT — ACTIVITIES OF DAILY LIVING (ADL)
ADLS_ACUITY_SCORE: 41
ADLS_ACUITY_SCORE: 41
ADLS_ACUITY_SCORE: 40
ADLS_ACUITY_SCORE: 41
ADLS_ACUITY_SCORE: 40
ADLS_ACUITY_SCORE: 38
ADLS_ACUITY_SCORE: 41
ADLS_ACUITY_SCORE: 38
ADLS_ACUITY_SCORE: 40
ADLS_ACUITY_SCORE: 40
ADLS_ACUITY_SCORE: 41
ADLS_ACUITY_SCORE: 40

## 2023-12-15 NOTE — CODE/RAPID RESPONSE
[facility]    House TOH RRT Note  12/14/2023   Time Called: 2306    RRT called for: Neuro concerns    Assessment & Plan     Minimally slight dysarthria suspect 2/2 profoundly dry oral mucosa, recent opioid therapy.  - Upon arrival, pt lying in bed at 45 degrees, awake, alert, in no overt distress, no obvious focal neuro deficit noted.  Nursing notes concern that pt possibly has slight R sided facial droop and slurred speech.  At time of arrival, pt A/Ox4, PERRL, 3-4mm, CN II-XII intact, no obvious facial droop noted, symmetrical smile, able to protrude tongue midline, strong bilateral hand grasp, strong BUE push/pull, strong dorsi/plantar flex.  Nursing notes pt having migraine in which pt states baseline, pain similar to prior migraines, notes receives botox injections every three months and takes APAP when he develops a migraine.  Pt reports no associated blurred vision, paresthesias.  Nursing has administered 2 doses of IV dilaudid for migraine this evening as pt remains NPO, last dose 2245 (received 4 doses today).  Pt also has [facility] accent, suspect altering tone of speech.  Pt's VS noting HR 70s, SBP 130s, RR 10s, O2 sats > 92% on RA, afebrile.            INTERVENTIONS:  - BG - 162  - Narcan 0.2 mg IV now  - Nursing assisted pt with oral care  - At this time, in setting of CN intact, pt reports feeling at baseline, continues on prophylactic lovenox, will defer further workup at this time  - Noted pt's PTA ASA and plavix on hold since 12/13/23; defer to rounding hospitalist/surgery when deemed OK to resume    At the end of the RRT pt remains hemodynamically stable, no overt focal neuro deficits appreciated    Discussed with and defer further cares to nursing, hospitalist    Interval History     Garry Mckay is a 88 year old male who was admitted on 12/8/2023 for abdominal pain.    Medical history significant for: Hypertension; CAD; CHF (ischemic cardiomyopathy, combined  systolic and diastolic), s/p ICD; PAD; dyslipidemia; CARINA on CPAP; anxiety/depression; BPH; hypothyroidism; and CKD stage III     Code Status: Full Code    Allergies   Allergies   Allergen Reactions    Avelox [Moxifloxacin] Itching    Cephalexin     Hydroxyzine     Oxycodone     Ultram [Tramadol]        Physical Exam   Vital Signs with Ranges:  Temp:  [97.5  F (36.4  C)-98.6  F (37  C)] 98.5  F (36.9  C)  Pulse:  [70-85] 81  Resp:  [14-23] 20  BP: (124-155)/() 148/127  SpO2:  [92 %-97 %] 96 %  I/O last 3 completed shifts:  In: 1923.75 [I.V.:1923.75]  Out: 1362 [Urine:450; Emesis/NG output:900; Drains:12]    Constitutional: Pt lying in bed at 45 degrees, awake, alert, in no overt distress, no obvious focal neuro deficit noted  Neck: No upper airway wheezes or stridor noted  Pulmonary: In no overt respiratory distress noted  Cardiovascular: Regular rate and rhythm, appears well perfused  GI: Round (NGT in place)  Skin/Integumen:  Warm, dry, pink  Neuro: A/Ox4, PERRL, 3-4mm, CN II-XII intact, no obvious facial droop noted, symmetrical smile, able to protrude tongue midline, strong bilateral hand grasp, strong BUE push/pull, strong dorsi/plantar flex  Psych:  Calm  Extremities: Moving all extremities equally with strong strength    Medical Decision Making       30 MINUTES SPENT BY ME on the date of service doing chart review, history, exam, documentation & further activities per the note.       SELMA [facility]  Hospitalist-House THO  Hospitalist Service  Securely message with [employer] (more info)  Text page via [employer] Paging/Directory

## 2023-12-15 NOTE — PROGRESS NOTES
A&Ox4, VSS. Pain controlled with PRN dilaudid x1. ESTEFANY drain woithout output, copious drainage from insertion site. NG tube to low intermittent suction per MD. D5 1/2 NS infusing at 75 mL/hr. LS: clear/dim. BS: audible. +loose BM x2. +void. Up A-2 GB/W.

## 2023-12-15 NOTE — OP NOTE
S/p image guided drain placement into a right pelvic fluid collection, without complication. Small amount of thin, cloudy fluid aspirated and sent for cultures. Patient left the department in stable condition.

## 2023-12-15 NOTE — CONSULTS
IR consult received. Pelvic fluid collections small but will attempt to aspirate with CT guidance, and place drain if possible. Likely this afternoon, please keep patient NPO.  Kennedy Johnson MD

## 2023-12-15 NOTE — CONSULTS
CLINICAL NUTRITION SERVICES - BRIEF NOTE     Consult received for: Pharmacy/Nutrition to Start and Manage PN   See RD note on 12/14 for most recent full nutrition assessment.     NEW FINDINGS   Reviewed chart-   Remains NPO  Will start TPN today   D/w pharmacist-- ok to utilize w/ Clinimix   Labs reviewed-- K 3.2 (L)  Meds reviewed-- D5 @ 75 mL/hr since 12/13 (90 grams dextrose/day)    No central access yet, vascular access consulted for PICC placement    ASSESSED NUTRITION NEEDS:  Dosing Weight: 71 kg (adjusted, based on 81.5 kg-- 12/14)  Estimated Energy Needs: 5593-8839 kcal (25-30 Kcal/Kg)  Justification: maintenance  Estimated Protein Needs:  grams protein (1.2-1.5 g pro/Kg)  Justification: post-op, hypercatabolism with acute illness  Estimated Fluid Needs: 1 mL/kcal  Justification: maintenance    TPN orders (+ appropriate labs) placed =   Dosing wt: 71 kg    Begin Clinimix E (5/15) @ 40 mL/hr to provide 1111 kcal (16 g/kg), 144 g CHO (GIR of 1.4). Start 250 mL 20% lipids (to run x6 days/week)  After 24 hours, if labs (K, Mag, Phos) acceptable, advance rate to 60 mL/hr to increase provisions to 1451 kcal (20 kcal/kg), 216 g CHO (GIR of 2.1)   After 24 hours, if labs (K, Mag, Phos) acceptable, advance rate to goal of 80 mL/hr = 1793 kcal (25 kcal/kg), 96 g protein (1.35 g/kg), 288 g CHO (GIR of 2.8), and 24% kcal from fat daily.       Total IVF + PN = 80 mL/hr or per provider      INTERVENTIONS  Implementation  Collaboration with other providers  Parenteral Nutrition/IV Fluids       Yusra Arthur, RD, LD  Pager: [phone]

## 2023-12-15 NOTE — PROGRESS NOTES
Surgery    Possible pelvic abscess with dilated small bowel.  WBC up.  Planning CT scan drainage of the pelvic abscess.  Hopefully ileus/obstruction resolves once the abscess is drained.    Son in the room.  I spent time updating him with the plan.    I'd start TPN given that he has essentially been NPO since last Friday.    Otilio Coon M.D., F.A.C.STwo [facility]  [facility]  [place]

## 2023-12-15 NOTE — PROGRESS NOTES
1430 Abscess Drain: Pt tolerated well. VSS. Total sedation given - 25 mcg Fentanyl and   Drain placed w/o difficulty.  2 cc purulent fluid removed & specimen sent to lab. Drain irrigated with NS. Connected to ESTEFANY bulb - full suction. Stay Fix drsg applied & tube secured.    1515 Pt back to rm 3304 per cart & RN Transport..  Detailed report to FS RN,  VSS

## 2023-12-15 NOTE — PROVIDER NOTIFICATION
Provider paged    pt is having some delirium. he is NPO and all oral meds are held. we gave him narcan @6914 for increased confusion, word slurring and HA. RRT was called for possible stroke signs. House NP declared opiod overdose d/t the Dilaudid. PT is now restless and delirious. What are your recommendations?    Provider ordered haldol d/t delirium.

## 2023-12-15 NOTE — PROGRESS NOTES
[facility]    Medicine Progress Note - Hospitalist Service    Date of Admission:  12/8/2023    Assessment & Plan   Garry Mckay is a 88 year old male with PMH of hypertension; CAD; CHF (ischemic cardiomyopathy, combined systolic and diastolic), s/p ICD; PAD; dyslipidemia; CARINA on CPAP; anxiety/depression; BPH; hypothyroidism; and CKD stage III; who presented to OSH 12/7/2023 with abdominal pain and found with appendicitis with peritonitis with sepsis. Transferred to [facility] 12/8/2023.      Underwent surgery on 12/8/2023 and required ICU management for septic shock. Ultimately improved, and Hospitalist service assumed care on 12/11/2023.        # Acute appendicitis with peritonitis and septic shock - s/p laparoscopic appendectomy 12/8/2023.  # Pelvic fluid collections, question abscesses.  # Postoperative ileus vs SBO.  # Nutrition.  * Initial presentation to OSH 12/7 as above. CT abdomen noted with acute appendicitis with 0.7 cm appendicolith, no abscess. Started on ampicillin-sulbactam. Subsequently transferred to [facility] 12/8.  * On 12/8, underwent above surgery. Weaned off pressors. Antibiotics changed to pipericillin-tazobactam.  * On 12/10, CT abdomen noted with multiple dilated small bowel loops with related decompression of colon with findings consistent with partial obstruction/ adynamic ileus.  * On 12/11, pt w/o N/V.   * On 12/12, diet advanced to clears as pt seemed to be improving with +flatus and BM. Later, developed more N/V and NG placed.  * On 12/14, WBC elevated. Repeat abdominal CT showed prior appendectomy with findings suggestive of peritonitis, developing rim enhancing pelvic fluid collections, abscesses possible; increasing dilation of proximal small bowel with transition point  in the right lower quadrant, suggestive of partial mechanical obstruction, developing adhesion is possible, no evidence of nadine bowel ischemia or acute perforation; distended  gallbladder without additional CT evidence of acute cholecystitis.  - Continue NPO; advance diet as able per Surgery  - start TPN 12/15  - Continue NG at LIS.  - Continue pipericillin-tazobactam (started 12/8).  - Continue IVF's   - Continue PRN IV hydromorphone; minimize opioids as able given ileus.  - Continue PRN anti-emetics for now.  - Continue to encourage regular ambulation as able.  - Appreciate Surgery help.     # Acute hypoxic respiratory failure, suspect multifactorial related to atelectasis, recent abdominal surgery, resolved.  # CARINA on home CPAP.  * Initial presentation as above. CXR at OSH negative.  * On 12/8, CXR showed new left basilar bandlike opacity, most likely atelectasis.   * On 12/11, down to 2L O2.  * On 12/14, off O2.  - Continue CPAP while sleeping.  - Continue to encourage incentive spirometry.  - Continue PRN O2.     # FATIMAH on CKD3, suspect prerenal, meds (including sacubitril-valsartan, diuretics).  # Hyperkalemia, resolved.  * Baseline creatinine 1-1.1.   * Creatinine 1.39 --> 1.8 at OSH.  * Cr up to 2.27 and K up to 5.5 on 12/8.  * Cr and K subsequently improved with IVF's.  * On IV furosemide 12/10-12/11.  - Continue IVF's.  - Continue to monitor BMP - repeat in am.  - Avoid nephrotoxic medications.     # Troponin elevation, suspect demand ischemia (type 2 NSTEMI).  # Hypertension (benign essential).  # CAD s/p CABG (1994), stenting (2002, 2003, 2005, 2021)  # Chronic HFrEF (systolic and diastolic), s/p ICD.  # HLD.  [PTA: carvedilol 12.5 mg BID; sacubitril-valsartan 49-51 mg BID; furosemide 20 mg daily.]  * Last cath 1/2021 at [facility] showed patent CLAUDIA-RCA, LIMA occluded, vein-diagonal patent with placement of stent,  LAD, 40% circumflex, RCA occluded. Last stress testing 7/2023 negative for ischemia. Echo 7/2023 showed LVEF 30-35%, global hypokinesis LV, moderate-severe cLVH, grade 1 diastolic dysfunction; RV systolic function mildly reduced.   * Initial presentation as above.  BNP in ED at 2613, troponin 70. EKG w/ nonspecific t-w changes.   * PTA sacubitril-valsartan and furosemide held on admit given FATIMAH.  * Started on IV furosemide in ICU 12/10, held 12/11.  * Meds subsequently restarted but held again 12/13 due to NPO.  - Continue to hold hold ASA, clopidogrel, atorvastatin, carvedilol, furosemide and sacubitril-valsartan for now.  - Continue to monitor i/o's, daily wts.     Anemia, suspect blood loss component.  * Hgb normal on initially evaluation.  * Underwent surgery 12/8 as above.  * Hgb down to 11.9 on 12/9.  - Continue to monitor CBC - repeat in am.  - Consider prbc transfusion if hgb </= 7.0 or if significant bleeding with hemodynamic instability or if symptomatic.     Thrombocytopenia, unclear etiology, possibly due to sepsis, medication effect or consumptive or other cause.  * Plts normal on initially evaluation.  * Plts subsequently down to 104K on 12/9  * Plts normalized 12/14  - Continue to monitor CBC - repeat in am.  - Consider platelet transfusion if needs a procedure and less than 50,000; or if less than 10,000.     Hypokalemia.  * Potassium low at times this hospitalization. Treated with replacement.  - Continue PRN K replacement.     Hx CVA.  - Continue to hold ASA and clopidogrel for now.  - Continue to hold atorvastatin.     Depression/anxiety.  - Continue to hold gabapentin for now.  - resume escitalopram      Hypothyroidism.  - Continue levothyroxine.     GERD.  - Continue pantoprazole IV.     BPH.  - Continue to hold finasteride for now.     Migraines.  * Gets botox.   - Continue to monitor clinically.    Gallbladder distention  *noted on CT abd 12/14  *abd usg 12/15 w/o evidence of cholecystitis  - monitor            Diet: NPO for Medical/Clinical Reasons Except for: Ice Chips, Meds    DVT Prophylaxis: Enoxaparin (Lovenox) SQ  Hameed Catheter: Not present  Lines: None     Cardiac Monitoring: None  Code Status: Full Code      Clinically Significant Risk Factors  "       # Hypokalemia: Lowest K = 3.2 mmol/L in last 2 days, will replace as needed       # Hypoalbuminemia: Lowest albumin = 2.8 g/dL at 12/11/2023  5:26 AM, will monitor as appropriate     # Hypertension: Noted on problem list        # Overweight: Estimated body mass index is 28.14 kg/m  as calculated from the following:    Height as of this encounter: 1.702 m (5' 7\").    Weight as of this encounter: 81.5 kg (179 lb 10.8 oz).             Disposition Plan     Expected Discharge Date: 12/16/2023        Discharge Comments: OR 12/8            Az Turpin MD  Hospitalist Service  [facility]  Securely message with [email or web address] (more info)  Text page via [employer] Paging/Directory   ______________________________________________________________________    Interval History   Patient mental status worse today. BM overnight. Still NPO due to ileus. Fair amount of fluid still in bowels. Ongoing brisk output from NG. Discussed with Surgery Dr. Coon, continue NG, for now start TPN. Discussed with family. Discussed r/b of TPN, including risk of infection and pancreatitis. They are in agreement that since patient has been NPO since last Friday but essentially since Wednesday given poor PO prior to presentation, we need to start nutrition.     Discussed RUQ tenderness and ultrasound.     Discussed with RD and PharmD and RN.     Physical Exam   Vital Signs: Temp: 98.6  F (37  C) Temp src: Axillary BP: (!) 151/75 Pulse: 83   Resp: 20 SpO2: 94 % O2 Device: None (Room air)    Weight: 179 lbs 10.8 oz    Constitutional: Awake, alert to self and time, cooperative, no apparent distress  Respiratory: Clear to auscultation bilaterally, no crackles or wheezing  Cardiovascular: Regular rate and rhythm, normal S1 and S2, and no murmur noted  GI: Normal bowel sounds, soft, non-distended, mild tenderness, most so in RUQ.  Skin/Integumen: No rashes, no cyanosis, no edema  Other: Alert but a bit confused. Delayed or no " answer for many questions      Medical Decision Making       55 MINUTES SPENT BY ME on the date of service doing chart review, history, exam, documentation & further activities per the note.      Data   ------------------------- PAST 24 HR DATA REVIEWED -----------------------------------------------    I have personally reviewed the following data over the past 24 hrs:    17.7 (H)  \   13.9   / 185     143 112 (H) 17.7 /  174 (H)   3.2 (L) 20 (L) 1.15 \       Imaging results reviewed over the past 24 hrs:   Recent Results (from the past 24 hour(s))   US Abdomen Limited    Narrative    US ABDOMEN LIMITED 12/15/2023 12:00 PM    CLINICAL HISTORY: Distended gallbladder.    TECHNIQUE: Limited abdominal ultrasound.    COMPARISON: CT 12/14/2023    FINDINGS:    GALLBLADDER: The gallbladder is mildly distended and there is sludge  in the gallbladder lumen. No gallbladder wall thickening or  pericholecystic fluid. Negative sonographic Shields's sign.    BILE DUCTS: There is no biliary dilatation. The common duct measures  3mm.    LIVER: Normal where seen.    RIGHT KIDNEY: No hydronephrosis. Benign cysts.    PANCREAS: The visualized portions of the pancreas are normal.    No ascites.      Impression    IMPRESSION: Gallbladder is mildly distended and contains sludge. No  signs of cholecystitis.

## 2023-12-15 NOTE — CARE PLAN
Orientations: A&Ox3-4 w/confusion/agitation d/t delirium (haldol given x2 for agitation)  Vitals/Pain: VSS on RA. Complains of pain relieved w/dilaudid x2   Tele: SR w/inverted Twave and PVCs  Lines/Drains: 2 PIVS (1 infusing D5 1/2NS @75ml/hr,   ESTEFANY drain no OUP leaking w/ copious drainage, NG tube to LIS w/ brown bile OUP)  Skin/Wounds: skin tear on R forearm dressing CDI, ESTEFANY drain dressing changed x3  GI/: voiding (inc/external cath use), ++BM loose stools, passing gas, NPO maintained   Labs: Abnormal/Trends, Electrolyte Replacement- k 3.2  Ambulation/Assist: A2 turn and pivot  Sleep Quality: restless/agitated and confused most of night (very poor sleep)  Plan: RRT called for increased confusion, slurred speech and slight right side droop, see MD note, got Haldol PRN, would suggest another option for anxiety.

## 2023-12-15 NOTE — SIGNIFICANT EVENT
Significant Event Note    Time of event: 1:59 AM December 15, 2023    Description of event:  Paged for severe delirium and agitation.    Plan:  0.5 mg IV haldol given without effect; 1 mg then given IV and now he is resting peacefully per nursing staff. We continue to monitor him very closely overnight.    Discussed with: bedside nurse    Lm Vaz MD

## 2023-12-16 ENCOUNTER — APPOINTMENT (OUTPATIENT)
Dept: GENERAL RADIOLOGY | Facility: CLINIC | Age: 88
DRG: 853 | End: 2023-12-16
Attending: STUDENT IN AN ORGANIZED HEALTH CARE EDUCATION/TRAINING PROGRAM
Payer: MEDICARE

## 2023-12-16 ENCOUNTER — APPOINTMENT (OUTPATIENT)
Dept: GENERAL RADIOLOGY | Facility: CLINIC | Age: 88
DRG: 853 | End: 2023-12-16
Attending: HOSPITALIST
Payer: MEDICARE

## 2023-12-16 LAB
ANION GAP SERPL CALCULATED.3IONS-SCNC: 13 MMOL/L (ref 7–15)
BUN SERPL-MCNC: 15.4 MG/DL (ref 8–23)
CALCIUM SERPL-MCNC: 8.5 MG/DL (ref 8.8–10.2)
CHLORIDE SERPL-SCNC: 112 MMOL/L (ref 98–107)
CREAT SERPL-MCNC: 1.08 MG/DL (ref 0.67–1.17)
DEPRECATED HCO3 PLAS-SCNC: 17 MMOL/L (ref 22–29)
EGFRCR SERPLBLD CKD-EPI 2021: 67 ML/MIN/1.73M2
GLUCOSE BLDC GLUCOMTR-MCNC: 181 MG/DL (ref 70–99)
GLUCOSE BLDC GLUCOMTR-MCNC: 185 MG/DL (ref 70–99)
GLUCOSE BLDC GLUCOMTR-MCNC: 192 MG/DL (ref 70–99)
GLUCOSE BLDC GLUCOMTR-MCNC: 206 MG/DL (ref 70–99)
GLUCOSE BLDC GLUCOMTR-MCNC: 216 MG/DL (ref 70–99)
GLUCOSE BLDC GLUCOMTR-MCNC: 220 MG/DL (ref 70–99)
GLUCOSE SERPL-MCNC: 230 MG/DL (ref 70–99)
MAGNESIUM SERPL-MCNC: 2.2 MG/DL (ref 1.7–2.3)
PHOSPHATE SERPL-MCNC: 2.1 MG/DL (ref 2.5–4.5)
POTASSIUM SERPL-SCNC: 3.8 MMOL/L (ref 3.4–5.3)
POTASSIUM SERPL-SCNC: 3.9 MMOL/L (ref 3.4–5.3)
SODIUM SERPL-SCNC: 142 MMOL/L (ref 135–145)

## 2023-12-16 PROCEDURE — 250N000009 HC RX 250: Performed by: HOSPITALIST

## 2023-12-16 PROCEDURE — 250N000009 HC RX 250: Performed by: INTERNAL MEDICINE

## 2023-12-16 PROCEDURE — 250N000012 HC RX MED GY IP 250 OP 636 PS 637: Performed by: STUDENT IN AN ORGANIZED HEALTH CARE EDUCATION/TRAINING PROGRAM

## 2023-12-16 PROCEDURE — 250N000011 HC RX IP 250 OP 636: Performed by: INTERNAL MEDICINE

## 2023-12-16 PROCEDURE — 93005 ELECTROCARDIOGRAM TRACING: CPT

## 2023-12-16 PROCEDURE — 250N000009 HC RX 250

## 2023-12-16 PROCEDURE — 71045 X-RAY EXAM CHEST 1 VIEW: CPT

## 2023-12-16 PROCEDURE — C9113 INJ PANTOPRAZOLE SODIUM, VIA: HCPCS | Performed by: INTERNAL MEDICINE

## 2023-12-16 PROCEDURE — 94640 AIRWAY INHALATION TREATMENT: CPT

## 2023-12-16 PROCEDURE — 80048 BASIC METABOLIC PNL TOTAL CA: CPT | Performed by: STUDENT IN AN ORGANIZED HEALTH CARE EDUCATION/TRAINING PROGRAM

## 2023-12-16 PROCEDURE — 74018 RADEX ABDOMEN 1 VIEW: CPT

## 2023-12-16 PROCEDURE — 94640 AIRWAY INHALATION TREATMENT: CPT | Mod: 76

## 2023-12-16 PROCEDURE — 250N000011 HC RX IP 250 OP 636: Mod: JZ | Performed by: STUDENT IN AN ORGANIZED HEALTH CARE EDUCATION/TRAINING PROGRAM

## 2023-12-16 PROCEDURE — 250N000013 HC RX MED GY IP 250 OP 250 PS 637: Performed by: STUDENT IN AN ORGANIZED HEALTH CARE EDUCATION/TRAINING PROGRAM

## 2023-12-16 PROCEDURE — 36415 COLL VENOUS BLD VENIPUNCTURE: CPT | Performed by: INTERNAL MEDICINE

## 2023-12-16 PROCEDURE — 250N000009 HC RX 250: Performed by: STUDENT IN AN ORGANIZED HEALTH CARE EDUCATION/TRAINING PROGRAM

## 2023-12-16 PROCEDURE — 83735 ASSAY OF MAGNESIUM: CPT | Performed by: INTERNAL MEDICINE

## 2023-12-16 PROCEDURE — 250N000011 HC RX IP 250 OP 636: Mod: JZ | Performed by: INTERNAL MEDICINE

## 2023-12-16 PROCEDURE — 250N000011 HC RX IP 250 OP 636: Mod: JZ | Performed by: HOSPITALIST

## 2023-12-16 PROCEDURE — B4185 PARENTERAL SOL 10 GM LIPIDS: HCPCS | Mod: JZ | Performed by: STUDENT IN AN ORGANIZED HEALTH CARE EDUCATION/TRAINING PROGRAM

## 2023-12-16 PROCEDURE — 93010 ELECTROCARDIOGRAM REPORT: CPT | Performed by: INTERNAL MEDICINE

## 2023-12-16 PROCEDURE — 84132 ASSAY OF SERUM POTASSIUM: CPT | Performed by: INTERNAL MEDICINE

## 2023-12-16 PROCEDURE — 120N000001 HC R&B MED SURG/OB

## 2023-12-16 PROCEDURE — 99233 SBSQ HOSP IP/OBS HIGH 50: CPT | Performed by: STUDENT IN AN ORGANIZED HEALTH CARE EDUCATION/TRAINING PROGRAM

## 2023-12-16 PROCEDURE — 84100 ASSAY OF PHOSPHORUS: CPT

## 2023-12-16 PROCEDURE — 999N000157 HC STATISTIC RCP TIME EA 10 MIN

## 2023-12-16 RX ORDER — FUROSEMIDE 10 MG/ML
20 INJECTION INTRAMUSCULAR; INTRAVENOUS ONCE
Status: COMPLETED | OUTPATIENT
Start: 2023-12-16 | End: 2023-12-16

## 2023-12-16 RX ORDER — METOPROLOL TARTRATE 1 MG/ML
2.5 INJECTION, SOLUTION INTRAVENOUS EVERY 4 HOURS PRN
Status: DISCONTINUED | OUTPATIENT
Start: 2023-12-16 | End: 2024-01-03 | Stop reason: HOSPADM

## 2023-12-16 RX ORDER — IPRATROPIUM BROMIDE AND ALBUTEROL SULFATE 2.5; .5 MG/3ML; MG/3ML
3 SOLUTION RESPIRATORY (INHALATION) EVERY 4 HOURS PRN
Status: DISCONTINUED | OUTPATIENT
Start: 2023-12-16 | End: 2024-01-03 | Stop reason: HOSPADM

## 2023-12-16 RX ORDER — POTASSIUM CHLORIDE 7.45 MG/ML
10 INJECTION INTRAVENOUS
Status: COMPLETED | OUTPATIENT
Start: 2023-12-16 | End: 2023-12-16

## 2023-12-16 RX ADMIN — INSULIN ASPART 1 UNITS: 100 INJECTION, SOLUTION INTRAVENOUS; SUBCUTANEOUS at 04:02

## 2023-12-16 RX ADMIN — IPRATROPIUM BROMIDE AND ALBUTEROL SULFATE 3 ML: .5; 3 SOLUTION RESPIRATORY (INHALATION) at 01:06

## 2023-12-16 RX ADMIN — INSULIN ASPART 2 UNITS: 100 INJECTION, SOLUTION INTRAVENOUS; SUBCUTANEOUS at 00:29

## 2023-12-16 RX ADMIN — POTASSIUM CHLORIDE 10 MEQ: 7.46 INJECTION, SOLUTION INTRAVENOUS at 08:47

## 2023-12-16 RX ADMIN — INSULIN ASPART 2 UNITS: 100 INJECTION, SOLUTION INTRAVENOUS; SUBCUTANEOUS at 08:46

## 2023-12-16 RX ADMIN — IPRATROPIUM BROMIDE AND ALBUTEROL SULFATE 3 ML: .5; 3 SOLUTION RESPIRATORY (INHALATION) at 05:58

## 2023-12-16 RX ADMIN — HALOPERIDOL LACTATE 1 MG: 5 INJECTION, SOLUTION INTRAMUSCULAR at 21:57

## 2023-12-16 RX ADMIN — LEVOTHYROXINE SODIUM 75 MCG: 20 INJECTION, SOLUTION INTRAVENOUS at 14:26

## 2023-12-16 RX ADMIN — PIPERACILLIN AND TAZOBACTAM 4.5 G: 4; .5 INJECTION, POWDER, FOR SOLUTION INTRAVENOUS at 21:30

## 2023-12-16 RX ADMIN — ENOXAPARIN SODIUM 40 MG: 40 INJECTION SUBCUTANEOUS at 11:58

## 2023-12-16 RX ADMIN — LATANOPROST 1 DROP: 50 SOLUTION/ DROPS OPHTHALMIC at 21:30

## 2023-12-16 RX ADMIN — POTASSIUM CHLORIDE 10 MEQ: 7.46 INJECTION, SOLUTION INTRAVENOUS at 08:55

## 2023-12-16 RX ADMIN — ASCORBIC ACID, VITAMIN A PALMITATE, CHOLECALCIFEROL, THIAMINE HYDROCHLORIDE, RIBOFLAVIN-5 PHOSPHATE SODIUM, PYRIDOXINE HYDROCHLORIDE, NIACINAMIDE, DEXPANTHENOL, ALPHA-TOCOPHEROL ACETATE, VITAMIN K1, FOLIC ACID, BIOTIN, CYANOCOBALAMIN: 200; 3300; 200; 6; 3.6; 6; 40; 15; 10; 150; 600; 60; 5 INJECTION, SOLUTION INTRAVENOUS at 19:55

## 2023-12-16 RX ADMIN — CARVEDILOL 12.5 MG: 12.5 TABLET, FILM COATED ORAL at 10:10

## 2023-12-16 RX ADMIN — ESCITALOPRAM OXALATE 20 MG: 10 TABLET ORAL at 08:25

## 2023-12-16 RX ADMIN — PANTOPRAZOLE SODIUM 40 MG: 40 INJECTION, POWDER, FOR SOLUTION INTRAVENOUS at 08:46

## 2023-12-16 RX ADMIN — ASCORBIC ACID, VITAMIN A PALMITATE, CHOLECALCIFEROL, THIAMINE HYDROCHLORIDE, RIBOFLAVIN-5 PHOSPHATE SODIUM, PYRIDOXINE HYDROCHLORIDE, NIACINAMIDE, DEXPANTHENOL, ALPHA-TOCOPHEROL ACETATE, VITAMIN K1, FOLIC ACID, BIOTIN, CYANOCOBALAMIN: 200; 3300; 200; 6; 3.6; 6; 40; 15; 10; 150; 600; 60; 5 INJECTION, SOLUTION INTRAVENOUS at 19:57

## 2023-12-16 RX ADMIN — HYDROMORPHONE HYDROCHLORIDE 0.2 MG: 0.2 INJECTION, SOLUTION INTRAMUSCULAR; INTRAVENOUS; SUBCUTANEOUS at 14:26

## 2023-12-16 RX ADMIN — PIPERACILLIN AND TAZOBACTAM 4.5 G: 4; .5 INJECTION, POWDER, FOR SOLUTION INTRAVENOUS at 03:56

## 2023-12-16 RX ADMIN — FUROSEMIDE 20 MG: 10 INJECTION, SOLUTION INTRAMUSCULAR; INTRAVENOUS at 05:51

## 2023-12-16 RX ADMIN — CARVEDILOL 12.5 MG: 12.5 TABLET, FILM COATED ORAL at 19:53

## 2023-12-16 RX ADMIN — POTASSIUM CHLORIDE 10 MEQ: 7.46 INJECTION, SOLUTION INTRAVENOUS at 00:02

## 2023-12-16 RX ADMIN — HALOPERIDOL LACTATE 0.5 MG: 5 INJECTION, SOLUTION INTRAMUSCULAR at 10:20

## 2023-12-16 RX ADMIN — POTASSIUM CHLORIDE 10 MEQ: 7.46 INJECTION, SOLUTION INTRAVENOUS at 02:52

## 2023-12-16 RX ADMIN — POTASSIUM CHLORIDE 10 MEQ: 7.46 INJECTION, SOLUTION INTRAVENOUS at 02:03

## 2023-12-16 RX ADMIN — PIPERACILLIN AND TAZOBACTAM 4.5 G: 4; .5 INJECTION, POWDER, FOR SOLUTION INTRAVENOUS at 16:14

## 2023-12-16 RX ADMIN — PIPERACILLIN AND TAZOBACTAM 4.5 G: 4; .5 INJECTION, POWDER, FOR SOLUTION INTRAVENOUS at 11:07

## 2023-12-16 RX ADMIN — METOPROLOL TARTRATE 2.5 MG: 5 INJECTION INTRAVENOUS at 11:07

## 2023-12-16 RX ADMIN — OLIVE OIL AND SOYBEAN OIL 250 ML: 16; 4 INJECTION, EMULSION INTRAVENOUS at 19:53

## 2023-12-16 ASSESSMENT — ACTIVITIES OF DAILY LIVING (ADL)
ADLS_ACUITY_SCORE: 40
ADLS_ACUITY_SCORE: 38
ADLS_ACUITY_SCORE: 40

## 2023-12-16 NOTE — PLAN OF CARE
"Goal Outcome Evaluation:    Orientations: A/O x 4 ex; quite delirious. Inattention, forgetful. Repeats questions/requests  Vitals/Pain: VSS on RA. C/o abdominal pain. PRN dilaudid given x 1. Denies nausea. PRN haldol given x 1 for agitation.   -NPO ex; ice chips  Tele: SR w/ PVC's  Went into Afib RVR in AM. PRN metoprolol given x 1 for HR > 120  Lines/Drains: NG to LI suction - marked at 62 cm. Completed standard 4hr clamping trial  PICC to LUE - TPN infusing at 40ml/hr  Skin/Wounds: Skin tear to R elbow w/ foam dressing in place, CDI. Original ESTEFANY drain with copious drainage, dressing changed, CDI. New ESTEFANY drain dressing CDI - irrigated x 2  GI/: Purewick in place w/ AUOP. No BM, passing flatus  Labs: Abnormal/Trends, Electrolyte Replacement- K replaced, recheck in AM  BG q4h  Ambulation/Assist: A x 2 GB/W  Sleep Quality: poor  Plan: Increase TPN rate to 60ml/hr at 2000, X-ray abdomen pending    Notified MD that pt made very concerning comment, asked writer \"Do you have any shotgun shells?\" Then proceeded to gesture that he wanted to shoot himself. Pt verbalized that they are sad. Continue to monitor very closely.           "

## 2023-12-16 NOTE — PROVIDER NOTIFICATION
MD Notification    Notified Person: MD    Notified Person Name: Mariusz PAGE    Notification Date/Time: 2304, 12/15/23    Notification Interaction: Katiana    Purpose of Notification: Hi! Patient just had an 18beats VT run, BP is 141/111, HR is in the 100s. Also his ESTEFANY drains are leaking and not properly draining. just did a dressing change.    Orders Received: Electrolyte protocol replacement (Mag & Potassium)    Comments:

## 2023-12-16 NOTE — PROCEDURES
[facility]    Double Lumen PICC Placement    Date/Time: 12/15/2023 6:25 PM    Performed by: Brandt Ruiz RN  Authorized by: Carlos Shaw MD  Indications: vascular access      UNIVERSAL PROTOCOL   Site Marked: Yes  Prior Images Obtained and Reviewed:  Yes  Required items: Required blood products, implants, devices and special equipment available    Patient identity confirmed:  Verbally with patient, arm band and hospital-assigned identification number  NA - No sedation, light sedation, or local anesthesia  Confirmation Checklist:  Patient's identity using two indicators, relevant allergies, procedure was appropriate and matched the consent or emergent situation and correct equipment/implants were available  Time out: Immediately prior to the procedure a time out was called    Universal Protocol: the Joint Commission Universal Protocol was followed    Preparation: Patient was prepped and draped in usual sterile fashion       ANESTHESIA    Local Anesthetic:  Lidocaine 1% without epinephrine  Anesthetic Total (mL):  2      SEDATION    Patient Sedated: No        Skin prep agent: skin prep agent completely dried prior to procedure  Sterile barriers: maximum sterile barriers were used: cap, mask, sterile gown, sterile gloves, and large sterile sheet  Hand hygiene: hand hygiene performed prior to central venous catheter insertion  Type of line used: PICC  Catheter type: double lumen  Lumen type: valved and power PICC  Lumen Identification: Purple and Red  Brand: Bard  Lot number: [id number]  Placement method: venipuncture, MST and ultrasound  Number of attempts: 1  Difficulty threading catheter: yes  Successful placement: yes  Orientation: left    Location: brachial vein (medial)  Arm circumference: adults 10 cm  Extremity circumference: 27  Visible catheter length: 0  Total catheter length: 38  Internal Lumen Volume: 38 mL    Dressing and securement: chlorhexidine patch applied,  statlock, transparent dressing, transparent securement dressing, sterile dressing applied, site cleansed and line secured  Post procedure assessment: placement verified by x-ray and blood return through all ports  PROCEDURE   Patient Tolerance:  Patient tolerated the procedure well with no immediate complicationsDescribe Procedure: Nursing note  Successfully placed double lumen PICC on the left brachial vein on one attempt with good blood return noted. CXR confirmed the tip of the PICC is in the   Disposal: sharps and needle count correct at the end of procedure, needles and guidewire disposed in sharps container

## 2023-12-16 NOTE — SIGNIFICANT EVENT
Significant Event Note    Time of event: 12:38 AM December 16, 2023    Description of event:  Paged for increased dyspnea and wheezing; oxygenation needs being stable. IV fluid held.    Plan:  Nebulizer given with improvement. CXR done and pending.    0530 addendum: CXR image reviewed by me and appears to show cardiomegaly and possibly some patchy opacifications consistent with pulmonary edema. A dose of IV Lasix is ordered.    Discussed with: bedside nurse    Lm Vaz MD

## 2023-12-16 NOTE — PLAN OF CARE
"Goal Outcome Evaluation:    Orientations: A/O x 4 ex; intermittent confusion. Inattention, repetitive request  Vitals/Pain: VSS on RA. Denies pain,  PRN  Haldol given x 1 for mild agitation. PRN neb given x2 for SOB and expiratory wheezing Denies nausea.   -NPO ex ice-chips  Tele: SR w/ frequent PVC's  Lines/Drains: ESTEFANY drain x 2, PICC placed to LUE. PIV x 2 L arm. TPN infusing @ 40mL/hr and Lipids infusing @ 20mL/hr alongside.  NG to low intermittent suction  Skin/Wounds: Skin tear to R elbow with foam dressing in place, CDI. Original ESTEFANY drain leaking, dressing changed x 2. 2nd ESTEFANY drain dressing CDI  GI/: incontinent unmeasured UO X2 . BM x 3. Passing flatus.   Labs: Abnormal/Trends, Electrolyte Replacement- K replaced, Mag replaced. Recheck this morning. BG >140, rapid insulin given x2.  Ambulation/Assist: A x 2 GB/W  Sleep Quality: poor  Plan: TPN started @ 2000. Chx xray done this morning for increased tachycardia, SOB and expiratory wheezing. PRN nebs added to medication list and a one time dose of lasix given for increased fluids.    Also pt had 18 beats of V-runs yesterday @ 2308, provider was notified and electrolytes replacement orders were received and given.      Patient also made a comment stating \"if I had a gun I would shoot myself in the head right now\". Continue to monitor patient very closely.  "

## 2023-12-16 NOTE — PROGRESS NOTES
MD Notification    Notified Person: MD    Notified Person Name: MD Turpin    Notification Date/Time: 0742 12/16    Notification Interaction: Vocera    Purpose of Notification: Pts HR was in 140s a couple minutes ago. Looks like Afib. I just released an EKG. HR currently between 105-115. Pt is quite delirious. Please advise. Thx!    Orders Received: PRN IV metoprolol ordered, coreg unheld    Comments: Spoke with MD Turpin. Clamped NG at 0920. Will administer coreg and escitalopram through NG, completing standard 4 hour clamping trial

## 2023-12-16 NOTE — PROGRESS NOTES
Patient denies significant discomfort or pain.  Reports that he is passing flatus and had bowel movement.  No nausea or vomiting.    Afebrile with normal vital signs  Abdomen is full somewhat tympanitic nontender, drainage tube with serous drainage.    Assessment/plan: Complex perforated appendicitis  Status post IR drainage of pelvic fluid collection  Continue TPN  Agree with clamping trials of nasogastric tube.  Encouraged ambulation.  Son at the bedside and updated.

## 2023-12-16 NOTE — PROGRESS NOTES
"MD Notification    Notified Person: MD    Notified Person Name: MD Turpin    Notification Date/Time: 1219 12/16    Notification Interaction: He is C/o some abdominal pain. Keeps saying ouch. When I ask where the pain is, or what it feels like he just says \"I don't know\".  Would the clamping trial cause this? I know he has PRN dilaudid, but RRT notes say to use caution with dilaudid. Is this okay to give? Thx!    Purpose of Notification:    Orders Received: Xray abdomen    Comments: \"Return to LI suction and monitor abdominal pain\"  "

## 2023-12-16 NOTE — PROGRESS NOTES
[facility]    Medicine Progress Note - Hospitalist Service    Date of Admission:  12/8/2023    Assessment & Plan   Garry Mckay is a 88 year old male with PMH of hypertension; CAD; CHF (ischemic cardiomyopathy, combined systolic and diastolic), s/p ICD; PAD; dyslipidemia; CARINA on CPAP; anxiety/depression; BPH; hypothyroidism; and CKD stage III; who presented to OSH 12/7/2023 with abdominal pain and found with appendicitis with peritonitis with sepsis. Transferred to [facility] 12/8/2023.      Underwent surgery on 12/8/2023 and required ICU management for septic shock. Ultimately improved, and Hospitalist service assumed care on 12/11/2023.        # Acute appendicitis with peritonitis and septic shock - s/p laparoscopic appendectomy 12/8/2023.  # Pelvic fluid collections, question abscesses.  # Postoperative ileus vs SBO.  # Nutrition.  * Initial presentation to OSH 12/7 as above. CT abdomen noted with acute appendicitis with 0.7 cm appendicolith, no abscess. Started on ampicillin-sulbactam. Subsequently transferred to [facility] 12/8.  * On 12/8, underwent above surgery. Weaned off pressors. Antibiotics changed to pipericillin-tazobactam.  * On 12/10, CT abdomen noted with multiple dilated small bowel loops with related decompression of colon with findings consistent with partial obstruction/ adynamic ileus.  * On 12/11, pt w/o N/V.   * On 12/12, diet advanced to clears as pt seemed to be improving with +flatus and BM. Later, developed more N/V and NG placed.  * On 12/14, WBC elevated. Repeat abdominal CT showed prior appendectomy with findings suggestive of peritonitis, developing rim enhancing pelvic fluid collections, abscesses possible; increasing dilation of proximal small bowel with transition point  in the right lower quadrant, suggestive of partial mechanical obstruction, developing adhesion is possible, no evidence of nadine bowel ischemia or acute perforation; distended  gallbladder without additional CT evidence of acute cholecystitis.  - Continue NPO; advance diet as able per Surgery  - start TPN 12/15  - Continue NG at LIS.  - Continue pipericillin-tazobactam (started 12/8).  - Continue IVF's   - Continue PRN IV hydromorphone; minimize opioids as able given ileus.  - Continue PRN anti-emetics for now.  - Continue to encourage regular ambulation as able.  - Appreciate Surgery help.     # Acute hypoxic respiratory failure, suspect multifactorial related to atelectasis, recent abdominal surgery, resolved.  # CARINA on home CPAP.  * Initial presentation as above. CXR at OSH negative.  * On 12/8, CXR showed new left basilar bandlike opacity, most likely atelectasis.   * On 12/11, down to 2L O2.  * On 12/14, off O2.  - Continue CPAP while sleeping.  - Continue to encourage incentive spirometry.  - Continue PRN O2.     # FATIMAH on CKD3, suspect prerenal, meds (including sacubitril-valsartan, diuretics).  # Hyperkalemia, resolved.  * Baseline creatinine 1-1.1.   * Creatinine 1.39 --> 1.8 at OSH.  * Cr up to 2.27 and K up to 5.5 on 12/8.  * Cr and K subsequently improved with IVF's.  * On IV furosemide 12/10-12/11.  - Continue IVF's.  - Continue to monitor BMP - repeat in am.  - Avoid nephrotoxic medications.     # Troponin elevation, suspect demand ischemia (type 2 NSTEMI).  # Hypertension (benign essential).  # CAD s/p CABG (1994), stenting (2002, 2003, 2005, 2021)  # Chronic HFrEF (systolic and diastolic), s/p ICD.  # HLD.  # Afib with RVR  [PTA: carvedilol 12.5 mg BID; sacubitril-valsartan 49-51 mg BID; furosemide 20 mg daily.]  * Last cath 1/2021 at [facility] showed patent [id number], LIMA occluded, vein-diagonal patent with placement of stent,  LAD, 40% circumflex, RCA occluded. Last stress testing 7/2023 negative for ischemia. Echo 7/2023 showed LVEF 30-35%, global hypokinesis LV, moderate-severe cLVH, grade 1 diastolic dysfunction; RV systolic function mildly reduced.   * Initial  presentation as above. BNP in ED at 2613, troponin 70. EKG w/ nonspecific t-w changes.   * PTA sacubitril-valsartan and furosemide held on admit given FATIMAH.  * Started on IV furosemide in ICU 12/10, held 12/11.  * Meds subsequently restarted but held again 12/13 due to NPO.  - Continue to hold hold ASA, clopidogrel, atorvastatin, furosemide and sacubitril-valsartan for now.  - resume coreg  - PRN IV metoprolol  - Continue to monitor i/o's, daily wts.     Anemia, suspect blood loss component.  * Hgb normal on initially evaluation.  * Underwent surgery 12/8 as above.  * Hgb down to 11.9 on 12/9.  - Continue to monitor CBC - repeat in am.  - Consider prbc transfusion if hgb </= 7.0 or if significant bleeding with hemodynamic instability or if symptomatic.     Thrombocytopenia, unclear etiology, possibly due to sepsis, medication effect or consumptive or other cause.  * Plts normal on initially evaluation.  * Plts subsequently down to 104K on 12/9  * Plts normalized 12/14  - Continue to monitor CBC - repeat in am.  - Consider platelet transfusion if needs a procedure and less than 50,000; or if less than 10,000.     Hypokalemia.  * Potassium low at times this hospitalization. Treated with replacement.  - Continue PRN K replacement.     Hx CVA.  - Continue to hold ASA and clopidogrel for now.  - Continue to hold atorvastatin.     Depression/anxiety.  - Continue to hold gabapentin for now.  - resume escitalopram      Hypothyroidism.  - Continue levothyroxine.     GERD.  - Continue pantoprazole IV.     BPH.  - Continue to hold finasteride for now.     Migraines.  * Gets botox.   - Continue to monitor clinically.    Gallbladder distention  *noted on CT abd 12/14  *abd usg 12/15 w/o evidence of cholecystitis  - monitor            Diet: NPO for Medical/Clinical Reasons Except for: Ice Chips, Meds  parenteral nutrition - Clinimix E  parenteral nutrition - Clinimix E    DVT Prophylaxis: Enoxaparin (Lovenox) SQ  Hameed Catheter:  "Not present  Lines: PRESENT      PICC 12/15/23 Double Lumen Left Brachial vein medial TPN-Site Assessment: WDL    Cardiac Monitoring: None  Code Status: Full Code      Clinically Significant Risk Factors        # Hypokalemia: Lowest K = 3.2 mmol/L in last 2 days, will replace as needed       # Hypoalbuminemia: Lowest albumin = 2.8 g/dL at 12/11/2023  5:26 AM, will monitor as appropriate     # Hypertension: Noted on problem list        # Overweight: Estimated body mass index is 27.83 kg/m  as calculated from the following:    Height as of this encounter: 1.702 m (5' 7\").    Weight as of this encounter: 80.6 kg (177 lb 11.1 oz).             Disposition Plan      Expected Discharge Date: 12/18/2023        Discharge Comments: OR 12/8 12/16 CT guided drainage, JPs leaking.  Confused and agitated at times.            Az Turpin MD  Hospitalist Service  [facility]  Securely message with [employer] (more info)  Text page via [employer] Paging/Directory   ______________________________________________________________________    Interval History   Some dyspnea overnight. CXR shows some mild vasc congestion, improved after neb. Got some lasix.   Started TPN yesterday evening.   RUQ usg negative  Had pelvic drain placed  Worse mental status. Did a clamping trial, 100 out after 4 hours. Belly is still pretty distended. After discussing with Dr. Radford will keep NG for now.   Developed Afib RVR. Gave home coreg with good response  Started TPN overnight.   Got his escitalopram this morning.    Increased abd pain in the afternoon. AXR ordered, pictures look un-interpretable on this computer, will have to await rads read    Discussed with son at bedside.     Physical Exam   Vital Signs: Temp: 98.5  F (36.9  C) Temp src: Oral BP: (!) 140/92 Pulse: 84   Resp: 24 SpO2: 95 % O2 Device: None (Room air) Oxygen Delivery: 9 LPM  Weight: 177 lbs 11.05 oz    Constitutional: Awake, alert to self and time, cooperative, " no apparent distress  Respiratory: Clear to auscultation bilaterally, no crackles or wheezing  Cardiovascular: Regular rate and rhythm, normal S1 and S2, and no murmur noted  GI: Normal bowel sounds, soft, non-distended, mild tenderness, most so in RUQ.  Skin/Integumen: No rashes, no cyanosis, no edema  Other: Alert but a bit confused. Delayed or no answer for many questions      Medical Decision Making       51 MINUTES SPENT BY ME on the date of service doing chart review, history, exam, documentation & further activities per the note.      Data   ------------------------- PAST 24 HR DATA REVIEWED -----------------------------------------------    I have personally reviewed the following data over the past 24 hrs:    N/A  \   N/A   / N/A     142 112 (H) 15.4 /  216 (H)   3.8; 3.9 17 (L) 1.08 \       Imaging results reviewed over the past 24 hrs:   Recent Results (from the past 24 hour(s))   XR Chest Port 1 View    Narrative    EXAM: XR CHEST PORT 1 VIEW  LOCATION: [facility]  DATE: 12/15/2023    INDICATION: PICC placement stat x ray per IV team  COMPARISON: 12/10/2023      Impression    IMPRESSION: Low lung volumes. Cardiomegaly. Congestion of the central vessels. Left lung base obscured by overlying defibrillator. Lungs otherwise appear clear. Prior median sternotomy. Left PICC line tip mid SVC, partially obscured by overlying   defibrillator wires.   XR Chest Port 1 View    Narrative    EXAM: XR CHEST PORT 1 VIEW  LOCATION: [facility]  DATE: 12/16/2023    INDICATION: Dyspnea and wheezing  COMPARISON: 12/15/2023      Impression    IMPRESSION: Cardiac enlargement. Cardiac leads. Coronary stent. Enteric tube. Mild vascular congestion. Atelectasis or infiltrate left lung base and small left effusion.

## 2023-12-16 NOTE — PLAN OF CARE
Goal Outcome Evaluation:    Orientations: A/O x 4 ex; delirious. Inattention, excessive call light use.   Vitals/Pain: VSS on RA. C/o pain in L hand. PRN dilaudid given x 1. Denies nausea.   -NPO  Tele: SR w/ frequent PVC's  Lines/Drains: ESTEFANY drain x 2, PICC placed to LUE. PIV x 2 L arm. 1/2 NS infusing @ 25 ml/hr  NG to low intermittent suction  Skin/Wounds: Skin tear to R elbow with foam dressing in place, CDI. Original ESTEFANY drain leaking, dressing changed x 1. 2nd ESTEFANY drain dressing CDI  GI/: AUOP with external cath in place. BM x 1. Passing flatus.   Labs: Abnormal/Trends, Electrolyte Replacement- K replaced, recheck 1900  Ambulation/Assist: A x 2 GB/W  Sleep Quality: poor  Plan: If possible, allow pt to get adequate rest tonight. Begin TPN @ 2000. US abd and CT guided aspiration completed today    Clarified with MD Rai that PICC placement to LUE is okay to use with ICD on upper L chest

## 2023-12-16 NOTE — PROGRESS NOTES
Got update on patient about his PICC line placement on his left arm which also has his ICD. Previous RN~Mirian MANJARREZ, informed me that she got an okay from the provider for the PICC to be left on the left arm with the ICD in place.

## 2023-12-16 NOTE — PROVIDER NOTIFICATION
MD Notification    Notified Person: MD    Notified Person Name: Milind JACKSON    Notification Date/Time: 12:39, 12/16/23    Notification Interaction: [employer]    Purpose of Notification: Stopped .45 NS infusing as patient is having expiratory wheezes and c/o SOB    Orders Received: Chx Xray, stop 1/2 NS, prn neb ordered.    Comments:

## 2023-12-16 NOTE — PLAN OF CARE
[id number]    Orientations: A&OX4, but fairly delirious asking repeated questions, excessive call light use, indecisiveness. Haldol given X1 this a.m. for increased agitation.   Vitals/Pain: VSS on RA. Denies pain  Tele: SR w/ frequent PVCs  Lines/Drains: L PIV X2. Pending PICC placement   Skin/Wounds: Skin tear on R forearm dressing CDI. Original ESTEFANY drain leaking from site, dressing changed X2. 2nd ESTEFANY drain placed today near R groin, dressing CDI.   GI/: Adequate UOP via external catheter, but leaking frequently. +BS, +flatus, BMX1. NPO with NG to LI suction.   Labs: Abnormal/Trends, Electrolyte Replacement- K 3.2--replaced per protocol.   Ambulation/Assist: A2 GBW  Plan: US Abd & CT guided aspiration completed today. CTM.

## 2023-12-17 ENCOUNTER — APPOINTMENT (OUTPATIENT)
Dept: GENERAL RADIOLOGY | Facility: CLINIC | Age: 88
DRG: 853 | End: 2023-12-17
Attending: SURGERY
Payer: MEDICARE

## 2023-12-17 LAB
ANION GAP SERPL CALCULATED.3IONS-SCNC: 10 MMOL/L (ref 7–15)
BUN SERPL-MCNC: 19.6 MG/DL (ref 8–23)
CALCIUM SERPL-MCNC: 8.5 MG/DL (ref 8.8–10.2)
CHLORIDE SERPL-SCNC: 110 MMOL/L (ref 98–107)
CREAT SERPL-MCNC: 1.11 MG/DL (ref 0.67–1.17)
DEPRECATED HCO3 PLAS-SCNC: 20 MMOL/L (ref 22–29)
EGFRCR SERPLBLD CKD-EPI 2021: 65 ML/MIN/1.73M2
ERYTHROCYTE [DISTWIDTH] IN BLOOD BY AUTOMATED COUNT: 14 % (ref 10–15)
GLUCOSE BLDC GLUCOMTR-MCNC: 173 MG/DL (ref 70–99)
GLUCOSE BLDC GLUCOMTR-MCNC: 194 MG/DL (ref 70–99)
GLUCOSE BLDC GLUCOMTR-MCNC: 206 MG/DL (ref 70–99)
GLUCOSE BLDC GLUCOMTR-MCNC: 212 MG/DL (ref 70–99)
GLUCOSE BLDC GLUCOMTR-MCNC: 215 MG/DL (ref 70–99)
GLUCOSE BLDC GLUCOMTR-MCNC: 215 MG/DL (ref 70–99)
GLUCOSE SERPL-MCNC: 200 MG/DL (ref 70–99)
HCT VFR BLD AUTO: 41.3 % (ref 40–53)
HGB BLD-MCNC: 13.6 G/DL (ref 13.3–17.7)
MAGNESIUM SERPL-MCNC: 1.9 MG/DL (ref 1.7–2.3)
MAGNESIUM SERPL-MCNC: 2.2 MG/DL (ref 1.7–2.3)
MCH RBC QN AUTO: 32.7 PG (ref 26.5–33)
MCHC RBC AUTO-ENTMCNC: 32.9 G/DL (ref 31.5–36.5)
MCV RBC AUTO: 99 FL (ref 78–100)
PHOSPHATE SERPL-MCNC: 2.1 MG/DL (ref 2.5–4.5)
PHOSPHATE SERPL-MCNC: 2.4 MG/DL (ref 2.5–4.5)
PLATELET # BLD AUTO: 211 10E3/UL (ref 150–450)
POTASSIUM SERPL-SCNC: 3.8 MMOL/L (ref 3.4–5.3)
POTASSIUM SERPL-SCNC: 4.1 MMOL/L (ref 3.4–5.3)
RBC # BLD AUTO: 4.16 10E6/UL (ref 4.4–5.9)
SODIUM SERPL-SCNC: 140 MMOL/L (ref 135–145)
TRIGL SERPL-MCNC: 296 MG/DL
WBC # BLD AUTO: 20.6 10E3/UL (ref 4–11)

## 2023-12-17 PROCEDURE — 83735 ASSAY OF MAGNESIUM: CPT | Performed by: STUDENT IN AN ORGANIZED HEALTH CARE EDUCATION/TRAINING PROGRAM

## 2023-12-17 PROCEDURE — 120N000001 HC R&B MED SURG/OB

## 2023-12-17 PROCEDURE — 94640 AIRWAY INHALATION TREATMENT: CPT

## 2023-12-17 PROCEDURE — 250N000011 HC RX IP 250 OP 636: Mod: JZ | Performed by: INTERNAL MEDICINE

## 2023-12-17 PROCEDURE — 250N000009 HC RX 250: Performed by: INTERNAL MEDICINE

## 2023-12-17 PROCEDURE — 250N000009 HC RX 250: Performed by: HOSPITALIST

## 2023-12-17 PROCEDURE — 250N000011 HC RX IP 250 OP 636: Mod: JZ | Performed by: HOSPITALIST

## 2023-12-17 PROCEDURE — 94640 AIRWAY INHALATION TREATMENT: CPT | Mod: 76

## 2023-12-17 PROCEDURE — 999N000157 HC STATISTIC RCP TIME EA 10 MIN

## 2023-12-17 PROCEDURE — 83036 HEMOGLOBIN GLYCOSYLATED A1C: CPT | Performed by: STUDENT IN AN ORGANIZED HEALTH CARE EDUCATION/TRAINING PROGRAM

## 2023-12-17 PROCEDURE — 258N000003 HC RX IP 258 OP 636: Performed by: HOSPITALIST

## 2023-12-17 PROCEDURE — 250N000013 HC RX MED GY IP 250 OP 250 PS 637: Performed by: STUDENT IN AN ORGANIZED HEALTH CARE EDUCATION/TRAINING PROGRAM

## 2023-12-17 PROCEDURE — 250N000011 HC RX IP 250 OP 636: Performed by: INTERNAL MEDICINE

## 2023-12-17 PROCEDURE — 84100 ASSAY OF PHOSPHORUS: CPT

## 2023-12-17 PROCEDURE — 999N000065 XR ABDOMEN 1 VIEW

## 2023-12-17 PROCEDURE — 84100 ASSAY OF PHOSPHORUS: CPT | Performed by: STUDENT IN AN ORGANIZED HEALTH CARE EDUCATION/TRAINING PROGRAM

## 2023-12-17 PROCEDURE — 83735 ASSAY OF MAGNESIUM: CPT | Performed by: INTERNAL MEDICINE

## 2023-12-17 PROCEDURE — 250N000009 HC RX 250

## 2023-12-17 PROCEDURE — 85048 AUTOMATED LEUKOCYTE COUNT: CPT | Performed by: STUDENT IN AN ORGANIZED HEALTH CARE EDUCATION/TRAINING PROGRAM

## 2023-12-17 PROCEDURE — 84478 ASSAY OF TRIGLYCERIDES: CPT | Performed by: STUDENT IN AN ORGANIZED HEALTH CARE EDUCATION/TRAINING PROGRAM

## 2023-12-17 PROCEDURE — C9113 INJ PANTOPRAZOLE SODIUM, VIA: HCPCS | Performed by: INTERNAL MEDICINE

## 2023-12-17 PROCEDURE — 84132 ASSAY OF SERUM POTASSIUM: CPT | Performed by: STUDENT IN AN ORGANIZED HEALTH CARE EDUCATION/TRAINING PROGRAM

## 2023-12-17 PROCEDURE — 250N000011 HC RX IP 250 OP 636: Mod: JZ | Performed by: STUDENT IN AN ORGANIZED HEALTH CARE EDUCATION/TRAINING PROGRAM

## 2023-12-17 PROCEDURE — 99233 SBSQ HOSP IP/OBS HIGH 50: CPT | Performed by: STUDENT IN AN ORGANIZED HEALTH CARE EDUCATION/TRAINING PROGRAM

## 2023-12-17 PROCEDURE — 80048 BASIC METABOLIC PNL TOTAL CA: CPT | Performed by: INTERNAL MEDICINE

## 2023-12-17 RX ORDER — FUROSEMIDE 10 MG/ML
20 INJECTION INTRAMUSCULAR; INTRAVENOUS ONCE
Status: COMPLETED | OUTPATIENT
Start: 2023-12-17 | End: 2023-12-17

## 2023-12-17 RX ORDER — ASPIRIN 81 MG/1
81 TABLET ORAL DAILY
Status: DISCONTINUED | OUTPATIENT
Start: 2023-12-17 | End: 2023-12-26

## 2023-12-17 RX ORDER — POTASSIUM CHLORIDE 7.45 MG/ML
10 INJECTION INTRAVENOUS
Status: COMPLETED | OUTPATIENT
Start: 2023-12-17 | End: 2023-12-17

## 2023-12-17 RX ORDER — ASPIRIN 81 MG/1
81 TABLET, CHEWABLE ORAL DAILY
Status: DISCONTINUED | OUTPATIENT
Start: 2023-12-17 | End: 2023-12-26

## 2023-12-17 RX ADMIN — POTASSIUM CHLORIDE 10 MEQ: 7.46 INJECTION, SOLUTION INTRAVENOUS at 01:02

## 2023-12-17 RX ADMIN — ASPIRIN 81 MG CHEWABLE TABLET 81 MG: 81 TABLET CHEWABLE at 10:16

## 2023-12-17 RX ADMIN — ENOXAPARIN SODIUM 40 MG: 40 INJECTION SUBCUTANEOUS at 13:30

## 2023-12-17 RX ADMIN — GABAPENTIN 200 MG: 100 CAPSULE ORAL at 10:16

## 2023-12-17 RX ADMIN — IPRATROPIUM BROMIDE AND ALBUTEROL SULFATE 3 ML: .5; 3 SOLUTION RESPIRATORY (INHALATION) at 09:03

## 2023-12-17 RX ADMIN — HYDROMORPHONE HYDROCHLORIDE 0.4 MG: 0.2 INJECTION, SOLUTION INTRAMUSCULAR; INTRAVENOUS; SUBCUTANEOUS at 00:22

## 2023-12-17 RX ADMIN — IPRATROPIUM BROMIDE AND ALBUTEROL SULFATE 3 ML: .5; 3 SOLUTION RESPIRATORY (INHALATION) at 01:53

## 2023-12-17 RX ADMIN — POTASSIUM CHLORIDE 10 MEQ: 7.46 INJECTION, SOLUTION INTRAVENOUS at 01:01

## 2023-12-17 RX ADMIN — FUROSEMIDE 20 MG: 10 INJECTION, SOLUTION INTRAMUSCULAR; INTRAVENOUS at 10:16

## 2023-12-17 RX ADMIN — LATANOPROST 1 DROP: 50 SOLUTION/ DROPS OPHTHALMIC at 20:30

## 2023-12-17 RX ADMIN — LEVOTHYROXINE SODIUM 75 MCG: 20 INJECTION, SOLUTION INTRAVENOUS at 13:30

## 2023-12-17 RX ADMIN — PIPERACILLIN AND TAZOBACTAM 4.5 G: 4; .5 INJECTION, POWDER, FOR SOLUTION INTRAVENOUS at 10:15

## 2023-12-17 RX ADMIN — ASCORBIC ACID, VITAMIN A PALMITATE, CHOLECALCIFEROL, THIAMINE HYDROCHLORIDE, RIBOFLAVIN-5 PHOSPHATE SODIUM, PYRIDOXINE HYDROCHLORIDE, NIACINAMIDE, DEXPANTHENOL, ALPHA-TOCOPHEROL ACETATE, VITAMIN K1, FOLIC ACID, BIOTIN, CYANOCOBALAMIN: 200; 3300; 200; 6; 3.6; 6; 40; 15; 10; 150; 600; 60; 5 INJECTION, SOLUTION INTRAVENOUS at 20:17

## 2023-12-17 RX ADMIN — CARVEDILOL 12.5 MG: 12.5 TABLET, FILM COATED ORAL at 10:17

## 2023-12-17 RX ADMIN — ESCITALOPRAM OXALATE 20 MG: 10 TABLET ORAL at 10:16

## 2023-12-17 RX ADMIN — MAGNESIUM SULFATE HEPTAHYDRATE 1 G: 500 INJECTION, SOLUTION INTRAMUSCULAR; INTRAVENOUS at 01:02

## 2023-12-17 RX ADMIN — PIPERACILLIN AND TAZOBACTAM 4.5 G: 4; .5 INJECTION, POWDER, FOR SOLUTION INTRAVENOUS at 22:50

## 2023-12-17 RX ADMIN — PIPERACILLIN AND TAZOBACTAM 4.5 G: 4; .5 INJECTION, POWDER, FOR SOLUTION INTRAVENOUS at 17:23

## 2023-12-17 RX ADMIN — DIATRIZOATE MEGLUMINE AND DIATRIZOATE SODIUM 90 ML: 660; 100 SOLUTION ORAL; RECTAL at 11:04

## 2023-12-17 RX ADMIN — PANTOPRAZOLE SODIUM 40 MG: 40 INJECTION, POWDER, FOR SOLUTION INTRAVENOUS at 10:15

## 2023-12-17 RX ADMIN — CARVEDILOL 12.5 MG: 12.5 TABLET, FILM COATED ORAL at 20:14

## 2023-12-17 RX ADMIN — PIPERACILLIN AND TAZOBACTAM 4.5 G: 4; .5 INJECTION, POWDER, FOR SOLUTION INTRAVENOUS at 03:20

## 2023-12-17 ASSESSMENT — ACTIVITIES OF DAILY LIVING (ADL)
ADLS_ACUITY_SCORE: 40
ADLS_ACUITY_SCORE: 36
ADLS_ACUITY_SCORE: 40
ADLS_ACUITY_SCORE: 36
ADLS_ACUITY_SCORE: 40
ADLS_ACUITY_SCORE: 36
ADLS_ACUITY_SCORE: 40
ADLS_ACUITY_SCORE: 36
ADLS_ACUITY_SCORE: 40
ADLS_ACUITY_SCORE: 40

## 2023-12-17 NOTE — PROVIDER NOTIFICATION
MD Notification    Notified Person: MD    Notified Person Name: Milind JACKSON    Notification Date/Time: 0037    Notification Interaction: Vocera    Purpose of Notification: Patient had an earlier 22beats of V-run, i paged the previous hospitalist and he asked me to check patient's potassium and mag level. the result came back at k 3.8 and mag 1.9    patient just had another 23beats of v-run. please advice    Orders Received: I will order electrolyte replacement. Recommend to continue to monitor closely    Comments:

## 2023-12-17 NOTE — PROGRESS NOTES
Overall seems relatively similar.  Remains passing gas and had small smear bowel movements.  Denies significant nausea or pain.    Afebrile with stable/normal vital signs.  ESTEFANY drain appears serosanguineous.  Abdomen seems slightly less distended and soft  Pelvic fluid still negative on culture.    Assessment/plan: Complicated perforated appendicitis.  Has done reasonably well with clamping of nasogastric tube with minimal return.  Discussed with hospitalist.  Prior to removing NG tube will perform Gastroview challenge today.

## 2023-12-17 NOTE — PLAN OF CARE
Goal Outcome Evaluation:    Orientations: A/O x 4 ex; intermittent confusion. Inattention, repetitive request, Nurse seeking.  Vitals/Pain: VSS on RA. Denies pain,  PRN  Haldol given x 1 for mild agitation. PRN neb given x1 for SOB and expiratory wheezing Denies nausea.   -NPO ex ice-chips  Tele: SR w/ frequent PVC's  Lines/Drains: ESTEFANY drain x 2, PICC placed to LUE. PIV x 2 L arm. TPN infusing @ 60mL/hr and Lipids infusing @ 20mL/hr alongside.  NG to low intermittent suction  Skin/Wounds: Skin tear to R elbow with foam dressing in place, CDI. Original ESTEFANY drain leaking, dressing changed x 2. 2nd ESTEFANY drain dressing CDI  GI/: incontinent unmeasured UO X2 . BM x 3. Passing flatus.   Labs: Abnormal/Trends, Electrolyte Replacement- K replaced, Mag replaced. Recheck this morning. BG >140, rapid insulin given x2.  Ambulation/Assist: A x 2 GB/W  Sleep Quality: poor  Plan: TPN increased to 60mL/hr @ 2000. Continue to encourage patient to use aerobika to help with breathing.    Also pt had 20+ beats of V-runs yesterday @ 2347,0037, 34beat @ 0528, and 48 beats @ 0547. provider notified and electrolytes replacement orders were received and given.

## 2023-12-17 NOTE — PROVIDER NOTIFICATION
MD Notification    Notified Person: MD    Notified Person Name: Milind JACKSON    Notification Date/Time: 0537    Notification Interaction: Vocera    Purpose of Notification: patient had a 34 beats V-run.any new orders?    Orders Received: continue to monitor and follow up am labs    Comments:

## 2023-12-17 NOTE — PROGRESS NOTES
[facility]    Medicine Progress Note - Hospitalist Service    Date of Admission:  12/8/2023    Assessment & Plan   Garry Mckay is a 88 year old male with PMH of hypertension; CAD; CHF (ischemic cardiomyopathy, combined systolic and diastolic), s/p ICD; PAD; dyslipidemia; CARINA on CPAP; anxiety/depression; BPH; hypothyroidism; and CKD stage III; who presented to OSH 12/7/2023 with abdominal pain and found with appendicitis with peritonitis with sepsis. Transferred to [facility] 12/8/2023.      Underwent surgery on 12/8/2023 and required ICU management for septic shock. Ultimately improved, and Hospitalist service assumed care on 12/11/2023.     # Acute appendicitis with peritonitis and septic shock - s/p laparoscopic appendectomy 12/8/2023.  # Pelvic fluid collections, question abscesses.  # Postoperative ileus vs SBO.  # Nutrition.  * Initial presentation to OSH 12/7 as above. CT abdomen noted with acute appendicitis with 0.7 cm appendicolith, no abscess. Started on ampicillin-sulbactam. Subsequently transferred to [facility] 12/8.  * On 12/8, underwent above surgery. Weaned off pressors. Antibiotics changed to pipericillin-tazobactam.  * On 12/10, CT abdomen noted with multiple dilated small bowel loops with related decompression of colon with findings consistent with partial obstruction/ adynamic ileus.  * On 12/11, pt w/o N/V.   * On 12/12, diet advanced to clears as pt seemed to be improving with +flatus and BM. Later, developed more N/V and NG placed.  * On 12/14, WBC elevated. Repeat abdominal CT showed prior appendectomy with findings suggestive of peritonitis, developing rim enhancing pelvic fluid collections, abscesses possible; increasing dilation of proximal small bowel with transition point  in the right lower quadrant, suggestive of partial mechanical obstruction, developing adhesion is possible, no evidence of nadine bowel ischemia or acute perforation; distended gallbladder  without additional CT evidence of acute cholecystitis.  - Continue NPO; advance diet as able per Surgery  - start TPN 12/15  - Continue NG at LIS.  - Continue pipericillin-tazobactam (started 12/8).  - Continue IVF's   - Continue PRN IV hydromorphone; minimize opioids as able given ileus.  - Continue PRN anti-emetics for now.  - Continue to encourage regular ambulation as able.  - Appreciate Surgery help.     # Acute hypoxic respiratory failure, suspect multifactorial related to atelectasis, recent abdominal surgery, resolved.  # CARINA on home CPAP.  * Initial presentation as above. CXR at OSH negative.  * On 12/8, CXR showed new left basilar bandlike opacity, most likely atelectasis.   * On 12/11, down to 2L O2.  * On 12/14, off O2.  - Continue CPAP while sleeping.  - Continue to encourage incentive spirometry.  - Continue PRN O2.     # FATIMAH on CKD3, suspect prerenal, meds (including sacubitril-valsartan, diuretics).  # Hyperkalemia, resolved.  * Baseline creatinine 1-1.1.   * Creatinine 1.39 --> 1.8 at OSH.  * Cr up to 2.27 and K up to 5.5 on 12/8.  * Cr and K subsequently improved with IVF's.  * On IV furosemide 12/10-12/11.  - Continue IVF's.  - Continue to monitor BMP - repeat in am.  - Avoid nephrotoxic medications.     # Troponin elevation, suspect demand ischemia (type 2 NSTEMI).  # Hypertension (benign essential).  # CAD s/p CABG (1994), stenting (2002, 2003, 2005, 2021)  # Chronic HFrEF (systolic and diastolic), s/p ICD.  # HLD.  # Afib with RVR  [PTA: carvedilol 12.5 mg BID; sacubitril-valsartan 49-51 mg BID; furosemide 20 mg daily.]  * Last cath 1/2021 at [facility] showed patent CLAUDIA-RCA, LIMA occluded, vein-diagonal patent with placement of stent,  LAD, 40% circumflex, RCA occluded. Last stress testing 7/2023 negative for ischemia. Echo 7/2023 showed LVEF 30-35%, global hypokinesis LV, moderate-severe cLVH, grade 1 diastolic dysfunction; RV systolic function mildly reduced.   * Initial presentation as  above. BNP in ED at 2613, troponin 70. EKG w/ nonspecific t-w changes.   * PTA sacubitril-valsartan and furosemide held on admit given FATIMAH.  * Started on IV furosemide in ICU 12/10, held 12/11.  * Meds subsequently restarted but held again 12/13 due to NPO.  - Continue to hold hold ASA, clopidogrel, atorvastatin, furosemide and sacubitril-valsartan for now.  - resume coreg  - PRN IV metoprolol  - Continue to monitor i/o's, daily wts.     NSVT  -monitor  -follow mag and K    Anemia, suspect blood loss component.  * Hgb normal on initially evaluation.  * Underwent surgery 12/8 as above.  * Hgb down to 11.9 on 12/9.  - Continue to monitor CBC - repeat in am.  - Consider prbc transfusion if hgb </= 7.0 or if significant bleeding with hemodynamic instability or if symptomatic.     Thrombocytopenia, unclear etiology, possibly due to sepsis, medication effect or consumptive or other cause.  * Plts normal on initially evaluation.  * Plts subsequently down to 104K on 12/9  * Plts normalized 12/14  - Continue to monitor CBC - repeat in am.  - Consider platelet transfusion if needs a procedure and less than 50,000; or if less than 10,000.     Hypokalemia.  * Potassium low at times this hospitalization. Treated with replacement.  - Continue PRN K replacement.     Hx CVA.  - Continue to hold ASA and clopidogrel for now.  - Continue to hold atorvastatin.     Depression/anxiety.  - Continue to hold gabapentin for now.  - resume escitalopram      Hypothyroidism.  - Continue levothyroxine.     GERD.  - Continue pantoprazole IV.     BPH.  - Continue to hold finasteride for now.     Migraines.  * Gets botox.   - Continue to monitor clinically.    Gallbladder distention  *noted on CT abd 12/14  *abd usg 12/15 w/o evidence of cholecystitis  - monitor            Diet: NPO for Medical/Clinical Reasons Except for: Ice Chips, Meds  parenteral nutrition - Clinimix E    DVT Prophylaxis: Enoxaparin (Lovenox) SQ  Hameed Catheter: Not  "present  Lines: PRESENT      PICC 12/15/23 Double Lumen Left Brachial vein medial TPN-Site Assessment: WDL    Cardiac Monitoring: None  Code Status: Full Code      Clinically Significant Risk Factors              # Hypoalbuminemia: Lowest albumin = 2.8 g/dL at 12/11/2023  5:26 AM, will monitor as appropriate     # Hypertension: Noted on problem list        # Overweight: Estimated body mass index is 27.83 kg/m  as calculated from the following:    Height as of this encounter: 1.702 m (5' 7\").    Weight as of this encounter: 80.6 kg (177 lb 11.1 oz).             Disposition Plan      Expected Discharge Date: 12/18/2023        Discharge Comments: OR 12/8 12/16 CT guided drainage, JPs leaking.  Confused and agitated at times.            Az Turpin MD  Hospitalist Service  [facility]  Securely message with [employer] (more info)  Text page via [employer] Paging/Directory   ______________________________________________________________________    Interval History   Runs of NSVT overnight  Wet cough started yesterday evening. Lungs still sound clear  No f/c/r.  He seems more alert today than yesterday.  Still rate controlled afib  Abd seems a bit softer today.   AXR from yesterday with ongoing small bowel distention.     Will resume ASA and AM gabapentin  Repeat clamping trial today. Lasix x1 dose. Pulmonary toilet.     Physical Exam   Vital Signs: Temp: 97.5  F (36.4  C) Temp src: Oral BP: (!) 138/108 Pulse: 110   Resp: 17 SpO2: 93 % O2 Device: None (Room air)    Weight: 177 lbs 11.05 oz    Constitutional: Awake, alert to self and time location situation, cooperative, no apparent distress  Respiratory: Clear to auscultation bilaterally, no crackles or wheezing, wet secretions in throat, strong cough  Cardiovascular: Regular rate and rhythm, normal S1 and S2, and no murmur noted  GI: Normal bowel sounds, soft, distended, mild tenderness,  Skin/Integumen: No rashes, no cyanosis, no " edema  Other: Alert, pleasant, seems to give a good history.      Medical Decision Making       55 MINUTES SPENT BY ME on the date of service doing chart review, history, exam, documentation & further activities per the note.      Data   ------------------------- PAST 24 HR DATA REVIEWED -----------------------------------------------    I have personally reviewed the following data over the past 24 hrs:    20.6 (H)  \   13.6   / 211     140 110 (H) 19.6 /  200 (H)   4.1 20 (L) 1.11 \       Imaging results reviewed over the past 24 hrs:   Recent Results (from the past 24 hour(s))   XR Abdomen Port 1 View    Narrative    EXAM: XR ABDOMEN PORT 1 VIEW  LOCATION: [facility]  DATE: 12/16/2023    INDICATION: eval bowel gas, increasing abd discomfort.  COMPARISON: CT abdomen and pelvis from 12/14/2023      Impression    IMPRESSION: Enteric tube terminates at the level gastric body. Percutaneous drains are seen in the right lower quadrant and pelvis. Persistent diffuse gaseous distention of the small greater than large bowel. No free air.

## 2023-12-17 NOTE — PROGRESS NOTES
"  Interventional Radiology - Progress Note  Inpatient - [facility] [place]  12/17/2023    S:  Mr. Mckay is doing ok this morning. He is currently occupied with attempting to get up with assist and get weighed. RN notes that abdominal drains are draining and flushing well but there has been copious output from one of the drains with leaking around the drain site saturating several dressings. On later chart review, it appears that the surgical drain has been leaking prior to new ESTEFANY drain placement.     Per chart review: slight increase in WBC from 17.7 to 20.6, mild tachycardia, no fevers, aspirate cx NTD.     O:  BP (!) 141/119   Pulse 93   Temp 97.5  F (36.4  C) (Oral)   Resp 25   Ht 1.702 m (5' 7\")   Wt 80.6 kg (177 lb 11.1 oz)   SpO2 92%   BMI 27.83 kg/m    General: Pleasant elderly man seen resting. Appears stable, comfortable, in no acute distress.   Neuro: Alert, oriented, speech clear.   Resp: Non-labored breathing on room air.  Abdomen: Soft, distended, non-tender.   Drain(s): Right abd surgical ESTEFANY drain in place which appears to be the one leaking, reinforced with large gauze dressing. 2nd right abd ESTEFANY drain in place, non tender, no redness or swelling, tubing with some debris and blood clots.   Skin: Warm and dry. Not diaphoretic.     IMAGING:  Results for orders placed or performed during the hospital encounter of 12/08/23   XR Chest Port 1 View    Narrative    EXAM: XR CHEST PORT 1 VIEW  LOCATION: [facility]  DATE: 12/8/2023    INDICATION: shortness of breath  COMPARISON: 01/10/2021.    FINDINGS: Sternal wires, mediastinal clips and a left subclavian cardiac device. There is no pneumothorax. The heart is enlarged. There is no pulmonary edema. The lungs are clear.      Impression    IMPRESSION: No acute abnormality.   US Upper Extremity Venous Duplex Right    Narrative    US UPPER EXTREMITY VENOUS DUPLEX RIGHT  12/8/2023 10:15 AM     HISTORY: right arm " swelling    COMPARISON: None.    TECHNIQUE: Examination of the upper extremity veins was performed with  graded compression and 2-D ultrasound and color doppler spectral  waveform analysis.     FINDINGS:  There is no evidence for DVT in the right upper extremity.  There is superficial venous thrombus in the right cephalic vein at the  antecubital fossa.      Impression    IMPRESSION: Superficial venous thrombus in the right cephalic vein.    COLLINS CERDA MD         SYSTEM ID:  [id number]   XR Chest Port 1 View    Narrative    EXAM: XR CHEST PORT 1 VIEW  LOCATION: [facility]  DATE: 12/8/2023    INDICATION: New IJ catheter placement.  COMPARISON: None.      Impression    IMPRESSION:     New left IJ catheter with tip near the confluence of the brachiocephalic veins, although likely coursing towards the right brachiocephalic vein. Recommend repositioning.    Redemonstrated left subclavian approach pacemaker/ICD, median sternotomy wires, and mediastinal surgical clips.    New left basilar bandlike opacity, most likely atelectasis.    Otherwise, no focal airspace disease. No pleural effusion or pneumothorax.    Stable cardiomegaly.   XR Chest Port 1 View    Narrative    EXAM: XR CHEST PORT 1 VIEW  LOCATION: [facility]  DATE: 12/10/2023    INDICATION: hypoxia  COMPARISON: 12/8/2023      Impression    IMPRESSION: No acute cardiopulmonary abnormality. Improved aeration of the left lung base.    Stable enlarged cardiomediastinal silhouette. Left neck central venous catheter tip unchanged at the confluence of the brachiocephalic veins.   CT Abdomen Pelvis w/o Contrast    Narrative    EXAM: CT ABDOMEN PELVIS W/O CONTRAST  LOCATION: [facility]  DATE: 12/10/2023    INDICATION: increasing abdominal pain, pt s p appendectomy for perforated apendix  COMPARISON: 12/07/2023  TECHNIQUE: CT scan of the abdomen and pelvis was performed without IV contrast.  Multiplanar reformats were obtained. Dose reduction techniques were used.  CONTRAST: None.    FINDINGS:     LOWER CHEST: Pleural effusions and bibasilar atelectasis. Partially visualized pacemaker wires.     HEPATOBILIARY: Normal liver parenchyma. No biliary dilation. Gallbladder is distended with layering sludge versus vicarious contrast excretion.     PANCREAS: Normal.     SPLEEN: Normal.     ADRENAL GLANDS: Normal.     KIDNEYS/BLADDER: Persistent nephrogram bilaterally. Benign renal cysts for which no further follow-up imaging is recommended. No hydronephrosis. Retained contrast is noted within the urinary bladder.      BOWEL: Dilated small bowel with air-fluid levels measuring up to 3.8 cm. Colon is predominantly decompressed with some fluid in the cecum. No discrete transition point identified, although there is some caliber change in the right lower quadrant.   Appendectomy. Surgical drain terminates in the right lower quadrant. Colonic diverticulosis. Scattered free fluid and mesenteric stranding is seen throughout the lower abdomen and pelvis. No organized or drainable collection at this time.     LYMPH NODES: No pathologically enlarged lymph nodes.    VASCULATURE: Infrarenal abdominal aortic ectasia measuring up to 2.7 cm. severe aortoiliac calcifications.     PELVIC ORGANS: No pelvic masses.     MUSCULOSKELETAL: Multilevel degenerative disc disease of the thoracolumbar spine. Postsurgical changes are seen in the anterior abdominal wall. Mild anasarca.        Impression    IMPRESSION:  1.  Multiple dilated loops of small bowel with relative decompression of the colon. Although no discrete mechanical transition point is confidently identified, this configuration is most consistent with at least partial obstruction, with adynamic ileus   an additional consideration. Recommend general surgery consultation.  2.  Post surgical changes of recent appendectomy. Scattered free fluid and mesenteric stranding in the  lower abdomen and pelvis. No organized or drainable collection at this time.  3.  Persistent bilateral nephrogram and retained contrast in the urinary bladder. Correlate with renal function.  4.  Mild anasarca and small bilateral pleural effusions.  5.  Chronic and ancillary findings as described.   CT Abdomen Pelvis w Contrast    Narrative    CT ABDOMEN PELVIS W CONTRAST 12/14/2023 1:34 PM    CLINICAL HISTORY: Worsening leukocytosis, concern for necrotic cecum  during appendectomy    TECHNIQUE: CT scan of the abdomen and pelvis was performed following  injection of IV contrast. Multiplanar reformats were obtained. Dose  reduction techniques were used.  CONTRAST: 89mL [id number]    COMPARISON: CT 12/10/2023    FINDINGS:   LOWER CHEST: Enlarged heart with pacemaker leads partially visualized.  Small bilateral pleural effusions with associated compressive  atelectasis. Calcified granulomas are unchanged.    HEPATOBILIARY: Likely hepatic steatosis. Gallbladder is distended  without definite wall thickening or surrounding fat stranding. No  evidence of biliary obstruction.    PANCREAS: Normal.    SPLEEN: Normal.    ADRENAL GLANDS: Normal.    KIDNEYS/BLADDER: Bilateral renal cysts, no specific follow-up  recommended. No hydronephrosis. Urinary bladder is decompressed but  otherwise unremarkable.    BOWEL: Prior appendectomy with stable position of surgical drain in  the right lower quadrant. Increasing dilation of proximal loops of  small bowel with transition point in the right lower quadrant in area  of edematous small bowel (series 3 image 162). Mild mesenteric edema  without evidence of nadine bowel wall hyperenhancement or pneumatosis.  No large volume pneumoperitoneum. Nasogastric tube with tip and  sidehole in the stomach.    PELVIC ORGANS: Prostate and seminal vesicles are unremarkable.    ADDITIONAL FINDINGS: Diffuse peritoneal thickening with  hyperenhancement and associated fat stranding. Developing  rim  enhancing fluid collections as follows:  - Right pelvic sidewall collection measures 6.4 x 2.8 cm (series 3  image 176).  - Left pelvic sidewall collection measures 3.3 x 1.9 cm (series 3  image 162).  - Dependent pelvic collection measures 4.6 x 2.9 cm (series 3 image  26). This likely communicates with right pelvic sidewall collection.    Moderate calcified atherosclerosis. Ectasia of the infrarenal  abdominal aorta without nadine aneurysmal dilation.    MUSCULOSKELETAL: No acute bony abnormality. Mild body wall edema.      Impression    IMPRESSION:   1.  Prior appendectomy with findings suggestive of peritonitis.  Developing rim enhancing pelvic fluid collections as above, abscesses  are possible.  2.  Increasing dilation of proximal small bowel with transition point  in the right lower quadrant, suggestive of partial mechanical  obstruction, developing adhesion is possible. No evidence of nadine  bowel ischemia or acute perforation.  3.  Distended gallbladder without additional CT evidence of acute  cholecystitis.    QUIN VALENZUELA MD         SYSTEM ID:  [id number]   US Abdomen Limited    Narrative    US ABDOMEN LIMITED 12/15/2023 12:00 PM    CLINICAL HISTORY: Distended gallbladder.    TECHNIQUE: Limited abdominal ultrasound.    COMPARISON: CT 12/14/2023    FINDINGS:    GALLBLADDER: The gallbladder is mildly distended and there is sludge  in the gallbladder lumen. No gallbladder wall thickening or  pericholecystic fluid. Negative sonographic Shields's sign.    BILE DUCTS: There is no biliary dilatation. The common duct measures  3mm.    LIVER: Normal where seen.    RIGHT KIDNEY: No hydronephrosis. Benign cysts.    PANCREAS: The visualized portions of the pancreas are normal.    No ascites.      Impression    IMPRESSION: Gallbladder is mildly distended and contains sludge. No  signs of cholecystitis.     BETH HDZ MD         SYSTEM ID:  [id number]   XR Chest Port 1 View    Narrative    EXAM: XR CHEST PORT 1  VIEW  LOCATION: [facility]  DATE: 12/15/2023    INDICATION: PICC placement stat x ray per IV team  COMPARISON: 12/10/2023      Impression    IMPRESSION: Low lung volumes. Cardiomegaly. Congestion of the central vessels. Left lung base obscured by overlying defibrillator. Lungs otherwise appear clear. Prior median sternotomy. Left PICC line tip mid SVC, partially obscured by overlying   defibrillator wires.   XR Chest Port 1 View    Narrative    EXAM: XR CHEST PORT 1 VIEW  LOCATION: [facility]  DATE: 12/16/2023    INDICATION: Dyspnea and wheezing  COMPARISON: 12/15/2023      Impression    IMPRESSION: Cardiac enlargement. Cardiac leads. Coronary stent. Enteric tube. Mild vascular congestion. Atelectasis or infiltrate left lung base and small left effusion.   XR Abdomen Port 1 View    Narrative    EXAM: XR ABDOMEN PORT 1 VIEW  LOCATION: [facility]  DATE: 12/16/2023    INDICATION: eval bowel gas, increasing abd discomfort.  COMPARISON: CT abdomen and pelvis from 12/14/2023      Impression    IMPRESSION: Enteric tube terminates at the level gastric body. Percutaneous drains are seen in the right lower quadrant and pelvis. Persistent diffuse gaseous distention of the small greater than large bowel. No free air.         LABS:  CBC  Recent Labs   Lab 12/17/23  0522 12/15/23  0600 12/14/23  0536 12/13/23  0532   WBC 20.6* 17.7* 15.8* 13.0*   RBC 4.16* 4.30* 4.20* 4.10*   HGB 13.6 13.9 13.5 13.3   HCT 41.3 42.3 41.8 41.5   MCV 99 98 100 101*   MCH 32.7 32.3 32.1 32.4   MCHC 32.9 32.9 32.3 32.0   RDW 14.0 13.9 14.0 14.2    185 152 133*     CMP  Recent Labs   Lab 12/17/23  1002 12/17/23  0522 12/17/23  0215 12/17/23  0004 12/16/23  2157 12/16/23  0632 12/16/23  0541 12/15/23  2208 12/15/23  2103 12/15/23  1645 12/15/23  0600 12/14/23  2259 12/14/23  0536 12/11/23  1240 12/11/23  0526   NA  --  140  --   --   --   --  142  --   --   --  143  --   141   < > 140   POTASSIUM  --  4.1  --  3.8  --   --  3.9  3.8  --  3.4  --  3.2*  --  3.3*   < > 4.2   CHLORIDE  --  110*  --   --   --   --  112*  --   --   --  112*  --  111*   < > 109*   CO2  --  20*  --   --   --   --  17*  --   --   --  20*  --  22   < > 19*   ANIONGAP  --  10  --   --   --   --  13  --   --   --  11  --  8   < > 12   * 200* 212*  --  192*   < > 230*   < >  --    < > 174*   < > 182*   < > 146*   BUN  --  19.6  --   --   --   --  15.4  --   --   --  17.7  --  24.9*   < > 44.2*   CR  --  1.11  --   --   --   --  1.08  --   --   --  1.15  --  1.19*   < > 1.75*   GFRESTIMATED  --  65  --   --   --   --  67  --   --   --  62  --  59*   < > 37*   RHODA  --  8.5*  --   --   --   --  8.5*  --   --   --  8.5*  --  8.4*   < > 9.3   MAG  --  2.2  --  1.9  --   --  2.2  --   --   --  1.9  --   --   --   --    PHOS  --  2.4*  --  2.1*  --   --  2.1*  --   --   --  2.2*  --   --   --  2.5   ALBUMIN  --   --   --   --   --   --   --   --   --   --   --   --   --   --  2.8*    < > = values in this interval not displayed.     INR  No lab results found in last 7 days.    DRAIN(S):  Output by Drain (mL) 12/15/23 0700 - 12/15/23 1459 12/15/23 1500 - 12/15/23 2259 12/15/23 2300 - 12/16/23 0659 12/16/23 0700 - 12/16/23 1459 12/16/23 1500 - 12/16/23 2259 12/16/23 2300 - 12/17/23 0659 12/17/23 0700 - 12/17/23 1052   Closed/Suction Drain 1 Right Abdomen Bulb 15 Japanese 0 0 15  0 10    Open Drain Right;Anterior 8 Japanese   15  0       Cultures:  NGTD  Antibiotics: Zosyn  Flushing: 10mL NS q8h        A:  Garry Mckay is a 88 year old man with PMH of hypertension, CAD, CHF (ischemic cardiomyopathy, combined systolic and diastolic) s/p ICD, PAD, dyslipidemia, CARINA on CPAP, anxiety/depression, BPH, hypothyroidism, and CKD stage III who presented to OSH 12/7/2023 with abdominal pain found to have appendicitis with peritonitis with sepsis. Transferred to [facility] 12/8/2023. Underwent surgery on 12/8/2023 and required  ICU management for septic shock. On 12/14 WBC elevated. Repeat abdominal CT showed prior appendectomy with findings suggestive of peritonitis, developing rim enhancing pelvic fluid collections, abscesses possible; increasing dilation of proximal small bowel with transition point in the right lower quadrant, suggestive of partial mechanical obstruction, developing adhesion is possible, no evidence of nadine bowel ischemia or acute perforation; distended gallbladder without additional CT evidence of acute cholecystitis. IR was consulted for drainage.     S/p IR placement drain into a right pelvic fluid collection on 12/15/23.     P:    - Medical management/antibiotics per primary team.  - Continue to follow WBC, cultures, and clinical signs/symptoms for improvement.  - Continue present drain cares. Continue drain to ESTEFANY bulb suction. Flush drain as noted above; flush in toward patient's body and can also flush debris/clots down into bulb. Subtract irrigant from output and record output every shift/PRN.   - Will continue to follow patient's clinical status, imaging, and drain function and daily output to determine the drain follow-up plan. Anticipate follow-up imaging once drain output is approx <10-20mL/day. Drain follow-up can occur as inpatient or outpatient; most often the first drain check will occur as outpatient depending on length of hospitalization. Outpatient drain follow-up orders will be entered as indicated.   - Please contact IR if ESTEFANY drain #2 output is <10mL/day for two consecutive days.   - IR will continue to follow peripherally. Please contact IR with drain related questions or concerns.       Total time spent on the date of the encounter is 30 minutes, including time spent counseling the patient, performing a medically appropriate evaluation, reviewing prior medical history, ordering medications and tests, documenting clinical information in the medical record, and communication of results.    Gayle  Leonard LIZARRAGA, APRN, NP-C  Interventional Radiology  [phone] (Sat/Sun 8am-12pm)      E/M codes for reference only:  13159

## 2023-12-17 NOTE — PROVIDER NOTIFICATION
MD Notification    Notified Person: MD    Notified Person Name: Mariusz PAGE    Notification Date/Time: 2347    Notification Interaction: Vocera    Purpose of Notification: Patient just had a 22beats V-run. advice    Orders Received: Lets check K and Mag.     Comments:

## 2023-12-17 NOTE — SIGNIFICANT EVENT
Significant Event Note    Time of event: 12:45 AM December 17, 2023    Description of event:  Paged for asymptomatic 2 runs of 20+ beats of wide complex tachycardia    Plan:  K 3.8 and Mag 1.9; will replace both as we continue to monitor him very closely overnight    Discussed with: bedside nurse    Lm Vaz MD

## 2023-12-18 ENCOUNTER — APPOINTMENT (OUTPATIENT)
Dept: OCCUPATIONAL THERAPY | Facility: CLINIC | Age: 88
DRG: 853 | End: 2023-12-18
Attending: HOSPITALIST
Payer: MEDICARE

## 2023-12-18 LAB
ALBUMIN SERPL BCG-MCNC: 3 G/DL (ref 3.5–5.2)
ALP SERPL-CCNC: 74 U/L (ref 40–150)
ALT SERPL W P-5'-P-CCNC: 12 U/L (ref 0–70)
ANION GAP SERPL CALCULATED.3IONS-SCNC: 10 MMOL/L (ref 7–15)
AST SERPL W P-5'-P-CCNC: 33 U/L (ref 0–45)
BASOPHILS # BLD AUTO: ABNORMAL 10*3/UL
BASOPHILS # BLD MANUAL: 0 10E3/UL (ref 0–0.2)
BASOPHILS NFR BLD AUTO: ABNORMAL %
BASOPHILS NFR BLD MANUAL: 0 %
BILIRUB SERPL-MCNC: 0.6 MG/DL
BUN SERPL-MCNC: 26.6 MG/DL (ref 8–23)
CALCIUM SERPL-MCNC: 8.9 MG/DL (ref 8.8–10.2)
CHLORIDE SERPL-SCNC: 108 MMOL/L (ref 98–107)
CREAT SERPL-MCNC: 1.15 MG/DL (ref 0.67–1.17)
DEPRECATED HCO3 PLAS-SCNC: 22 MMOL/L (ref 22–29)
EGFRCR SERPLBLD CKD-EPI 2021: 62 ML/MIN/1.73M2
EOSINOPHIL # BLD AUTO: ABNORMAL 10*3/UL
EOSINOPHIL # BLD MANUAL: 0.3 10E3/UL (ref 0–0.7)
EOSINOPHIL NFR BLD AUTO: ABNORMAL %
EOSINOPHIL NFR BLD MANUAL: 2 %
ERYTHROCYTE [DISTWIDTH] IN BLOOD BY AUTOMATED COUNT: 13.9 % (ref 10–15)
GLUCOSE BLDC GLUCOMTR-MCNC: 188 MG/DL (ref 70–99)
GLUCOSE BLDC GLUCOMTR-MCNC: 201 MG/DL (ref 70–99)
GLUCOSE BLDC GLUCOMTR-MCNC: 202 MG/DL (ref 70–99)
GLUCOSE BLDC GLUCOMTR-MCNC: 209 MG/DL (ref 70–99)
GLUCOSE BLDC GLUCOMTR-MCNC: 240 MG/DL (ref 70–99)
GLUCOSE BLDC GLUCOMTR-MCNC: 241 MG/DL (ref 70–99)
GLUCOSE SERPL-MCNC: 200 MG/DL (ref 70–99)
HBA1C MFR BLD: 6.8 %
HCT VFR BLD AUTO: 43.2 % (ref 40–53)
HGB BLD-MCNC: 14.2 G/DL (ref 13.3–17.7)
IMM GRANULOCYTES # BLD: ABNORMAL 10*3/UL
IMM GRANULOCYTES NFR BLD: ABNORMAL %
LYMPHOCYTES # BLD AUTO: ABNORMAL 10*3/UL
LYMPHOCYTES # BLD MANUAL: 2.1 10E3/UL (ref 0.8–5.3)
LYMPHOCYTES NFR BLD AUTO: ABNORMAL %
LYMPHOCYTES NFR BLD MANUAL: 14 %
MAGNESIUM SERPL-MCNC: 2 MG/DL (ref 1.7–2.3)
MCH RBC QN AUTO: 32.9 PG (ref 26.5–33)
MCHC RBC AUTO-ENTMCNC: 32.9 G/DL (ref 31.5–36.5)
MCV RBC AUTO: 100 FL (ref 78–100)
MONOCYTES # BLD AUTO: ABNORMAL 10*3/UL
MONOCYTES # BLD MANUAL: 0.5 10E3/UL (ref 0–1.3)
MONOCYTES NFR BLD AUTO: ABNORMAL %
MONOCYTES NFR BLD MANUAL: 3 %
NEUTROPHILS # BLD AUTO: ABNORMAL 10*3/UL
NEUTROPHILS # BLD MANUAL: 12.3 10E3/UL (ref 1.6–8.3)
NEUTROPHILS NFR BLD AUTO: ABNORMAL %
NEUTROPHILS NFR BLD MANUAL: 81 %
NRBC # BLD AUTO: 0 10E3/UL
NRBC BLD AUTO-RTO: 0 /100
PHOSPHATE SERPL-MCNC: 2.3 MG/DL (ref 2.5–4.5)
PLAT MORPH BLD: ABNORMAL
PLATELET # BLD AUTO: 199 10E3/UL (ref 150–450)
POTASSIUM SERPL-SCNC: 4 MMOL/L (ref 3.4–5.3)
PROT SERPL-MCNC: 5.5 G/DL (ref 6.4–8.3)
RBC # BLD AUTO: 4.32 10E6/UL (ref 4.4–5.9)
RBC MORPH BLD: ABNORMAL
SODIUM SERPL-SCNC: 140 MMOL/L (ref 135–145)
WBC # BLD AUTO: 15.2 10E3/UL (ref 4–11)

## 2023-12-18 PROCEDURE — C9113 INJ PANTOPRAZOLE SODIUM, VIA: HCPCS | Performed by: INTERNAL MEDICINE

## 2023-12-18 PROCEDURE — 250N000009 HC RX 250

## 2023-12-18 PROCEDURE — 250N000011 HC RX IP 250 OP 636: Mod: JZ | Performed by: INTERNAL MEDICINE

## 2023-12-18 PROCEDURE — 250N000011 HC RX IP 250 OP 636: Mod: JZ | Performed by: STUDENT IN AN ORGANIZED HEALTH CARE EDUCATION/TRAINING PROGRAM

## 2023-12-18 PROCEDURE — 97535 SELF CARE MNGMENT TRAINING: CPT | Mod: GO

## 2023-12-18 PROCEDURE — 85007 BL SMEAR W/DIFF WBC COUNT: CPT | Performed by: PHYSICIAN ASSISTANT

## 2023-12-18 PROCEDURE — 250N000011 HC RX IP 250 OP 636: Performed by: INTERNAL MEDICINE

## 2023-12-18 PROCEDURE — 120N000001 HC R&B MED SURG/OB

## 2023-12-18 PROCEDURE — 250N000009 HC RX 250: Performed by: INTERNAL MEDICINE

## 2023-12-18 PROCEDURE — 250N000012 HC RX MED GY IP 250 OP 636 PS 637: Performed by: STUDENT IN AN ORGANIZED HEALTH CARE EDUCATION/TRAINING PROGRAM

## 2023-12-18 PROCEDURE — B4185 PARENTERAL SOL 10 GM LIPIDS: HCPCS | Mod: JZ | Performed by: STUDENT IN AN ORGANIZED HEALTH CARE EDUCATION/TRAINING PROGRAM

## 2023-12-18 PROCEDURE — 97530 THERAPEUTIC ACTIVITIES: CPT | Mod: GO

## 2023-12-18 PROCEDURE — 83735 ASSAY OF MAGNESIUM: CPT | Performed by: STUDENT IN AN ORGANIZED HEALTH CARE EDUCATION/TRAINING PROGRAM

## 2023-12-18 PROCEDURE — 80053 COMPREHEN METABOLIC PANEL: CPT | Performed by: STUDENT IN AN ORGANIZED HEALTH CARE EDUCATION/TRAINING PROGRAM

## 2023-12-18 PROCEDURE — 84100 ASSAY OF PHOSPHORUS: CPT | Performed by: STUDENT IN AN ORGANIZED HEALTH CARE EDUCATION/TRAINING PROGRAM

## 2023-12-18 PROCEDURE — 258N000003 HC RX IP 258 OP 636: Performed by: STUDENT IN AN ORGANIZED HEALTH CARE EDUCATION/TRAINING PROGRAM

## 2023-12-18 PROCEDURE — 250N000013 HC RX MED GY IP 250 OP 250 PS 637: Performed by: STUDENT IN AN ORGANIZED HEALTH CARE EDUCATION/TRAINING PROGRAM

## 2023-12-18 PROCEDURE — 99233 SBSQ HOSP IP/OBS HIGH 50: CPT | Performed by: STUDENT IN AN ORGANIZED HEALTH CARE EDUCATION/TRAINING PROGRAM

## 2023-12-18 PROCEDURE — 85014 HEMATOCRIT: CPT | Performed by: PHYSICIAN ASSISTANT

## 2023-12-18 PROCEDURE — 250N000009 HC RX 250: Performed by: STUDENT IN AN ORGANIZED HEALTH CARE EDUCATION/TRAINING PROGRAM

## 2023-12-18 PROCEDURE — 250N000009 HC RX 250: Mod: JZ | Performed by: STUDENT IN AN ORGANIZED HEALTH CARE EDUCATION/TRAINING PROGRAM

## 2023-12-18 RX ORDER — WATER 10 ML/10ML
INJECTION INTRAMUSCULAR; INTRAVENOUS; SUBCUTANEOUS
Status: COMPLETED
Start: 2023-12-18 | End: 2023-12-18

## 2023-12-18 RX ORDER — MAGNESIUM SULFATE HEPTAHYDRATE 40 MG/ML
2 INJECTION, SOLUTION INTRAVENOUS ONCE
Status: COMPLETED | OUTPATIENT
Start: 2023-12-18 | End: 2023-12-18

## 2023-12-18 RX ADMIN — PIPERACILLIN AND TAZOBACTAM 4.5 G: 4; .5 INJECTION, POWDER, FOR SOLUTION INTRAVENOUS at 16:55

## 2023-12-18 RX ADMIN — ALTEPLASE 2 MG: 2.2 INJECTION, POWDER, LYOPHILIZED, FOR SOLUTION INTRAVENOUS at 12:11

## 2023-12-18 RX ADMIN — ASPIRIN 81 MG CHEWABLE TABLET 81 MG: 81 TABLET CHEWABLE at 10:02

## 2023-12-18 RX ADMIN — SODIUM PHOSPHATE, MONOBASIC, MONOHYDRATE AND SODIUM PHOSPHATE, DIBASIC, ANHYDROUS 9 MMOL: 142; 276 INJECTION, SOLUTION INTRAVENOUS at 13:06

## 2023-12-18 RX ADMIN — PIPERACILLIN AND TAZOBACTAM 4.5 G: 4; .5 INJECTION, POWDER, FOR SOLUTION INTRAVENOUS at 10:02

## 2023-12-18 RX ADMIN — MAGNESIUM SULFATE HEPTAHYDRATE 2 G: 40 INJECTION, SOLUTION INTRAVENOUS at 06:55

## 2023-12-18 RX ADMIN — ENOXAPARIN SODIUM 40 MG: 40 INJECTION SUBCUTANEOUS at 13:06

## 2023-12-18 RX ADMIN — PANTOPRAZOLE SODIUM 40 MG: 40 INJECTION, POWDER, FOR SOLUTION INTRAVENOUS at 10:02

## 2023-12-18 RX ADMIN — CARVEDILOL 12.5 MG: 12.5 TABLET, FILM COATED ORAL at 10:03

## 2023-12-18 RX ADMIN — ASCORBIC ACID, VITAMIN A PALMITATE, CHOLECALCIFEROL, THIAMINE HYDROCHLORIDE, RIBOFLAVIN-5 PHOSPHATE SODIUM, PYRIDOXINE HYDROCHLORIDE, NIACINAMIDE, DEXPANTHENOL, ALPHA-TOCOPHEROL ACETATE, VITAMIN K1, FOLIC ACID, BIOTIN, CYANOCOBALAMIN: 200; 3300; 200; 6; 3.6; 6; 40; 15; 10; 150; 600; 60; 5 INJECTION, SOLUTION INTRAVENOUS at 19:54

## 2023-12-18 RX ADMIN — CARVEDILOL 12.5 MG: 12.5 TABLET, FILM COATED ORAL at 19:51

## 2023-12-18 RX ADMIN — OLIVE OIL AND SOYBEAN OIL 250 ML: 16; 4 INJECTION, EMULSION INTRAVENOUS at 19:59

## 2023-12-18 RX ADMIN — GABAPENTIN 200 MG: 100 CAPSULE ORAL at 10:02

## 2023-12-18 RX ADMIN — LEVOTHYROXINE SODIUM 75 MCG: 20 INJECTION, SOLUTION INTRAVENOUS at 13:06

## 2023-12-18 RX ADMIN — INSULIN GLARGINE 10 UNITS: 100 INJECTION, SOLUTION SUBCUTANEOUS at 21:51

## 2023-12-18 RX ADMIN — WATER 10 ML: 1 INJECTION INTRAMUSCULAR; INTRAVENOUS; SUBCUTANEOUS at 18:39

## 2023-12-18 RX ADMIN — ESCITALOPRAM OXALATE 20 MG: 10 TABLET ORAL at 10:02

## 2023-12-18 RX ADMIN — PIPERACILLIN AND TAZOBACTAM 4.5 G: 4; .5 INJECTION, POWDER, FOR SOLUTION INTRAVENOUS at 04:13

## 2023-12-18 RX ADMIN — PIPERACILLIN AND TAZOBACTAM 4.5 G: 4; .5 INJECTION, POWDER, FOR SOLUTION INTRAVENOUS at 21:50

## 2023-12-18 RX ADMIN — LATANOPROST 1 DROP: 50 SOLUTION/ DROPS OPHTHALMIC at 21:50

## 2023-12-18 RX ADMIN — ALTEPLASE 2 MG: 2.2 INJECTION, POWDER, LYOPHILIZED, FOR SOLUTION INTRAVENOUS at 18:38

## 2023-12-18 ASSESSMENT — ACTIVITIES OF DAILY LIVING (ADL)
ADLS_ACUITY_SCORE: 36
ADLS_ACUITY_SCORE: 37
ADLS_ACUITY_SCORE: 36

## 2023-12-18 NOTE — PROGRESS NOTES
CLINICAL NUTRITION SERVICES - REASSESSMENT NOTE      Recommendations Ordered by Registered Dietitian (RD):   Continue w/ TPN as ordered     Malnutrition:   % Weight Loss:  None noted (12/14). Suspect wt loss this admit fluid-related  % Intake:  </= 50% for >/= 5 days (severe malnutrition) (12/14)-- improving w/ TPN  Subcutaneous Fat Loss:  None observed (12/14)  Muscle Loss:  Patellar region mild depletion and Posterior calf region mild depletion-- may be partially age-related (12/14)  Fluid Retention:  None noted    Malnutrition Diagnosis: Patient does not meet two of the established criteria necessary for diagnosing malnutrition         EVALUATION OF PROGRESS TOWARD GOALS   Diet: NPO for Medical/Clinical Reasons Except for: Ice Chips, Meds    Intake/Tolerance:  Remains NPO    Nutrition Support: Parenteral  Type of Access: PICC  Parenteral Frequency: Continuous   Parenteral Regimen: Clinimix E (5/15) @ 80 mL/hr (1920 mL) + 250 mL of 20% lipids 6x/week   Total Parenteral Provisions: 1793 kcal (25 kcal/kg), 96 g protein (1.35 g/kg), 288 g CHO (GIR of 2.8), and 24% kcal from fat daily.       12/15: TPN started-- Clinimix @ 40 mL/hr + 6x/weekly lipids   12/17: reached goal rate last night   D/w pharmacist this AM-- no changes. Continue @ goal rate       ASSESSED NUTRITION NEEDS:  Dosing Weight: 71 kg (adjusted, based on 81.5 kg-- 12/14)  Estimated Energy Needs: 9867-2973 kcal (25-30 Kcal/Kg)  Justification: maintenance  Estimated Protein Needs:  grams protein (1.2-1.5 g pro/Kg)  Justification: post-op, hypercatabolism with acute illness  Estimated Fluid Needs: 1 mL/kcal  Justification: maintenance        NEW FINDINGS:   General: A/O x4 w/ intermittent confusion, feeling anxious    Weight: wt loss of 5 kg since admit. Suspect fluid-related as pt is -3L  Date/Time Weight Weight Method   12/18/23 0400 79.5 kg (175 lb 4.3 oz) Bed scale   12/17/23 1000 78.5 kg (173 lb 1 oz) Standing scale   12/16/23 0655 80.6 kg (177  "lb 11.1 oz) Bed scale   12/14/23 0606 81.5 kg (179 lb 10.8 oz) Bed scale   12/12/23 0000 84.2 kg (185 lb 10 oz) Bed scale   12/11/23 0400 84.6 kg (186 lb 8.2 oz) Bed scale   12/10/23 0400 86 kg (189 lb 9.5 oz) Bed scale   12/09/23 0400 85.5 kg (188 lb 7.9 oz) Bed scale   12/08/23 0035 84.6 kg (186 lb 8.2 oz)      I/O: Net IO Since Admission: -3,865.28 mL [12/18/23 1041].  2x BM today, 2x yesterday, 1x on 12/16, 4x on 12/15, 2x on 12/14 12/17: Gastrografin contrast challenge with multiple dilated small bowel loops without contrast in colon.   12/15: IR =  image guided drain placement into a right pelvic fluid collection   35 mL drain output yesterday, 30 mL on 12/16, 0 mL on 12/15, 12 mL on 12/14  1500 mL NG output yesterday, 100 mL on 12/16, 250 mL on 12/15  12/18: surgery note = \"NG tube with persistently high output, will keep this in place today.\"    Labs: reviewed   Component Value Date   BUN 26.6 (H) 12/18/2023   PHOS 2.3 (L) 12/18/2023     Triglycerides   Date Value Ref Range Status   12/17/2023 296 (H) <150 mg/dL Final     Lab 12/18/23  0428 12/18/23  0416 12/18/23  0152 12/17/23  2347 12/17/23  2025 12/17/23  1656   * 200* 209* 215* 173* 194*      Medications: reviewed    insulin aspart  1-12 Units Subcutaneous Q4H    levothyroxine  75 mcg Intravenous Daily    pantoprazole  40 mg Intravenous Q24H    [Held by provider] senna-docusate  1 tablet Oral BID     Skin: WOC signed off 12/14        Previous Goals:   Diet advancement vs start nutrition support w/in 2-3 days  Evaluation: Met    Previous Nutrition Diagnosis:   Inadequate oral intake related to slow diet advancement as evidenced by primarily NPO x6 days   Evaluation: Improving        MALNUTRITION  % Weight Loss:  None noted (12/14). Suspect wt loss this admit fluid-related  % Intake:  </= 50% for >/= 5 days (severe malnutrition) (12/14)-- improving w/ TPN  Subcutaneous Fat Loss:  None observed (12/14)  Muscle Loss:  Patellar region mild depletion " and Posterior calf region mild depletion-- may be partially age-related (12/14)  Fluid Retention:  None noted    Malnutrition Diagnosis: Patient does not meet two of the established criteria necessary for diagnosing malnutrition        CURRENT NUTRITION DIAGNOSIS  Altered GI function related to post-op ileus as evidenced by NPO x9 days, TPN meeting 100% of estimated nutrition needs        INTERVENTIONS  Recommendations / Nutrition Prescription  Continue w/ TPN as ordered      Implementation  Collaboration with other providers    Goals  TPN to meet % of estimated nutrition needs        MONITORING AND EVALUATION:  Progress towards goals will be monitored and evaluated per protocol and Practice Guidelines      Yusra Arthur RD, LD  Pager: [phone]

## 2023-12-18 NOTE — PLAN OF CARE
Goal Outcome Evaluation:    Orientations: A/O x 4 ex; int. Confusion. Repeated questions/requests/fixations  Vitals/Pain: VSS on RA. Denies pain/nausea. PRN Haldol available for agitation.  NPO ex; ice chips  Respiratory: Intermittent expiratory wheezing, cough. PRN neb given x 1.   Tele: SR w/ PVC's. PRN metoprolol available for HR > 120  Lines/Drains: PICC to LUE - infusing TPN @ 60ml/hr  NG to LI suction - marked at 62 cm. Completed 6hr clamping trial. Gastroview challenge completed.   PIV x 2 SL  Skin/Wounds: Skin tear to R elbow w/ foam dressing CDI. Original ESTEFANY drain with copious drainage, dressing changed x 1. New ESTEFANY drain dressing CDI - irrigated x 2 with 20ml output  Mepilex on sacrum/coccyx  GI/: Purewick in place with AUOP. No BM during shift, passing flatus  Labs: Abnormal/Trends, Electrolyte Replacement- K/Mg recheck in AM  BG q4h  Ambulation/Assist: A x 2 GB/W  Sleep Quality: Very poor. Does not sleep at night. Took 1 hr nap today  Plan: Increase TPN rate to 80ml/hr at 2000, x-ray abdomen pending, encourage aerobika

## 2023-12-18 NOTE — PROGRESS NOTES
[facility]    Medicine Progress Note - Hospitalist Service    Date of Admission:  12/8/2023    Assessment & Plan   Garry Mckay is a 88 year old male with PMH of hypertension; CAD; CHF (ischemic cardiomyopathy, combined systolic and diastolic), s/p ICD; PAD; dyslipidemia; CARINA on CPAP; anxiety/depression; BPH; hypothyroidism; and CKD stage III; who presented to OSH 12/7/2023 with abdominal pain and found with appendicitis with peritonitis with sepsis. Transferred to [facility] 12/8/2023.      Underwent surgery on 12/8/2023 and required ICU management for septic shock. Ultimately improved, and Hospitalist service assumed care on 12/11/2023.     # Acute appendicitis with peritonitis and septic shock - s/p laparoscopic appendectomy 12/8/2023.  # Pelvic fluid collections, question abscesses.  # Postoperative ileus vs SBO, prolonged  # Nutrition.  * Initial presentation to OSH 12/7 as above. CT abdomen noted with acute appendicitis with 0.7 cm appendicolith, no abscess. Started on ampicillin-sulbactam. Subsequently transferred to [facility] 12/8.  * On 12/8, underwent above surgery. Weaned off pressors. Antibiotics changed to pipericillin-tazobactam.  * On 12/10, CT abdomen noted with multiple dilated small bowel loops with related decompression of colon with findings consistent with partial obstruction/ adynamic ileus.  * On 12/11, pt w/o N/V.   * On 12/12, diet advanced to clears as pt seemed to be improving with +flatus and BM. Later, developed more N/V and NG placed.  * On 12/14, WBC elevated. Repeat abdominal CT showed prior appendectomy with findings suggestive of peritonitis, developing rim enhancing pelvic fluid collections, abscesses possible; increasing dilation of proximal small bowel with transition point  in the right lower quadrant, suggestive of partial mechanical obstruction, developing adhesion is possible, no evidence of nadine bowel ischemia or acute perforation; distended  gallbladder without additional CT evidence of acute cholecystitis.  - Continue NPO; advance diet as able per Surgery  - start TPN 12/15  - Continue NG at LIS.  - Continue pipericillin-tazobactam (started 12/8).  - Continue IVF's   - Continue PRN IV hydromorphone; minimize opioids as able given ileus.  - Continue PRN anti-emetics for now.  - Continue to encourage regular ambulation as able.  - Appreciate Surgery help.  - Repeat CT abdomen 12/18 as per general surgery     # Acute hypoxic respiratory failure, suspect multifactorial related to atelectasis, recent abdominal surgery, resolved.  # CARINA on home CPAP.  * Initial presentation as above. CXR at OSH negative.  * On 12/8, CXR showed new left basilar bandlike opacity, most likely atelectasis.   * On 12/11, down to 2L O2.  * On 12/14, off O2.  - Continue CPAP while sleeping as able  - Continue to encourage incentive spirometry.  - Continue PRN O2.     # FATIMAH on CKD3, suspect prerenal, meds (including sacubitril-valsartan, diuretics).  # Hyperkalemia, resolved.  * Baseline creatinine 1-1.1.   * Creatinine 1.39 --> 1.8 at OSH.  * Cr up to 2.27 and K up to 5.5 on 12/8.  * Cr and K subsequently improved with IVF's.  * On IV furosemide 12/10-12/11.  - Continue IVF's.  - Continue to monitor BMP  - Avoid nephrotoxic medications.     # Troponin elevation, suspect demand ischemia (type 2 NSTEMI).  # Hypertension (benign essential).  # CAD s/p CABG (1994), stenting (2002, 2003, 2005, 2021)  # Chronic HFrEF (systolic and diastolic), s/p ICD.  # HLD.  # Afib with RVR  [PTA: carvedilol 12.5 mg BID; sacubitril-valsartan 49-51 mg BID; furosemide 20 mg daily.]  * Last cath 1/2021 at [facility] showed patent CLAUDIA-RCA, LIMA occluded, vein-diagonal patent with placement of stent,  LAD, 40% circumflex, RCA occluded. Last stress testing 7/2023 negative for ischemia. Echo 7/2023 showed LVEF 30-35%, global hypokinesis LV, moderate-severe cLVH, grade 1 diastolic dysfunction; RV systolic  function mildly reduced.   * Initial presentation as above. BNP in ED at 2613, troponin 70. EKG w/ nonspecific t-w changes.   * PTA sacubitril-valsartan and furosemide held on admit given FATIMAH.  * Started on IV furosemide in ICU 12/10, held 12/11.  * Meds subsequently restarted but held again 12/13 due to NPO.  - Continue to hold hold ASA, clopidogrel, atorvastatin, furosemide and sacubitril-valsartan for now.  - resume coreg  - PRN IV metoprolol  - Continue to monitor i/o's, daily wts.     NSVT  -monitor  -follow mag and K  -has ICD    Acute metabolic encephalopathy (delirium)  - treat associated conditions as noted elsewhere  - delirium precautions    Hyperglycemia while on TPN  - check A1c  - High dose sliding scale  - Lantus 10u QHS    Anemia, suspect blood loss component.  * Hgb normal on initially evaluation.  * Underwent surgery 12/8 as above.  * Hgb down to 11.9 on 12/9.  - Continue to monitor CBC   - Consider prbc transfusion if hgb </= 7.0 or if significant bleeding with hemodynamic instability or if symptomatic.     Thrombocytopenia, unclear etiology, possibly due to sepsis, medication effect or consumptive or other cause.  * Plts normal on initially evaluation.  * Plts subsequently down to 104K on 12/9  * Plts normalized 12/14  - Continue to monitor CBC     Hypokalemia.  * Potassium low at times this hospitalization. Treated with replacement.  - Continue PRN K replacement.     Hx CVA.  - Continue to hold ASA and clopidogrel for now.  - Continue to hold atorvastatin.     Depression/anxiety.  - Continue to hold gabapentin for now.  - resume escitalopram      Hypothyroidism.  - Continue levothyroxine.     GERD.  - Continue pantoprazole IV.     BPH.  - Continue to hold finasteride for now.     Migraines.  * Gets botox.   - Continue to monitor clinically.    Gallbladder distention  *noted on CT abd 12/14  *abd usg 12/15 w/o evidence of cholecystitis  - monitor    Goals of care  Patient's daughter identifies  "living independently and being able to drive as extremely important for patient's quality of life.  12/18/2023: Discussed quality of life and returning to baseline will be delayed for every day patient remains hospitalized.  Discussed CODE STATUS and that generally if an 88-year-old requires cardiac resuscitation or intubation they would likely never return to independent living and driving.  When this is coupled with his current physical status, it is likely that his return to independent living is even worse if he were to require resuscitation.  Discussed that patient's and their families sometimes set a date for transition to comfort care if things continue to not improve, especially in situations where patients may linger for some weeks without clear improvement.  -Consult to palliative care for assistance with goals of care moving forward        Diet: NPO for Medical/Clinical Reasons Except for: Ice Chips, Meds  parenteral nutrition - Clinimix E  parenteral nutrition - Clinimix E    DVT Prophylaxis: Enoxaparin (Lovenox) SQ  Hameed Catheter: Not present  Lines: PRESENT      PICC 12/15/23 Double Lumen Left Brachial vein medial TPN-Site Assessment: WDL    Cardiac Monitoring: None  Code Status: Full Code      Clinically Significant Risk Factors              # Hypoalbuminemia: Lowest albumin = 2.8 g/dL at 12/11/2023  5:26 AM, will monitor as appropriate     # Hypertension: Noted on problem list        # Overweight: Estimated body mass index is 27.45 kg/m  as calculated from the following:    Height as of this encounter: 1.702 m (5' 7\").    Weight as of this encounter: 79.5 kg (175 lb 4.3 oz).             Disposition Plan      Expected Discharge Date: 12/21/2023        Discharge Comments: OR 12/8 12/16 CT guided drainage, JPs leaking.  Confused and agitated at times.            Az Turpin MD  Hospitalist Service  [facility]  Securely message with [employer] (more info)  Text page via " [employer] Paging/Directory   ______________________________________________________________________    Interval History   Continues to have episodes of NSVT.  No shocks from defib.  Wet cough is much improved today.  He is able to exchange some short pleasantries with me before falling asleep and sleeping for the remainder of the 40 minutes today spent in the room.  In general, his mentation is somewhat worse, but physically he appears somewhat better.    Long discussion with daughter at bedside today about goals of care.  Discussed high risk of morbidity and mortality associated with any CPR or emergent intubation and [id number].  Discussed that it is very difficult to give a clear expectation of when, or if patient will recover because SBO/ileus is such an amorphous disease that clears differently for every patient.  Discussed that she and her brother will have to continue to consider how much they are willing to tolerate and how much they are willing to put their dad through for the chance that he might recover to the point where he can hopefully get home and drive again.  She is clear that being able to drive independently (both a car and his tractor) are a huge quality of life measure for him.  She states that he would like to be at home for the end of his life if it looks like that was happening.  We discussed palliative care and hospice.  She is aware of the philosophy of care as her mother was on hospice at the end of her life after a long phillips with cancer.      Physical Exam   Vital Signs: Temp: 97.4  F (36.3  C) Temp src: Axillary BP: 119/76 Pulse: 71   Resp: 15 SpO2: (!) 88 % O2 Device: None (Room air) Oxygen Delivery: 2 LPM  Weight: 175 lbs 4.25 oz    Constitutional: Awake, alert to self and time location situation, cooperative, no apparent distress  Respiratory: Clear to auscultation bilaterally, no crackles or wheezing  Cardiovascular: Regular rate and rhythm, normal S1 and S2, and no murmur noted  GI:  Normal bowel sounds, soft, distended, mild tenderness,  Skin/Integumen: No rashes, no cyanosis, no edema  Other: Alert, pleasant, but falls asleep easily, confused      Medical Decision Making       60 MINUTES SPENT BY ME on the date of service doing chart review, history, exam, documentation & further activities per the note.      Data   ------------------------- PAST 24 HR DATA REVIEWED -----------------------------------------------    I have personally reviewed the following data over the past 24 hrs:    15.2 (H)  \   14.2   / 199     140 108 (H) 26.6 (H) /  201 (H)   4.0 22 1.15 \     ALT: 12 AST: 33 AP: 74 TBILI: 0.6   ALB: 3.0 (L) TOT PROTEIN: 5.5 (L) LIPASE: N/A       Imaging results reviewed over the past 24 hrs:   Recent Results (from the past 24 hour(s))   XR Abdomen 1 View    Narrative    EXAM: XR ABDOMEN 1 VIEW  LOCATION: [facility]  DATE: 12/17/2023    INDICATION: Recheck ileus following administration of Gastroview  COMPARISON: 12/16/2023      Impression    IMPRESSION: Supine view labeled at 5 hours after administration of oral contrast. There is now opacification of multiple dilated loops of small bowel consistent with distal small bowel obstruction. Air is seen throughout normal normal normal-sized colon   but there is no contrast yet within colon.  NG tube in place. Drains are present within right lower quadrant.

## 2023-12-18 NOTE — PROGRESS NOTES
Pt here with appendicitis. A&O.  Confused and anxious at times.  VSS. Tele Sr with bbb. Npo except ice chips.  Ng tube for decompression.   Up with ax2.  Denies pain. Pt scoring green on the Aggression Stop Light Tool. Plan:  increased tpn rate to 80ml at 2000.  Possibly remove ng tube tomorrow.

## 2023-12-18 NOTE — PROGRESS NOTES
[facility]    General Surgery  Daily Progress Note       Assessment and Plan:   Garry Mckay is a 88 year old male   S/P Lap Appy for Perforated Appendicitis, and vascular assessment of questionable area on cecum through ICG administration  POD #10     Diagnosis: Perforated Appendicitis, Post Op Ileus  Post-operative pelvic fluid collections s/p IR drain 12/15    PLAN:  - Gastrografin contrast challenge with multiple dilated small bowel loops without contrast in colon. A couple BM's following this.  - NG tube with persistently high output, will keep this in place today. Could consider XR next 24-48 hours to reassess bowel dilation. Protonix for GI prophylaxis. Continue TPN per dietitian.  - If worsening leukocytosis today, will get CT abdomen/pelvis to assess pelvic fluid collections. Continue antibiotics per ID. **addendum** Leukocytosis improved today, will recheck tomorrow. Discussed with Dr. Singer. Will hold off on imaging today unless change in clinical condition. Will continue to monitor NG output with the hopes of this decreasing as bowel function returns.  - On electrolyte replacement protocol for hypokalemia. Cr improving.  - Ambulate/activity. QID to assist with bowel function, PCDs for DVT prophylaxis. Continue lovenox for DVT chemoprophylaxis. Plavix held since NG replaced.  - Encourage deep breathe and IS.   - Medical management per primary team.        Interval History:   Garry Mckay is seen on surgical rounds with daughter present today. She states he had called her last night confused with time of day but was oriented during the morning. He has been complaining of feeling warm and wanting to get out of bed. Denies abdominal pain. NG with 1500 ml yesterday. XR without contast in colon, a couple BM's following this. Hypertensive at times, vitals otherwise wnl.         Physical Exam:   Temp: 98.2  F (36.8  C) Temp src: Oral BP: 132/75 Pulse: 80   Resp: 18 SpO2: 92 % O2  Device: Nasal cannula Oxygen Delivery: 2 LPM    I/O last 3 completed shifts:  In: 885 [Other:25]  Out: 2925 [Urine:1400; Emesis/NG output:1500; Drains:25]    GENERAL: VS reviewed, alert, oriented to place but forgetful and wanting to get out of bed, no acute distress  LUNGS: Normal respiratory effort, no wheezing  ABDOMEN:  Distended but soft, tender to central abdomen, ESTEFANY drain with 5 ml/24 hours- output is serous, IR drain with 20 ml/24 hours- output is serous  INCISION: Staples in place. Incisions are clean, dry, and intact. No surrounding erythema.  EXTREMITIES: Moving all extremities, no gross deformities  NEUROLOGICAL: Grossly non-focal, mood & affect appropriate    XR ABDOMEN 12/17  IMPRESSION: Supine view labeled at 5 hours after administration of oral contrast. There is now opacification of multiple dilated loops of small bowel consistent with distal small bowel obstruction. Air is seen throughout normal normal normal-sized colon   but there is no contrast yet within colon.  NG tube in place. Drains are present within right lower quadrant.    Data   Recent Labs   Lab 12/18/23  0428 12/18/23  0416 12/18/23  0152 12/17/23  1002 12/17/23  0522 12/17/23  0215 12/17/23  0004 12/16/23  0632 12/16/23  0541 12/15/23  1645 12/15/23  0600 12/14/23  2259 12/14/23  0536   WBC  --   --   --   --  20.6*  --   --   --   --   --  17.7*  --  15.8*   HGB  --   --   --   --  13.6  --   --   --   --   --  13.9  --  13.5   MCV  --   --   --   --  99  --   --   --   --   --  98  --  100   PLT  --   --   --   --  211  --   --   --   --   --  185  --  152   NA  --  140  --   --  140  --   --   --  142  --  143  --  141   POTASSIUM  --  4.0  --   --  4.1  --  3.8  --  3.9  3.8   < > 3.2*  --  3.3*   CHLORIDE  --  108*  --   --  110*  --   --   --  112*  --  112*  --  111*   CO2  --  22  --   --  20*  --   --   --  17*  --  20*  --  22   BUN  --  26.6*  --   --  19.6  --   --   --  15.4  --  17.7  --  24.9*   CR  --  1.15  --   --   1.11  --   --   --  1.08  --  1.15  --  1.19*   ANIONGAP  --  10  --   --  10  --   --   --  13  --  11  --  8   RHODA  --  8.9  --   --  8.5*  --   --   --  8.5*  --  8.5*  --  8.4*   * 200* 209*   < > 200*   < >  --    < > 230*   < > 174*   < > 182*   ALBUMIN  --  3.0*  --   --   --   --   --   --   --   --   --   --   --    PROTTOTAL  --  5.5*  --   --   --   --   --   --   --   --   --   --   --    BILITOTAL  --  0.6  --   --   --   --   --   --   --   --   --   --   --    ALKPHOS  --  74  --   --   --   --   --   --   --   --   --   --   --    ALT  --  12  --   --   --   --   --   --   --   --   --   --   --    AST  --  33  --   --   --   --   --   --   --   --   --   --   --     < > = values in this interval not displayed.       Amie Stewart PA-C    Please use [employer] to page 7:30am-4pm, page on-call surgeon after 4pm  Office number: [phone]

## 2023-12-18 NOTE — PLAN OF CARE
Shift Summary (2610-0410):    Alert, intermittently confused. Slow to respond. VSS on room air. Denies pain. Tele shows SR w/ T wave inversion and PVCs. BG elevated - lantus added today. TPN @ 80 ml/hr. NPO. NG to LIS - 350 ml green output this shift. +BM, loose. R ESTEFANY drain x2 to bulb suction, 0 ml of output this shift. Leaking at site, requiring frequent dressing change. Lap sites WDL. Up w/ Ax2 GBW, up in chair for most of the day. Replaced lytes per protocol. Alteplase instilled in L PICC w/o improvement (second attempt instilled at 18:40). Flushes well, does not draw. PIV SL. LS clear/dim, upper airway wheezes present. PT/OT/Palliative consulted. POC ongoing.

## 2023-12-18 NOTE — PLAN OF CARE
Goal Outcome Evaluation:           Overall Patient Progress: improvingOverall Patient Progress: improving    Outcome Evaluation: meeting needs on TPN, reached goal rate last night. remains NPO    Yusra Arthur RD, LD

## 2023-12-18 NOTE — PLAN OF CARE
Pt alert and oriented x4 with intermittent confusion. Pt anxious at times. Pt up to commode x2 with assist of 1 and gait belt/walker. Pt denies pain. Pt denies shortness of breath. Pt with one void in commode and 200 ml out external catheter. Scant drainage in chente tubes. Pt slept poor during the night.

## 2023-12-18 NOTE — PROVIDER NOTIFICATION
"MD Notification    Notified Person: MD    Notified Person Name: Amie CINTHYA Stewart    Notification Date/Time: 12/18 @ 3:15PM    Notification Interaction: [employer]     Purpose of Notification: \"15 fr ESTEFANY drain from 12/8 leaking copious amounts. Saturated dressing changed twice within the last 5 hrs. Is further intervention needed for this? Please advise.\"    Orders Received: Callback received w/ no new orders. Continue to change dressing PRN and surgery will assess tomorrow.     Comments:   "

## 2023-12-19 ENCOUNTER — APPOINTMENT (OUTPATIENT)
Dept: OCCUPATIONAL THERAPY | Facility: CLINIC | Age: 88
DRG: 853 | End: 2023-12-19
Attending: HOSPITALIST
Payer: MEDICARE

## 2023-12-19 ENCOUNTER — APPOINTMENT (OUTPATIENT)
Dept: PHYSICAL THERAPY | Facility: CLINIC | Age: 88
DRG: 853 | End: 2023-12-19
Attending: HOSPITALIST
Payer: MEDICARE

## 2023-12-19 ENCOUNTER — APPOINTMENT (OUTPATIENT)
Dept: CT IMAGING | Facility: CLINIC | Age: 88
DRG: 853 | End: 2023-12-19
Attending: NURSE PRACTITIONER
Payer: MEDICARE

## 2023-12-19 LAB
ATRIAL RATE - MUSE: 113 BPM
ATRIAL RATE - MUSE: 80 BPM
BACTERIA ASPIRATE CULT: ABNORMAL
BASOPHILS # BLD AUTO: 0.1 10E3/UL (ref 0–0.2)
BASOPHILS NFR BLD AUTO: 1 %
DIASTOLIC BLOOD PRESSURE - MUSE: NORMAL MMHG
DIASTOLIC BLOOD PRESSURE - MUSE: NORMAL MMHG
EOSINOPHIL # BLD AUTO: 0.7 10E3/UL (ref 0–0.7)
EOSINOPHIL NFR BLD AUTO: 5 %
ERYTHROCYTE [DISTWIDTH] IN BLOOD BY AUTOMATED COUNT: 14 % (ref 10–15)
GLUCOSE BLDC GLUCOMTR-MCNC: 201 MG/DL (ref 70–99)
GLUCOSE BLDC GLUCOMTR-MCNC: 217 MG/DL (ref 70–99)
GLUCOSE BLDC GLUCOMTR-MCNC: 219 MG/DL (ref 70–99)
GLUCOSE BLDC GLUCOMTR-MCNC: 229 MG/DL (ref 70–99)
GLUCOSE BLDC GLUCOMTR-MCNC: 231 MG/DL (ref 70–99)
GLUCOSE BLDC GLUCOMTR-MCNC: 239 MG/DL (ref 70–99)
GLUCOSE BLDC GLUCOMTR-MCNC: 253 MG/DL (ref 70–99)
HCT VFR BLD AUTO: 37.9 % (ref 40–53)
HGB BLD-MCNC: 12.3 G/DL (ref 13.3–17.7)
IMM GRANULOCYTES # BLD: 0.4 10E3/UL
IMM GRANULOCYTES NFR BLD: 3 %
INTERPRETATION ECG - MUSE: NORMAL
INTERPRETATION ECG - MUSE: NORMAL
LYMPHOCYTES # BLD AUTO: 2.5 10E3/UL (ref 0.8–5.3)
LYMPHOCYTES NFR BLD AUTO: 17 %
MAGNESIUM SERPL-MCNC: 2 MG/DL (ref 1.7–2.3)
MCH RBC QN AUTO: 32.6 PG (ref 26.5–33)
MCHC RBC AUTO-ENTMCNC: 32.5 G/DL (ref 31.5–36.5)
MCV RBC AUTO: 101 FL (ref 78–100)
MONOCYTES # BLD AUTO: 1 10E3/UL (ref 0–1.3)
MONOCYTES NFR BLD AUTO: 7 %
NEUTROPHILS # BLD AUTO: 10.1 10E3/UL (ref 1.6–8.3)
NEUTROPHILS NFR BLD AUTO: 67 %
NRBC # BLD AUTO: 0 10E3/UL
NRBC BLD AUTO-RTO: 0 /100
P AXIS - MUSE: 21 DEGREES
P AXIS - MUSE: NORMAL DEGREES
PHOSPHATE SERPL-MCNC: 2.9 MG/DL (ref 2.5–4.5)
PLATELET # BLD AUTO: 201 10E3/UL (ref 150–450)
POTASSIUM SERPL-SCNC: 4.1 MMOL/L (ref 3.4–5.3)
PR INTERVAL - MUSE: 126 MS
PR INTERVAL - MUSE: NORMAL MS
QRS DURATION - MUSE: 116 MS
QRS DURATION - MUSE: 130 MS
QT - MUSE: 364 MS
QT - MUSE: 402 MS
QTC - MUSE: 463 MS
QTC - MUSE: 471 MS
R AXIS - MUSE: 16 DEGREES
R AXIS - MUSE: 19 DEGREES
RBC # BLD AUTO: 3.77 10E6/UL (ref 4.4–5.9)
SYSTOLIC BLOOD PRESSURE - MUSE: NORMAL MMHG
SYSTOLIC BLOOD PRESSURE - MUSE: NORMAL MMHG
T AXIS - MUSE: -78 DEGREES
T AXIS - MUSE: 221 DEGREES
VENTRICULAR RATE- MUSE: 101 BPM
VENTRICULAR RATE- MUSE: 80 BPM
WBC # BLD AUTO: 14.7 10E3/UL (ref 4–11)

## 2023-12-19 PROCEDURE — 250N000011 HC RX IP 250 OP 636: Mod: JZ | Performed by: INTERNAL MEDICINE

## 2023-12-19 PROCEDURE — 250N000011 HC RX IP 250 OP 636: Performed by: INTERNAL MEDICINE

## 2023-12-19 PROCEDURE — 94640 AIRWAY INHALATION TREATMENT: CPT

## 2023-12-19 PROCEDURE — 97530 THERAPEUTIC ACTIVITIES: CPT | Mod: GP

## 2023-12-19 PROCEDURE — 250N000013 HC RX MED GY IP 250 OP 250 PS 637: Performed by: INTERNAL MEDICINE

## 2023-12-19 PROCEDURE — B4185 PARENTERAL SOL 10 GM LIPIDS: HCPCS | Mod: JZ | Performed by: STUDENT IN AN ORGANIZED HEALTH CARE EDUCATION/TRAINING PROGRAM

## 2023-12-19 PROCEDURE — 85004 AUTOMATED DIFF WBC COUNT: CPT | Performed by: PHYSICIAN ASSISTANT

## 2023-12-19 PROCEDURE — C9113 INJ PANTOPRAZOLE SODIUM, VIA: HCPCS | Performed by: INTERNAL MEDICINE

## 2023-12-19 PROCEDURE — 250N000009 HC RX 250: Performed by: INTERNAL MEDICINE

## 2023-12-19 PROCEDURE — 84100 ASSAY OF PHOSPHORUS: CPT | Performed by: STUDENT IN AN ORGANIZED HEALTH CARE EDUCATION/TRAINING PROGRAM

## 2023-12-19 PROCEDURE — 99233 SBSQ HOSP IP/OBS HIGH 50: CPT | Performed by: INTERNAL MEDICINE

## 2023-12-19 PROCEDURE — 250N000013 HC RX MED GY IP 250 OP 250 PS 637: Performed by: STUDENT IN AN ORGANIZED HEALTH CARE EDUCATION/TRAINING PROGRAM

## 2023-12-19 PROCEDURE — 99223 1ST HOSP IP/OBS HIGH 75: CPT | Mod: AI | Performed by: REGISTERED NURSE

## 2023-12-19 PROCEDURE — 250N000012 HC RX MED GY IP 250 OP 636 PS 637: Performed by: INTERNAL MEDICINE

## 2023-12-19 PROCEDURE — 97535 SELF CARE MNGMENT TRAINING: CPT | Mod: GO

## 2023-12-19 PROCEDURE — 999N000157 HC STATISTIC RCP TIME EA 10 MIN

## 2023-12-19 PROCEDURE — 20501 NJX SINUS TRACT DIAGNOSTIC: CPT

## 2023-12-19 PROCEDURE — 84132 ASSAY OF SERUM POTASSIUM: CPT | Performed by: STUDENT IN AN ORGANIZED HEALTH CARE EDUCATION/TRAINING PROGRAM

## 2023-12-19 PROCEDURE — 120N000001 HC R&B MED SURG/OB

## 2023-12-19 PROCEDURE — 250N000011 HC RX IP 250 OP 636: Mod: JZ | Performed by: HOSPITALIST

## 2023-12-19 PROCEDURE — 250N000009 HC RX 250: Mod: JZ | Performed by: STUDENT IN AN ORGANIZED HEALTH CARE EDUCATION/TRAINING PROGRAM

## 2023-12-19 PROCEDURE — 250N000009 HC RX 250: Performed by: HOSPITALIST

## 2023-12-19 PROCEDURE — 83735 ASSAY OF MAGNESIUM: CPT | Performed by: STUDENT IN AN ORGANIZED HEALTH CARE EDUCATION/TRAINING PROGRAM

## 2023-12-19 PROCEDURE — 76080 X-RAY EXAM OF FISTULA: CPT

## 2023-12-19 RX ORDER — MAGNESIUM SULFATE HEPTAHYDRATE 40 MG/ML
2 INJECTION, SOLUTION INTRAVENOUS ONCE
Status: COMPLETED | OUTPATIENT
Start: 2023-12-19 | End: 2023-12-19

## 2023-12-19 RX ORDER — HALOPERIDOL 5 MG/ML
0.5 INJECTION INTRAMUSCULAR EVERY 6 HOURS PRN
Status: DISCONTINUED | OUTPATIENT
Start: 2023-12-19 | End: 2024-01-03 | Stop reason: HOSPADM

## 2023-12-19 RX ORDER — ACETAMINOPHEN 325 MG/10.15ML
650 LIQUID ORAL EVERY 6 HOURS PRN
Status: DISCONTINUED | OUTPATIENT
Start: 2023-12-19 | End: 2024-01-03 | Stop reason: HOSPADM

## 2023-12-19 RX ADMIN — PANTOPRAZOLE SODIUM 40 MG: 40 INJECTION, POWDER, FOR SOLUTION INTRAVENOUS at 09:51

## 2023-12-19 RX ADMIN — CARVEDILOL 12.5 MG: 12.5 TABLET, FILM COATED ORAL at 09:52

## 2023-12-19 RX ADMIN — ACETAMINOPHEN 650 MG: 325 SUSPENSION ORAL at 20:47

## 2023-12-19 RX ADMIN — OLIVE OIL AND SOYBEAN OIL 250 ML: 16; 4 INJECTION, EMULSION INTRAVENOUS at 20:38

## 2023-12-19 RX ADMIN — IPRATROPIUM BROMIDE AND ALBUTEROL SULFATE 3 ML: .5; 3 SOLUTION RESPIRATORY (INHALATION) at 23:12

## 2023-12-19 RX ADMIN — GABAPENTIN 200 MG: 100 CAPSULE ORAL at 09:52

## 2023-12-19 RX ADMIN — LEVOTHYROXINE SODIUM 75 MCG: 20 INJECTION, SOLUTION INTRAVENOUS at 14:57

## 2023-12-19 RX ADMIN — ESCITALOPRAM OXALATE 20 MG: 10 TABLET ORAL at 09:52

## 2023-12-19 RX ADMIN — PIPERACILLIN AND TAZOBACTAM 4.5 G: 4; .5 INJECTION, POWDER, FOR SOLUTION INTRAVENOUS at 22:35

## 2023-12-19 RX ADMIN — HALOPERIDOL LACTATE 1 MG: 5 INJECTION, SOLUTION INTRAMUSCULAR at 01:30

## 2023-12-19 RX ADMIN — CARVEDILOL 12.5 MG: 12.5 TABLET, FILM COATED ORAL at 20:46

## 2023-12-19 RX ADMIN — PIPERACILLIN AND TAZOBACTAM 4.5 G: 4; .5 INJECTION, POWDER, FOR SOLUTION INTRAVENOUS at 03:29

## 2023-12-19 RX ADMIN — ENOXAPARIN SODIUM 40 MG: 40 INJECTION SUBCUTANEOUS at 12:26

## 2023-12-19 RX ADMIN — INSULIN GLARGINE 10 UNITS: 100 INJECTION, SOLUTION SUBCUTANEOUS at 22:36

## 2023-12-19 RX ADMIN — PIPERACILLIN AND TAZOBACTAM 4.5 G: 4; .5 INJECTION, POWDER, FOR SOLUTION INTRAVENOUS at 09:51

## 2023-12-19 RX ADMIN — HYDROMORPHONE HYDROCHLORIDE 0.4 MG: 0.2 INJECTION, SOLUTION INTRAMUSCULAR; INTRAVENOUS; SUBCUTANEOUS at 17:54

## 2023-12-19 RX ADMIN — LATANOPROST 1 DROP: 50 SOLUTION/ DROPS OPHTHALMIC at 22:35

## 2023-12-19 RX ADMIN — HALOPERIDOL LACTATE 0.5 MG: 5 INJECTION, SOLUTION INTRAMUSCULAR at 17:02

## 2023-12-19 RX ADMIN — MAGNESIUM SULFATE HEPTAHYDRATE 2 G: 40 INJECTION, SOLUTION INTRAVENOUS at 06:43

## 2023-12-19 RX ADMIN — PIPERACILLIN AND TAZOBACTAM 4.5 G: 4; .5 INJECTION, POWDER, FOR SOLUTION INTRAVENOUS at 17:02

## 2023-12-19 RX ADMIN — ASPIRIN 81 MG CHEWABLE TABLET 81 MG: 81 TABLET CHEWABLE at 09:52

## 2023-12-19 RX ADMIN — ASCORBIC ACID, VITAMIN A PALMITATE, CHOLECALCIFEROL, THIAMINE HYDROCHLORIDE, RIBOFLAVIN-5 PHOSPHATE SODIUM, PYRIDOXINE HYDROCHLORIDE, NIACINAMIDE, DEXPANTHENOL, ALPHA-TOCOPHEROL ACETATE, VITAMIN K1, FOLIC ACID, BIOTIN, CYANOCOBALAMIN: 200; 3300; 200; 6; 3.6; 6; 40; 15; 10; 150; 600; 60; 5 INJECTION, SOLUTION INTRAVENOUS at 20:37

## 2023-12-19 ASSESSMENT — ACTIVITIES OF DAILY LIVING (ADL)
ADLS_ACUITY_SCORE: 40
ADLS_ACUITY_SCORE: 36
ADLS_ACUITY_SCORE: 40
ADLS_ACUITY_SCORE: 36
ADLS_ACUITY_SCORE: 40
ADLS_ACUITY_SCORE: 40
ADLS_ACUITY_SCORE: 36
ADLS_ACUITY_SCORE: 36
ADLS_ACUITY_SCORE: 40
ADLS_ACUITY_SCORE: 36

## 2023-12-19 NOTE — PROGRESS NOTES
[facility]    General Surgery  Daily Progress Note       Assessment and Plan:   Garry Mckay is a 88 year old male   S/P Lap Appy for Perforated Appendicitis, and vascular assessment of questionable area on cecum through ICG administration  POD #11     Diagnosis: Perforated Appendicitis, Post Op Ileus  Post-operative pelvic fluid collections s/p IR drain 12/15    PLAN:  - IR recommending CT sinogram today.    - Considering clamping trial and initiating sips and chips again.  Will discuss with surgeon.  - Continue Protonix for GI prophylaxis. Continue TPN per dietitian.  - Leukocytosis continues to improve.  - On electrolyte replacement protocol for hypokalemia. Cr improving.  - Ambulate/activity. QID to assist with bowel function,   - PCDs for DVT prophylaxis. Continue lovenox for DVT chemoprophylaxis. Plavix held since NG replaced.  - Encourage deep breathe and IS.   - Medical management per primary team.        Interval History:   Garry Mckay is seen on surgical rounds with daughter and son present today. He has no concerns of pain typically, but feels like he needs to change position or get out of bed often.  Additionally, feeling hot or cold, fluctuating between the two often.  No appreciable fevers.  Is hungry and asking for liquids.  Passing flatus and green liquid stools.  NGT output decreasing and daughter notes it is much lighter in color now vs last 2 days..         Physical Exam:   Temp: 97.3  F (36.3  C) Temp src: Axillary BP: 114/72 Pulse: 80   Resp: 22 SpO2: 94 % O2 Device: None (Room air)      I/O last 3 completed shifts:  In: 2759.6 [P.O.:60; I.V.:450; Other:20; NG/GT:60]  Out: 700 [Urine:350; Emesis/NG output:350]    GENERAL: VS reviewed, alert, oriented to place but forgetful and wanting to get out of bed, no acute distress but asking repeatedly for warm blankets.  LUNGS: Normal respiratory effort, no wheezing  ABDOMEN:  Moderately distended but soft, tender to  central and left abdomen,   ESTEFANY drain with 15 ml/24 hours- output is serous,   IR drain with 15 ml/24 hours- output is serous  Leaking around drain is serous to clear  INCISION: Staples in place. Incisions are clean, dry, and intact. No surrounding erythema.  EXTREMITIES: Moving all extremities, no gross deformities  NEUROLOGICAL: Grossly non-focal, mood & affect appropriate        Data   Recent Labs   Lab 12/19/23  0722 12/19/23  0339 12/19/23  0332 12/19/23  0002 12/18/23  1241 12/18/23  1126 12/18/23  0428 12/18/23  0416 12/17/23  1002 12/17/23  0522 12/16/23  0632 12/16/23  0541   WBC  --  14.7*  --   --   --  15.2*  --   --   --  20.6*  --   --    HGB  --  12.3*  --   --   --  14.2  --   --   --  13.6  --   --    MCV  --  101*  --   --   --  100  --   --   --  99  --   --    PLT  --  201  --   --   --  199  --   --   --  211  --   --    NA  --   --   --   --   --   --   --  140  --  140  --  142   POTASSIUM  --  4.1  --   --   --   --   --  4.0  --  4.1   < > 3.9  3.8   CHLORIDE  --   --   --   --   --   --   --  108*  --  110*  --  112*   CO2  --   --   --   --   --   --   --  22  --  20*  --  17*   BUN  --   --   --   --   --   --   --  26.6*  --  19.6  --  15.4   CR  --   --   --   --   --   --   --  1.15  --  1.11  --  1.08   ANIONGAP  --   --   --   --   --   --   --  10  --  10  --  13   RHODA  --   --   --   --   --   --   --  8.9  --  8.5*  --  8.5*   *  --  217* 231*   < >  --    < > 200*   < > 200*   < > 230*   ALBUMIN  --   --   --   --   --   --   --  3.0*  --   --   --   --    PROTTOTAL  --   --   --   --   --   --   --  5.5*  --   --   --   --    BILITOTAL  --   --   --   --   --   --   --  0.6  --   --   --   --    ALKPHOS  --   --   --   --   --   --   --  74  --   --   --   --    ALT  --   --   --   --   --   --   --  12  --   --   --   --    AST  --   --   --   --   --   --   --  33  --   --   --   --     < > = values in this interval not displayed.       Garret Nieto,  AILYN    Please use Vocera to page 7:30am-4pm, page on-call surgeon after 4pm  Office number: [phone]

## 2023-12-19 NOTE — PLAN OF CARE
Pt alert and oriented x4, intermittent confusion to date. Pt anxious with frequent needs. Pt denies pain. Pt slept well from 9280-9581 then extremely restless. Pt up to chair x2 this shift. Pt slept well from 0400 until 0700 after being in chair. Pt with frequent incontinent small amt of liquid green stools. Coccyx is reddened and blanches well. Pt turned frequently every two hours side to side all shift. Pt denies nausea. Pt with audible exp wheezes. Lung sounds diminished. Pt incontinent of urine although some output through external catheter. Pt with small amt output from ng.

## 2023-12-19 NOTE — PROVIDER NOTIFICATION
MD Notification    Notified Person: MD    Notified Person Name: Rodney Carson    Notification Date/Time: 12/19/23 1612    Notification Interaction:    Purpose of Notification: pt reported nervousness and anxiety, requested 300mg gabapentin, says normally what he gets at bedtime, i informed pt it was 4pm and his gabapentin wasn't scheduled until 9pm. Can we get a PRN dose or something else for anxiety? Pt's BP also elevated, 170/106, rechecked twice, no PRNs ordered.    Orders Received: No treatment for high BP d/t anxiety, reassure pt, has daily anxiety. Haldol PRN if pt gets really worked up. Avoid benzos, allergic to vistaril.    Comments:

## 2023-12-19 NOTE — ED PROVIDER NOTES
MD Notification    Notified Person: MD    Notified Person Name: Rodney Carson    Notification Date/Time: 12/19/23 1114    Notification Interaction: [employer] Messaging    Purpose of Notification: Pt c/o headache, does not have tylenol ordered. Can we order some? To clarify, pt getting meds NG or po?    Orders Received: Liquid tylenol ordered, both administration routes ok.    Comments:

## 2023-12-19 NOTE — CONSULTS
Palliative Care Consultation Note  [facility]      Patient: Garry Mckay  Date of Admission:  12/8/2023    Requesting Clinician / Team: Dr Turpin/Medicine  Reason for consult: Goals of care     Recommendations & Counseling     GOALS OF CARE:   Restorative without limits   Goal is to continue current cares and hope for improvement. He has been starting to walk with assist to the bathroom. PT will start working with him today. Daughter feels hopeful that patient can get better- wants to give him time to do so.  I reviewed code status with daughter Maylin. She and her brother Girma spoke to hospitalist yesterday regarding recommendations for DNR/DNI as he would likely have poor outcome if he went through CPR. Maylin will continue discussions with her brother on this question but prefer to leave Garry at Full Code status at this time.    ADVANCE CARE PLANNING:  No health care directive on file. Per  informed consent policy, next of kin should be involved if patient becomes unable. [place] one at [place] in [place].   There is no POLST form on file, defer to patient and/or next of kin for decisions   Code status: Full Code    MEDICAL MANAGEMENT:   #Delirium  Avoid benzodiazepines, antihistamines, anticholinergics if able.  Lights on and blinds open during the day.  Reorient frequently.  Lights & TV off during the night.  Promote normal circadian rhythm.  Limit sensory deprivation - utilize hearing aids, glasses, etc.  Frequently assess basic needs such as temperature, elimination, thirst/hunger, pain  Consider bedside attendant (if able) for additional safety     #General Weakness/Debility   Physical Therapy  Occupational therapy    PSYCHOSOCIAL/SPIRITUAL SUPPORT:  Family daughter Maylin and son Girma  Spiritual  is Jewish but not practicing. Family declines  support at this time. They will reach out if needed.    Palliative Care will continue to follow. Thank you for the consult and allowing us  "to aid in the care of Garry Mckay.    Reviewed patient case with hospitalist Dr Carson and  patient's RN,     Xiao Echeverria, CNS  Securely message with HW (more info)  Text page via [employer] Paging/Directory      During regular M-F work hours ([id number]) -- please contact me on Vocera   In my absence, securely message our team via Vocera \"[facility]\" or go to On Call tab in [employer], search for \"[facility]\"   After regular work hours and on weekends/holidays, to reach our on call physician, securely message via Vocera \"[facility]\" or go to On Call tab in [employer], search for \"[facility]\"     Palliative Summary/HPI     Garry Mckay is a 88 year old male with a past medical history of BPH, hypothyroidism, CARINA, CKD 3, HTN, stroke (2017), ischemic cardiomyopathy, HF, CAD s/p CABG s/p stenting, ICD, who presented on 12/08/23 with altered mental status and severe abdominal pain. He lives in [place] but currently has been staying with his daughter while visiting for the holidays.     Today, the patient was seen for:  Goals of care    Palliative Care Summary:   Met with Garry, his daughter Maylin and her  Carlos in room. Garry is very fatigued and confused so he is unable to meaningfully participate in discussion. He is sitting up in bedside recliner and keeps thinking that I am the physical therapist who will take him out walking. He falls asleep often and wakes up intermittently for short periods.   I introduced our role as an extra layer of support and how we help patients and families dealing with serious, potentially life-limiting illnesses. I explained the composition of the palliative care team.  Palliative care helps patients and families navigate their care while focusing on the whole person; providing emotional, social and spiritual support  Palliative care often assists with symptom management, information sharing about what to expect from the " illness, available treatment options and what effect those options may have on the disease course, and provide effective communication and caring support. and Introduced the role of palliative care as an interdisciplinary team that cares for patients with serious illness to help support symptom management, communication, coping for patients and their families as well as support with medical decision making.    Prognosis, Goals, & Planning:    Functional Status just prior to this current hospitalization:  has been hospitalized 1 times in the past 6 months for recurrent SOB, HF. Palliative Performance Scale (PPS): 70%  Significant evidence of disease.  Full/normal self-care & LOC; normal or reduced intake, reduced ambulation and activity/work.  Estimated Median Survival in Days: 145 to 108  days.   ECOG2 (Ambulatory and capable of all selfcare but unable to carry out any work activities; may need help with IADLs up and about > 50% of waking hours)  Interim history/status while hospitalized:     Hospital course: Upon further evaluation with CT he was found to have acute appendicitis... He underwent a laparoscopic appendectomy on 12/08 with findings of complicated perforated appendicitis with peritonitis and septic shock. Further imaging was done on 12/14 due to WBC increasing finding fluid collections and possible abscess. He developed ileus, TPN was started on 12/15. Other complications during hospitalization include atelectasis, gall bladder distention without evidence for cholecystitis at this time. He continues to have fluctuating mental status. They are trialing clamping of NG tube today.    Prognosis, Goals, and/or Advance Care Planning:  I reviewed code status with daughter Maylin. She and her brother Girma spoke to hospitalist yesterday regarding recommendations for DNR/DNI as hospitalist explained that patient would likely have poor outcome if he went through CPR. Maylin does recall the conversation. They have not  decided on anything. Maylin will continue discussions with her brother on this question but prefers to leave Garry at Full Code status at this time.  Family understands that Garry is very ill but they see improvement as patient has been starting to walk to the bathroom with assist. He may have his NG taken out soon. PT will start working with patient today The Goal is to continue current cares and hope for improvement. They want to give Garry time to improve if he can. Maylin said she is here at hospital every day at 9:30-3:30. They are okay with me checking in after a few days.  Code Status was addressed today:   Yes, We discussed potential risks and rationale of attempting cardiac resuscitation, intubation, and mechanical ventilation.  We also discussed probability of survival as well as quality of life implications.  Based on this discussion, patient or surrogate response/decision: continue full code status      Patient's decision making preferences: unable to assess        Patient has decision-making capacity today for complex decisions:Unreliable           Coping, Meaning, & Spirituality:   Mood, coping, and/or meaning in the context of serious illness were addressed today: Yes    Social:   Living situation:lives alone  Important relationships/caregivers:daughter Maylin and son Girma    Medications:  I have reviewed this patient's medication profile and medications from this hospitalization. Notable medications:     Noted scheduled meds are:   aspirin  81 mg Oral Daily    Or    aspirin  81 mg Per NG tube Daily    carvedilol  12.5 mg Oral BID    [Held by provider] clopidogrel  75 mg Oral Daily    enoxaparin ANTICOAGULANT  40 mg Subcutaneous Q24H    escitalopram  20 mg Oral or NG Tube Daily    [Held by provider] finasteride  5 mg Oral At Bedtime    [Held by provider] gabapentin  100 mg Oral Q24H    gabapentin  200 mg Oral QAM    [Held by provider] gabapentin  300 mg Oral At Bedtime    insulin aspart  1-12 Units  Subcutaneous Q4H    insulin glargine  10 Units Subcutaneous At Bedtime    latanoprost  1 drop Both Eyes At Bedtime    levothyroxine  75 mcg Intravenous Daily    lipids plant base  250 mL Intravenous Once per day on Monday Tuesday Wednesday Thursday Friday Saturday    pantoprazole  40 mg Intravenous Q24H    piperacillin-tazobactam  4.5 g Intravenous Q6H    [Held by provider] senna-docusate  1 tablet Oral BID    sodium chloride (PF)  10 mL Irrigation Q12H    sodium chloride (PF)  10-40 mL Intracatheter Q7 Days    sodium chloride (PF)  3 mL Intracatheter Q8H       Noted PRN meds are:  alteplase, bisacodyl, calcium carbonate, dextrose, glucose **OR** dextrose **OR** glucagon, haloperidol lactate, HYDROmorphone, HYDROmorphone, ipratropium - albuterol 0.5 mg/2.5 mg/3 mL, lidocaine 4%, lidocaine (buffered or not buffered), metoprolol, naloxone **OR** naloxone **OR** naloxone **OR** naloxone, nitroGLYcerin, ondansetron **OR** ondansetron, prochlorperazine **OR** prochlorperazine **OR** prochlorperazine, sodium chloride (PF), sodium chloride (PF), sodium chloride (PF)    ROS:  Comprehensive ROS is  reviewed and is negative except as here & per HPI:   Limited due to mental status.    PHYSICAL EXAM:  Vital Signs: Temp: 97.3  F (36.3  C) Temp src: Axillary BP: 114/72 Pulse: 80   Resp: 22 SpO2: 94 % O2 Device: None (Room air)    Weight: 176 lbs 12.94 oz    GENERAL:  Fluctuating alertness, confused, fatigued, no  distress, sitting up in bedside recliner.  HEAD: Normocephalic atraumatic  SCLERA: Anicteric  EXTREMITIES: mild LE edema  ABDOMEN:  No distention  RESPIRATORY: Breathing non labored  CARDIOVASCULAR: RRR  NEUROLOGIC: Delirium  PSYCH: Calm, drowsy, inattention, confused.    Data reviewed:  Lab Results   Component Value Date    WBC 14.7 (H) 12/19/2023    WBC 15.2 (H) 12/18/2023    WBC 20.6 (H) 12/17/2023    HGB 12.3 (L) 12/19/2023    HGB 14.2 12/18/2023    HGB 13.6 12/17/2023    HCT 37.9 (L) 12/19/2023    HCT 43.2  12/18/2023    HCT 41.3 12/17/2023     12/19/2023     12/18/2023     12/17/2023     12/18/2023     12/17/2023     12/16/2023    POTASSIUM 4.1 12/19/2023    POTASSIUM 4.0 12/18/2023    POTASSIUM 4.1 12/17/2023    CHLORIDE 108 (H) 12/18/2023    CHLORIDE 110 (H) 12/17/2023    CHLORIDE 112 (H) 12/16/2023    CO2 22 12/18/2023    CO2 20 (L) 12/17/2023    CO2 17 (L) 12/16/2023    BUN 26.6 (H) 12/18/2023    BUN 19.6 12/17/2023    BUN 15.4 12/16/2023    CR 1.15 12/18/2023    CR 1.11 12/17/2023    CR 1.08 12/16/2023     (H) 12/19/2023     (H) 12/19/2023     (H) 12/19/2023    DD 0.5 01/10/2021    NTBNPI 3,859 (H) 12/08/2023    NTBNPI 2,613 (H) 12/07/2023    NTBNPI 394 01/10/2021    TROPI 0.234 (HH) 01/10/2021    AST 33 12/18/2023    AST 29 12/09/2023    AST 38 12/07/2023    AST 38 12/07/2023    ALT 12 12/18/2023    ALT 14 12/09/2023    ALT 29 12/07/2023    ALT 29 12/07/2023    ALKPHOS 74 12/18/2023    ALKPHOS 54 12/09/2023    ALKPHOS 89 12/07/2023    ALKPHOS 89 12/07/2023    BILITOTAL 0.6 12/18/2023    BILITOTAL 1.0 12/09/2023    BILITOTAL 1.4 (H) 12/07/2023    BILITOTAL 1.4 (H) 12/07/2023    INR 1.06 01/10/2021      Lab Results   Component Value Date    ALBUMIN 3.0 12/18/2023    ALBUMIN 3.3 01/10/2021     Results for orders placed or performed during the hospital encounter of 12/08/23   XR Chest Port 1 View    Impression    IMPRESSION: No acute abnormality.   US Upper Extremity Venous Duplex Right    Impression    IMPRESSION: Superficial venous thrombus in the right cephalic vein.    COLLINS CERDA MD         SYSTEM ID:  [id number]   XR Chest Port 1 View    Impression    IMPRESSION:     New left IJ catheter with tip near the confluence of the brachiocephalic veins, although likely coursing towards the right brachiocephalic vein. Recommend repositioning.    Redemonstrated left subclavian approach pacemaker/ICD, median sternotomy wires, and mediastinal surgical  clips.    New left basilar bandlike opacity, most likely atelectasis.    Otherwise, no focal airspace disease. No pleural effusion or pneumothorax.    Stable cardiomegaly.   XR Chest Port 1 View    Impression    IMPRESSION: No acute cardiopulmonary abnormality. Improved aeration of the left lung base.    Stable enlarged cardiomediastinal silhouette. Left neck central venous catheter tip unchanged at the confluence of the brachiocephalic veins.   CT Abdomen Pelvis w/o Contrast    Impression    IMPRESSION:  1.  Multiple dilated loops of small bowel with relative decompression of the colon. Although no discrete mechanical transition point is confidently identified, this configuration is most consistent with at least partial obstruction, with adynamic ileus   an additional consideration. Recommend general surgery consultation.  2.  Post surgical changes of recent appendectomy. Scattered free fluid and mesenteric stranding in the lower abdomen and pelvis. No organized or drainable collection at this time.  3.  Persistent bilateral nephrogram and retained contrast in the urinary bladder. Correlate with renal function.  4.  Mild anasarca and small bilateral pleural effusions.  5.  Chronic and ancillary findings as described.   CT Abdomen Pelvis w Contrast    Impression    IMPRESSION:   1.  Prior appendectomy with findings suggestive of peritonitis.  Developing rim enhancing pelvic fluid collections as above, abscesses  are possible.  2.  Increasing dilation of proximal small bowel with transition point  in the right lower quadrant, suggestive of partial mechanical  obstruction, developing adhesion is possible. No evidence of nadine  bowel ischemia or acute perforation.  3.  Distended gallbladder without additional CT evidence of acute  cholecystitis.    QUIN VALENZUELA MD         SYSTEM ID:  [id number]   US Abdomen Limited    Impression    IMPRESSION: Gallbladder is mildly distended and contains sludge. No  signs of  cholecystitis.     BETH HDZ MD         SYSTEM ID:  [id number]   CT Abdomen Peritoneum Abscess Drain w Cath Place    Impression    IMPRESSION:  1.  Successful CT-guided drain placement into a small pelvic fluid  collection.    BETH HDZ MD         SYSTEM ID:  [id number]   XR Chest Port 1 View    Impression    IMPRESSION: Low lung volumes. Cardiomegaly. Congestion of the central vessels. Left lung base obscured by overlying defibrillator. Lungs otherwise appear clear. Prior median sternotomy. Left PICC line tip mid SVC, partially obscured by overlying   defibrillator wires.   XR Chest Port 1 View    Impression    IMPRESSION: Cardiac enlargement. Cardiac leads. Coronary stent. Enteric tube. Mild vascular congestion. Atelectasis or infiltrate left lung base and small left effusion.   XR Abdomen Port 1 View    Impression    IMPRESSION: Enteric tube terminates at the level gastric body. Percutaneous drains are seen in the right lower quadrant and pelvis. Persistent diffuse gaseous distention of the small greater than large bowel. No free air.   XR Abdomen 1 View    Impression    IMPRESSION: Supine view labeled at 5 hours after administration of oral contrast. There is now opacification of multiple dilated loops of small bowel consistent with distal small bowel obstruction. Air is seen throughout normal normal normal-sized colon   but there is no contrast yet within colon.  NG tube in place. Drains are present within right lower quadrant.       Medical Decision Making       Please see A&P for additional details of medical decision making.  MANAGEMENT DISCUSSED with the following over the past 24 hours: Dr Carson   NOTE(S)/MEDICAL RECORDS REVIEWED over the past 24 hours: hospitalist, interventional radiology,  surgery,   Tests personally interpreted in the past 24 hours:  - CHEST XRAY showing see above  - ABDOMINAL CT showing see above  Tests REVIEWED in the past 24 hours:  - See lab/imaging results included in  the data section of the note  SUPPLEMENTAL HISTORY, in addition to the patient's history, over the past 24 hours obtained from:   - daughter Maylin and son in law Carlos    Much or all of the text in this note was generated through the use of Dragon dictation, voice to text software. Errors in spelling or words which appear to be out of context are unintentional and may be present due to having escaped editing.

## 2023-12-19 NOTE — PROGRESS NOTES
[facility]    Medicine Progress Note - Hospitalist Service    Date of Admission:  12/8/2023    Assessment & Plan   88 year old male S/P Lap Appy for Perforated Appendicitis, and vascular assessment of questionable area on cecum through ICG administration.  He also has had issues with post op fluid collections that IR has placed a drain in and had a significant post-op ileus.  Initially underwent surgery on 12/8/2023 and required ICU management for septic shock. Ultimately improved, and Hospitalist service assumed care on 12/11/2023.     Acute appendicitis with peritonitis and septic shock - s/p laparoscopic appendectomy 12/8/2023.  Pelvic fluid collections, question abscesses.  Postoperative ileus vs SBO, prolonged  Nutrition:  Summary:  Initial presentation to OSH 12/7 as above. CT abdomen noted with acute appendicitis with 0.7 cm appendicolith, no abscess. Started on ampicillin-sulbactam. Subsequently transferred to [facility] 12/8.  On 12/8, underwent above surgery. Weaned off pressors. Antibiotics changed to pipericillin-tazobactam. On 12/10, CT abdomen noted with multiple dilated small bowel loops with related decompression of colon with findings consistent with partial obstruction/ adynamic ileus.  On 12/12, diet advanced to clears as pt seemed to be improving with +flatus and BM. Later, developed more N/V and NG placed.  On 12/14, WBC elevated. Repeat abdominal CT showed prior appendectomy with findings suggestive of peritonitis, developing rim enhancing pelvic fluid collections, abscesses possible; increasing dilation of proximal small bowel with transition point in the right lower quadrant, suggestive of partial mechanical obstruction, developing adhesion is possible, no evidence of nadine bowel ischemia or acute perforation; distended gallbladder without additional CT evidence of acute cholecystitis.    - Continue NPO; advance diet as able per Surgery and will defer NGT management  to surgical team.  - started TPN 12/15, continue  - Continue pipericillin-tazobactam (started 12/8), continue as would expect needs at least 2 weeks antibiotics.  Consider ID consult if any worsening or concern with length of course.  - Continue PRN IV hydromorphone; minimize opioids as able given ileus.  Continue PRN anti-emetics.  Continue GI proph with PPI IV.     Acute hypoxic respiratory failure, suspect multifactorial related to atelectasis, recent abdominal surgery, resolved.  CARINA on home CPA:.  * Initial presentation as above. CXR at OSH negative.  * On 12/8, CXR showed new left basilar bandlike opacity, most likely atelectasis.   * On 12/11, down to 2L O2.  * On 12/14, off O2.  - Continue CPAP while sleeping as able  - Continue to encourage incentive spirometry.     FATIMAH on CKD3, suspect prerenal, meds (including sacubitril-valsartan, diuretics).  Hyperkalemia, resolved.  * Baseline creatinine 1-1.1.   * Creatinine 1.39 --> 1.8 at OSH.  * Cr up to 2.27 and K up to 5.5 on 12/8.  * Cr and K subsequently improved with IVF's.  * On IV furosemide 12/10-12/11.  - Continue to monitor BMP  - Avoid nephrotoxic medications.     Troponin elevation, suspect demand ischemia (type 2 NSTEMI).  Hypertension (benign essential).  CAD s/p CABG (1994), stenting (2002, 2003, 2005, 2021)  Chronic HFrEF (systolic and diastolic), s/p ICD.  HLD.  Afib with RVR:  [PTA: carvedilol 12.5 mg BID; sacubitril-valsartan 49-51 mg BID; furosemide 20 mg daily.]  * Last cath 1/2021 at [facility] showed patent CLAUDIA-RCA, LIMA occluded, vein-diagonal patent with placement of stent,  LAD, 40% circumflex, RCA occluded. Last stress testing 7/2023 negative for ischemia. Echo 7/2023 showed LVEF 30-35%, global hypokinesis LV, moderate-severe cLVH, grade 1 diastolic dysfunction; RV systolic function mildly reduced.   * Initial presentation as above. BNP in ED at 2613, troponin 70. EKG w/ nonspecific t-w changes. PTA sacubitril-valsartan and furosemide  held on admit given FATIMAH.  Briefly on IV diuretics.  - Continue ASA 81 mg cautiously given GI issues.  Plavix remains on hold with procedures and drains/bleed concern.  Consider resuming plavix soon if acceptable to surgery and it appears no further drains/procedures needed.      NSVT hx:  -has ICD     Acute metabolic encephalopathy (delirium)  - treat associated conditions as noted elsewhere  - delirium precautions     Hyperglycemia while on TPN  - check A1c  - High dose sliding scale  - Lantus 10u at bedtime  -  Depending on glu trend consider adding some more insulin to TPN.     Anemia, suspect partial blood loss component with surgery issues:  - Continue to monitor hgb periodically  - Consider prbc transfusion if hgb </= 7.0 or if significant bleeding with hemodynamic instability or if symptomatic.     Thrombocytopenia, unclear etiology, possibly due to sepsis, medication effect or consumptive or other cause.  * Plts normal on initially evaluation.  * Plts subsequently down to 104K on 12/9  * Plts normalized 12/14  - Continue to monitor CBC occasionally     Hypokalemia:  * Potassium low at times this hospitalization. Treated with replacement.     Hx CVA.  - Continue to hold clopidogrel for now.  ASA is continuing.    - Continue to hold atorvastatin until can take po intake routinely.     Depression/anxiety.  - Continue to hold gabapentin for now.  - Continue escitalopram       Hypothyroidism.  - Continue levothyroxine.     GERD.  - Continue pantoprazole IV.     BPH.  - Continue to hold finasteride for now.  Resume when can take some more oral intake.     Migraines.  * Gets botox.   - Continue to monitor clinically.     Gallbladder distention  *noted on CT abd 12/14  *abd usg 12/15 w/o evidence of cholecystitis  - monitor     Goals of care  Patient's daughter identifies living independently and being able to drive as extremely important for patient's quality of life.  12/18/2023: Discussed quality of life and  "returning to baseline will be delayed for every day patient remains hospitalized.  Colleague discussed CODE STATUS and that generally if an 88-year-old requires cardiac resuscitation or intubation they would likely never return to independent living and driving.  When this is coupled with his current physical status, it is likely that his return to independent living is even worse if he were to require resuscitation.  Discussed that patient's and their families sometimes set a date for transition to comfort care if things continue to not improve, especially in situations where patients may linger for some weeks without clear improvement.  -Consult to palliative care for assistance with goals of care pending 12/19         Medical Decision Making       Over 50 MINUTES SPENT BY ME on the date of service doing chart review, history, exam, documentation & further activities per the note.      Labs/Imaging Reviewed:  See Information above and Data section below    PPE Used:  Mask       Diet: NPO for Medical/Clinical Reasons Except for: Ice Chips, Meds  parenteral nutrition - Clinimix E  parenteral nutrition - Clinimix E    DVT Prophylaxis: Enoxaparin (Lovenox) SQ  Hameed Catheter: Not present  Lines: PRESENT      PICC 12/15/23 Double Lumen Left Brachial vein medial TPN-Site Assessment: WDL      Cardiac Monitoring: None  Code Status: Full Code      Clinically Significant Risk Factors              # Hypoalbuminemia: Lowest albumin = 2.8 g/dL at 12/11/2023  5:26 AM, will monitor as appropriate     # Hypertension: Noted on problem list       # DMII: A1C = 6.8 % (Ref range: <5.7 %) within past 6 months   # Overweight: Estimated body mass index is 27.69 kg/m  as calculated from the following:    Height as of this encounter: 1.702 m (5' 7\").    Weight as of this encounter: 80.2 kg (176 lb 12.9 oz).             Disposition Plan      Expected Discharge Date: 12/21/2023        Discharge Comments: OR 12/8 12/16 CT guided drainage, " JPs leaking.  Confused and agitated at times.          Rodney Carson, DO  Hospitalist Service  [facility]  Securely message with Katiana (more info)  Text page via [employer] Paging/Directory   ______________________________________________________________________    Interval History   Assumed hospitalist care for the day, history reviewed.  Mr. Mckay had no major new complaints.  He continues feeling restless at times.  Denies increased ABD pain.  No chest pain or SOB.      Physical Exam   Vital Signs: Temp: 97.3  F (36.3  C) Temp src: Axillary BP: 119/66 Pulse: 77   Resp: 15 SpO2: 96 % O2 Device: None (Room air)    Weight: 176 lbs 12.94 oz    GEN:  Alert, oriented to self/place, confused with some detials, appears ill but comfortable.  He was up in the chair when I saw him this AM.  HEENT:  Normocephalic/atraumatic, no scleral icterus, no nasal discharge, mouth moist.  CV:  Regular rate and rhythm, distant, no loud murmur.    LUNGS:  Clear to auscultation upper with decreased breath sounds bases, no wheezes/retractions.  Symmetric chest rise on inhalation noted.  ABD:  Very hypoactive bowel sounds, soft, non-tender, mild to moderately distended.  No guarding/rigidity.  Detailed surgical site/drain site exam deferred to surgical service.  EXT:  Trace edema.  No cyanosis.  No acute joint synovitis noted.    Medications    dextrose      parenteral nutrition - Clinimix E      parenteral nutrition - Clinimix E 80 mL/hr at 12/18/23 1954      aspirin  81 mg Oral Daily    Or    aspirin  81 mg Per NG tube Daily    carvedilol  12.5 mg Oral BID    [Held by provider] clopidogrel  75 mg Oral Daily    enoxaparin ANTICOAGULANT  40 mg Subcutaneous Q24H    escitalopram  20 mg Oral or NG Tube Daily    [Held by provider] finasteride  5 mg Oral At Bedtime    [Held by provider] gabapentin  100 mg Oral Q24H    gabapentin  200 mg Oral QAM    [Held by provider] gabapentin  300 mg Oral At Bedtime    insulin aspart   1-12 Units Subcutaneous Q4H    insulin glargine  10 Units Subcutaneous At Bedtime    latanoprost  1 drop Both Eyes At Bedtime    levothyroxine  75 mcg Intravenous Daily    lipids plant base  250 mL Intravenous Once per day on Monday Tuesday Wednesday Thursday Friday Saturday    pantoprazole  40 mg Intravenous Q24H    piperacillin-tazobactam  4.5 g Intravenous Q6H    [Held by provider] senna-docusate  1 tablet Oral BID    sodium chloride (PF)  10 mL Irrigation Q12H    sodium chloride (PF)  10-40 mL Intracatheter Q7 Days    sodium chloride (PF)  3 mL Intracatheter Q8H       Data     Labs and Imaging results below reviewed today.    Recent Labs   Lab 12/19/23  0339 12/18/23  1126 12/17/23  0522   WBC 14.7* 15.2* 20.6*   HGB 12.3* 14.2 13.6   HCT 37.9* 43.2 41.3   * 100 99    199 211     7-Day Micro Results       Collected Updated Procedure Result Status      12/15/2023 1510 12/15/2023 1745 Gram Stain [[id number]]   Aspirate from Pelvis, Right    Final result Component Value   Gram Stain Result No organisms seen   Gram Stain Result 4+ WBC seen   Predominantly PMNs            12/15/2023 1510 12/19/2023 1423 Aspirate Aerobic Bacterial Culture Routine [[id number]]   (Abnormal)   Aspirate from Pelvis, Right    Final result Component Value   Culture Isolated in broth only Eggerthella lenta    On day 2 of incubation  Identification obtained by MALDI-TOF mass spectrometry research use only database. Test characteristics determined and verified by the Infectious Diseases Diagnostic Laboratory.  Susceptibilities not routinely done, refer to antibiogram to view typical susceptibility profiles                     Recent Labs   Lab 12/19/23  1209 12/19/23  0722 12/19/23  0339 12/19/23  0332 12/18/23  0428 12/18/23  0416 12/17/23  1002 12/17/23  0522 12/16/23  0632 12/16/23  0541   NA  --   --   --   --   --  140  --  140  --  142   POTASSIUM  --   --  4.1  --   --  4.0  --  4.1   < > 3.9  3.8   CHLORIDE  --   --    --   --   --  108*  --  110*  --  112*   CO2  --   --   --   --   --  22  --  20*  --  17*   ANIONGAP  --   --   --   --   --  10  --  10  --  13   * 253*  --  217*   < > 200*   < > 200*   < > 230*   BUN  --   --   --   --   --  26.6*  --  19.6  --  15.4   CR  --   --   --   --   --  1.15  --  1.11  --  1.08   GFRESTIMATED  --   --   --   --   --  62  --  65  --  67   RHODA  --   --   --   --   --  8.9  --  8.5*  --  8.5*   MAG  --   --  2.0  --   --  2.0  --  2.2   < > 2.2   PHOS  --   --  2.9  --   --  2.3*  --  2.4*   < > 2.1*   PROTTOTAL  --   --   --   --   --  5.5*  --   --   --   --    ALBUMIN  --   --   --   --   --  3.0*  --   --   --   --    BILITOTAL  --   --   --   --   --  0.6  --   --   --   --    ALKPHOS  --   --   --   --   --  74  --   --   --   --    AST  --   --   --   --   --  33  --   --   --   --    ALT  --   --   --   --   --  12  --   --   --   --     < > = values in this interval not displayed.       Recent Results (from the past 24 hour(s))   CT Sinogram Injection    Narrative    CT SINOGRAM INJECTION 12/19/2023 1:52 PM    CLINICAL HISTORY: History of lap appy for perforated appendicitis  early December, IR pelvic drain 12/15 with minimal outputs (surgical  drain in place, leaking). Check fluid resolution.    TECHNIQUE: CT scan of the pelvis was performed without IV contrast,  before and after administration of contrast through existing drain.  Multiplanar reformats were obtained. Dose reduction techniques were  used.    CONTRAST: None.    COMPARISON: 12/15/2023    FINDINGS: There is oral contrast material in the colon on the first  scan. The tube is in good position within the small amount of  remaining fluid in the right lower quadrant.    Injecting contrast through the tube, there is filling of the small  amount of fluid adjacent to the drain in the right lower quadrant and  in the pelvic cul-de-sac. No fistula to bowel.      Impression    IMPRESSION:   Percutaneous drain in good  position in the pelvis with minimal  residual fluid. No fistula to bowel.

## 2023-12-19 NOTE — PROGRESS NOTES
CT sinogram done on IR drain, RLQ today. 15 mL contrast injected with some resistance. Reviewed with Dr Johnson. Per CT the IR drain is in good position with minimal amount of fluid in cavity. The contrast appears to just fill the fluid cavity, no connection to bowel. Outputs have been 0 the past 2 days.   -Recommend keeping the drain in until the end of the week to see if there is any more drainage prior to removing it.     No fluid around the surgical drain per CT but there is a large amount of serous drainage (probably ascites) saturating the dressing but no output in the ESTEFANY. Surgery could consider removing the drain and placing an ostomy bag over the opening to collect fluid.     Total time: 20 minutes    Thanks, Kaity [facility] Interventional Radiology CNP ([phone]) (phone [phone])

## 2023-12-20 ENCOUNTER — APPOINTMENT (OUTPATIENT)
Dept: PHYSICAL THERAPY | Facility: CLINIC | Age: 88
DRG: 853 | End: 2023-12-20
Attending: HOSPITALIST
Payer: MEDICARE

## 2023-12-20 ENCOUNTER — APPOINTMENT (OUTPATIENT)
Dept: GENERAL RADIOLOGY | Facility: CLINIC | Age: 88
DRG: 853 | End: 2023-12-20
Attending: INTERNAL MEDICINE
Payer: MEDICARE

## 2023-12-20 LAB
ANION GAP SERPL CALCULATED.3IONS-SCNC: 7 MMOL/L (ref 7–15)
BASE EXCESS BLDV CALC-SCNC: -3.4 MMOL/L (ref -7.7–1.9)
BUN SERPL-MCNC: 31.4 MG/DL (ref 8–23)
CALCIUM SERPL-MCNC: 8.7 MG/DL (ref 8.8–10.2)
CHLORIDE SERPL-SCNC: 107 MMOL/L (ref 98–107)
CREAT SERPL-MCNC: 1.08 MG/DL (ref 0.67–1.17)
DEPRECATED HCO3 PLAS-SCNC: 23 MMOL/L (ref 22–29)
EGFRCR SERPLBLD CKD-EPI 2021: 67 ML/MIN/1.73M2
ERYTHROCYTE [DISTWIDTH] IN BLOOD BY AUTOMATED COUNT: 14 % (ref 10–15)
GLUCOSE BLDC GLUCOMTR-MCNC: 183 MG/DL (ref 70–99)
GLUCOSE BLDC GLUCOMTR-MCNC: 186 MG/DL (ref 70–99)
GLUCOSE BLDC GLUCOMTR-MCNC: 195 MG/DL (ref 70–99)
GLUCOSE BLDC GLUCOMTR-MCNC: 209 MG/DL (ref 70–99)
GLUCOSE BLDC GLUCOMTR-MCNC: 219 MG/DL (ref 70–99)
GLUCOSE BLDC GLUCOMTR-MCNC: 277 MG/DL (ref 70–99)
GLUCOSE SERPL-MCNC: 250 MG/DL (ref 70–99)
HCO3 BLDV-SCNC: 23 MMOL/L (ref 21–28)
HCT VFR BLD AUTO: 37.2 % (ref 40–53)
HGB BLD-MCNC: 12.3 G/DL (ref 13.3–17.7)
MAGNESIUM SERPL-MCNC: 2.1 MG/DL (ref 1.7–2.3)
MCH RBC QN AUTO: 32.9 PG (ref 26.5–33)
MCHC RBC AUTO-ENTMCNC: 33.1 G/DL (ref 31.5–36.5)
MCV RBC AUTO: 100 FL (ref 78–100)
O2/TOTAL GAS SETTING VFR VENT: 21 %
OXYHGB MFR BLDV: 50 % (ref 70–75)
PCO2 BLDV: 43 MM HG (ref 40–50)
PH BLDV: 7.33 [PH] (ref 7.32–7.43)
PHOSPHATE SERPL-MCNC: 3.1 MG/DL (ref 2.5–4.5)
PLATELET # BLD AUTO: 186 10E3/UL (ref 150–450)
PO2 BLDV: 30 MM HG (ref 25–47)
POTASSIUM SERPL-SCNC: 4.1 MMOL/L (ref 3.4–5.3)
RBC # BLD AUTO: 3.74 10E6/UL (ref 4.4–5.9)
SODIUM SERPL-SCNC: 137 MMOL/L (ref 135–145)
WBC # BLD AUTO: 13.3 10E3/UL (ref 4–11)

## 2023-12-20 PROCEDURE — 85027 COMPLETE CBC AUTOMATED: CPT | Performed by: INTERNAL MEDICINE

## 2023-12-20 PROCEDURE — 250N000013 HC RX MED GY IP 250 OP 250 PS 637: Performed by: STUDENT IN AN ORGANIZED HEALTH CARE EDUCATION/TRAINING PROGRAM

## 2023-12-20 PROCEDURE — 120N000001 HC R&B MED SURG/OB

## 2023-12-20 PROCEDURE — B4185 PARENTERAL SOL 10 GM LIPIDS: HCPCS | Mod: JZ | Performed by: STUDENT IN AN ORGANIZED HEALTH CARE EDUCATION/TRAINING PROGRAM

## 2023-12-20 PROCEDURE — 83735 ASSAY OF MAGNESIUM: CPT | Performed by: INTERNAL MEDICINE

## 2023-12-20 PROCEDURE — 250N000013 HC RX MED GY IP 250 OP 250 PS 637: Performed by: INTERNAL MEDICINE

## 2023-12-20 PROCEDURE — 250N000011 HC RX IP 250 OP 636: Mod: JZ | Performed by: INTERNAL MEDICINE

## 2023-12-20 PROCEDURE — 250N000009 HC RX 250: Mod: JZ | Performed by: STUDENT IN AN ORGANIZED HEALTH CARE EDUCATION/TRAINING PROGRAM

## 2023-12-20 PROCEDURE — 97530 THERAPEUTIC ACTIVITIES: CPT | Mod: GP | Performed by: PHYSICAL THERAPIST

## 2023-12-20 PROCEDURE — 250N000009 HC RX 250: Performed by: INTERNAL MEDICINE

## 2023-12-20 PROCEDURE — 250N000012 HC RX MED GY IP 250 OP 636 PS 637: Performed by: INTERNAL MEDICINE

## 2023-12-20 PROCEDURE — 71045 X-RAY EXAM CHEST 1 VIEW: CPT

## 2023-12-20 PROCEDURE — 999N000157 HC STATISTIC RCP TIME EA 10 MIN

## 2023-12-20 PROCEDURE — 82805 BLOOD GASES W/O2 SATURATION: CPT | Performed by: INTERNAL MEDICINE

## 2023-12-20 PROCEDURE — 250N000011 HC RX IP 250 OP 636: Performed by: INTERNAL MEDICINE

## 2023-12-20 PROCEDURE — C9113 INJ PANTOPRAZOLE SODIUM, VIA: HCPCS | Performed by: INTERNAL MEDICINE

## 2023-12-20 PROCEDURE — 80048 BASIC METABOLIC PNL TOTAL CA: CPT | Performed by: INTERNAL MEDICINE

## 2023-12-20 PROCEDURE — 99233 SBSQ HOSP IP/OBS HIGH 50: CPT | Performed by: INTERNAL MEDICINE

## 2023-12-20 PROCEDURE — 84100 ASSAY OF PHOSPHORUS: CPT | Performed by: INTERNAL MEDICINE

## 2023-12-20 PROCEDURE — 93005 ELECTROCARDIOGRAM TRACING: CPT

## 2023-12-20 PROCEDURE — 93010 ELECTROCARDIOGRAM REPORT: CPT | Performed by: INTERNAL MEDICINE

## 2023-12-20 PROCEDURE — 97116 GAIT TRAINING THERAPY: CPT | Mod: GP | Performed by: PHYSICAL THERAPIST

## 2023-12-20 RX ORDER — FUROSEMIDE 10 MG/ML
40 INJECTION INTRAMUSCULAR; INTRAVENOUS ONCE
Status: COMPLETED | OUTPATIENT
Start: 2023-12-20 | End: 2023-12-20

## 2023-12-20 RX ORDER — HYDROMORPHONE HCL IN WATER/PF 6 MG/30 ML
.2-.4 PATIENT CONTROLLED ANALGESIA SYRINGE INTRAVENOUS
Status: DISCONTINUED | OUTPATIENT
Start: 2023-12-20 | End: 2023-12-28

## 2023-12-20 RX ORDER — OLANZAPINE 2.5 MG/1
2.5 TABLET, FILM COATED ORAL 3 TIMES DAILY PRN
Status: DISCONTINUED | OUTPATIENT
Start: 2023-12-20 | End: 2023-12-28

## 2023-12-20 RX ADMIN — PIPERACILLIN AND TAZOBACTAM 4.5 G: 4; .5 INJECTION, POWDER, FOR SOLUTION INTRAVENOUS at 10:08

## 2023-12-20 RX ADMIN — CARVEDILOL 12.5 MG: 12.5 TABLET, FILM COATED ORAL at 20:10

## 2023-12-20 RX ADMIN — OLANZAPINE 2.5 MG: 2.5 TABLET, FILM COATED ORAL at 18:34

## 2023-12-20 RX ADMIN — OLANZAPINE 2.5 MG: 2.5 TABLET, FILM COATED ORAL at 03:52

## 2023-12-20 RX ADMIN — FUROSEMIDE 40 MG: 10 INJECTION, SOLUTION INTRAMUSCULAR; INTRAVENOUS at 02:21

## 2023-12-20 RX ADMIN — PIPERACILLIN AND TAZOBACTAM 4.5 G: 4; .5 INJECTION, POWDER, FOR SOLUTION INTRAVENOUS at 17:00

## 2023-12-20 RX ADMIN — OLANZAPINE 2.5 MG: 2.5 TABLET, FILM COATED ORAL at 08:50

## 2023-12-20 RX ADMIN — OLIVE OIL AND SOYBEAN OIL 250 ML: 16; 4 INJECTION, EMULSION INTRAVENOUS at 20:08

## 2023-12-20 RX ADMIN — PIPERACILLIN AND TAZOBACTAM 4.5 G: 4; .5 INJECTION, POWDER, FOR SOLUTION INTRAVENOUS at 22:23

## 2023-12-20 RX ADMIN — PIPERACILLIN AND TAZOBACTAM 4.5 G: 4; .5 INJECTION, POWDER, FOR SOLUTION INTRAVENOUS at 04:05

## 2023-12-20 RX ADMIN — ENOXAPARIN SODIUM 40 MG: 40 INJECTION SUBCUTANEOUS at 13:28

## 2023-12-20 RX ADMIN — CARVEDILOL 12.5 MG: 12.5 TABLET, FILM COATED ORAL at 08:50

## 2023-12-20 RX ADMIN — ACETAMINOPHEN 650 MG: 325 SUSPENSION ORAL at 08:49

## 2023-12-20 RX ADMIN — GABAPENTIN 100 MG: 100 CAPSULE ORAL at 16:07

## 2023-12-20 RX ADMIN — GABAPENTIN 300 MG: 300 CAPSULE ORAL at 22:23

## 2023-12-20 RX ADMIN — LATANOPROST 1 DROP: 50 SOLUTION/ DROPS OPHTHALMIC at 22:32

## 2023-12-20 RX ADMIN — ASCORBIC ACID, VITAMIN A PALMITATE, CHOLECALCIFEROL, THIAMINE HYDROCHLORIDE, RIBOFLAVIN-5 PHOSPHATE SODIUM, PYRIDOXINE HYDROCHLORIDE, NIACINAMIDE, DEXPANTHENOL, ALPHA-TOCOPHEROL ACETATE, VITAMIN K1, FOLIC ACID, BIOTIN, CYANOCOBALAMIN: 200; 3300; 200; 6; 3.6; 6; 40; 15; 10; 150; 600; 60; 5 INJECTION, SOLUTION INTRAVENOUS at 20:08

## 2023-12-20 RX ADMIN — ASPIRIN 81 MG CHEWABLE TABLET 81 MG: 81 TABLET CHEWABLE at 08:50

## 2023-12-20 RX ADMIN — ACETAMINOPHEN 650 MG: 325 SUSPENSION ORAL at 19:13

## 2023-12-20 RX ADMIN — ESCITALOPRAM OXALATE 20 MG: 10 TABLET ORAL at 08:50

## 2023-12-20 RX ADMIN — LEVOTHYROXINE SODIUM 75 MCG: 20 INJECTION, SOLUTION INTRAVENOUS at 13:28

## 2023-12-20 RX ADMIN — PANTOPRAZOLE SODIUM 40 MG: 40 INJECTION, POWDER, FOR SOLUTION INTRAVENOUS at 08:51

## 2023-12-20 RX ADMIN — GABAPENTIN 200 MG: 100 CAPSULE ORAL at 08:50

## 2023-12-20 RX ADMIN — HALOPERIDOL LACTATE 0.5 MG: 5 INJECTION, SOLUTION INTRAMUSCULAR at 00:06

## 2023-12-20 ASSESSMENT — ACTIVITIES OF DAILY LIVING (ADL)
ADLS_ACUITY_SCORE: 38
ADLS_ACUITY_SCORE: 36
ADLS_ACUITY_SCORE: 36
ADLS_ACUITY_SCORE: 40
ADLS_ACUITY_SCORE: 40
ADLS_ACUITY_SCORE: 36
ADLS_ACUITY_SCORE: 36
ADLS_ACUITY_SCORE: 40
ADLS_ACUITY_SCORE: 40
ADLS_ACUITY_SCORE: 36
ADLS_ACUITY_SCORE: 38
ADLS_ACUITY_SCORE: 40

## 2023-12-20 NOTE — PROVIDER NOTIFICATION
MD Notification    Notified Person: MD    Notified Person Name: Caroline Castro    Notification Date/Time: 12/20/23 0730    Notification Interaction: [email or web address] Messaging    Purpose of Notification: Can you place order for PRN zyprexa? Pt increased restlessness, attempting to get OOB and agitation. Haldol ineffective but per NOC RN received 1x dose overnight and it was effective. Pt pulled IV, attempting to pull PICC line and NG tube. 1:1 at bedside for safety. Pt inconsistently oriented to situation, location. Oriented to self consistently.    Orders Received: PRN zyprexa 2.5 mg ordered    Comments:

## 2023-12-20 NOTE — PROGRESS NOTES
[facility]    General Surgery  Daily Progress Note       Assessment and Plan:   Garry Mckay is a 88 year old male   S/P Lap Appy for Perforated Appendicitis, and vascular assessment of questionable area on cecum through ICG administration  POD #12     Diagnosis: Perforated Appendicitis, Post Op Ileus  Post-operative pelvic fluid collections s/p IR drain 12/15  CT sinogram 12/19 ruled out fistula and improved fluid collection, and noted contrast in colon from gastrografin challenge.    PLAN:  - Agree with IR.  Surgical ESTEFANY drain could be removed now.  No output and ascites leaking around tubing.  Remove this.  Start with gauze dressing, may transition to stoma appliance bag if needed.  - Clamp NGT.  Ok for sips of clears when sitting up in chair.  Possibly remove later today vs tomorrow.  - Continue Protonix for GI prophylaxis. Continue TPN per dietitian.  - Leukocytosis continues to improve again today.  - On electrolyte replacement protocol for hypokalemia. Cr improving.  - Ambulate/activity. QID to assist with bowel function,   - PCDs for DVT prophylaxis. Continue lovenox for DVT chemoprophylaxis. Plavix held since NG replaced.  - Encourage deep breathe and IS.   - Medical management per primary team.        Interval History:   Garry Mckay is seen on surgical rounds today. Still fluctuating between hot and cold and this morning is preoccupied with wanting to get out of bed.  Had several loose stools yesterday, but none overnight.  Breathing more wet last night.         Physical Exam:   Temp: 97.5  F (36.4  C) Temp src: Axillary BP: (!) 153/114 Pulse: 80   Resp: 17 SpO2: 94 % O2 Device: None (Room air)      I/O last 3 completed shifts:  In: 75 [Other:15; NG/GT:60]  Out: 1130 [Urine:900; Emesis/NG output:200; Drains:30]    GENERAL: VS reviewed, alert, oriented to place but forgetful and wanting to get out of bed, no acute distress but asking repeatedly for warm blankets.  LUNGS:  Normal respiratory effort, no audible wheezing  ABDOMEN:  Less distended, soft, tender to central and left abdomen,   ESTEFANY drain with 0 ml/24 hours   IR drain with 30 ml/24 hours- output is serous  Less leaking around ESTEFANY drain; is serous to clear  INCISION: Incisions are clean, dry, and intact. No surrounding erythema.  EXTREMITIES: Moving all extremities, no gross deformities  NEUROLOGICAL: Grossly non-focal, mood & affect appropriate    Data   Recent Labs   Lab 12/20/23  0542 12/20/23  0353 12/20/23  0001 12/19/23  0722 12/19/23  0339 12/18/23  1241 12/18/23  1126 12/18/23  0428 12/18/23  0416 12/17/23  1002 12/17/23  0522   WBC 13.3*  --   --   --  14.7*  --  15.2*  --   --   --  20.6*   HGB 12.3*  --   --   --  12.3*  --  14.2  --   --   --  13.6     --   --   --  101*  --  100  --   --   --  99     --   --   --  201  --  199  --   --   --  211     --   --   --   --   --   --   --  140  --  140   POTASSIUM 4.1  --   --   --  4.1  --   --   --  4.0  --  4.1   CHLORIDE 107  --   --   --   --   --   --   --  108*  --  110*   CO2 23  --   --   --   --   --   --   --  22  --  20*   BUN 31.4*  --   --   --   --   --   --   --  26.6*  --  19.6   CR 1.08  --   --   --   --   --   --   --  1.15  --  1.11   ANIONGAP 7  --   --   --   --   --   --   --  10  --  10   RHODA 8.7*  --   --   --   --   --   --   --  8.9  --  8.5*   * 209* 186*   < >  --    < >  --    < > 200*   < > 200*   ALBUMIN  --   --   --   --   --   --   --   --  3.0*  --   --    PROTTOTAL  --   --   --   --   --   --   --   --  5.5*  --   --    BILITOTAL  --   --   --   --   --   --   --   --  0.6  --   --    ALKPHOS  --   --   --   --   --   --   --   --  74  --   --    ALT  --   --   --   --   --   --   --   --  12  --   --    AST  --   --   --   --   --   --   --   --  33  --   --     < > = values in this interval not displayed.       Garret Nieto PA-C    Please use Katiana to page 7:30am-4pm, page on-call surgeon after  4pm  Office number: [phone]

## 2023-12-20 NOTE — CARE PLAN
Orientations: A&Ox2-3 (disoriented to time and place) intermittently confused/lethargic. Anxious w/repeated requests / frequent moaning gave haldol x1 with no improvement)  Vitals/Pain: VSS on RA.   Lungs: Expiratory wheezes, turned to wet lung sounds, w/abd muscle use  Tele: NSR w/frequent PVCs  Lines/Drains: PICC infusing TPN @80mL/hr and Lipids @20mL/hr, intermitent Antbx, L PIV SL,   Ant ESTEFANY bulb w/ minimal output, leaky, dressing changed x3, surg aware. Anterior ESTEFANY bulb no drainage.  NG tube LIS   Skin/Wounds:  Lap sites x 3  w/ steri strips, scant drainage.   GI/: inc, External cath in place w/AUOP, BM-, BS+, NPO   Labs: Abnormal/Trends, Electrolyte Replacement- K 4.1, Mg 2.1, Phos 3.1 (rechecks in AM)  Ambulation/Assist: 2 GBW  Sleep Quality: Very poor, Zyprexa given x1  Plan: RRT called for increase WOB, resp distress see note

## 2023-12-20 NOTE — PROVIDER NOTIFICATION
Provider paged:     [phone]: Pt WOB has increased significantly, he has audible expiratory wheezes and abd muscle use. nebulizer tx, coughing, and oral suctioning were not effecitve in clearing secretions. do you think and order for deep suctioning would be okay?       -- deep suctioning ordered    0039: Hadol was given for agitation/restlessness w/ no improvement, can we unhold the 300mg of gabapentin? he is no longer NPO   --no response    0155:  Pt WOB has gotten worse, and is breathing is super wet. Respiratory came and tried to deep suction w/no improvements. pt is extremely restless, anxious and continually calling out. can we maybe get some orders for Lasix, a CXR and something for anxiety??   --Lasix, CXR, Zyprexa, and VBG ordered. Gabapentin unheld

## 2023-12-20 NOTE — PROGRESS NOTES
[facility]    Medicine Progress Note - Hospitalist Service    Date of Admission:  12/8/2023    Assessment & Plan   88 year old male S/P Lap Appy for Perforated Appendicitis, and vascular assessment of questionable area on cecum through ICG administration.  He also has had issues with post op fluid collections that IR has placed a drain in and had a significant post-op ileus.  Initially underwent surgery on 12/8/2023 and required ICU management for septic shock.     Ultimately improved, and Hospitalist service assumed care on 12/11/2023.      Acute appendicitis with peritonitis and septic shock - s/p laparoscopic appendectomy 12/8/2023.  Pelvic fluid collections, question abscesses.  Postoperative ileus vs SBO, prolonged  Nutrition:  Summary:  Initial presentation to OSH 12/7 as above. CT abdomen noted with acute appendicitis with 0.7 cm appendicolith, no abscess. Started on ampicillin-sulbactam. Subsequently transferred to [facility] 12/8.  On 12/8, underwent above surgery. Weaned off pressors. Antibiotics changed to pipericillin-tazobactam.   On 12/10, CT abdomen noted with multiple dilated small bowel loops with related decompression of colon with findings consistent with partial obstruction/ adynamic ileus.  On 12/12, diet advanced to clears as pt seemed to be improving with +flatus and BM. Later, developed more N/V and NG placed.  On 12/14, WBC elevated. Repeat abdominal CT showed prior appendectomy with findings suggestive of peritonitis, developing rim enhancing pelvic fluid collections, abscesses possible; increasing dilation of proximal small bowel with transition point in the right lower quadrant, suggestive of partial mechanical obstruction, developing adhesion is possible, no evidence of nadine bowel ischemia or acute perforation; distended gallbladder without additional CT evidence of acute cholecystitis.    IR placed drain into right pelvic fluid collection (2/15/23)    CT  sinegram 12/19/23 - with drain in good position, min residual fluid and no fistula.     12/20/23 - Seen by surgery this am - clamping NG tube now with retrial of clear liquids    - started TPN 12/15, continue  - Continue pipericillin-tazobactam (started 12/8), continue as would expect needs at least 2 weeks antibiotics.  Consider ID consult if any worsening or concern with length of course.  - Continue PRN IV hydromorphone; minimize opioids as able given ileus.  Continue PRN anti-emetics.  Continue GI proph with PPI IV.     2. Acute hypoxic respiratory failure, suspect multifactorial related to atelectasis, recent abdominal surgery, resolved.  CARINA on home CPA:.  * Initial presentation as above. CXR at OSH negative.  * On 12/8, CXR showed new left basilar bandlike opacity, most likely atelectasis.   * On 12/11, down to 2L O2.  * On 12/14, off O2.  - Continue CPAP while sleeping as able  - Continue to encourage incentive spirometry.     3. FATIMAH on CKD3, suspect prerenal, meds (including sacubitril-valsartan, diuretics).  Hyperkalemia, resolved.  * Baseline creatinine 1-1.1.   * Creatinine 1.39 --> 1.8 at OSH.  * Cr up to 2.27 and K up to 5.5 on 12/8.  * Cr and K subsequently improved with IVF's.  * On IV furosemide 12/10-12/11.  - Continue to monitor BMP  - Avoid nephrotoxic medications.     Creat this am 1.08 (12/20/23)    4. Troponin elevation, suspect demand ischemia (type 2 NSTEMI).  Hypertension (benign essential).  CAD s/p CABG (1994), stenting (2002, 2003, 2005, 2021)  Chronic HFrEF (systolic and diastolic), s/p ICD.  HLD.  Afib with RVR:  [PTA: carvedilol 12.5 mg BID; sacubitril-valsartan 49-51 mg BID; furosemide 20 mg daily.]  * Last cath 1/2021 at [facility] showed patent CLAUDIA-RCA, LIMA occluded, vein-diagonal patent with placement of stent,  LAD, 40% circumflex, RCA occluded. Last stress testing 7/2023 negative for ischemia. Echo 7/2023 showed LVEF 30-35%, global hypokinesis LV, moderate-severe cLVH, grade 1  diastolic dysfunction; RV systolic function mildly reduced.   * Initial presentation as above. BNP in ED at 2613, troponin 70. EKG w/ nonspecific t-w changes. PTA sacubitril-valsartan and furosemide held on admit given FATIMAH.  Briefly on IV diuretics.  - Continue ASA 81 mg cautiously given GI issues.    Plavix remains on hold with procedures and drains/bleed concern and because NG tube needed to be replaced.    5. NSVT hx:  -has ICD     6. Acute metabolic encephalopathy (delirium)  - treat associated conditions as noted elsewhere  - delirium precautions    Continue with prn po zyprexa (per RN that is what is helping his symptoms the most)  IV haldol also has been given with less effective results  IV dilaudid did not show any significant improvement in his symptoms/agitation - so clinically does not appear to be in a lot of pain    Continue with 1:1 sitter   Suspect that he will continue to slowly clinically improve as we are able to get him up out of chair/bed more and when NG tube can be removed.    ECG reviewed from 12/20/23 - no prolongation of QTc     7. Hyperglycemia while on TPN  - check A1c  - High dose sliding scale  - Lantus 10u at bedtime - will increase dose this evening to 15 units as blood sugars 180-200's consistently    8. Anemia, suspect partial blood loss component with surgery issues:  - Continue to monitor hgb periodically  - Consider prbc transfusion if hgb </= 7.0 or if significant bleeding with hemodynamic instability or if symptomatic.     9. Thrombocytopenia, unclear etiology, possibly due to sepsis, medication effect or consumptive or other cause.  * Plts normal on initially evaluation.  * Plts subsequently down to 104K on 12/9  * Plts normalized 12/14  - Continue to monitor CBC occasionally     10. Hypokalemia:  Supplemented and normalized     11. Hx CVA.  - Continue to hold clopidogrel for now (as above)  ASA is continuing.    - Continue to hold atorvastatin until can take po intake  routinely.     12. Depression/anxiety.  - Continue to hold gabapentin for now.  - Continue escitalopram       13. Hypothyroidism.  - Continue levothyroxine.     14. GERD.  - Continue pantoprazole IV.     15. BPH.  - Continue to hold finasteride for now.  Resume when can take some more oral intake.     16. Migraines.  * Gets botox.   - Continue to monitor clinically.     17. Gallbladder distention  *noted on CT abd 12/14  *abd usg 12/15 w/o evidence of cholecystitis  - monitor     Goals of care  Patient's daughter identifies living independently and being able to drive as extremely important for patient's quality of life.  12/18/2023: Discussed quality of life and returning to baseline will be delayed for every day patient remains hospitalized.  Colleague discussed CODE STATUS and that generally if an 88-year-old requires cardiac resuscitation or intubation they would likely never return to independent living and driving.  When this is coupled with his current physical status, it is likely that his return to independent living is even worse if he were to require resuscitation.  Discussed that patient's and their families sometimes set a date for transition to comfort care if things continue to not improve, especially in situations where patients may linger for some weeks without clear improvement.     Medical Decision Making       59 MINUTES SPENT BY ME on the date of service doing chart review, history, exam, documentation & further activities per the note.        PPE Worn:  Mask, gloves     Diet: NPO for Medical/Clinical Reasons Except for: Ice Chips, Meds  parenteral nutrition - Clinimix E    DVT Prophylaxis: Enoxaparin (Lovenox) SQ  Hameed Catheter: Not present  Lines: PRESENT      PICC 12/15/23 Double Lumen Left Brachial vein medial TPN-Site Assessment: WDL      Cardiac Monitoring: None  Code Status: Full Code      Clinically Significant Risk Factors              # Hypoalbuminemia: Lowest albumin = 2.8 g/dL at  "12/11/2023  5:26 AM, will monitor as appropriate     # Hypertension: Noted on problem list       # DMII: A1C = 6.8 % (Ref range: <5.7 %) within past 6 months   # Overweight: Estimated body mass index is 27.69 kg/m  as calculated from the following:    Height as of this encounter: 1.702 m (5' 7\").    Weight as of this encounter: 80.2 kg (176 lb 12.9 oz).             Disposition Plan     Expected Discharge Date: 12/21/2023        Discharge Comments: OR 12/8 12/16 CT guided drainage, JPs leaking.  Confused and agitated at times.          Caroline Castro,   Hospitalist Service  [facility]  Securely message with [employer] (more info)  Text page via [employer] Paging/Directory   ______________________________________________________________________    Interval History   Seen with daughter and bedside RN in the room.  Son also on speaker phone and contributing to conversation and questions.     Restless last evening and did not sleep much.  Started moaning a bit this am.  Currently in chair and reporting that his backside is sore.    NG tube clamped by general surgery earlier this am. Has been tolerating some sips of clears.    Physical Exam   Vital Signs: Temp: 97.8  F (36.6  C) Temp src: Tympanic BP: (!) 153/114 Pulse: 80   Resp: 17 SpO2: 94 % O2 Device: None (Room air)    Weight: 176 lbs 12.94 oz    GEN:  Alert, awake, somewhat restless and agitated.  Frustrated that he can not get back into bed right now    HEENT:  Normocephalic/atraumatic, no scleral icterus, NG tube in place (clamped)  CV:  Regular rate and rhythm, somewhat distant but no loud murmur S1 + S2 noted, no S3 or S4.  LUNGS:  Clear to auscultation bilaterally ant/lat without rales/rhonchi/wheezing/retractions.  Limited post exam this am. Symmetric chest rise on inhalation noted.  ABD:  slower but present bowel sounds, soft, mildy distended throughout.  No rebound/guarding/rigidity.  EXT:  No significant pitting pretibial edema " bilaterally.  No cyanosis.  No acute joint synovitis noted.  SKIN:  Dry to touch, no exanthems noted in the visualized areas.  PSYCH:  restless in the chair.  Speech is clear - requesting to get out of the chair.  No tremors at rest.    Medications    dextrose      parenteral nutrition - Clinimix E 80 mL/hr at 12/19/23 2037      aspirin  81 mg Oral Daily    Or    aspirin  81 mg Per NG tube Daily    carvedilol  12.5 mg Oral BID    [Held by provider] clopidogrel  75 mg Oral Daily    enoxaparin ANTICOAGULANT  40 mg Subcutaneous Q24H    escitalopram  20 mg Oral or NG Tube Daily    [Held by provider] finasteride  5 mg Oral At Bedtime    gabapentin  100 mg Oral Q24H    gabapentin  200 mg Oral QAM    gabapentin  300 mg Oral At Bedtime    insulin aspart  1-12 Units Subcutaneous Q4H    insulin glargine  10 Units Subcutaneous At Bedtime    latanoprost  1 drop Both Eyes At Bedtime    levothyroxine  75 mcg Intravenous Daily    lipids plant base  250 mL Intravenous Once per day on Monday Tuesday Wednesday Thursday Friday Saturday    OLANZapine  2.5 mg Oral At Bedtime    pantoprazole  40 mg Intravenous Q24H    piperacillin-tazobactam  4.5 g Intravenous Q6H    [Held by provider] senna-docusate  1 tablet Oral BID    sodium chloride (PF)  10 mL Irrigation Q12H    sodium chloride (PF)  10-40 mL Intracatheter Q7 Days    sodium chloride (PF)  3 mL Intracatheter Q8H       Data     Labs and Imaging results below reviewed today.  Recent Labs   Lab 12/20/23  0542 12/19/23  0339 12/18/23  1126   WBC 13.3* 14.7* 15.2*   HGB 12.3* 12.3* 14.2   HCT 37.2* 37.9* 43.2    101* 100    201 199     Recent Labs   Lab 12/20/23  1240 12/20/23  0843 12/20/23  0542 12/19/23  0722 12/19/23  0339 12/18/23  0428 12/18/23  0416 12/17/23  1002 12/17/23  0522   NA  --   --  137  --   --   --  140  --  140   POTASSIUM  --   --  4.1  --  4.1  --  4.0  --  4.1   CHLORIDE  --   --  107  --   --   --  108*  --  110*   CO2  --   --  23  --   --   --  22  " --  20*   ANIONGAP  --   --  7  --   --   --  10  --  10   * 183* 250*   < >  --    < > 200*   < > 200*   BUN  --   --  31.4*  --   --   --  26.6*  --  19.6   CR  --   --  1.08  --   --   --  1.15  --  1.11   GFRESTIMATED  --   --  67  --   --   --  62  --  65   RHODA  --   --  8.7*  --   --   --  8.9  --  8.5*    < > = values in this interval not displayed.     Recent Labs   Lab 12/20/23  1240 12/20/23  0843 12/20/23  0542 12/19/23  0722 12/19/23  0339 12/18/23  0428 12/18/23  0416 12/17/23  1002 12/17/23  0522   NA  --   --  137  --   --   --  140  --  140   POTASSIUM  --   --  4.1  --  4.1  --  4.0  --  4.1   CHLORIDE  --   --  107  --   --   --  108*  --  110*   CO2  --   --  23  --   --   --  22  --  20*   ANIONGAP  --   --  7  --   --   --  10  --  10   * 183* 250*   < >  --    < > 200*   < > 200*   BUN  --   --  31.4*  --   --   --  26.6*  --  19.6   CR  --   --  1.08  --   --   --  1.15  --  1.11   GFRESTIMATED  --   --  67  --   --   --  62  --  65   RHODA  --   --  8.7*  --   --   --  8.9  --  8.5*   MAG  --   --  2.1  --  2.0  --  2.0  --  2.2   PHOS  --   --  3.1  --  2.9  --  2.3*  --  2.4*   PROTTOTAL  --   --   --   --   --   --  5.5*  --   --    ALBUMIN  --   --   --   --   --   --  3.0*  --   --    BILITOTAL  --   --   --   --   --   --  0.6  --   --    ALKPHOS  --   --   --   --   --   --  74  --   --    AST  --   --   --   --   --   --  33  --   --    ALT  --   --   --   --   --   --  12  --   --     < > = values in this interval not displayed.     No results for input(s): \"INR\" in the last 168 hours.    Recent Results (from the past 24 hour(s))   CT Sinogram Injection    Narrative    CT SINOGRAM INJECTION 12/19/2023 1:52 PM    CLINICAL HISTORY: History of lap appy for perforated appendicitis  early December, IR pelvic drain 12/15 with minimal outputs (surgical  drain in place, leaking). Check fluid resolution.    TECHNIQUE: CT scan of the pelvis was performed without IV " contrast,  before and after administration of contrast through existing drain.  Multiplanar reformats were obtained. Dose reduction techniques were  used.    CONTRAST: None.    COMPARISON: 12/15/2023    FINDINGS: There is oral contrast material in the colon on the first  scan. The tube is in good position within the small amount of  remaining fluid in the right lower quadrant.    Injecting contrast through the tube, there is filling of the small  amount of fluid adjacent to the drain in the right lower quadrant and  in the pelvic cul-de-sac. No fistula to bowel.      Impression    IMPRESSION:   Percutaneous drain in good position in the pelvis with minimal  residual fluid. No fistula to bowel.    BETH HDZ MD         SYSTEM ID:  [id number]   XR Chest Port 1 View    Narrative    EXAM: XR CHEST PORT 1 VIEW  LOCATION: [facility]  DATE: 12/20/2023    INDICATION: SOB.  COMPARISON: Chest x-ray on 12/16/2023.      Impression    IMPRESSION: AP view of the chest was obtained. Left chest wall AICD and leads are in stable position. Postsurgical changes of cardiac surgery with median sternotomy wires and surgical clips. Enlarged cardiac silhouette and mild pulmonary vascular   congestion. No significant pleural effusion or pneumothorax.

## 2023-12-20 NOTE — PROGRESS NOTES
Pt intermittently alert/lethargic. Disoriented to date. Anxious w/ repeated requests, often forgets activity (asks to get out of bed right after requesting to get into bed). Tele SR w/ freq PVCs and Pafib runs 3-5 beats. VSS, RA hypertensive d/t anxiety, provider aware, continue to reassure, give haldol if continued restlessness. Gave pt haldol x 1 d/t repeated attempts to try to get OOB, no effect. Crying out/moaning frequently. Denies pain. Was more cooperative/calm in afternoon, AM and early [place] restless. LS audible exp wheezes, denies SOB. Ant ESTEFANY bulb w/ minimal output, leaky, dressing changed, surg aware. Anterior ESTEFANY bulb no drainage. Lap sites x 3  w/ steri strips, scant drainage. TPN infusing at 80ml/hr in double lumen PICC. BG Q4H checks. L PIV SL. NG tube to intermittent suction, 50ml output this shift. BM + this shift. Inct B&B. Up w/ assist x 2 + GBW, may occasionally need pennie-steady. NPO ex ice chips and meds. Continue to monitor.

## 2023-12-21 ENCOUNTER — APPOINTMENT (OUTPATIENT)
Dept: CT IMAGING | Facility: CLINIC | Age: 88
DRG: 853 | End: 2023-12-21
Attending: NURSE PRACTITIONER
Payer: MEDICARE

## 2023-12-21 ENCOUNTER — APPOINTMENT (OUTPATIENT)
Dept: PHYSICAL THERAPY | Facility: CLINIC | Age: 88
DRG: 853 | End: 2023-12-21
Attending: HOSPITALIST
Payer: MEDICARE

## 2023-12-21 LAB
ALBUMIN SERPL BCG-MCNC: 3 G/DL (ref 3.5–5.2)
ALP SERPL-CCNC: 84 U/L (ref 40–150)
ALT SERPL W P-5'-P-CCNC: 12 U/L (ref 0–70)
ANION GAP SERPL CALCULATED.3IONS-SCNC: 11 MMOL/L (ref 7–15)
AST SERPL W P-5'-P-CCNC: 15 U/L (ref 0–45)
BASE EXCESS BLDV CALC-SCNC: -2.1 MMOL/L (ref -7.7–1.9)
BILIRUB SERPL-MCNC: 0.4 MG/DL
BUN SERPL-MCNC: 28.9 MG/DL (ref 8–23)
CALCIUM SERPL-MCNC: 8.8 MG/DL (ref 8.8–10.2)
CHLORIDE SERPL-SCNC: 107 MMOL/L (ref 98–107)
CREAT SERPL-MCNC: 1.05 MG/DL (ref 0.67–1.17)
DEPRECATED HCO3 PLAS-SCNC: 20 MMOL/L (ref 22–29)
EGFRCR SERPLBLD CKD-EPI 2021: 69 ML/MIN/1.73M2
ERYTHROCYTE [DISTWIDTH] IN BLOOD BY AUTOMATED COUNT: 14.2 % (ref 10–15)
GLUCOSE BLDC GLUCOMTR-MCNC: 184 MG/DL (ref 70–99)
GLUCOSE BLDC GLUCOMTR-MCNC: 199 MG/DL (ref 70–99)
GLUCOSE BLDC GLUCOMTR-MCNC: 231 MG/DL (ref 70–99)
GLUCOSE BLDC GLUCOMTR-MCNC: 245 MG/DL (ref 70–99)
GLUCOSE BLDC GLUCOMTR-MCNC: 249 MG/DL (ref 70–99)
GLUCOSE BLDC GLUCOMTR-MCNC: 274 MG/DL (ref 70–99)
GLUCOSE SERPL-MCNC: 175 MG/DL (ref 70–99)
HCO3 BLDV-SCNC: 24 MMOL/L (ref 21–28)
HCT VFR BLD AUTO: 38.4 % (ref 40–53)
HGB BLD-MCNC: 12.6 G/DL (ref 13.3–17.7)
LACTATE SERPL-SCNC: 1 MMOL/L (ref 0.7–2)
MAGNESIUM SERPL-MCNC: 1.9 MG/DL (ref 1.7–2.3)
MAGNESIUM SERPL-MCNC: 2.3 MG/DL (ref 1.7–2.3)
MCH RBC QN AUTO: 32.3 PG (ref 26.5–33)
MCHC RBC AUTO-ENTMCNC: 32.8 G/DL (ref 31.5–36.5)
MCV RBC AUTO: 99 FL (ref 78–100)
NT-PROBNP SERPL-MCNC: 4090 PG/ML (ref 0–1800)
O2/TOTAL GAS SETTING VFR VENT: 21 %
PCO2 BLDV: 43 MM HG (ref 40–50)
PH BLDV: 7.35 [PH] (ref 7.32–7.43)
PHOSPHATE SERPL-MCNC: 3 MG/DL (ref 2.5–4.5)
PLATELET # BLD AUTO: 189 10E3/UL (ref 150–450)
PO2 BLDV: 31 MM HG (ref 25–47)
POTASSIUM SERPL-SCNC: 4.1 MMOL/L (ref 3.4–5.3)
POTASSIUM SERPL-SCNC: 4.6 MMOL/L (ref 3.4–5.3)
PROT SERPL-MCNC: 5.6 G/DL (ref 6.4–8.3)
RBC # BLD AUTO: 3.9 10E6/UL (ref 4.4–5.9)
SODIUM SERPL-SCNC: 138 MMOL/L (ref 135–145)
TROPONIN T SERPL HS-MCNC: 308 NG/L
WBC # BLD AUTO: 14.5 10E3/UL (ref 4–11)

## 2023-12-21 PROCEDURE — 250N000012 HC RX MED GY IP 250 OP 636 PS 637: Performed by: INTERNAL MEDICINE

## 2023-12-21 PROCEDURE — 99232 SBSQ HOSP IP/OBS MODERATE 35: CPT | Performed by: INTERNAL MEDICINE

## 2023-12-21 PROCEDURE — C9113 INJ PANTOPRAZOLE SODIUM, VIA: HCPCS | Performed by: INTERNAL MEDICINE

## 2023-12-21 PROCEDURE — 83735 ASSAY OF MAGNESIUM: CPT | Performed by: INTERNAL MEDICINE

## 2023-12-21 PROCEDURE — 83605 ASSAY OF LACTIC ACID: CPT | Performed by: NURSE PRACTITIONER

## 2023-12-21 PROCEDURE — 93005 ELECTROCARDIOGRAM TRACING: CPT

## 2023-12-21 PROCEDURE — 97116 GAIT TRAINING THERAPY: CPT | Mod: GP | Performed by: PHYSICAL THERAPIST

## 2023-12-21 PROCEDURE — 250N000009 HC RX 250: Performed by: INTERNAL MEDICINE

## 2023-12-21 PROCEDURE — 70450 CT HEAD/BRAIN W/O DYE: CPT | Mod: MG

## 2023-12-21 PROCEDURE — 120N000001 HC R&B MED SURG/OB

## 2023-12-21 PROCEDURE — 97530 THERAPEUTIC ACTIVITIES: CPT | Mod: GP | Performed by: PHYSICAL THERAPIST

## 2023-12-21 PROCEDURE — 83880 ASSAY OF NATRIURETIC PEPTIDE: CPT | Performed by: NURSE PRACTITIONER

## 2023-12-21 PROCEDURE — 80053 COMPREHEN METABOLIC PANEL: CPT | Performed by: INTERNAL MEDICINE

## 2023-12-21 PROCEDURE — 250N000013 HC RX MED GY IP 250 OP 250 PS 637: Performed by: INTERNAL MEDICINE

## 2023-12-21 PROCEDURE — 83735 ASSAY OF MAGNESIUM: CPT | Performed by: NURSE PRACTITIONER

## 2023-12-21 PROCEDURE — 250N000013 HC RX MED GY IP 250 OP 250 PS 637: Performed by: STUDENT IN AN ORGANIZED HEALTH CARE EDUCATION/TRAINING PROGRAM

## 2023-12-21 PROCEDURE — 250N000011 HC RX IP 250 OP 636: Performed by: INTERNAL MEDICINE

## 2023-12-21 PROCEDURE — 250N000011 HC RX IP 250 OP 636: Mod: JZ | Performed by: INTERNAL MEDICINE

## 2023-12-21 PROCEDURE — 84484 ASSAY OF TROPONIN QUANT: CPT | Performed by: NURSE PRACTITIONER

## 2023-12-21 PROCEDURE — 85018 HEMOGLOBIN: CPT | Performed by: NURSE PRACTITIONER

## 2023-12-21 PROCEDURE — 84100 ASSAY OF PHOSPHORUS: CPT | Performed by: INTERNAL MEDICINE

## 2023-12-21 PROCEDURE — 99291 CRITICAL CARE FIRST HOUR: CPT | Performed by: NURSE PRACTITIONER

## 2023-12-21 PROCEDURE — 84132 ASSAY OF SERUM POTASSIUM: CPT | Performed by: NURSE PRACTITIONER

## 2023-12-21 PROCEDURE — 82803 BLOOD GASES ANY COMBINATION: CPT | Performed by: NURSE PRACTITIONER

## 2023-12-21 PROCEDURE — 93010 ELECTROCARDIOGRAM REPORT: CPT | Performed by: INTERNAL MEDICINE

## 2023-12-21 RX ORDER — MAGNESIUM SULFATE HEPTAHYDRATE 40 MG/ML
2 INJECTION, SOLUTION INTRAVENOUS ONCE
Status: COMPLETED | OUTPATIENT
Start: 2023-12-21 | End: 2023-12-21

## 2023-12-21 RX ORDER — LEVOTHYROXINE SODIUM 50 UG/1
100 TABLET ORAL
Status: DISCONTINUED | OUTPATIENT
Start: 2023-12-22 | End: 2024-01-03 | Stop reason: HOSPADM

## 2023-12-21 RX ADMIN — MAGNESIUM SULFATE HEPTAHYDRATE 2 G: 40 INJECTION, SOLUTION INTRAVENOUS at 10:43

## 2023-12-21 RX ADMIN — LATANOPROST 1 DROP: 50 SOLUTION/ DROPS OPHTHALMIC at 22:56

## 2023-12-21 RX ADMIN — ASPIRIN 81 MG CHEWABLE TABLET 81 MG: 81 TABLET CHEWABLE at 11:37

## 2023-12-21 RX ADMIN — PANTOPRAZOLE SODIUM 40 MG: 40 INJECTION, POWDER, FOR SOLUTION INTRAVENOUS at 09:59

## 2023-12-21 RX ADMIN — ESCITALOPRAM OXALATE 20 MG: 10 TABLET ORAL at 11:39

## 2023-12-21 RX ADMIN — CARVEDILOL 12.5 MG: 12.5 TABLET, FILM COATED ORAL at 11:38

## 2023-12-21 RX ADMIN — GABAPENTIN 200 MG: 100 CAPSULE ORAL at 11:39

## 2023-12-21 RX ADMIN — GABAPENTIN 100 MG: 100 CAPSULE ORAL at 14:17

## 2023-12-21 RX ADMIN — PIPERACILLIN AND TAZOBACTAM 4.5 G: 4; .5 INJECTION, POWDER, FOR SOLUTION INTRAVENOUS at 03:38

## 2023-12-21 RX ADMIN — ENOXAPARIN SODIUM 40 MG: 40 INJECTION SUBCUTANEOUS at 14:17

## 2023-12-21 RX ADMIN — LEVOTHYROXINE SODIUM 75 MCG: 20 INJECTION, SOLUTION INTRAVENOUS at 14:17

## 2023-12-21 RX ADMIN — ASCORBIC ACID, VITAMIN A PALMITATE, CHOLECALCIFEROL, THIAMINE HYDROCHLORIDE, RIBOFLAVIN-5 PHOSPHATE SODIUM, PYRIDOXINE HYDROCHLORIDE, NIACINAMIDE, DEXPANTHENOL, ALPHA-TOCOPHEROL ACETATE, VITAMIN K1, FOLIC ACID, BIOTIN, CYANOCOBALAMIN: 200; 3300; 200; 6; 3.6; 6; 40; 15; 10; 150; 600; 60; 5 INJECTION, SOLUTION INTRAVENOUS at 22:32

## 2023-12-21 RX ADMIN — ACETAMINOPHEN 650 MG: 325 SUSPENSION ORAL at 03:37

## 2023-12-21 RX ADMIN — PIPERACILLIN AND TAZOBACTAM 4.5 G: 4; .5 INJECTION, POWDER, FOR SOLUTION INTRAVENOUS at 09:59

## 2023-12-21 ASSESSMENT — ACTIVITIES OF DAILY LIVING (ADL)
ADLS_ACUITY_SCORE: 44

## 2023-12-21 NOTE — PLAN OF CARE
Goal Outcome Evaluation:    Orientations: A&O x2-3, intermittently confused to time/place/situation  Vitals/Pain: VSS on RA  Tele: SR w/ freq PVCs  Lines/Drains: L PICC infusing TPN and lipids  Skin/Wounds: Abdominal lap sites x3 and ESTEFANY x2  Blanchable redness to sacrum/coccyx, R arm skin tear  GI/: Incontinent, AUOP, 3 small loose BMs  Labs: K/Mag/Phos protocols  Ambulation/Assist: x2 GB pennie steady  Sleep Quality: Good  Plan: Possible NG and ESTEFANY removal today

## 2023-12-21 NOTE — PROGRESS NOTES
[facility]    General Surgery  Daily Progress Note       Assessment and Plan:   Garry Mckay is a 88 year old male   S/P Lap Appy for Perforated Appendicitis, and vascular assessment of questionable area on cecum through ICG administration  POD #13    Diagnosis: Perforated Appendicitis, Post Op Ileus  Post-operative pelvic fluid collections s/p IR drain 12/15  CT sinogram 12/19 ruled out fistula and improved fluid collection, and noted contrast in colon from gastrografin challenge.    PLAN:  - Remove NGT  - Remove ESTEFANY surgical drain (continue IR placed drain for time being)  - Continue Protonix for GI prophylaxis. Continue TPN per dietitian; likely wean this off next 1-2 days if tolerating full liquids.  - Leukocytosis steadily improving over last few days.  Today is day 14 of Zosyn.  Discontinue Zosyn.  Monitor for return of fevers/WBC.  - Ambulate/activity. QID to assist with bowel function,   - PCDs for DVT prophylaxis. Continue lovenox for DVT chemoprophylaxis. Plavix held since NG replaced.  Consider restarting tomorrow now that NG out?  - Encourage deep breathe and IS.   - Medical management per primary team.        Interval History:   Garry Mckay is seen on surgical rounds today. He is pleasantly sleeping upon my visit.  Spoke to daughter and son in law.  They report he is doing better over last 24-48 hours.  More lucid now when they are talking, but some reports from nursing that patient disoriented intermittently.         Physical Exam:   Temp: 97.4  F (36.3  C) Temp src: Axillary BP: (!) 145/68 Pulse: 76   Resp: 18 SpO2: 91 % O2 Device: None (Room air)      I/O last 3 completed shifts:  In: 1743.36 [P.O.:240; I.V.:5; Other:15]  Out: 935 [Urine:725; Emesis/NG output:200; Drains:10]    Minimal exam today to help pt aide in sleeping  GENERAL: VS reviewed, I/O's ok.  LUNGS: Normal respiratory effort, no audible wheezing      Data   Recent Labs   Lab 12/21/23  1249  12/21/23  0850 12/21/23  0648 12/21/23  0341 12/20/23  0843 12/20/23  0542 12/19/23  0722 12/19/23  0339 12/18/23  1241 12/18/23  1126 12/18/23  0428 12/18/23  0416 12/17/23  1002 12/17/23  0522   WBC  --   --   --   --   --  13.3*  --  14.7*  --  15.2*  --   --   --  20.6*   HGB  --   --   --   --   --  12.3*  --  12.3*  --  14.2  --   --   --  13.6   MCV  --   --   --   --   --  100  --  101*  --  100  --   --   --  99   PLT  --   --   --   --   --  186  --  201  --  199  --   --   --  211   NA  --   --   --   --   --  137  --   --   --   --   --  140  --  140   POTASSIUM  --   --  4.1  --   --  4.1  --  4.1  --   --   --  4.0  --  4.1   CHLORIDE  --   --   --   --   --  107  --   --   --   --   --  108*  --  110*   CO2  --   --   --   --   --  23  --   --   --   --   --  22  --  20*   BUN  --   --   --   --   --  31.4*  --   --   --   --   --  26.6*  --  19.6   CR  --   --   --   --   --  1.08  --   --   --   --   --  1.15  --  1.11   ANIONGAP  --   --   --   --   --  7  --   --   --   --   --  10  --  10   RHODA  --   --   --   --   --  8.7*  --   --   --   --   --  8.9  --  8.5*   * 274*  --  231*   < > 250*   < >  --    < >  --    < > 200*   < > 200*   ALBUMIN  --   --   --   --   --   --   --   --   --   --   --  3.0*  --   --    PROTTOTAL  --   --   --   --   --   --   --   --   --   --   --  5.5*  --   --    BILITOTAL  --   --   --   --   --   --   --   --   --   --   --  0.6  --   --    ALKPHOS  --   --   --   --   --   --   --   --   --   --   --  74  --   --    ALT  --   --   --   --   --   --   --   --   --   --   --  12  --   --    AST  --   --   --   --   --   --   --   --   --   --   --  33  --   --     < > = values in this interval not displayed.       Garret Nieto PA-C    Please use [employer] to page 7:30am-4pm, page on-call surgeon after 4pm  Office number: [phone]

## 2023-12-21 NOTE — PROGRESS NOTES
[facility]    Medicine Progress Note - Hospitalist Service    Date of Admission:  12/8/2023    Assessment & Plan   88 year old male S/P Lap Appy for Perforated Appendicitis, and vascular assessment of questionable area on cecum through ICG administration.  He also has had issues with post op fluid collections that IR has placed a drain in and had a significant post-op ileus.  Initially underwent surgery on 12/8/2023 and required ICU management for septic shock.     Ultimately improved, and Hospitalist service assumed care on 12/11/2023.      Acute appendicitis with peritonitis and septic shock - s/p laparoscopic appendectomy 12/8/2023.  Pelvic fluid collections, question abscesses.  Postoperative ileus vs SBO, prolonged  Nutrition:  Summary:  Initial presentation to OSH 12/7 as above. CT abdomen noted with acute appendicitis with 0.7 cm appendicolith, no abscess. Started on ampicillin-sulbactam. Subsequently transferred to [facility] 12/8.  On 12/8, underwent above surgery. Weaned off pressors. Antibiotics changed to pipericillin-tazobactam.   On 12/10, CT abdomen noted with multiple dilated small bowel loops with related decompression of colon with findings consistent with partial obstruction/ adynamic ileus.  On 12/12, diet advanced to clears as pt seemed to be improving with +flatus and BM. Later, developed more N/V and NG placed.  On 12/14, WBC elevated. Repeat abdominal CT showed prior appendectomy with findings suggestive of peritonitis, developing rim enhancing pelvic fluid collections, abscesses possible; increasing dilation of proximal small bowel with transition point in the right lower quadrant, suggestive of partial mechanical obstruction, developing adhesion is possible, no evidence of nadine bowel ischemia or acute perforation; distended gallbladder without additional CT evidence of acute cholecystitis.    IR placed drain into right pelvic fluid collection (2/15/23)    CT  sinegram 12/19/23 - with drain in good position, min residual fluid and no fistula.     - started TPN 12/15, continue  - Continue pipericillin-tazobactam (started 12/8), continue as would expect needs at least 2 weeks antibiotics - will discontinue today as day #14  - Continue PRN IV hydromorphone; minimize opioids as able given ileus.  Continue PRN anti-emetics.  Continue GI proph with PPI IV.    Has tolerated NG tube clamping and clears 12/20/23-12/21/23  Will discuss further with surgery today re: removal of NG today?       2. Acute hypoxic respiratory failure, suspect multifactorial related to atelectasis, recent abdominal surgery, resolved.  CARINA on home CPA:.  * Initial presentation as above. CXR at OSH negative.  * On 12/8, CXR showed new left basilar bandlike opacity, most likely atelectasis.   * On 12/11, down to 2L O2.  * On 12/14, off O2.  - Continue CPAP while sleeping as able  - Continue to encourage incentive spirometry.    Wean oxygen as able      3. FATIMAH on CKD3, suspect prerenal, meds (including sacubitril-valsartan, diuretics).  Hyperkalemia, resolved.  * Baseline creatinine 1-1.1.   * Creatinine 1.39 --> 1.8 at OSH.  * Cr up to 2.27 and K up to 5.5 on 12/8.  * Cr and K subsequently improved with IVF's.  * On IV furosemide 12/10-12/11.  - Continue to monitor BMP  - Avoid nephrotoxic medications.     Creat last 1.08 (12/20/23)    4. Troponin elevation, suspect demand ischemia (type 2 NSTEMI).  Hypertension (benign essential).  CAD s/p CABG (1994), stenting (2002, 2003, 2005, 2021)  Chronic HFrEF (systolic and diastolic), s/p ICD.  HLD.  Afib with RVR:  [PTA: carvedilol 12.5 mg BID; sacubitril-valsartan 49-51 mg BID; furosemide 20 mg daily.]  * Last cath 1/2021 at [facility] showed patent CLAUDIA-RCA, LIMA occluded, vein-diagonal patent with placement of stent,  LAD, 40% circumflex, RCA occluded. Last stress testing 7/2023 negative for ischemia. Echo 7/2023 showed LVEF 30-35%, global hypokinesis LV,  moderate-severe cLVH, grade 1 diastolic dysfunction; RV systolic function mildly reduced.   * Initial presentation as above. BNP in ED at 2613, troponin 70. EKG w/ nonspecific t-w changes. PTA sacubitril-valsartan and furosemide held on admit given FATIMAH.  Briefly on IV diuretics.  - Continue ASA 81 mg cautiously given GI issues.    Plavix remains on hold with procedures and drains/bleed concern and because NG tube needed to be replaced.    5. NSVT hx:  -has ICD     6. Acute metabolic encephalopathy (delirium)  - treat associated conditions as noted elsewhere  - delirium precautions    Had a better night  No prn meds given last night    Po prn zyprexa appears to be the most effective when he is agitated.     He is likely less agitated as he has been able to take in po in the last 24 hours (clinical improvement)    Will continue to reassess and monitor closely.  Re-direct as able.     7. Hyperglycemia while on TPN  - check A1c  - High dose sliding scale  - Lantus 15 units at bedtime  Blood sugars still 200's  Will continue to monitor as anticipate possible trial off TPN soon    No insulin changes this am (lantus increased 12/20/23)     8. Anemia, suspect partial blood loss component with surgery issues:  - Continue to monitor hgb periodically  - Consider prbc transfusion if hgb </= 7.0 or if significant bleeding with hemodynamic instability or if symptomatic.     9. Thrombocytopenia, unclear etiology, possibly due to sepsis, medication effect or consumptive or other cause.  * Plts normal on initially evaluation.  * Plts subsequently down to 104K on 12/9  * Plts normalized 12/14  - Continue to monitor CBC occasionally     10. Hypokalemia:  Supplemented and normalized     11. Hx CVA.  - Continue to hold clopidogrel for now (as above)  ASA is continuing.    - Continue to hold atorvastatin until can take po intake routinely.     12. Depression/anxiety.  - Continue to hold gabapentin for now.  - Continue escitalopram        13. Hypothyroidism.  - Continue levothyroxine.     14. GERD.  - Continue pantoprazole IV.     15. BPH.  - Continue to hold finasteride for now.  Resume when can take some more oral intake.     16. Migraines.  * Gets botox.   - Continue to monitor clinically.     17. Gallbladder distention  *noted on CT abd 12/14  *abd usg 12/15 w/o evidence of cholecystitis  - monitor     Goals of care  Patient's daughter identifies living independently and being able to drive as extremely important for patient's quality of life.  12/18/2023: Discussed quality of life and returning to baseline will be delayed for every day patient remains hospitalized.  Colleague discussed CODE STATUS and that generally if an 88-year-old requires cardiac resuscitation or intubation they would likely never return to independent living and driving.  When this is coupled with his current physical status, it is likely that his return to independent living is even worse if he were to require resuscitation.  Discussed that patient's and their families sometimes set a date for transition to comfort care if things continue to not improve, especially in situations where patients may linger for some weeks without clear improvement.    12/21/23 - d/w palliative care today.       Medical Decision Making       45 MINUTES SPENT BY ME on the date of service doing chart review, history, exam, documentation & further activities per the note.        PPE Worn:  Mask, gloves     Diet: Clear Liquid Diet  parenteral nutrition - Clinimix E    DVT Prophylaxis: Enoxaparin (Lovenox) SQ  Hameed Catheter: Not present  Lines: PRESENT      PICC 12/15/23 Double Lumen Left Brachial vein medial TPN-Site Assessment: WDL      Cardiac Monitoring: None  Code Status: Full Code      Clinically Significant Risk Factors              # Hypoalbuminemia: Lowest albumin = 2.8 g/dL at 12/11/2023  5:26 AM, will monitor as appropriate     # Hypertension: Noted on problem list       # DMII: A1C =  "6.8 % (Ref range: <5.7 %) within past 6 months   # Overweight: Estimated body mass index is 27.69 kg/m  as calculated from the following:    Height as of this encounter: 1.702 m (5' 7\").    Weight as of this encounter: 80.2 kg (176 lb 12.9 oz).             Disposition Plan     Expected Discharge Date: 12/27/2023        Discharge Comments: OR 12/8 12/16 CT guided drainage, JPs leaking.  Confused and agitated at times.          Caroline Castro DO  Hospitalist Service  [facility]  Securely message with [employer] (more info)  Text page via [employer] Paging/Directory   ______________________________________________________________________    Interval History     Seen alone in the room.  Sleeping comfortably.  Not very verbal this am  Overall appears MUCH more comfortable    Per RN, had an uneventful night. Still had intermittent confusion but much less agitated.  3 small BM noted overnight.     Physical Exam   Vital Signs: Temp: 98.1  F (36.7  C) Temp src: Axillary BP: (!) 145/68 Pulse: 76   Resp: 18 SpO2: 91 % O2 Device: None (Room air)    Weight: 176 lbs 12.94 oz    GEN:  sleeping comfortably this am.  No acute respiratory distress  HEENT:  Normocephalic/atraumatic, no scleral icterus, NG tube in place (clamped)  CV:  somewhat distant but regular rate and rhythm, S1 + S2 noted.  LUNGS:  Clear to auscultation ant/lat bilaterally ant/lat without rales/rhonchi/wheezing/retractions. Symmetric chest rise on inhalation noted.  ABD:  slower but present bowel sounds to ausc this am, soft, still mildy distended throughout.  No clear rebound/guarding/rigidity to light palpation.  EXT:  No significant pitting pretibial edema bilaterally.  No cyanosis.    PSYCH:  not restless.  Moving legs to my touch.     Medications    dextrose      parenteral nutrition - Clinimix E 80 mL/hr at 12/20/23 2008      aspirin  81 mg Oral Daily    Or    aspirin  81 mg Per NG tube Daily    carvedilol  12.5 mg Oral BID    " [Held by provider] clopidogrel  75 mg Oral Daily    enoxaparin ANTICOAGULANT  40 mg Subcutaneous Q24H    escitalopram  20 mg Oral or NG Tube Daily    [Held by provider] finasteride  5 mg Oral At Bedtime    gabapentin  100 mg Oral Q24H    gabapentin  200 mg Oral QAM    gabapentin  300 mg Oral At Bedtime    insulin aspart  1-12 Units Subcutaneous Q4H    insulin glargine  15 Units Subcutaneous At Bedtime    latanoprost  1 drop Both Eyes At Bedtime    levothyroxine  75 mcg Intravenous Daily    lipids plant base  250 mL Intravenous Once per day on Monday Tuesday Wednesday Thursday Friday Saturday    pantoprazole  40 mg Intravenous Q24H    piperacillin-tazobactam  4.5 g Intravenous Q6H    [Held by provider] senna-docusate  1 tablet Oral BID    sodium chloride (PF)  10 mL Irrigation Q12H    sodium chloride (PF)  10-40 mL Intracatheter Q7 Days    sodium chloride (PF)  3 mL Intracatheter Q8H       Data     Labs and Imaging results below reviewed today.  Recent Labs   Lab 12/20/23  0542 12/19/23  0339 12/18/23  1126   WBC 13.3* 14.7* 15.2*   HGB 12.3* 12.3* 14.2   HCT 37.2* 37.9* 43.2    101* 100    201 199     Recent Labs   Lab 12/21/23  0341 12/21/23  0030 12/20/23  1957 12/20/23  0843 12/20/23  0542 12/19/23  0722 12/19/23  0339 12/18/23  0428 12/18/23  0416 12/17/23  1002 12/17/23  0522   NA  --   --   --   --  137  --   --   --  140  --  140   POTASSIUM  --   --   --   --  4.1  --  4.1  --  4.0  --  4.1   CHLORIDE  --   --   --   --  107  --   --   --  108*  --  110*   CO2  --   --   --   --  23  --   --   --  22  --  20*   ANIONGAP  --   --   --   --  7  --   --   --  10  --  10   * 249* 195*   < > 250*   < >  --    < > 200*   < > 200*   BUN  --   --   --   --  31.4*  --   --   --  26.6*  --  19.6   CR  --   --   --   --  1.08  --   --   --  1.15  --  1.11   GFRESTIMATED  --   --   --   --  67  --   --   --  62  --  65   RHODA  --   --   --   --  8.7*  --   --   --  8.9  --  8.5*    < > = values in  "this interval not displayed.     Recent Labs   Lab 12/21/23  0341 12/21/23  0030 12/20/23 1957 12/20/23  0843 12/20/23  0542 12/19/23  0722 12/19/23  0339 12/18/23  0428 12/18/23  0416 12/17/23  1002 12/17/23  0522   NA  --   --   --   --  137  --   --   --  140  --  140   POTASSIUM  --   --   --   --  4.1  --  4.1  --  4.0  --  4.1   CHLORIDE  --   --   --   --  107  --   --   --  108*  --  110*   CO2  --   --   --   --  23  --   --   --  22  --  20*   ANIONGAP  --   --   --   --  7  --   --   --  10  --  10   * 249* 195*   < > 250*   < >  --    < > 200*   < > 200*   BUN  --   --   --   --  31.4*  --   --   --  26.6*  --  19.6   CR  --   --   --   --  1.08  --   --   --  1.15  --  1.11   GFRESTIMATED  --   --   --   --  67  --   --   --  62  --  65   RHODA  --   --   --   --  8.7*  --   --   --  8.9  --  8.5*   MAG  --   --   --   --  2.1  --  2.0  --  2.0  --  2.2   PHOS  --   --   --   --  3.1  --  2.9  --  2.3*  --  2.4*   PROTTOTAL  --   --   --   --   --   --   --   --  5.5*  --   --    ALBUMIN  --   --   --   --   --   --   --   --  3.0*  --   --    BILITOTAL  --   --   --   --   --   --   --   --  0.6  --   --    ALKPHOS  --   --   --   --   --   --   --   --  74  --   --    AST  --   --   --   --   --   --   --   --  33  --   --    ALT  --   --   --   --   --   --   --   --  12  --   --     < > = values in this interval not displayed.     No results for input(s): \"INR\" in the last 168 hours.    No results found for this or any previous visit (from the past 24 hour(s)).       "

## 2023-12-21 NOTE — PROGRESS NOTES
Surgery  Patient doing tolerably well, apparently slept better last night.  Minimal NG output over the last couple of days.  Abdomen is still somewhat distended but minimal ESTEFANY output.  Tolerated clears yesterday.  I think we can remove the NG tube and the surgically placed ESTEFANY drain.  I would keep him on clear liquids today, make sure he does not develop increased pain or nausea.  Hopefully advance to full liquids tomorrow.  Continue TPN for the time being.  Ramses Singer MD  General Surgery, Office [phone]

## 2023-12-21 NOTE — PROGRESS NOTES
"Pt AO x 2-3, intermittently disoriented to place, situation, disoriented to date. Anxious w/ repeated requests. During AM shift change, pt pulled out IV, attempted to remove PICC line and gown. When asking orientation questions, pt reported he was \"at home\" and didn't know why he was in the hospital. Not re-directable, 1:1 sitter placed for safety at bedside, PRN zyprexa ordered and given. VSS, tele SR/SB w/. Freq PVCs and PAfib runs.  Pt calmed down somewhat, participated w/ rehab. NG tube clamped, prepare for removal along w/ anterior ESTEFANY drain tomorrow. Denied nausea. Tolerating small sips of clears.  ESTEFANY drain x 2, lap sites x 3. TPN infusing @ 80ml/hr in double lumen PICC.   Pt was sleeping in between cares, pt awoke at 1600, repeated requests and attempting to get OOB and trying to pull off gown and cords. Zyprexa x 1 given @ 1845. ++++BM this shift, AUOP w/ ext cath. Up w/ assist x 2 + GBW, may need pennie-steady upon waking. Continue to monitor.   "

## 2023-12-21 NOTE — PROGRESS NOTES
"Spoke with hospitalist Dr Castro today. She said palliative care may sign off at this time as goals of care have been determined. However, we can be recalled if further needs are identified.     Xiao HAHN, CNS  Palliative Medicine   [facility]  Securely message with [employer] (I will be listed under Xiao Echeverria)     During regular M-F work hours ([phone]) -- please contact me on Vocera   In my absence, securely message our team via [employer] \"[facility] [place]\" or go to On Call tab in [employer], search for \"[facility]\"   After regular work hours and on weekends/holidays, to reach our on call physician, securely message via [employer] \"[facility] [place]\" or go to On Call tab in [employer], search for \"[facility] [place]\"       "

## 2023-12-21 NOTE — PROGRESS NOTES
Garry Mckay was seen today to remove the RLQ drain which had minimal outputs per the plan from 12/19/23.    After explaining the procedure and answering questions the drain was removed intact without pain or complications. Gauze and tape placed over the site.     Discussed above with RN and Dr Delcid today.     Total time: 25 minutes    Thanks, Kaity [facility] Interventional Radiology CNP ([phone]) (phone [phone])

## 2023-12-22 ENCOUNTER — DOCUMENTATION ONLY (OUTPATIENT)
Dept: OTHER | Facility: CLINIC | Age: 88
End: 2023-12-22
Payer: MEDICARE

## 2023-12-22 ENCOUNTER — APPOINTMENT (OUTPATIENT)
Dept: OCCUPATIONAL THERAPY | Facility: CLINIC | Age: 88
DRG: 853 | End: 2023-12-22
Attending: HOSPITALIST
Payer: MEDICARE

## 2023-12-22 ENCOUNTER — APPOINTMENT (OUTPATIENT)
Dept: CARDIOLOGY | Facility: CLINIC | Age: 88
DRG: 853 | End: 2023-12-22
Attending: NURSE PRACTITIONER
Payer: MEDICARE

## 2023-12-22 LAB
ALBUMIN UR-MCNC: NEGATIVE MG/DL
ANION GAP SERPL CALCULATED.3IONS-SCNC: 9 MMOL/L (ref 7–15)
APPEARANCE UR: CLEAR
BILIRUB UR QL STRIP: NEGATIVE
BUN SERPL-MCNC: 31.9 MG/DL (ref 8–23)
CALCIUM SERPL-MCNC: 8.8 MG/DL (ref 8.8–10.2)
CHLORIDE SERPL-SCNC: 108 MMOL/L (ref 98–107)
COLOR UR AUTO: YELLOW
CREAT SERPL-MCNC: 1.03 MG/DL (ref 0.67–1.17)
DEPRECATED HCO3 PLAS-SCNC: 22 MMOL/L (ref 22–29)
EGFRCR SERPLBLD CKD-EPI 2021: 71 ML/MIN/1.73M2
ERYTHROCYTE [DISTWIDTH] IN BLOOD BY AUTOMATED COUNT: 14.1 % (ref 10–15)
GLUCOSE BLDC GLUCOMTR-MCNC: 174 MG/DL (ref 70–99)
GLUCOSE BLDC GLUCOMTR-MCNC: 183 MG/DL (ref 70–99)
GLUCOSE BLDC GLUCOMTR-MCNC: 195 MG/DL (ref 70–99)
GLUCOSE BLDC GLUCOMTR-MCNC: 200 MG/DL (ref 70–99)
GLUCOSE BLDC GLUCOMTR-MCNC: 215 MG/DL (ref 70–99)
GLUCOSE BLDC GLUCOMTR-MCNC: 224 MG/DL (ref 70–99)
GLUCOSE SERPL-MCNC: 200 MG/DL (ref 70–99)
GLUCOSE UR STRIP-MCNC: 30 MG/DL
HCT VFR BLD AUTO: 35.2 % (ref 40–53)
HGB BLD-MCNC: 11.4 G/DL (ref 13.3–17.7)
HGB UR QL STRIP: NEGATIVE
KETONES UR STRIP-MCNC: NEGATIVE MG/DL
LEUKOCYTE ESTERASE UR QL STRIP: NEGATIVE
LVEF ECHO: NORMAL
MAGNESIUM SERPL-MCNC: 2.2 MG/DL (ref 1.7–2.3)
MCH RBC QN AUTO: 32.4 PG (ref 26.5–33)
MCHC RBC AUTO-ENTMCNC: 32.4 G/DL (ref 31.5–36.5)
MCV RBC AUTO: 100 FL (ref 78–100)
NITRATE UR QL: NEGATIVE
PH UR STRIP: 5.5 [PH] (ref 5–7)
PHOSPHATE SERPL-MCNC: 2.9 MG/DL (ref 2.5–4.5)
PLATELET # BLD AUTO: 188 10E3/UL (ref 150–450)
POTASSIUM SERPL-SCNC: 4.4 MMOL/L (ref 3.4–5.3)
RBC # BLD AUTO: 3.52 10E6/UL (ref 4.4–5.9)
RBC URINE: <1 /HPF
SODIUM SERPL-SCNC: 139 MMOL/L (ref 135–145)
SP GR UR STRIP: 1.02 (ref 1–1.03)
TROPONIN T SERPL HS-MCNC: 202 NG/L
TROPONIN T SERPL HS-MCNC: 252 NG/L
TROPONIN T SERPL HS-MCNC: 276 NG/L
UROBILINOGEN UR STRIP-MCNC: NORMAL MG/DL
WBC # BLD AUTO: 11.1 10E3/UL (ref 4–11)
WBC URINE: <1 /HPF

## 2023-12-22 PROCEDURE — 84100 ASSAY OF PHOSPHORUS: CPT | Performed by: INTERNAL MEDICINE

## 2023-12-22 PROCEDURE — 84484 ASSAY OF TROPONIN QUANT: CPT | Performed by: INTERNAL MEDICINE

## 2023-12-22 PROCEDURE — 250N000013 HC RX MED GY IP 250 OP 250 PS 637: Performed by: NURSE PRACTITIONER

## 2023-12-22 PROCEDURE — C8929 TTE W OR WO FOL WCON,DOPPLER: HCPCS

## 2023-12-22 PROCEDURE — 250N000011 HC RX IP 250 OP 636: Mod: JZ | Performed by: INTERNAL MEDICINE

## 2023-12-22 PROCEDURE — 84484 ASSAY OF TROPONIN QUANT: CPT | Performed by: NURSE PRACTITIONER

## 2023-12-22 PROCEDURE — 250N000009 HC RX 250: Performed by: INTERNAL MEDICINE

## 2023-12-22 PROCEDURE — 85027 COMPLETE CBC AUTOMATED: CPT | Performed by: INTERNAL MEDICINE

## 2023-12-22 PROCEDURE — 250N000013 HC RX MED GY IP 250 OP 250 PS 637: Performed by: SURGERY

## 2023-12-22 PROCEDURE — 80048 BASIC METABOLIC PNL TOTAL CA: CPT | Performed by: INTERNAL MEDICINE

## 2023-12-22 PROCEDURE — 120N000001 HC R&B MED SURG/OB

## 2023-12-22 PROCEDURE — 97535 SELF CARE MNGMENT TRAINING: CPT | Mod: GO

## 2023-12-22 PROCEDURE — 999N000040 HC STATISTIC CONSULT NO CHARGE VASC ACCESS

## 2023-12-22 PROCEDURE — 99233 SBSQ HOSP IP/OBS HIGH 50: CPT | Performed by: INTERNAL MEDICINE

## 2023-12-22 PROCEDURE — 250N000012 HC RX MED GY IP 250 OP 636 PS 637: Performed by: INTERNAL MEDICINE

## 2023-12-22 PROCEDURE — 255N000002 HC RX 255 OP 636: Performed by: INTERNAL MEDICINE

## 2023-12-22 PROCEDURE — 83735 ASSAY OF MAGNESIUM: CPT | Performed by: INTERNAL MEDICINE

## 2023-12-22 PROCEDURE — C9113 INJ PANTOPRAZOLE SODIUM, VIA: HCPCS | Performed by: INTERNAL MEDICINE

## 2023-12-22 PROCEDURE — 250N000011 HC RX IP 250 OP 636: Performed by: INTERNAL MEDICINE

## 2023-12-22 PROCEDURE — 250N000013 HC RX MED GY IP 250 OP 250 PS 637: Performed by: INTERNAL MEDICINE

## 2023-12-22 PROCEDURE — 99222 1ST HOSP IP/OBS MODERATE 55: CPT | Mod: 25 | Performed by: INTERNAL MEDICINE

## 2023-12-22 PROCEDURE — 93306 TTE W/DOPPLER COMPLETE: CPT | Mod: 26 | Performed by: INTERNAL MEDICINE

## 2023-12-22 PROCEDURE — 81001 URINALYSIS AUTO W/SCOPE: CPT | Performed by: NURSE PRACTITIONER

## 2023-12-22 PROCEDURE — 250N000013 HC RX MED GY IP 250 OP 250 PS 637: Performed by: STUDENT IN AN ORGANIZED HEALTH CARE EDUCATION/TRAINING PROGRAM

## 2023-12-22 PROCEDURE — 97530 THERAPEUTIC ACTIVITIES: CPT | Mod: GO

## 2023-12-22 PROCEDURE — 36415 COLL VENOUS BLD VENIPUNCTURE: CPT | Performed by: INTERNAL MEDICINE

## 2023-12-22 RX ORDER — DEXTROSE MONOHYDRATE 25 G/50ML
25-50 INJECTION, SOLUTION INTRAVENOUS
Status: DISCONTINUED | OUTPATIENT
Start: 2023-12-22 | End: 2024-01-03 | Stop reason: HOSPADM

## 2023-12-22 RX ORDER — NICOTINE POLACRILEX 4 MG
15-30 LOZENGE BUCCAL
Status: DISCONTINUED | OUTPATIENT
Start: 2023-12-22 | End: 2024-01-03 | Stop reason: HOSPADM

## 2023-12-22 RX ADMIN — ENOXAPARIN SODIUM 40 MG: 40 INJECTION SUBCUTANEOUS at 13:02

## 2023-12-22 RX ADMIN — INSULIN GLARGINE 18 UNITS: 100 INJECTION, SOLUTION SUBCUTANEOUS at 21:42

## 2023-12-22 RX ADMIN — Medication 1 ML: at 21:59

## 2023-12-22 RX ADMIN — Medication 1 ML: at 21:08

## 2023-12-22 RX ADMIN — ASCORBIC ACID, VITAMIN A PALMITATE, CHOLECALCIFEROL, THIAMINE HYDROCHLORIDE, RIBOFLAVIN-5 PHOSPHATE SODIUM, PYRIDOXINE HYDROCHLORIDE, NIACINAMIDE, DEXPANTHENOL, ALPHA-TOCOPHEROL ACETATE, VITAMIN K1, FOLIC ACID, BIOTIN, CYANOCOBALAMIN: 200; 3300; 200; 6; 3.6; 6; 40; 15; 10; 150; 600; 60; 5 INJECTION, SOLUTION INTRAVENOUS at 21:41

## 2023-12-22 RX ADMIN — LEVOTHYROXINE SODIUM 100 MCG: 100 TABLET ORAL at 09:31

## 2023-12-22 RX ADMIN — HUMAN ALBUMIN MICROSPHERES AND PERFLUTREN 9 ML: 10; .22 INJECTION, SOLUTION INTRAVENOUS at 11:09

## 2023-12-22 RX ADMIN — CARVEDILOL 12.5 MG: 12.5 TABLET, FILM COATED ORAL at 21:08

## 2023-12-22 RX ADMIN — CARVEDILOL 12.5 MG: 12.5 TABLET, FILM COATED ORAL at 00:33

## 2023-12-22 RX ADMIN — ACETAMINOPHEN 650 MG: 325 SUSPENSION ORAL at 00:37

## 2023-12-22 RX ADMIN — CARVEDILOL 12.5 MG: 12.5 TABLET, FILM COATED ORAL at 09:31

## 2023-12-22 RX ADMIN — PANTOPRAZOLE SODIUM 40 MG: 40 INJECTION, POWDER, FOR SOLUTION INTRAVENOUS at 09:33

## 2023-12-22 RX ADMIN — LATANOPROST 1 DROP: 50 SOLUTION/ DROPS OPHTHALMIC at 21:11

## 2023-12-22 RX ADMIN — SACUBITRIL AND VALSARTAN 1 TABLET: 24; 26 TABLET, FILM COATED ORAL at 21:08

## 2023-12-22 RX ADMIN — ASPIRIN 81 MG CHEWABLE TABLET 81 MG: 81 TABLET CHEWABLE at 09:31

## 2023-12-22 RX ADMIN — ESCITALOPRAM OXALATE 20 MG: 10 TABLET ORAL at 09:32

## 2023-12-22 ASSESSMENT — ACTIVITIES OF DAILY LIVING (ADL)
ADLS_ACUITY_SCORE: 38
ADLS_ACUITY_SCORE: 42
ADLS_ACUITY_SCORE: 44
ADLS_ACUITY_SCORE: 38
ADLS_ACUITY_SCORE: 38
ADLS_ACUITY_SCORE: 44
ADLS_ACUITY_SCORE: 38
ADLS_ACUITY_SCORE: 44
ADLS_ACUITY_SCORE: 44
ADLS_ACUITY_SCORE: 42
ADLS_ACUITY_SCORE: 42
ADLS_ACUITY_SCORE: 38

## 2023-12-22 NOTE — PLAN OF CARE
Goal Outcome Evaluation:      Plan of Care Reviewed With: patient, child    Overall Patient Progress: improvingOverall Patient Progress: improving    Outcome Evaluation: diet advanced. started ONS. weaning on TPN    Yusra Arthur RD, LD

## 2023-12-22 NOTE — CODE/RAPID RESPONSE
[facility]    House THO RRT Note  12/21/2023   Time Called: 2115    RRT called for: Bradycardia, AMS    Assessment & Plan     Acute encephalopathy possibly 2/2 recent sleep deprivation vs delirium vs medication effect.  Alternatively considered infection, CVA, ICH, hypoglycemia, hypercarbia  Anisocoria, L>R.  Troponin elevation possibly 2/2 acute on chronic HFrEF with noted pulmonary edema.  Persistent mild leukocytosis in setting of recent perforated appendicitis, postoperative pelvic fluid collections s/p IR drain placement 12/15, removal 12/21.   Sinus rhythm with frequent PVCs.  - Upon arrival, pt lying in bed, sleeping, in no overt distress with VS noting HR 70s, SBP 120s, RR 10s-20s, O2 sats > 92%, afebrile.  Nursing notes attempted to complete assessment; however, noted pt lethargic, difficult to arouse and radial pulse appeared variable with HR 40s-70s prompting RRT.  At time of arrival, pt sluggishly opens eyes to physical stimulation and loud voice but quickly closes them.  Pt's pupils noting L 5 mm, R 3 mm.  Pt able to protrude tongue midline and sluggishly wiggle toes to command, does not follow other commands.  Pt's abdomen round, soft; noted had NGT removed today, ESTEFANY removed earlier today.      INTERVENTIONS:  - BG - 184  - Stat EKG  - Stat CT head WO  - Stat lactic acid, VBG, CMP, CBC, trop, BNP, Mg; will repeat trop x2  - UA/UC  - Noted pt with CARINA, on CPAP at home, CPAP ordered; requested for nursing to contact RT to place pt on CPAP while sleeping  - Will place hold on scheduled gabapentin  - Will place pt back on telemetry monitoring  - Remains on Q4H VS  - Will order continuous pulse oximetry  - Will order echocardiogram to be completed tomorrow     At the end of the RRT pt resting in bed, remains hemodynamically stable and nursing notes pt more awake, talking about his son, Liliam plans and able to follow commands.      Discussed with and defer further cares to  nursing and hospitalist    Interval History     Garry Mckay is a 88 year old male who was admitted on 12/8/2023 for abdominal pain.    Medical history significant for: HFrEF, ischemic CM s/p ICD, CAD s/p CABG and stenting, HTN, HLP, CVA, MDD ANSON, migraines, hypothyroidism, BPH, CARINA, GERD, glaucoma    Code Status: Full Code    Allergies   Allergies   Allergen Reactions    Avelox [Moxifloxacin] Itching    Cephalexin     Hydroxyzine     Oxycodone     Ultram [Tramadol]      Physical Exam   Vital Signs with Ranges:  Temp:  [97.4  F (36.3  C)-98.1  F (36.7  C)] 98  F (36.7  C)  Pulse:  [67-78] 75  Resp:  [15-48] 19  BP: (105-145)/() 119/70  SpO2:  [88 %-99 %] 94 %  I/O last 3 completed shifts:  In: 1613.36 [P.O.:120; I.V.:5; Other:5]  Out: 735 [Urine:725; Drains:10]    Constitutional: Pt lying in bed, sleeping, in no overt distress  Neck: No upper airway wheezes or stridor noted  Pulmonary: In no apparent respiratory distress  Cardiovascular: Regular, irregular rate and rhythm, normal S1S2, no murmur, rub or gallop noted  GI: Round, soft, nondistended, nontender to palpation, no guarding or rebound tenderness noted  Skin/Integumen: Warm, dry, pink  Neuro: Pt's L pupil 5, R pupil 3, both reactive, able to protrude tongue midline and sluggishly wiggle toes to command, does not follow other commands, quickly closes eyes  Psych:  Calm  Extremities: Generalized edema    Data     EKG:  Interpreted by SELMA Whipple CNP  Time reviewed: 2137  Symptoms at time of EKG: None   Rhythm: normal sinus   Rate: Normal  Axis: Normal  Ectopy: premature ventricular contractions (frequent)  Conduction: left bundle branch block (incomplete)  ST Segments/ T Waves: No acute ischemic changes and T wave inversion V3, V4, V5, and V6  Q Waves: none  Comparison to prior: Unchanged from 12/20/23    Clinical Impression: no acute changes    IMAGING: (X-ray/CT/MRI)   Recent Results (from the past 24 hour(s))   CT Head w/o Contrast     Narrative    EXAM: CT HEAD W/O CONTRAST  LOCATION: [facility]  DATE: 12/21/2023    INDICATION: Anisocoria, L>R, lethargic  COMPARISON: None.  TECHNIQUE: Routine CT Head without IV contrast. Multiplanar reformats. Dose reduction techniques were used.    FINDINGS:  INTRACRANIAL CONTENTS: No intracranial hemorrhage, extraaxial collection, or mass effect.  No CT evidence of acute infarct. Chronic bilateral caudate nucleus lacunar infarcts. Moderate presumed chronic small vessel ischemic changes. Moderate generalized   volume loss. No hydrocephalus. Dense basilar artery and carotid siphon calcifications.    VISUALIZED ORBITS/SINUSES/MASTOIDS: No intraorbital abnormality. No paranasal sinus mucosal disease. No middle ear or mastoid effusion.    BONES/SOFT TISSUES: No acute abnormality.      Impression    IMPRESSION:  1.  No CT evidence for acute intracranial process.  2.  Brain atrophy and presumed chronic microvascular ischemic changes as above.     VBG:  Recent Labs   Lab 12/21/23 2136 12/20/23  0237   PHV 7.35 7.33   PO2V 31 30   PCO2V 43 43   HCO3V 24 23     Lactic acid:  Recent Labs   Lab 12/21/23 2136   LACT 1.0     CBC with Diff:  Recent Labs   Lab Test 12/21/23 2136 12/07/23  1816 01/10/21  1020   WBC 14.5*   < > 9.3   HGB 12.6*   < > 17.7   MCV 99   < > 98      < > 153   INR  --   --  1.06    < > = values in this interval not displayed.      Comprehensive Metabolic Panel:  Recent Labs   Lab 12/22/23  0427 12/22/23  0151 12/21/23 2136 12/21/23  0850 12/21/23  0648   NA  --   --  138  --   --    POTASSIUM  --   --  4.6  --  4.1   CHLORIDE  --   --  107  --   --    CO2  --   --  20*  --   --    ANIONGAP  --   --  11  --   --    *   < > 175*   < >  --    BUN  --   --  28.9*  --   --    CR  --   --  1.05  --   --    GFRESTIMATED  --   --  69  --   --    RHODA  --   --  8.8  --   --    MAG  --   --  2.3  --  1.9   PHOS  --   --   --   --  3.0   PROTTOTAL  --   --  5.6*  --   --  "   ALBUMIN  --   --  3.0*  --   --    BILITOTAL  --   --  0.4  --   --    ALKPHOS  --   --  84  --   --    AST  --   --  15  --   --    ALT  --   --  12  --   --     < > = values in this interval not displayed.     Recent Labs   Lab 12/21/23  2136   NTBNPI 4,090*     UA:  No results for input(s): \"COLOR\", \"APPEARANCE\", \"URINEGLC\", \"URINEBILI\", \"URINEKETONE\", \"SG\", \"UBLD\", \"URINEPH\", \"PROTEIN\", \"UROBILINOGEN\", \"NITRITE\", \"LEUKEST\", \"RBCU\", \"WBCU\" in the last 168 hours.    Time Spent on this Encounter   I spent 40 minutes of critical care time on the unit/floor managing the care of Garry Mckay. Upon evaluation, this patient had a high probability of imminent or life-threatening deterioration due to AMS, which required my direct attention, intervention, and personal management. 100% of my time was spent at the bedside counseling the patient and/or coordinating care regarding services listed in this note.    SELMA Whipple [facility]  Hospitalist-House THO  Hospitalist Service  Securely message with [employer] (more info)  Text page via [facility] Paging/Directory     "

## 2023-12-22 NOTE — PROGRESS NOTES
[facility]    Medicine Progress Note - Hospitalist Service    Date of Admission:  12/8/2023    Assessment & Plan   88 year old male S/P Lap Appy for Perforated Appendicitis, and vascular assessment of questionable area on cecum through ICG administration.  He also has had issues with post op fluid collections that IR has placed a drain in and had a significant post-op ileus.  Initially underwent surgery on 12/8/2023 and required ICU management for septic shock.     Ultimately improved, and Hospitalist service assumed care on 12/11/2023.      Acute appendicitis with peritonitis and septic shock - s/p laparoscopic appendectomy 12/8/2023.  Pelvic fluid collections, question abscesses.  Postoperative ileus vs SBO, prolonged  Nutrition:    Initial presentation to OSH 12/7 as above. CT abdomen noted with acute appendicitis with 0.7 cm appendicolith, no abscess. Started on ampicillin-sulbactam. Subsequently transferred to [facility] 12/8.  On 12/8, underwent above surgery. Weaned off pressors. Antibiotics changed to pipericillin-tazobactam.   On 12/10, CT abdomen noted with multiple dilated small bowel loops with related decompression of colon with findings consistent with partial obstruction/ adynamic ileus.  On 12/12, diet advanced to clears as pt seemed to be improving with +flatus and BM. Later, developed more N/V and NG placed.  On 12/14, WBC elevated. Repeat abdominal CT showed prior appendectomy with findings suggestive of peritonitis, developing rim enhancing pelvic fluid collections, abscesses possible; increasing dilation of proximal small bowel with transition point in the right lower quadrant, suggestive of partial mechanical obstruction, developing adhesion is possible, no evidence of nadine bowel ischemia or acute perforation; distended gallbladder without additional CT evidence of acute cholecystitis.    IR placed drain into right pelvic fluid collection (2/15/23)    CT sinegram  12/19/23 - with drain in good position, min residual fluid and no fistula.     - started TPN 12/15, continue  - Continue pipericillin-tazobactam (started 12/8), continue as would expect needs at least 2 weeks antibiotics - will discontinue today as day #14    NG tube and ESTEFANY drain has been removed 12/23/23  He is continuing to show clinical improvement    2. Acute hypoxic respiratory failure, suspect multifactorial related to atelectasis, recent abdominal surgery, resolved.  CARINA on home CPA:.  * Initial presentation as above. CXR at OSH negative.  * On 12/8, CXR showed new left basilar bandlike opacity, most likely atelectasis.   * On 12/11, down to 2L O2.  * On 12/14, off O2.  - Continue CPAP while sleeping as able  - Continue to encourage incentive spirometry.    Continue to wean oxygen as able      3. FATIMAH on CKD3, suspect prerenal, meds (including sacubitril-valsartan, diuretics).  Hyperkalemia, resolved.  * Baseline creatinine 1-1.1.   * Creatinine 1.39 --> 1.8 at OSH.  * Cr up to 2.27 and K up to 5.5 on 12/8.  * Cr and K subsequently improved with IVF's.  * On IV furosemide 12/10-12/11.  - Continue to monitor BMP  - Avoid nephrotoxic medications.     Creat 1.03 this am (12/22/23)    4. Troponin elevation, suspect demand ischemia (type 2 NSTEMI).  Hypertension (benign essential).  CAD s/p CABG (1994), stenting (2002, 2003, 2005, 2021)  Chronic HFrEF (systolic and diastolic), s/p ICD.  HLD.  Afib with RVR:  [PTA: carvedilol 12.5 mg BID; sacubitril-valsartan 49-51 mg BID; furosemide 20 mg daily.]  * Last cath 1/2021 at [facility] showed patent CLAUDIA-RCA, LIMA occluded, vein-diagonal patent with placement of stent,  LAD, 40% circumflex, RCA occluded. Last stress testing 7/2023 negative for ischemia. Echo 7/2023 showed LVEF 30-35%, global hypokinesis LV, moderate-severe cLVH, grade 1 diastolic dysfunction; RV systolic function mildly reduced.   * Initial presentation as above. BNP in ED at 2613, troponin 70. EKG w/  nonspecific t-w changes. PTA sacubitril-valsartan and furosemide held on admit given FATIMAH.  Briefly on IV diuretics.    - Continue ASA 81 mg cautiously given GI issues.      Plavix remains on hold with procedures and drains/bleed concern and because NG tube needed to be replaced.    5. NSVT hx      Troponin elevation    -has ICD and h/o CAD s/p CABG 1994  Last stress testing in [place] 7/2023, which was negative for ischemia    Cardiology did consult for pre-op clearance on 12/8/23 and then again saw on 12/9/23.  Troponin more elevated on 12/10/23 felt likely to sepsis.      Last night troponins again checked and were elevated.  Serial troponins with slight improvement.    Vitals have been stable - no hypotension or significant tachycardia.    Awaiting ECHO.  If clinical changes or concerning changing on ECHO, will re-consult with cardiology     Addendum - 1330  Echo - EF 20-25% with hyokinesis of the post wall,   Cardiology consult for any further recommendations.      Last echo in care everywhere - EF 30-35% with moderate hypokinesis of the left ventricle.  The left ventricle is moderately dilated.  There is mild concentric left ventricular hypertrophy.    Severe hypokinesis with some areas of akinesid-mid to distal septum and apex.  Severe hypokinesis/akinesis inferior and posterior walls. Lateral wall and  prox septum move best     6. Acute metabolic encephalopathy (delirium)    Improving    - treat associated conditions as noted elsewhere  - delirium precautions    RRT was called last pm for  somnulence - work-up unremarkable.  This am is more awake, appropriate for me without any concern.     7. Hyperglycemia while on TPN  - check A1c  - High dose sliding scale change to qa and qhs  - Lantus 15 units at bedtime  Blood sugars still 180-200's   Will continue to monitor as anticipate possible trial off TPN soon    Will increase lantus dose to 18 units the evening if TPN to continue     8. Anemia, suspect partial  blood loss component with surgery issues:  - Continue to monitor hgb periodically  - Consider prbc transfusion if hgb </= 7.0 or if significant bleeding with hemodynamic instability or if symptomatic.     9. Thrombocytopenia, unclear etiology, possibly due to sepsis, medication effect or consumptive or other cause.  * Plts normal on initially evaluation.  * Plts subsequently down to 104K on 12/9  * Plts normalized 12/14  - Continue to monitor CBC occasionally     10. Hypokalemia:  Supplemented and normalized     11. Hx CVA.  - Continue to hold clopidogrel for now (as above)  ASA is continuing.    - Continue to hold atorvastatin until can take po intake routinely.     12. Depression/anxiety.  - Continue to hold gabapentin for now.  - Continue escitalopram       13. Hypothyroidism.  - Continue levothyroxine.     14. GERD.  - Continue pantoprazole IV.     15. BPH.  - Continue to hold finasteride for now.  Resume when can take some more oral intake.     16. Migraines.  * Gets botox.   - Continue to monitor clinically.     17. Gallbladder distention  *noted on CT abd 12/14  *abd usg 12/15 w/o evidence of cholecystitis  - monitor     Goals of care  Patient's daughter identifies living independently and being able to drive as extremely important for patient's quality of life.  12/18/2023: Discussed quality of life and returning to baseline will be delayed for every day patient remains hospitalized.  Colleague discussed CODE STATUS and that generally if an 88-year-old requires cardiac resuscitation or intubation they would likely never return to independent living and driving.  When this is coupled with his current physical status, it is likely that his return to independent living is even worse if he were to require resuscitation.  Discussed that patient's and their families sometimes set a date for transition to comfort care if things continue to not improve, especially in situations where patients may linger for some  "weeks without clear improvement.    12/21/23 - d/w palliative care today.       Medical Decision Making       45 MINUTES SPENT BY ME on the date of service doing chart review, history, exam, documentation & further activities per the note.        PPE Worn:  Mask, gloves     Diet: Snacks/Supplements Adult: Gelatein Plus; With Meals  parenteral nutrition - Clinimix E  Full Liquid Diet  parenteral nutrition - Clinimix E    DVT Prophylaxis: Enoxaparin (Lovenox) SQ  Hameed Catheter: Not present  Lines: PRESENT      PICC 12/15/23 Double Lumen Left Brachial vein medial TPN-Site Assessment: WDL      Cardiac Monitoring: ACTIVE order. Indication: Bradycardias (48 hours)  Code Status: Full Code      Clinically Significant Risk Factors              # Hypoalbuminemia: Lowest albumin = 2.8 g/dL at 12/11/2023  5:26 AM, will monitor as appropriate     # Hypertension: Noted on problem list  # Acute heart failure with reduced ejection fraction: last echo with EF <40% and receiving IV diuretics      # DMII: A1C = 6.8 % (Ref range: <5.7 %) within past 6 months   # Overweight: Estimated body mass index is 26.14 kg/m  as calculated from the following:    Height as of this encounter: 1.702 m (5' 7\").    Weight as of this encounter: 75.7 kg (166 lb 14.2 oz).             Disposition Plan      Expected Discharge Date: 12/27/2023,  3:00 PM      Discharge Comments: OR 12/8 12/16 CT guided drainage, JPs leaking.  Confused and agitated at times.          Caroline Castro DO  Hospitalist Service  [facility]  Securely message with Katiana (more info)  Text page via [facility] Paging/Directory   ______________________________________________________________________    Interval History     Seen alone in the room.  Awoke.  Denies current chest pain or pressure.  Not feeling SOB.  Was able to rest better last night again.  No HA, N/V or increase in abd pain.      Physical Exam   Vital Signs: Temp: 98.3  F (36.8  C) Temp " src: Oral BP: (!) 145/79 Pulse: 68   Resp: 18 SpO2: 96 % O2 Device: None (Room air) Oxygen Delivery: 3 LPM  Weight: 166 lbs 14.21 oz    GEN:  awake, alert, pleasant  Asking appropriate questions and not agitated   No acute respiratory distress  HEENT:  Normocephalic/atraumatic, no scleral icterus, no nasal discharge  CV:  somewhat distant more regular rate and rhythm, S1 + S2 noted.  LUNGS:  Clear to auscultation ant/lat bilaterally without rales/rhonchi/wheezing/retractions. Symmetric chest rise on inhalation noted.  ABD:  more active bowel sounds this am, soft, still mildy distended throughout.  Expected tenderness around the incision sites. No clear rebound/guarding/rigidity to light palpation.  EXT:  No significant pitting pretibial edema bilaterally.  No cyanosis.    PSYCH:  awake, cooperative, pleasant and not agitated. .     Medications    dextrose      parenteral nutrition - Clinimix E      parenteral nutrition - Clinimix E 80 mL/hr at 12/21/23 2232      aspirin  81 mg Oral Daily    Or    aspirin  81 mg Per NG tube Daily    carvedilol  12.5 mg Oral BID    [Held by provider] clopidogrel  75 mg Oral Daily    enoxaparin ANTICOAGULANT  40 mg Subcutaneous Q24H    escitalopram  20 mg Oral or NG Tube Daily    [Held by provider] finasteride  5 mg Oral At Bedtime    [Held by provider] gabapentin  100 mg Oral Q24H    [Held by provider] gabapentin  200 mg Oral QAM    [Held by provider] gabapentin  300 mg Oral At Bedtime    insulin aspart  1-7 Units Subcutaneous TID AC    insulin aspart  1-5 Units Subcutaneous At Bedtime    insulin glargine  18 Units Subcutaneous At Bedtime    latanoprost  1 drop Both Eyes At Bedtime    levothyroxine  100 mcg Oral QAM AC    pantoprazole  40 mg Intravenous Q24H    [Held by provider] senna-docusate  1 tablet Oral BID    sodium chloride (PF)  10-40 mL Intracatheter Q7 Days    sodium chloride (PF)  3 mL Intracatheter Q8H       Data     Labs and Imaging results below reviewed today.  Recent  Labs   Lab 12/22/23  0600 12/21/23 2136 12/20/23  0542   WBC 11.1* 14.5* 13.3*   HGB 11.4* 12.6* 12.3*   HCT 35.2* 38.4* 37.2*    99 100    189 186     Recent Labs   Lab 12/22/23  1302 12/22/23  0923 12/22/23  0600 12/22/23  0151 12/21/23 2136 12/21/23  0850 12/21/23  0648 12/20/23  0843 12/20/23  0542   NA  --   --  139  --  138  --   --   --  137   POTASSIUM  --   --  4.4  --  4.6  --  4.1  --  4.1   CHLORIDE  --   --  108*  --  107  --   --   --  107   CO2  --   --  22  --  20*  --   --   --  23   ANIONGAP  --   --  9  --  11  --   --   --  7   * 183* 200*   < > 175*   < >  --    < > 250*   BUN  --   --  31.9*  --  28.9*  --   --   --  31.4*   CR  --   --  1.03  --  1.05  --   --   --  1.08   GFRESTIMATED  --   --  71  --  69  --   --   --  67   RHODA  --   --  8.8  --  8.8  --   --   --  8.7*    < > = values in this interval not displayed.     Recent Labs   Lab 12/22/23  1302 12/22/23  0923 12/22/23  0600 12/22/23 0151 12/21/23 2136 12/21/23  0850 12/21/23  0648 12/20/23  0843 12/20/23  0542 12/18/23  0428 12/18/23  0416   NA  --   --  139  --  138  --   --   --  137  --  140   POTASSIUM  --   --  4.4  --  4.6  --  4.1  --  4.1   < > 4.0   CHLORIDE  --   --  108*  --  107  --   --   --  107  --  108*   CO2  --   --  22  --  20*  --   --   --  23  --  22   ANIONGAP  --   --  9  --  11  --   --   --  7  --  10   * 183* 200*   < > 175*   < >  --    < > 250*   < > 200*   BUN  --   --  31.9*  --  28.9*  --   --   --  31.4*  --  26.6*   CR  --   --  1.03  --  1.05  --   --   --  1.08  --  1.15   GFRESTIMATED  --   --  71  --  69  --   --   --  67  --  62   RHODA  --   --  8.8  --  8.8  --   --   --  8.7*  --  8.9   MAG  --   --  2.2  --  2.3  --  1.9  --  2.1   < > 2.0   PHOS  --   --  2.9  --   --   --  3.0  --  3.1   < > 2.3*   PROTTOTAL  --   --   --   --  5.6*  --   --   --   --   --  5.5*   ALBUMIN  --   --   --   --  3.0*  --   --   --   --   --  3.0*   BILITOTAL  --   --   --   --   "0.4  --   --   --   --   --  0.6   ALKPHOS  --   --   --   --  84  --   --   --   --   --  74   AST  --   --   --   --  15  --   --   --   --   --  33   ALT  --   --   --   --  12  --   --   --   --   --  12    < > = values in this interval not displayed.     No results for input(s): \"INR\" in the last 168 hours.    Recent Results (from the past 24 hour(s))   CT Head w/o Contrast    Narrative    EXAM: CT HEAD W/O CONTRAST  LOCATION: [facility]  DATE: 2023    INDICATION: Anisocoria, L>R, lethargic  COMPARISON: None.  TECHNIQUE: Routine CT Head without IV contrast. Multiplanar reformats. Dose reduction techniques were used.    FINDINGS:  INTRACRANIAL CONTENTS: No intracranial hemorrhage, extraaxial collection, or mass effect.  No CT evidence of acute infarct. Chronic bilateral caudate nucleus lacunar infarcts. Moderate presumed chronic small vessel ischemic changes. Moderate generalized   volume loss. No hydrocephalus. Dense basilar artery and carotid siphon calcifications.    VISUALIZED ORBITS/SINUSES/MASTOIDS: No intraorbital abnormality. No paranasal sinus mucosal disease. No middle ear or mastoid effusion.    BONES/SOFT TISSUES: No acute abnormality.      Impression    IMPRESSION:  1.  No CT evidence for acute intracranial process.  2.  Brain atrophy and presumed chronic microvascular ischemic changes as above.   Echocardiogram Complete   Result Value    LVEF  20-25%    Narrative    [id number]  [id number]  [id number]  [id number]^ORTIZ^KAYLA^MARIAN     [facility]  Echocardiography Laboratory  [address] [place] [address]     Name: JANELL FREDERICK  MRN: [id number]  : 1935  Study Date: 2023 10:44 AM  Age: 88 yrs  Gender: Male  Patient Location: [facility]  Reason For Study: Other, Please Specify in Comments  Ordering Physician: KAYLA ALCANTAR  Referring Physician: Clinic, [facility]  Performed By: Maylin Flores     BSA: 1.9 m2  Height: 67 in  Weight: " 178 lb  HR: 50  BP: 119/70 mmHg  ______________________________________________________________________________  Procedure  Complete Portable Echo Adult. Optison (NDC #[id number]) given intravenously.  ______________________________________________________________________________  Interpretation Summary     Technically difficult study, no subcostal views available  The left ventricle is moderately dilated.  There is mild concentric left ventricular hypertrophy.  The visual ejection fraction is 20-25%.  Severe hypokinesis with some areas of akinesid-mid to distal septum and apex.  Severe hypokinesis/akinesis inferior and posterior walls. Lateral wall and  prox septum move best  Asc aorta dilated 40mm  ______________________________________________________________________________  Left Ventricle  The left ventricle is moderately dilated. There is mild concentric left  ventricular hypertrophy. The visual ejection fraction is 20-25%. Diastolic  function not assessed due to arrhythmia. There are regional wall motion  abnormalities as specified. Severe hypokinesis with some areas of akinesid-mid  to distal septum and apex. Severe hypokinesis/akinesis inferior and posterior  walls. Lateral wall and prox septum move best. There is no thrombus seen in  the left ventricle.     Right Ventricle  There is a catheter/pacemaker lead seen in the right ventricle. The right  ventricle is not well visualized. The right ventricle is normal in size and  function.     Atria  The left atrium is moderately dilated. Right atrial size is normal.     Mitral Valve  There is mild (1+) mitral regurgitation.     Tricuspid Valve  Normal tricuspid valve. Right ventricular systolic pressure could not be  approximated due to inadequate tricuspid regurgitation.     Aortic Valve  The aortic valve is trileaflet with aortic valve sclerosis. There is trace  aortic regurgitation. No hemodynamically significant valvular aortic stenosis.     Pulmonic  Valve  The pulmonic valve is not well seen, but is grossly normal.     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 IVC diameter  >2.1 cm collapsing <50% with sniff suggests a high RA pressure estimated at 15  mmHg or greater.     Pericardium  The pericardium appears normal.     Rhythm  The rhythm was paced.  ______________________________________________________________________________  MMode/2D Measurements & Calculations  IVSd: 1.4 cm     LVIDd: 6.3 cm  LVIDs: 5.5 cm  LVPWd: 0.96 cm  FS: 13.1 %  LV mass(C)d: 332.5 grams  LV mass(C)dI: 172.8 grams/m2  Ao root diam: 3.6 cm  LA dimension: 5.0 cm  asc Aorta Diam: 4.0 cm  LA/Ao: 1.4  LVOT diam: 2.4 cm  LVOT area: 4.4 cm2  Ao root diam index Ht(cm/m): 2.1  Ao root diam index BSA (cm/m2): 1.9  Asc Ao diam index BSA (cm/m2): 2.1  Asc Ao diam index Ht(cm/m): 2.3  LA Volume (BP): 103.0 ml     LA Volume Index (BP): 53.6 ml/m2  RWT: 0.30     Doppler Measurements & Calculations  MV E max zachery: 55.8 cm/sec  MV A max zachery: 67.7 cm/sec  MV E/A: 0.82  MV dec slope: 233.2 cm/sec2  MV dec time: 0.24 sec  LV V1 max P.4 mmHg  LV V1 max: 58.7 cm/sec  LV V1 VTI: 12.8 cm  SV(LVOT): 55.8 ml  SI(LVOT): 29.0 ml/m2  PA acc time: 0.10 sec  E/E' avg: 10.4  Lateral E/e': 10.0  Medial E/e': 10.9  RV S Zachery: 11.4 cm/sec     ______________________________________________________________________________  Report approved by: Farshad Gramajo 2023 12:42 PM

## 2023-12-22 NOTE — CONSULTS
[facility]    Cardiology Consultation     Date of Admission:  12/8/2023    Assessment & Plan   Garry Mckay is a 88 year old male who was admitted on 12/8/2023.    Patient was last seen by I in 2021. Subsequently followed with cardiology associates in [facility].     Patient with a past medical history of chronic HFrEF, ischemic cardiomyopathy, s/p ICD placement coronary artery disease s/p MAX in 2002, 2003, 2005, and 2021, hypertension, hyperlipidemia, history of CVA, obstructive sleep apnea, hypothyroidism.    1.  Elevated troponin       Coronary artery disease s/p coronary bypass grafting (1994), stenting (2002, 2003, 2005, and 2021)   -1/2021 coronary angiogram at [facility] showed patent CLAUDIA-RCA, LIMA occluded, SVG-1st OM occluded, SVG-1st diagonal 90% stenosis w/PCI and MAX x 1  -No current anginal symptoms   -Repeat echocardiogram done today following RRT last evening: akinesia in the mid to distal septum and apex, severe hypokinesis/akinesis in the inferior and posterior walls and mild reduction in ejection fraction    2. Chronic HFrEF (systolic and diastolic)      S/p ICD placement  - LVEF: 25% (previously 30-35% 7/2023)  - NYHA class II-III  - Etiology: ischemic  - Fluid status: euvolemic; suspected dry weight: ~174#  - Diuretic regimen: PTA furosemide 20 mg daily (was given 12/10 and 12/11 IV)  - Ischemic evaluation: 7/2023 Lexiscan nuclear stress test: Negative for ischemia  - Guideline directed medical therapy:  - Beta blocker: Carvedilol 12.5 mg twice per day  - ACEI/ARB/ARNI: PTA Entresto 49-51 twice per day  - Aldactone antagonist: None  - SGLT2 inhibitor: None    3.  S/P laparoscopic appendectomy for a perforated appendicitis, POD#13       Peritonitis and septic shock       Post-operative ileus, prolonged  -Surgery following and managing  -WBC count trending down, nearly normal today at 11.1  -Remains on TPN  -No longer on antibiotics     4. Paroxysmal atrial  fibrillation  -Seen on tele 12/16/23 and converted to sinus rhythm spontaneously    5. FATIMAH, resolved  -Postoperative creatinine peaked at 2.27, now downtrending and 1.03 today  -Entresto currently on hold     6.  Obstructive sleep apnea, on CPAP at home    Plan:  Lexiscan nuclear stress test for further ischemic evaluation on 12/26/2023  Resume low-dose Entresto 24-26 twice daily with resolution of FATIMAH  Continue aspirin 81 mg daily  Monitor telemetry for recurrence of atrial fibrillation  Resume atorvastatin 80 mg daily and Zetia 10 mg daily when patient able to take p.o.  Resume Plavix when stabilized and felt safe to do so from surgical standpoint  BMP tomorrow   Recommend outpatient follow-up with [facility] cardiology team at discharge     Moderate complexity     Mary Colby NP    Primary Care Physician   [facility]    Reason for Consult   Reason for consult: I was asked by the hospitalist team to evaluate this patient for cardiomyopathy and elevated troponin.    History of Present Illness   Garry Mckay is a 88 year old male who presents with abdominal pain on 12/8/2023 to [facility] with subsequent transferred to [facility] given his complex cardiac history.  Patient is originally from [facility], but is here because his daughter resides in [facility].  Cardiology was consulted and evaluated by Dr. Ladd on 12/8/2023.  Given his recent nuclear stress test in July 2023 which was negative for ischemia and need for immediate surgery, she recommended they proceed with the appendectomy.  He has had a lengthy hospital course secondary to his severe sepsis, postoperative ileus, and postoperative pelvic fluid collections.    Patient's last coronary angiogram done in the setting of an NSTEMI at [facility] revealed two-vessel coronary artery disease (100% mid LAD , 100% proximal RCA ), chronic total occlusion of the proximal  anastomosis of the LIMA to the LAD, chronic total occlusion of the proximal anastomosis of the SVG to the first obtuse marginal, CLAUDIA graft to the RCA was patent, 90% stenosis in the distal anastomosis of the SVG to the first diagonal.  He underwent a successful angioplasty and stenting of the proximal to, mid, and distal anastomotic lesion of the SVG to the first diagonal.    Patient had a cardiovascular workup during a hospitalization for NSTEMI in July 2023 in [place] at [facility].Echocardiogram at that time showed ejection fraction 30 to 35%, moderate to severe LVH, mildly reduced RV systolic function with gross normal size.  Lexiscan nuclear stress test showing left ventricular dilation at rest and stress, severe fixed perfusion defect throughout the entire inferior and basal half of the inferolateral wall with associated segmental hypokinesis.    Rapid response was called last night due to concerns for acute encephalopathy, lethargy and bradycardia with heart rate into the 40s.  As head CT was done that showed no evidence for acute intracranial process.  EKG showed normal sinus rhythm with frequent PVCs and incomplete left bundle branch block.  By the end of the rapid response patient was back to his baseline mentation.    Echocardiogram done today showed mild concentric left ventricular hypertrophy, moderate left ventricular dilation with a visualized ejection fraction 20 to 25%, severe hypokinesis with some areas of akinesia in the mid to distal septum and apex, severe hypokinesis/akinesis in the inferior and posterior walls.  Troponins in the last 24 hours following rapid response, trended [phone].  White blood cell count this morning downtrending to 11.1, previously 14.5.  Hemoglobin 11.4, slightly lower than yesterday 12.6.    Past Medical History   No past medical history on file.      Past Surgical History   Past Surgical History:   Procedure Laterality Date    LAPAROSCOPIC APPENDECTOMY N/A  12/8/2023    Procedure: APPENDECTOMY, LAPAROSCOPIC, ICG INSPECTION OF BOWEL;  Surgeon: Jeannette Garcia MD;  Location:  OR         Prior to Admission Medications   Prior to Admission Medications   Prescriptions Last Dose Informant Patient Reported? Taking?   B Complex-C (SUPER B COMPLEX PO) 12/7/2023 at am Care Giver, Self Yes Yes   Sig: Take 1 tablet by mouth daily   Botulinum Toxin Type A (BOTOX) 200 units injection More than a month Care Giver, Self Yes Yes   Sig: every 3 months   ENTRESTO 49-51 MG per tablet 12/7/2023 at 1900 Care Giver, Self Yes Yes   Sig: Take 1 tablet by mouth 2 times daily   Vitamin D3 (CHOLECALCIFEROL) 125 MCG (5000 UT) tablet 12/7/2023 at am Care Giver, Self Yes Yes   Sig: Take 1 tablet by mouth daily   aspirin (ASA) 81 MG EC tablet 12/7/2023 at am Care Giver, Self Yes Yes   Sig: Take 1 tablet by mouth daily   atorvastatin (LIPITOR) 80 MG tablet 12/7/2023 at 1900 Care Giver, Self Yes Yes   Sig: Take 80 mg by mouth At Bedtime   carvedilol (COREG) 12.5 MG tablet 12/6/2023 at 1900 Care Giver, Self Yes Yes   Sig: Take 1 tablet by mouth 2 times daily   clopidogrel (PLAVIX) 75 MG tablet 12/7/2023 at am Care Giver, Self Yes Yes   Sig: Take 75 mg by mouth daily   escitalopram (LEXAPRO) 20 MG tablet 12/7/2023 at am Care Giver, Self Yes Yes   Sig: Take 20 mg by mouth daily   ezetimibe (ZETIA) 10 MG tablet 12/7/2023 at 1900 Care Giver, Self Yes Yes   Sig: Take 10 mg by mouth daily   finasteride (PROSCAR) 5 MG tablet 12/7/2023 at 1900 Care Giver, Self Yes Yes   Sig: Take 1 tablet by mouth At Bedtime    furosemide (LASIX) 20 MG tablet 12/7/2023 at am Care Giver, Self Yes Yes   Sig: Take 20 mg by mouth daily   gabapentin (NEURONTIN) 100 MG capsule 12/7/2023 at am Care Giver, Self Yes Yes   Sig: Take 200 mg by mouth every morning   gabapentin (NEURONTIN) 100 MG capsule 12/7/2023 at 1200  Yes Yes   Sig: Take 100 mg by mouth every 24 hours At noon   gabapentin (NEURONTIN) 300 MG capsule 12/6/2023 at hs  Care Giver, Self Yes Yes   Sig: Take 300 mg by mouth at bedtime   latanoprost (XALATAN) 0.005 % ophthalmic solution 12/6/2023 at hs Care Giver, Self Yes Yes   Sig: Place 1 drop into both eyes at bedtime   levothyroxine (SYNTHROID/LEVOTHROID) 100 MCG tablet 12/7/2023 at am Care Giver, Self Yes Yes   Sig: Take 100 mcg by mouth daily   magnesium oxide (MAG-OX) 400 (241.3 Mg) MG tablet 12/7/2023 at 1900 Care Giver, Self Yes Yes   Sig: Take 1 tablet by mouth At Bedtime    melatonin 3 MG tablet 12/6/2023 at hs Self, Care Giver Yes Yes   Sig: Take 3 mg by mouth nightly as needed for sleep   multivitamin (CENTRUM SILVER) tablet 12/7/2023 at am Care Giver, Self Yes Yes   Sig: Take 1 tablet by mouth daily   nitroGLYcerin (NITROSTAT) 0.4 MG sublingual tablet  at prn Care Giver, Self Yes Yes   Sig: Place 1 tablet under the tongue every 5 minutes as needed for chest pain      Facility-Administered Medications: None     Current Facility-Administered Medications   Medication Dose Route Frequency    aspirin  81 mg Oral Daily    Or    aspirin  81 mg Per NG tube Daily    carvedilol  12.5 mg Oral BID    [Held by provider] clopidogrel  75 mg Oral Daily    enoxaparin ANTICOAGULANT  40 mg Subcutaneous Q24H    escitalopram  20 mg Oral or NG Tube Daily    [Held by provider] finasteride  5 mg Oral At Bedtime    [Held by provider] gabapentin  100 mg Oral Q24H    [Held by provider] gabapentin  200 mg Oral QAM    [Held by provider] gabapentin  300 mg Oral At Bedtime    insulin aspart  1-7 Units Subcutaneous TID AC    insulin aspart  1-5 Units Subcutaneous At Bedtime    insulin glargine  18 Units Subcutaneous At Bedtime    latanoprost  1 drop Both Eyes At Bedtime    levothyroxine  100 mcg Oral QAM AC    pantoprazole  40 mg Intravenous Q24H    [Held by provider] senna-docusate  1 tablet Oral BID    sodium chloride (PF)  10-40 mL Intracatheter Q7 Days    sodium chloride (PF)  3 mL Intracatheter Q8H     Current Facility-Administered Medications  "  Medication Last Rate    dextrose      parenteral nutrition - Clinimix E      parenteral nutrition - Clinimix E 80 mL/hr at 12/21/23 2232     Allergies   Allergies   Allergen Reactions    Avelox [Moxifloxacin] Itching    Cephalexin     Hydroxyzine     Oxycodone     Ultram [Tramadol]        Social History        Family History        Review of Systems   A comprehensive review of system was performed and is negative other than that noted in the HPI or here.     Physical Exam   Vital Signs with Ranges  Temp:  [97.8  F (36.6  C)-98.7  F (37.1  C)] 98.3  F (36.8  C)  Pulse:  [68-80] 68  Resp:  [15-24] 18  BP: (105-151)/(70-93) 145/79  SpO2:  [88 %-99 %] 96 %  Wt Readings from Last 4 Encounters:   12/22/23 75.7 kg (166 lb 14.2 oz)   12/07/23 81.2 kg (179 lb)   01/10/21 79.8 kg (176 lb)     I/O last 3 completed shifts:  In: 1110 [P.O.:150]  Out: 700 [Urine:700]      Vitals: BP (!) 145/79 (BP Location: Right arm)   Pulse 68   Temp 98.3  F (36.8  C) (Oral)   Resp 18   Ht 1.702 m (5' 7\")   Wt 75.7 kg (166 lb 14.2 oz)   SpO2 96%   BMI 26.14 kg/m      Physical Exam:   General - Alert and oriented to time place and person in no acute distress  Eyes - No scleral icterus  HEENT - Neck supple, moist mucous membranes  Cardiovascular - Regular rate and rhythm, occasional ectopic beats, no JVP  Extremities - There is trace bilateral ankle edema  Respiratory - Breathing non-labored, lungs clear to auscultation   Skin - No pallor or cyanosis  Gastrointestinal - mild abdominal tenderness  Psych - Appropriate affect   Neurological - No gross motor neurological focal deficits    No lab results found in last 7 days.    Invalid input(s): \"TROPONINIES\"    Recent Labs   Lab 12/22/23  1302 12/22/23  0923 12/22/23  0600 12/22/23  0151 12/21/23  2136 12/21/23  0850 12/21/23  0648 12/20/23  0843 12/20/23  0542 12/18/23  0428 12/18/23  0416   WBC  --   --  11.1*  --  14.5*  --   --   --  13.3*   < >  --    HGB  --   --  11.4*  --  12.6*  -- " "  --   --  12.3*   < >  --    MCV  --   --  100  --  99  --   --   --  100   < >  --    PLT  --   --  188  --  189  --   --   --  186   < >  --    NA  --   --  139  --  138  --   --   --  137  --  140   POTASSIUM  --   --  4.4  --  4.6  --  4.1  --  4.1   < > 4.0   CHLORIDE  --   --  108*  --  107  --   --   --  107  --  108*   CO2  --   --  22  --  20*  --   --   --  23  --  22   BUN  --   --  31.9*  --  28.9*  --   --   --  31.4*  --  26.6*   CR  --   --  1.03  --  1.05  --   --   --  1.08  --  1.15   GFRESTIMATED  --   --  71  --  69  --   --   --  67  --  62   ANIONGAP  --   --  9  --  11  --   --   --  7  --  10   RHODA  --   --  8.8  --  8.8  --   --   --  8.7*  --  8.9   * 183* 200*   < > 175*   < >  --    < > 250*   < > 200*   ALBUMIN  --   --   --   --  3.0*  --   --   --   --   --  3.0*   PROTTOTAL  --   --   --   --  5.6*  --   --   --   --   --  5.5*   BILITOTAL  --   --   --   --  0.4  --   --   --   --   --  0.6   ALKPHOS  --   --   --   --  84  --   --   --   --   --  74   ALT  --   --   --   --  12  --   --   --   --   --  12   AST  --   --   --   --  15  --   --   --   --   --  33    < > = values in this interval not displayed.     Recent Labs   Lab Test 12/17/23  1831 12/15/23  0600   TRIG 296* 217*     Recent Labs   Lab 12/22/23  0600 12/21/23  2136 12/20/23  0542   WBC 11.1* 14.5* 13.3*   HGB 11.4* 12.6* 12.3*   HCT 35.2* 38.4* 37.2*    99 100    189 186     Recent Labs   Lab 12/21/23 2136 12/20/23  0237   PHV 7.35 7.33   PO2V 31 30   PCO2V 43 43   HCO3V 24 23     Recent Labs   Lab 12/21/23 2136   NTBNPI 4,090*     No results for input(s): \"DD\" in the last 168 hours.  No results for input(s): \"SED\", \"CRP\" in the last 168 hours.  Recent Labs   Lab 12/22/23  0600 12/21/23 2136 12/20/23  0542    189 186     No results for input(s): \"TSH\" in the last 168 hours.  Recent Labs   Lab 12/22/23  0608   COLOR Yellow   APPEARANCE Clear   URINEGLC 30*   URINEBILI Negative "   URINEKETONE Negative   SG 1.020   UBLD Negative   URINEPH 5.5   PROTEIN Negative   NITRITE Negative   LEUKEST Negative   RBCU <1   WBCU <1       Imaging:  Recent Results (from the past 48 hour(s))   CT Head w/o Contrast    Narrative    EXAM: CT HEAD W/O CONTRAST  LOCATION: [facility]  DATE: 2023    INDICATION: Anisocoria, L>R, lethargic  COMPARISON: None.  TECHNIQUE: Routine CT Head without IV contrast. Multiplanar reformats. Dose reduction techniques were used.    FINDINGS:  INTRACRANIAL CONTENTS: No intracranial hemorrhage, extraaxial collection, or mass effect.  No CT evidence of acute infarct. Chronic bilateral caudate nucleus lacunar infarcts. Moderate presumed chronic small vessel ischemic changes. Moderate generalized   volume loss. No hydrocephalus. Dense basilar artery and carotid siphon calcifications.    VISUALIZED ORBITS/SINUSES/MASTOIDS: No intraorbital abnormality. No paranasal sinus mucosal disease. No middle ear or mastoid effusion.    BONES/SOFT TISSUES: No acute abnormality.      Impression    IMPRESSION:  1.  No CT evidence for acute intracranial process.  2.  Brain atrophy and presumed chronic microvascular ischemic changes as above.   Echocardiogram Complete   Result Value    LVEF  20-25%    Narrative    [id number]  [id number]  [id number]  [id number]^ORTIZ^KAYLA^MARIAN     [facility]  Echocardiography Laboratory  [address] [place] [address]     Name: JANELL FREDERICK  MRN: [id number]  : 1935  Study Date: 2023 10:44 AM  Age: 88 yrs  Gender: Male  Patient Location: [facility]  Reason For Study: Other, Please Specify in Comments  Ordering Physician: KAYLA ALCANTAR  Referring Physician: [facility], [facility]  Performed By: Maylin Flores     BSA: 1.9 m2  Height: 67 in  Weight: 178 lb  HR: 50  BP: 119/70 mmHg  ______________________________________________________________________________  Procedure  Complete Portable Echo Adult.  Optison (NDC #[id number]) given intravenously.  ______________________________________________________________________________  Interpretation Summary     Technically difficult study, no subcostal views available  The left ventricle is moderately dilated.  There is mild concentric left ventricular hypertrophy.  The visual ejection fraction is 20-25%.  Severe hypokinesis with some areas of akinesid-mid to distal septum and apex.  Severe hypokinesis/akinesis inferior and posterior walls. Lateral wall and  prox septum move best  Asc aorta dilated 40mm  ______________________________________________________________________________  Left Ventricle  The left ventricle is moderately dilated. There is mild concentric left  ventricular hypertrophy. The visual ejection fraction is 20-25%. Diastolic  function not assessed due to arrhythmia. There are regional wall motion  abnormalities as specified. Severe hypokinesis with some areas of akinesid-mid  to distal septum and apex. Severe hypokinesis/akinesis inferior and posterior  walls. Lateral wall and prox septum move best. There is no thrombus seen in  the left ventricle.     Right Ventricle  There is a catheter/pacemaker lead seen in the right ventricle. The right  ventricle is not well visualized. The right ventricle is normal in size and  function.     Atria  The left atrium is moderately dilated. Right atrial size is normal.     Mitral Valve  There is mild (1+) mitral regurgitation.     Tricuspid Valve  Normal tricuspid valve. Right ventricular systolic pressure could not be  approximated due to inadequate tricuspid regurgitation.     Aortic Valve  The aortic valve is trileaflet with aortic valve sclerosis. There is trace  aortic regurgitation. No hemodynamically significant valvular aortic stenosis.     Pulmonic Valve  The pulmonic valve is not well seen, but is grossly normal.     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 IVC diameter  >2.1  "cm collapsing <50% with sniff suggests a high RA pressure estimated at 15  mmHg or greater.     Pericardium  The pericardium appears normal.     Rhythm  The rhythm was paced.  ______________________________________________________________________________  MMode/2D Measurements & Calculations  IVSd: 1.4 cm     LVIDd: 6.3 cm  LVIDs: 5.5 cm  LVPWd: 0.96 cm  FS: 13.1 %  LV mass(C)d: 332.5 grams  LV mass(C)dI: 172.8 grams/m2  Ao root diam: 3.6 cm  LA dimension: 5.0 cm  asc Aorta Diam: 4.0 cm  LA/Ao: 1.4  LVOT diam: 2.4 cm  LVOT area: 4.4 cm2  Ao root diam index Ht(cm/m): 2.1  Ao root diam index BSA (cm/m2): 1.9  Asc Ao diam index BSA (cm/m2): 2.1  Asc Ao diam index Ht(cm/m): 2.3  LA Volume (BP): 103.0 ml     LA Volume Index (BP): 53.6 ml/m2  RWT: 0.30     Doppler Measurements & Calculations  MV E max zachery: 55.8 cm/sec  MV A max zachery: 67.7 cm/sec  MV E/A: 0.82  MV dec slope: 233.2 cm/sec2  MV dec time: 0.24 sec  LV V1 max P.4 mmHg  LV V1 max: 58.7 cm/sec  LV V1 VTI: 12.8 cm  SV(LVOT): 55.8 ml  SI(LVOT): 29.0 ml/m2  PA acc time: 0.10 sec  E/E' avg: 10.4  Lateral E/e': 10.0  Medial E/e': 10.9  RV S Zachery: 11.4 cm/sec     ______________________________________________________________________________  Report approved by: Farshad Gramajo 2023 12:42 PM             Echo:  No results found for this or any previous visit (from the past 4320 hour(s)).    Clinically Significant Risk Factors              # Hypoalbuminemia: Lowest albumin = 2.8 g/dL at 2023  5:26 AM, will monitor as appropriate     # Hypertension: Noted on problem list  # Acute heart failure with reduced ejection fraction: last echo with EF <40% and receiving IV diuretics      # DMII: A1C = 6.8 % (Ref range: <5.7 %) within past 6 months   # Overweight: Estimated body mass index is 26.14 kg/m  as calculated from the following:    Height as of this encounter: 1.702 m (5' 7\").    Weight as of this encounter: 75.7 kg (166 lb 14.2 oz).            Cardiac " Arrhythmia: Atrial fibrillation: Paroxysmal    Encephalopathy: Other encephalopathy    Chronic Fatigue and Other Debilities: Other reduced mobility

## 2023-12-22 NOTE — PROGRESS NOTES
[facility]    General Surgery  Daily Progress Note       Assessment and Plan:   Garry Mckay is a 88 year old male   S/P Lap Appy for Perforated Appendicitis, and vascular assessment of questionable area on cecum through ICG administration  POD #13    Diagnosis: Perforated Appendicitis, Post Op Ileus  Post-operative pelvic fluid collections s/p IR drain 12/15  CT sinogram 12/19 ruled out fistula and improved fluid collection, and noted contrast in colon from gastrografin challenge.    PLAN:  - Advance to Full liquids.  - Continue Protonix for GI prophylaxis. Continue TPN per dietitian; likely wean this off next 1-2 days if tolerating full liquids.  - Leukocytosis steadily improved over last few days and nearly normal today.  Discontinued Zosyn 12/21/23.  Monitor for return of fevers/WBC.  - Ambulate/activity. QID to assist with bowel function,   - PCDs for DVT prophylaxis. Continue lovenox for DVT chemoprophylaxis. Plavix held since NG replaced.  Consider restarting tomorrow now that NG out?  - Encourage deep breathe and IS.   - Medical management per primary team.        Interval History:   Garry Mckay is seen on surgical rounds today. He is much more alert today.  No pain.  Passing flatus and having BM's.  CC today is coughing and difficulty clearing his throat.    Noted there was a RRT called last night.  Please see notes.         Physical Exam:   Temp: 97.8  F (36.6  C) Temp src: Axillary BP: 133/86 Pulse: 69   Resp: 18 SpO2: 96 % O2 Device: None (Room air) Oxygen Delivery: 3 LPM    I/O last 3 completed shifts:  In: 1110 [P.O.:150]  Out: 700 [Urine:700]    Resp: breathing comfortably.  Non labored.  Abd: soft, obese, mildly distended, NT today  Inc: CDI.  Drains out and dressed      Data   Recent Labs   Lab 12/22/23  0600 12/22/23  0427 12/22/23  0151 12/21/23  2136 12/21/23  0850 12/21/23  0648 12/20/23  0843 12/20/23  0542 12/18/23  0428 12/18/23  0416   WBC 11.1*  --   --   14.5*  --   --   --  13.3*   < >  --    HGB 11.4*  --   --  12.6*  --   --   --  12.3*   < >  --      --   --  99  --   --   --  100   < >  --      --   --  189  --   --   --  186   < >  --      --   --  138  --   --   --  137  --  140   POTASSIUM 4.4  --   --  4.6  --  4.1  --  4.1   < > 4.0   CHLORIDE 108*  --   --  107  --   --   --  107  --  108*   CO2 22  --   --  20*  --   --   --  23  --  22   BUN 31.9*  --   --  28.9*  --   --   --  31.4*  --  26.6*   CR 1.03  --   --  1.05  --   --   --  1.08  --  1.15   ANIONGAP 9  --   --  11  --   --   --  7  --  10   RHODA 8.8  --   --  8.8  --   --   --  8.7*  --  8.9   * 200* 195* 175*   < >  --    < > 250*   < > 200*   ALBUMIN  --   --   --  3.0*  --   --   --   --   --  3.0*   PROTTOTAL  --   --   --  5.6*  --   --   --   --   --  5.5*   BILITOTAL  --   --   --  0.4  --   --   --   --   --  0.6   ALKPHOS  --   --   --  84  --   --   --   --   --  74   ALT  --   --   --  12  --   --   --   --   --  12   AST  --   --   --  15  --   --   --   --   --  33    < > = values in this interval not displayed.       Garret Nieto PA-C    Please use [place] to page 7:30am-4pm, page on-call surgeon after 4pm  Office number: [phone]

## 2023-12-22 NOTE — PLAN OF CARE
Goal Outcome Evaluation:      Plan of Care Reviewed With: patient    Overall Patient Progress: improvingOverall Patient Progress: improving    Orientations: Alert and oriented, occasionally forgetful  Vitals/Pain: VSS, 3L of oxymask   Tele: off  Lines/Drains: 2L PICC at [facility] with TPN infusing at 80 ml/hr, lipids done  Skin/Wounds: skin tear RUE, mepilex in place; NG and 2 ESTEFANY drains removed today  GI/: incontinent B/B, external catheter in place, pericares done, changed at 1800; had medium BM today  Labs: Abnormal/Trends, Electrolyte Replacement- Ph/Mg/K protocol, Mg replaced today, all recheck tomorrow (12/22)   Ambulation/Assist: A1 with RW and gaitbelt per PT  Sleep Quality: resting at short intervals.

## 2023-12-22 NOTE — PROGRESS NOTES
CLINICAL NUTRITION SERVICES - REASSESSMENT NOTE      Recommendations Ordered by Registered Dietitian (RD):   Continued TPN wean-- decreased rate to 60 mL/hr. Lipids discontinued yesterday  Ordered BID vanilla [employer] 1.0 ONS  Continue w/ BID Gel+   RD reviewed nutrition POC-- importance of PO intake as diet advances and weaning off TPN. Wrote down on pt's menu other ONS options available as well.       Malnutrition:   % Weight Loss:  difficult to assess d/t fluid-status  % Intake:  </= 50% for >/= 5 days (severe malnutrition) (12/14)-- improving w/ TPN  Subcutaneous Fat Loss:  None observed (12/14)  Muscle Loss:  Patellar region mild depletion and Posterior calf region mild depletion-- may be partially age-related (12/14)  Fluid Retention:  None noted    Malnutrition Diagnosis: Patient does not meet two of the established criteria necessary for diagnosing malnutrition          EVALUATION OF PROGRESS TOWARD GOALS   Diet: Full Liquid Diet    Snacks/Supplements Adult: Gelatein Plus; With Meals      Intake/Tolerance:  Pt just had lunch delivered-- cream of wheat, pudding, gel+, and sprite. Looking forward to have FLD! Not sure if he likes the gel+ yet, but dtr is very encouraging and helping pt consume the ordered supplements. Pt willing to try vanilla [employer] ONS as well. Wrote down on pt's menu other ONS options available as well. RD reviewed nutrition POC-- importance of PO intake as diet advances and weaning off TPN.   Pt is tolerating diet, continued slow diet advancement per surgery per chart review.   Pt received 2 CLD meals yesterday and 1st FLD meal today per [facility]. Sips documented per nursing flow sheet.      Nutrition Support: Parenteral  Type of Access: PICC  Parenteral Frequency: Continuous   Parenteral Regimen: Clinimix E (5/15) @ 80 mL/hr (1920 mL)  Total Parenteral Provisions: 1793 kcal (25 kcal/kg), 96 g protein (1.35 g/kg), 288 g CHO (GIR of 2.8), and 24% kcal from fat daily.       12/21:  lipids discontinued w/ diet advancement   D/w pharmacist today-- decreased rate to 60 mL/hr = Clinimix E (5/15) @ 60 mL/hr (1440 mL) = 1022 kcal, 72 g protein, 216 g CHO  Continue w/ TPN wean as pt improves PO intake and diet advances. ?Hopefully this weekend if diet advances to solids and pt tolerates      ASSESSED NUTRITION NEEDS:  Dosing Weight: 71 kg (adjusted, based on 81.5 kg-- 12/14)  Estimated Energy Needs: 1960-6256 kcal (25-30 Kcal/Kg)  Justification: maintenance  Estimated Protein Needs:  grams protein (1.2-1.5 g pro/Kg)  Justification: post-op, hypercatabolism with acute illness  Estimated Fluid Needs: 1 mL/kcal  Justification: maintenance        NEW FINDINGS:   Weight: unclear if 12/22 wt is accurate, ?10# wt loss w/in 3 days. Reordered wt to confirm   Date/Time Weight Weight Method   12/22/23 0629 75.7 kg (166 lb 14.2 oz) Bed scale   12/19/23 0000 80.2 kg (176 lb 12.9 oz) --   12/18/23 0400 79.5 kg (175 lb 4.3 oz) Bed scale   12/17/23 1000 78.5 kg (173 lb 1 oz) Standing scale   12/16/23 0655 80.6 kg (177 lb 11.1 oz) Bed scale   12/14/23 0606 81.5 kg (179 lb 10.8 oz) Bed scale   12/12/23 0000 84.2 kg (185 lb 10 oz) Bed scale   12/11/23 0400 84.6 kg (186 lb 8.2 oz) Bed scale   12/10/23 0400 86 kg (189 lb 9.5 oz) Bed scale   12/09/23 0400 85.5 kg (188 lb 7.9 oz) Bed scale   12/08/23 0035 84.6 kg (186 lb 8.2 oz)      I/O: Net IO Since Admission: -1,642.32 mL [12/22/23 0813].  Stool output = 3x BM yesterday, 6x on 12/20, 4x on 12/19, 3x on 12/18  Drain output = 10 mL on 12/20, 30 mL on 12/19 12/21: 2x ESTEFANY drains removed   NG output = 300 mL on 12/20, 100 mL on 12/19, 350 mL on 12/18 12/21: NG removed     Labs: reviewed, BGM remains >180 despite insulin added to bag   Component Value Date   BUN 31.9 (H) 12/22/2023     Lab 12/22/23  0600 12/22/23  0427 12/22/23  0151 12/21/23  2136 12/21/23  2125 12/21/23  1602   * 200* 195* 175* 184* 199*        Medications: reviewed   escitalopram  20 mg Oral  or NG Tube Daily    insulin aspart (HSSI)  1-12 Units Subcutaneous Q4H    insulin glargine  15 Units Subcutaneous At Bedtime    levothyroxine  100 mcg Oral QAM AC    pantoprazole  40 mg Intravenous Q24H         Previous Goals:   TPN to meet % of estimated nutrition needs   Evaluation: Met    Previous Nutrition Diagnosis:   Altered GI function related to post-op ileus as evidenced by NPO x9 days, TPN meeting 100% of estimated nutrition needs   Evaluation: Improving        MALNUTRITION  % Weight Loss:  difficult to assess d/t fluid-status  % Intake:  </= 50% for >/= 5 days (severe malnutrition) (12/14)-- improving w/ TPN  Subcutaneous Fat Loss:  None observed (12/14)  Muscle Loss:  Patellar region mild depletion and Posterior calf region mild depletion-- may be partially age-related (12/14)  Fluid Retention:  None noted    Malnutrition Diagnosis: Patient does not meet two of the established criteria necessary for diagnosing malnutrition         CURRENT NUTRITION DIAGNOSIS  Inadequate oral intake related to slow diet advancement as evidenced by FLD starting today, minimal PO intake yet, continued supplemental TPN        INTERVENTIONS  Recommendations / Nutrition Prescription  Continued TPN wean-- decreased rate to 60 mL/hr. Lipids discontinued yesterday  Ordered BID vanilla [employer] 1.0 ONS  Continue w/ BID Gel+   RD reviewed nutrition POC-- importance of PO intake as diet advances and weaning off TPN. Wrote down on pt's menu other ONS options available as well.     Implementation  Collaboration with other providers  Medical food supplement therapy  Parenteral Nutrition/IV Fluids     Goals  Wean off TPN w/in 72 hours  Diet advancement past FLD w/in 48 hours  Pt to consume >75% of 3 meals/day  Wt >81 kg       MONITORING AND EVALUATION:  Progress towards goals will be monitored and evaluated per protocol and Practice Guidelines      Yusra Arthur RD, LD  Pager: [phone]

## 2023-12-22 NOTE — PLAN OF CARE
Vital signs stable on RA. Alert and oriented x 3 but forgetful. Lung sounds diminished. Bowel sounds active, flatus yes. Pt denied pain. Up Ax2 Tolerating clear liquid diet. CMS intact.RT intervened last night because of the variation in HR.Troponin 308,later trended down to 252

## 2023-12-23 ENCOUNTER — HEALTH MAINTENANCE LETTER (OUTPATIENT)
Age: 88
End: 2023-12-23

## 2023-12-23 LAB
ANION GAP SERPL CALCULATED.3IONS-SCNC: 9 MMOL/L (ref 7–15)
BUN SERPL-MCNC: 27.8 MG/DL (ref 8–23)
CALCIUM SERPL-MCNC: 9.2 MG/DL (ref 8.8–10.2)
CHLORIDE SERPL-SCNC: 108 MMOL/L (ref 98–107)
CREAT SERPL-MCNC: 0.84 MG/DL (ref 0.67–1.17)
DEPRECATED HCO3 PLAS-SCNC: 20 MMOL/L (ref 22–29)
EGFRCR SERPLBLD CKD-EPI 2021: 85 ML/MIN/1.73M2
GLUCOSE BLDC GLUCOMTR-MCNC: 176 MG/DL (ref 70–99)
GLUCOSE BLDC GLUCOMTR-MCNC: 179 MG/DL (ref 70–99)
GLUCOSE BLDC GLUCOMTR-MCNC: 187 MG/DL (ref 70–99)
GLUCOSE BLDC GLUCOMTR-MCNC: 198 MG/DL (ref 70–99)
GLUCOSE BLDC GLUCOMTR-MCNC: 260 MG/DL (ref 70–99)
GLUCOSE SERPL-MCNC: 221 MG/DL (ref 70–99)
MAGNESIUM SERPL-MCNC: 1.9 MG/DL (ref 1.7–2.3)
PHOSPHATE SERPL-MCNC: 2.3 MG/DL (ref 2.5–4.5)
PLATELET # BLD AUTO: 192 10E3/UL (ref 150–450)
POTASSIUM SERPL-SCNC: 4.7 MMOL/L (ref 3.4–5.3)
SODIUM SERPL-SCNC: 137 MMOL/L (ref 135–145)

## 2023-12-23 PROCEDURE — 250N000013 HC RX MED GY IP 250 OP 250 PS 637: Performed by: NURSE PRACTITIONER

## 2023-12-23 PROCEDURE — 99233 SBSQ HOSP IP/OBS HIGH 50: CPT | Performed by: INTERNAL MEDICINE

## 2023-12-23 PROCEDURE — 250N000013 HC RX MED GY IP 250 OP 250 PS 637: Performed by: INTERNAL MEDICINE

## 2023-12-23 PROCEDURE — 250N000011 HC RX IP 250 OP 636: Performed by: INTERNAL MEDICINE

## 2023-12-23 PROCEDURE — 84100 ASSAY OF PHOSPHORUS: CPT | Performed by: INTERNAL MEDICINE

## 2023-12-23 PROCEDURE — 83735 ASSAY OF MAGNESIUM: CPT | Performed by: INTERNAL MEDICINE

## 2023-12-23 PROCEDURE — 120N000001 HC R&B MED SURG/OB

## 2023-12-23 PROCEDURE — 250N000013 HC RX MED GY IP 250 OP 250 PS 637: Performed by: STUDENT IN AN ORGANIZED HEALTH CARE EDUCATION/TRAINING PROGRAM

## 2023-12-23 PROCEDURE — 36415 COLL VENOUS BLD VENIPUNCTURE: CPT | Performed by: INTERNAL MEDICINE

## 2023-12-23 PROCEDURE — 250N000011 HC RX IP 250 OP 636: Mod: JZ | Performed by: INTERNAL MEDICINE

## 2023-12-23 PROCEDURE — 85049 AUTOMATED PLATELET COUNT: CPT | Performed by: INTERNAL MEDICINE

## 2023-12-23 PROCEDURE — 80048 BASIC METABOLIC PNL TOTAL CA: CPT | Performed by: NURSE PRACTITIONER

## 2023-12-23 PROCEDURE — 250N000009 HC RX 250

## 2023-12-23 PROCEDURE — 250N000012 HC RX MED GY IP 250 OP 636 PS 637

## 2023-12-23 PROCEDURE — 250N000013 HC RX MED GY IP 250 OP 250 PS 637

## 2023-12-23 RX ORDER — GABAPENTIN 100 MG/1
100 CAPSULE ORAL 3 TIMES DAILY
Status: DISCONTINUED | OUTPATIENT
Start: 2023-12-23 | End: 2023-12-27

## 2023-12-23 RX ORDER — MAGNESIUM OXIDE 400 MG/1
400 TABLET ORAL EVERY 4 HOURS
Status: COMPLETED | OUTPATIENT
Start: 2023-12-23 | End: 2023-12-23

## 2023-12-23 RX ORDER — PANTOPRAZOLE SODIUM 40 MG/1
40 TABLET, DELAYED RELEASE ORAL
Status: DISCONTINUED | OUTPATIENT
Start: 2023-12-23 | End: 2024-01-03 | Stop reason: HOSPADM

## 2023-12-23 RX ORDER — WATER 10 ML/10ML
INJECTION INTRAMUSCULAR; INTRAVENOUS; SUBCUTANEOUS
Status: COMPLETED
Start: 2023-12-23 | End: 2023-12-23

## 2023-12-23 RX ADMIN — SACUBITRIL AND VALSARTAN 1 TABLET: 24; 26 TABLET, FILM COATED ORAL at 08:46

## 2023-12-23 RX ADMIN — POTASSIUM & SODIUM PHOSPHATES POWDER PACK 280-160-250 MG 1 PACKET: 280-160-250 PACK at 11:17

## 2023-12-23 RX ADMIN — POTASSIUM & SODIUM PHOSPHATES POWDER PACK 280-160-250 MG 1 PACKET: 280-160-250 PACK at 16:15

## 2023-12-23 RX ADMIN — ASPIRIN 81 MG CHEWABLE TABLET 81 MG: 81 TABLET CHEWABLE at 08:45

## 2023-12-23 RX ADMIN — CARVEDILOL 12.5 MG: 12.5 TABLET, FILM COATED ORAL at 08:46

## 2023-12-23 RX ADMIN — Medication 400 MG: at 11:17

## 2023-12-23 RX ADMIN — ALTEPLASE 2 MG: 2.2 INJECTION, POWDER, LYOPHILIZED, FOR SOLUTION INTRAVENOUS at 18:36

## 2023-12-23 RX ADMIN — ESCITALOPRAM OXALATE 20 MG: 10 TABLET ORAL at 08:44

## 2023-12-23 RX ADMIN — CARVEDILOL 12.5 MG: 12.5 TABLET, FILM COATED ORAL at 21:01

## 2023-12-23 RX ADMIN — ONDANSETRON 4 MG: 2 INJECTION INTRAMUSCULAR; INTRAVENOUS at 07:05

## 2023-12-23 RX ADMIN — SACUBITRIL AND VALSARTAN 1 TABLET: 24; 26 TABLET, FILM COATED ORAL at 21:01

## 2023-12-23 RX ADMIN — Medication 1 ML: at 01:28

## 2023-12-23 RX ADMIN — ASCORBIC ACID, VITAMIN A PALMITATE, CHOLECALCIFEROL, THIAMINE HYDROCHLORIDE, RIBOFLAVIN-5 PHOSPHATE SODIUM, PYRIDOXINE HYDROCHLORIDE, NIACINAMIDE, DEXPANTHENOL, ALPHA-TOCOPHEROL ACETATE, VITAMIN K1, FOLIC ACID, BIOTIN, CYANOCOBALAMIN: 200; 3300; 200; 6; 3.6; 6; 40; 15; 10; 150; 600; 60; 5 INJECTION, SOLUTION INTRAVENOUS at 20:45

## 2023-12-23 RX ADMIN — WATER 2.2 ML: 1 INJECTION INTRAMUSCULAR; INTRAVENOUS; SUBCUTANEOUS at 18:37

## 2023-12-23 RX ADMIN — LATANOPROST 1 DROP: 50 SOLUTION/ DROPS OPHTHALMIC at 23:14

## 2023-12-23 RX ADMIN — INSULIN GLARGINE 18 UNITS: 100 INJECTION, SOLUTION SUBCUTANEOUS at 23:15

## 2023-12-23 RX ADMIN — LEVOTHYROXINE SODIUM 100 MCG: 100 TABLET ORAL at 08:44

## 2023-12-23 RX ADMIN — Medication 400 MG: at 08:44

## 2023-12-23 RX ADMIN — PANTOPRAZOLE SODIUM 40 MG: 40 TABLET, DELAYED RELEASE ORAL at 08:45

## 2023-12-23 RX ADMIN — POTASSIUM & SODIUM PHOSPHATES POWDER PACK 280-160-250 MG 1 PACKET: 280-160-250 PACK at 08:45

## 2023-12-23 RX ADMIN — ENOXAPARIN SODIUM 40 MG: 40 INJECTION SUBCUTANEOUS at 11:50

## 2023-12-23 ASSESSMENT — ACTIVITIES OF DAILY LIVING (ADL)
ADLS_ACUITY_SCORE: 38
ADLS_ACUITY_SCORE: 42
ADLS_ACUITY_SCORE: 38
ADLS_ACUITY_SCORE: 42
ADLS_ACUITY_SCORE: 38
ADLS_ACUITY_SCORE: 38
ADLS_ACUITY_SCORE: 42
ADLS_ACUITY_SCORE: 38

## 2023-12-23 NOTE — PROGRESS NOTES
"Acute Care Surgery Progress Note    Subjective: quit lethargic today. Per daughter, the patient was up and working with PT/OT yesterday and now he is very tired. Had been able to tolerating 50-75% of meal trays per his daughter.     Objective: BP (!) 169/85 (BP Location: Right arm)   Pulse 74   Temp 97.4  F (36.3  C) (Oral)   Resp 16   Ht 1.702 m (5' 7\")   Wt 75.7 kg (166 lb 14.2 oz)   SpO2 98%   BMI 26.14 kg/m    Gen: not in acute distress  CV: normal sinus  Pulm: nonlabored breathing on room air  Abd: soft, non-tender, mildly distended. Drain site in RLQ with dressing in place/clean/dry  Ext: moving all extremities  Neuro/psych: lethargic but easily arousable. Not capable of consent    Assessment/Plan:   Garry Mckay  is a 88 year old male with complex cardiac history who was admitted with perforated appendicitis and septic shock s/p laparoscopic appendectomy and ICG evaluation of the cecum intra-op which was found to be viable. Course was complicated by prolonged ileus 2/2 intra-abdominal abscess and delirium. POD14, had been tolerating full liquid diet and delirium is steadily improving.    - advance to soft diet  - continue transition the patient off TPN  - Lovenox for chemo ppx  - ok to resume Plavix from surgery standpoint  - cardiology is planning for cardiac stress test next week  - general surgery will follow along    Maylin Waters MD   PGY4 General Surgery Resident  [facility]            "

## 2023-12-23 NOTE — PLAN OF CARE
[id number]    A&O x4, forgetful at times but seems to be improving.   VSS on RA. Tele SR with frequent PAC/PVC- Discussed with Dr. Singer to remove tele if able, less lines- verified with hospitalist Dr. Adrian Carson ok to discontinue tele.   Up assist x1 WGB.   LS diminished, pulmonary toilet encouraged.   BS+, abdomen distended, denies nausea, BM x2 today incontinent, (liquid stool all requiring linen change right away this morning). Flatus+.   Urine output adequate with external cath in place. Incisions WDL, dressings over previous ESTEFANY sites CDI.  Denies pain other than some throat soreness from NG chloraseptic spray ordered.  TPN infusing at 80ml/hr with plan to decrease tonight  Advanced to full liquids, tolerating small amts, poor appetite.

## 2023-12-23 NOTE — PROGRESS NOTES
"Cardiology Progress Note          Assessment and Plan:         88-year-old gentleman with extensive history of coronary artery disease status post CABG and known occlusion of the SVG to the OM with an atretic LIMA to LAD, recent nuclear perfusion study in 2023 demonstrating no evidence of ischemia, severe ischemic cardiomyopathy who was admitted to [facility] on 2023 for septic shock associated with perforated appendicitis.    He has been noted to have mildly elevated troponins during this hospitalization without any anginal symptoms.  Left ventricular systolic function is severely depressed on echocardiography but was also severely reduced on echocardiography in July of this year.  Guideline directed medical therapy is gradually being added back.  Plans are for potential stress perfusion imaging early next week.    IMPRESSION:    Coronary artery disease status post coronary bypass grafting in  and subsequent multiple PCI's.  Severe ischemic cardiomyopathy.  Septic shock secondary to perforated appendicitis.  Elevated troponins which are probably secondary to #3  Paroxysmal atrial fibrillation with spontaneous conversion to normal sinus rhythm on 2023  FATIMAH which has resolved    PLAN    -Entresto has been resumed appropriately.  -Stress perfusion imaging next week.        Interval History:     Somnolent this morning but arousable and responsive.             Review of Systems:   As per subjective, otherwise 5 systems reviewed and negative.           Physical Exam:   Blood pressure (!) 146/81, pulse 74, temperature 97.4  F (36.3  C), temperature source Oral, resp. rate 16, height 1.702 m (5' 7\"), weight 75.7 kg (166 lb 14.2 oz), SpO2 98%.      Vital Sign Ranges  Temperature Temp  Av.9  F (36.6  C)  Min: 97.4  F (36.3  C)  Max: 98.4  F (36.9  C)   Blood pressure Systolic (24hrs), Av , Min:134 , Max:166        Diastolic (24hrs), Av, Min:62, Max:85      Pulse Pulse  Av.5  " Min: 68  Max: 86   Respirations Resp  Av.6  Min: 14  Max: 32   Pulse oximetry SpO2  Av.2 %  Min: 95 %  Max: 98 %         Intake/Output Summary (Last 24 hours) at 2023 1122  Last data filed at 2023 0630  Gross per 24 hour   Intake 799 ml   Output 1200 ml   Net -401 ml       Constitutional: NAD  Skin: Warm and dry  Neck: No JVD  Lungs: CTA  Cardiovascular: RRR, no m/r/g  Abdomen: Soft, non tender.  Extremities and Back: No LE edema  Neurological: Nonfocal           Medications:     I have reviewed this patient's current medications.              Data:     Labs reviewed.             Minesh Phelps MD, M.D.

## 2023-12-23 NOTE — PROGRESS NOTES
[facility]    Medicine Progress Note - Hospitalist Service    Date of Admission:  12/8/2023    Assessment & Plan   88 year old male S/P Lap Appy for Perforated Appendicitis, and vascular assessment of questionable area on cecum through ICG administration.  He also has had issues with post op fluid collections that IR has placed a drain in and had a significant post-op ileus.  Initially underwent surgery on 12/8/2023 and required ICU management for septic shock.     Ultimately improved, and Hospitalist service assumed care on 12/11/2023.      Acute appendicitis with peritonitis and septic shock - s/p laparoscopic appendectomy 12/8/2023.  Pelvic fluid collections, question abscesses.  Postoperative ileus vs SBO, prolonged  Nutrition:    Initial presentation to OSH 12/7 as above. CT abdomen noted with acute appendicitis with 0.7 cm appendicolith, no abscess. Started on ampicillin-sulbactam. Subsequently transferred to [facility] 12/8.  On 12/8, underwent above surgery. Weaned off pressors. Antibiotics changed to pipericillin-tazobactam.   On 12/10, CT abdomen noted with multiple dilated small bowel loops with related decompression of colon with findings consistent with partial obstruction/ adynamic ileus.  On 12/12, diet advanced to clears as pt seemed to be improving with +flatus and BM. Later, developed more N/V and NG placed.  On 12/14, WBC elevated. Repeat abdominal CT showed prior appendectomy with findings suggestive of peritonitis, developing rim enhancing pelvic fluid collections, abscesses possible; increasing dilation of proximal small bowel with transition point in the right lower quadrant, suggestive of partial mechanical obstruction, developing adhesion is possible, no evidence of nadine bowel ischemia or acute perforation; distended gallbladder without additional CT evidence of acute cholecystitis.    IR placed drain into right pelvic fluid collection (2/15/23)    CT sinegram  12/19/23 - with drain in good position, min residual fluid and no fistula.     - started TPN 12/15 - continuing but rate has been titrated down    - Completed 14 days of IV pipericillin-tazobactam     NG tube and ESTEFANY drain removed 12/21/23  He is continuing to show clinical improvement    2. Acute hypoxic respiratory failure, suspect multifactorial related to atelectasis, recent abdominal surgery, resolved.  CARINA on home CPA:.  * Initial presentation as above. CXR at OSH negative.  * On 12/8, CXR showed new left basilar bandlike opacity, most likely atelectasis.   * On 12/11, down to 2L O2.  * On 12/14, off O2.  - Continue CPAP while sleeping as able  - Continue to encourage incentive spirometry.    Currently he is off of his oxygen     3. FATIMAH on CKD3, suspect prerenal, meds (including sacubitril-valsartan, diuretics).  Hyperkalemia, resolved.  * Baseline creatinine 1-1.1.   * Creatinine 1.39 --> 1.8 at OSH.  * Cr up to 2.27 and K up to 5.5 on 12/8.  * Cr and K subsequently improved with IVF's.  * On IV furosemide 12/10-12/11.  - Continue to monitor BMP  - Avoid nephrotoxic medications.     Creat 1.03 (12/22/23)    4. Troponin elevation, suspect demand ischemia (type 2 NSTEMI).  Hypertension (benign essential).  CAD s/p CABG (1994), stenting (2002, 2003, 2005, 2021)  Chronic HFrEF (systolic and diastolic), s/p ICD.  HLD.  Afib with RVR:  [PTA: carvedilol 12.5 mg BID; sacubitril-valsartan 49-51 mg BID; furosemide 20 mg daily.]      * Last cath 1/2021 at [facility] showed patent CLAUDIA-RCA, LIMA occluded, vein-diagonal patent with placement of stent,  LAD, 40% circumflex, RCA occluded. Last stress testing 7/2023 negative for ischemia. Echo 7/2023 showed LVEF 30-35%, global hypokinesis LV, moderate-severe cLVH, grade 1 diastolic dysfunction; RV systolic function mildly reduced.   * Initial presentation as above. BNP in ED at 2613, troponin 70. EKG w/ nonspecific t-w changes. PTA sacubitril-valsartan and furosemide held on  admit given FATIMAH.  Briefly on IV diuretics.    - Continue ASA 81 mg cautiously given GI issues.     -has ICD and h/o CAD s/p CABG 1994  Last stress testing in [place] 7/2023, which was negative for ischemia    Cardiology did consult for pre-op clearance on 12/8/23 and then again saw on 12/9/23.  Troponin more elevated on 12/10/23 felt likely to sepsis.      12/22/23 troponins again checked during RRT and were elevated.      Echo 12/22/23 - EF 20-25% with hyokinesis of the post wall.    Last echo in care everywhere - EF 30-35% with moderate hypokinesis of the left ventricle.  The left ventricle is moderately dilated.  There is mild concentric left ventricular hypertrophy.  Severe hypokinesis with some areas of akinesid-mid to distal septum and apex.  Severe hypokinesis/akinesis inferior and posterior walls. Lateral wall and  prox septum move best    Cardiology re-consulted 12/228/23 (appreciated) and is following    Entresto restarted 12/22/23 (BP is tolerating)  Plan for NM lexiscan on 12/26/23 at this time    Plavix still on hold.  Cardiology would like it restarted as soon as it is safe to do so from a surgery stand-point.      Addendum - 1540  Surgery notes reviewed.  Possible will be able to restart plavix 12/24/23.     5. Acute metabolic encephalopathy (delirium)    Improved yesterday - today appears more tired but not agitated or resltess    - treat associated conditions as noted elsewhere  - delirium precautions    12/23/23 - daughter is asking about possibly restarting his tid neurontin (med for anxiety).  Will consider restarting again at lower doses if he again remains more awake today     7. Hyperglycemia while on TPN  - check A1c  - High dose sliding scale change to qac and qhs  - Lantus increased to 18units at bedtime  Blood sugars still 180-200's     Anticipate there will be a need for insulin adjustment one TPN is discontinued      8. Anemia, suspect partial blood loss component with surgery  issues:  - Continue to monitor hgb periodically  - Consider prbc transfusion if hgb </= 7.0 or if significant bleeding with hemodynamic instability or if symptomatic.     9. Thrombocytopenia, unclear etiology, possibly due to sepsis, medication effect or consumptive or other cause.  * Plts normal on initially evaluation.  * Plts subsequently down to 104K on 12/9  * Plts normalized 12/14  - Continue to monitor CBC occasionally     10. Hypokalemia:  Supplemented and normalized     11. Hx CVA.  - Continue to hold clopidogrel for now (as above)  ASA is continuing.    - Continue to hold atorvastatin until can take po intake routinely.     12. Depression/anxiety.  - Continue to hold gabapentin for now   As above, will restart if more awake still today (currently appears more tired but it is early)  PTA dose of gabapentin was 200mg/100mg/300mg    - Continue escitalopram daily - tolerating     13. Hypothyroidism.  - Continue levothyroxine.     14. GERD.  - PPI changed to po     15. BPH.  - will resume finasteride now that able to take po     16. Migraines.  * Gets botox.   - Continue to monitor clinically.     17. Gallbladder distention  *noted on CT abd 12/14  *abd usg 12/15 w/o evidence of cholecystitis  - monitor    FULL CODE     Medical Decision Making       55 MINUTES SPENT BY ME on the date of service doing chart review, history, exam, documentation & further activities per the note.      Also was able to connect with family present in the room this am and provide clinical update.      PPE Worn:  Mask, gloves     Diet: Snacks/Supplements Adult: Gelatein Plus; With Meals  Full Liquid Diet  parenteral nutrition - Clinimix E    DVT Prophylaxis: Enoxaparin (Lovenox) SQ  Hameed Catheter: Not present  Lines: PRESENT      PICC 12/15/23 Double Lumen Left Brachial vein medial TPN-Site Assessment: WDL      Cardiac Monitoring: None  Code Status: Full Code      Clinically Significant Risk Factors              # Hypoalbuminemia:  "Lowest albumin = 2.8 g/dL at 12/11/2023  5:26 AM, will monitor as appropriate     # Hypertension: Noted on problem list  # Chronic heart failure with reduced ejection fraction: last echo with EF <40%      # DMII: A1C = 6.8 % (Ref range: <5.7 %) within past 6 months   # Overweight: Estimated body mass index is 26.14 kg/m  as calculated from the following:    Height as of this encounter: 1.702 m (5' 7\").    Weight as of this encounter: 75.7 kg (166 lb 14.2 oz).             Disposition Plan     Expected Discharge Date: 12/27/2023,  3:00 PM      Discharge Comments: OR 12/8 12/16 CT guided drainage, JPs leaking.  Confused and agitated at times.          Caroline Castro, DO  Hospitalist Service  [facility]  Securely message with my[employer]aa (more info)  Text page via [employer] Paging/Directory   ______________________________________________________________________    Interval History     Seen in room with son and daughter present.  Garry is just waking up this am and appears somewhat more tired than yesterday am.  Per overnight RN, was noted to be more restless and forgetful last night.  He was still c/o of a sore throat from prior NG tube.      Currently he denies CP, SOB, F/C or HA.  No current nausea or emesis or symptoms last night.      Physical Exam   Vital Signs: Temp: 97.5  F (36.4  C) Temp src: Oral BP: 134/62 Pulse: 74   Resp: (!) 32 SpO2: 96 % O2 Device: None (Room air)    Weight: 166 lbs 14.21 oz    GEN:  awake, alert.  Elderly male laying in bed, appears fatigued but in no acute respiratory distress.   HEENT:  Normocephalic/atraumatic, no scleral icterus, no nasal discharge  CV:  somewhat distant regular rate and rhythm, S1 + S2 noted.  LUNGS:  Clear to auscultation ant/lat bilaterally without rales/rhonchi/wheezing/retractions. Symmetric chest rise on inhalation noted.  ABD:  Active bowel sounds this am, soft, less distended today.  Expected tenderness around the incision sites. " No clear rebound/guarding/rigidity to light palpation.  EXT:  No significant pitting pretibial edema bilaterally.  No cyanosis.    PSYCH:  awake, cooperative, pleasant and not agitated. Not restless   Medications    dextrose      parenteral nutrition - Clinimix E 60 mL/hr at 12/22/23 2141      aspirin  81 mg Oral Daily    Or    aspirin  81 mg Per NG tube Daily    carvedilol  12.5 mg Oral BID    [Held by provider] clopidogrel  75 mg Oral Daily    enoxaparin ANTICOAGULANT  40 mg Subcutaneous Q24H    escitalopram  20 mg Oral or NG Tube Daily    [Held by provider] finasteride  5 mg Oral At Bedtime    [Held by provider] gabapentin  100 mg Oral Q24H    [Held by provider] gabapentin  200 mg Oral QAM    [Held by provider] gabapentin  300 mg Oral At Bedtime    insulin aspart  1-7 Units Subcutaneous TID AC    insulin aspart  1-5 Units Subcutaneous At Bedtime    insulin glargine  18 Units Subcutaneous At Bedtime    latanoprost  1 drop Both Eyes At Bedtime    levothyroxine  100 mcg Oral QAM AC    magnesium oxide  400 mg Oral Q4H    pantoprazole  40 mg Intravenous Q24H    potassium & sodium phosphates  1 packet Oral or Feeding Tube Q4H    sacubitril-valsartan  1 tablet Oral BID    [Held by provider] senna-docusate  1 tablet Oral BID    sodium chloride (PF)  10-40 mL Intracatheter Q7 Days    sodium chloride (PF)  3 mL Intracatheter Q8H       Data     Labs and Imaging results below reviewed today.  Recent Labs   Lab 12/23/23  0535 12/22/23  0600 12/21/23 2136 12/20/23  0542   WBC  --  11.1* 14.5* 13.3*   HGB  --  11.4* 12.6* 12.3*   HCT  --  35.2* 38.4* 37.2*   MCV  --  100 99 100    188 189 186     Recent Labs   Lab 12/23/23  0535 12/23/23  0201 12/22/23 2139 12/22/23  0923 12/22/23  0600 12/22/23  0151 12/21/23 2136     --   --   --  139  --  138   POTASSIUM 4.7  --   --   --  4.4  --  4.6   CHLORIDE 108*  --   --   --  108*  --  107   CO2 20*  --   --   --  22  --  20*   ANIONGAP 9  --   --   --  9  --  11   GLC  "221* 198* 174*   < > 200*   < > 175*   BUN 27.8*  --   --   --  31.9*  --  28.9*   CR 0.84  --   --   --  1.03  --  1.05   GFRESTIMATED 85  --   --   --  71  --  69   RHODA 9.2  --   --   --  8.8  --  8.8    < > = values in this interval not displayed.     Recent Labs   Lab 12/23/23  0535 12/23/23  0201 12/22/23  2139 12/22/23  0923 12/22/23  0600 12/22/23  0151 12/21/23  2136 12/21/23  0850 12/21/23  0648 12/18/23  0428 12/18/23  0416     --   --   --  139  --  138  --   --    < > 140   POTASSIUM 4.7  --   --   --  4.4  --  4.6  --  4.1   < > 4.0   CHLORIDE 108*  --   --   --  108*  --  107  --   --    < > 108*   CO2 20*  --   --   --  22  --  20*  --   --    < > 22   ANIONGAP 9  --   --   --  9  --  11  --   --    < > 10   * 198* 174*   < > 200*   < > 175*   < >  --    < > 200*   BUN 27.8*  --   --   --  31.9*  --  28.9*  --   --    < > 26.6*   CR 0.84  --   --   --  1.03  --  1.05  --   --    < > 1.15   GFRESTIMATED 85  --   --   --  71  --  69  --   --    < > 62   RHODA 9.2  --   --   --  8.8  --  8.8  --   --    < > 8.9   MAG 1.9  --   --   --  2.2  --  2.3  --  1.9   < > 2.0   PHOS 2.3*  --   --   --  2.9  --   --   --  3.0   < > 2.3*   PROTTOTAL  --   --   --   --   --   --  5.6*  --   --   --  5.5*   ALBUMIN  --   --   --   --   --   --  3.0*  --   --   --  3.0*   BILITOTAL  --   --   --   --   --   --  0.4  --   --   --  0.6   ALKPHOS  --   --   --   --   --   --  84  --   --   --  74   AST  --   --   --   --   --   --  15  --   --   --  33   ALT  --   --   --   --   --   --  12  --   --   --  12    < > = values in this interval not displayed.     No results for input(s): \"INR\" in the last 168 hours.    Recent Results (from the past 24 hour(s))   Echocardiogram Complete   Result Value    LVEF  20-25%    [facility]    [id number]  [id number]  [id number]^ORTIZ^KAYLA^MARIAN     [facility]  Echocardiography Laboratory  [address] [place] [address], [place] [address]     Name: YOLY" JANELL  MRN: [id number]  : 1935  Study Date: 2023 10:44 AM  Age: 88 yrs  Gender: Male  Patient Location: [facility]  Reason For Study: Other, Please Specify in Comments  Ordering Physician: KAYLA ALCANTAR  Referring Physician: Clinic, [facility]  Performed By: Maylin Flores     BSA: 1.9 m2  Height: 67 in  Weight: 178 lb  HR: 50  BP: 119/70 mmHg  ______________________________________________________________________________  Procedure  Complete Portable Echo Adult. Optison (NDC #[id number]) given intravenously.  ______________________________________________________________________________  Interpretation Summary     Technically difficult study, no subcostal views available  The left ventricle is moderately dilated.  There is mild concentric left ventricular hypertrophy.  The visual ejection fraction is 20-25%.  Severe hypokinesis with some areas of akinesid-mid to distal septum and apex.  Severe hypokinesis/akinesis inferior and posterior walls. Lateral wall and  prox septum move best  Asc aorta dilated 40mm  ______________________________________________________________________________  Left Ventricle  The left ventricle is moderately dilated. There is mild concentric left  ventricular hypertrophy. The visual ejection fraction is 20-25%. Diastolic  function not assessed due to arrhythmia. There are regional wall motion  abnormalities as specified. Severe hypokinesis with some areas of akinesid-mid  to distal septum and apex. Severe hypokinesis/akinesis inferior and posterior  walls. Lateral wall and prox septum move best. There is no thrombus seen in  the left ventricle.     Right Ventricle  There is a catheter/pacemaker lead seen in the right ventricle. The right  ventricle is not well visualized. The right ventricle is normal in size and  function.     Atria  The left atrium is moderately dilated. Right atrial size is normal.     Mitral Valve  There is mild (1+) mitral regurgitation.      Tricuspid Valve  Normal tricuspid valve. Right ventricular systolic pressure could not be  approximated due to inadequate tricuspid regurgitation.     Aortic Valve  The aortic valve is trileaflet with aortic valve sclerosis. There is trace  aortic regurgitation. No hemodynamically significant valvular aortic stenosis.     Pulmonic Valve  The pulmonic valve is not well seen, but is grossly normal.     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 IVC diameter  >2.1 cm collapsing <50% with sniff suggests a high RA pressure estimated at 15  mmHg or greater.     Pericardium  The pericardium appears normal.     Rhythm  The rhythm was paced.  ______________________________________________________________________________  MMode/2D Measurements & Calculations  IVSd: 1.4 cm     LVIDd: 6.3 cm  LVIDs: 5.5 cm  LVPWd: 0.96 cm  FS: 13.1 %  LV mass(C)d: 332.5 grams  LV mass(C)dI: 172.8 grams/m2  Ao root diam: 3.6 cm  LA dimension: 5.0 cm  asc Aorta Diam: 4.0 cm  LA/Ao: 1.4  LVOT diam: 2.4 cm  LVOT area: 4.4 cm2  Ao root diam index Ht(cm/m): 2.1  Ao root diam index BSA (cm/m2): 1.9  Asc Ao diam index BSA (cm/m2): 2.1  Asc Ao diam index Ht(cm/m): 2.3  LA Volume (BP): 103.0 ml     LA Volume Index (BP): 53.6 ml/m2  RWT: 0.30     Doppler Measurements & Calculations  MV E max zachery: 55.8 cm/sec  MV A max zachery: 67.7 cm/sec  MV E/A: 0.82  MV dec slope: 233.2 cm/sec2  MV dec time: 0.24 sec  LV V1 max P.4 mmHg  LV V1 max: 58.7 cm/sec  LV V1 VTI: 12.8 cm  SV(LVOT): 55.8 ml  SI(LVOT): 29.0 ml/m2  PA acc time: 0.10 sec  E/E' avg: 10.4  Lateral E/e': 10.0  Medial E/e': 10.9  RV S Zachery: 11.4 cm/sec     ______________________________________________________________________________  Report approved by: Farshad Gramajo 2023 12:42 PM

## 2023-12-23 NOTE — PLAN OF CARE
AxOx4, forgetful. Noted to be increasingly confused/restless overnight, but still able to answer orientation questions correctly.    VSS on RA. Assist x1-2 GB/W. Full liquid diet. External catheter in place with adequate output; leaks at times. BM x1, watery.    L 2-lumen PICC, infusing TPN at 60. No blood return noted.    Abdominal lap sites BRENDAN with adhesive strips.  ESTEFANY drain site dressings CDI.    Skin tear to R inner elbow-- dressing CDI.  Slight blanchable redness to coccyx/buttocks.    Denies pain / nausea. Chloraseptic spray used several times for sore throat from NG tube.    , 198.    Plan for Lexiscan stress test on 12/26. Continue plan of care.

## 2023-12-24 LAB
ANION GAP SERPL CALCULATED.3IONS-SCNC: 6 MMOL/L (ref 7–15)
ATRIAL RATE - MUSE: 73 BPM
ATRIAL RATE - MUSE: 74 BPM
BUN SERPL-MCNC: 29.3 MG/DL (ref 8–23)
CALCIUM SERPL-MCNC: 9.5 MG/DL (ref 8.8–10.2)
CHLORIDE SERPL-SCNC: 107 MMOL/L (ref 98–107)
CREAT SERPL-MCNC: 0.87 MG/DL (ref 0.67–1.17)
DEPRECATED HCO3 PLAS-SCNC: 24 MMOL/L (ref 22–29)
DIASTOLIC BLOOD PRESSURE - MUSE: NORMAL MMHG
DIASTOLIC BLOOD PRESSURE - MUSE: NORMAL MMHG
EGFRCR SERPLBLD CKD-EPI 2021: 84 ML/MIN/1.73M2
ERYTHROCYTE [DISTWIDTH] IN BLOOD BY AUTOMATED COUNT: 13.9 % (ref 10–15)
GLUCOSE BLDC GLUCOMTR-MCNC: 182 MG/DL (ref 70–99)
GLUCOSE BLDC GLUCOMTR-MCNC: 198 MG/DL (ref 70–99)
GLUCOSE BLDC GLUCOMTR-MCNC: 204 MG/DL (ref 70–99)
GLUCOSE BLDC GLUCOMTR-MCNC: 211 MG/DL (ref 70–99)
GLUCOSE BLDC GLUCOMTR-MCNC: 213 MG/DL (ref 70–99)
GLUCOSE SERPL-MCNC: 177 MG/DL (ref 70–99)
HCT VFR BLD AUTO: 39.3 % (ref 40–53)
HGB BLD-MCNC: 12.6 G/DL (ref 13.3–17.7)
INTERPRETATION ECG - MUSE: NORMAL
INTERPRETATION ECG - MUSE: NORMAL
MAGNESIUM SERPL-MCNC: 1.9 MG/DL (ref 1.7–2.3)
MCH RBC QN AUTO: 32 PG (ref 26.5–33)
MCHC RBC AUTO-ENTMCNC: 32.1 G/DL (ref 31.5–36.5)
MCV RBC AUTO: 100 FL (ref 78–100)
P AXIS - MUSE: 2 DEGREES
P AXIS - MUSE: 8 DEGREES
PHOSPHATE SERPL-MCNC: 2.7 MG/DL (ref 2.5–4.5)
PLATELET # BLD AUTO: 197 10E3/UL (ref 150–450)
POTASSIUM SERPL-SCNC: 4.8 MMOL/L (ref 3.4–5.3)
PR INTERVAL - MUSE: 136 MS
PR INTERVAL - MUSE: 150 MS
QRS DURATION - MUSE: 110 MS
QRS DURATION - MUSE: 110 MS
QT - MUSE: 440 MS
QT - MUSE: 448 MS
QTC - MUSE: 484 MS
QTC - MUSE: 497 MS
R AXIS - MUSE: 22 DEGREES
R AXIS - MUSE: 34 DEGREES
RBC # BLD AUTO: 3.94 10E6/UL (ref 4.4–5.9)
SODIUM SERPL-SCNC: 137 MMOL/L (ref 135–145)
SYSTOLIC BLOOD PRESSURE - MUSE: NORMAL MMHG
SYSTOLIC BLOOD PRESSURE - MUSE: NORMAL MMHG
T AXIS - MUSE: 149 DEGREES
T AXIS - MUSE: 187 DEGREES
VENTRICULAR RATE- MUSE: 73 BPM
VENTRICULAR RATE- MUSE: 74 BPM
WBC # BLD AUTO: 10.4 10E3/UL (ref 4–11)

## 2023-12-24 PROCEDURE — 80048 BASIC METABOLIC PNL TOTAL CA: CPT | Performed by: INTERNAL MEDICINE

## 2023-12-24 PROCEDURE — 250N000009 HC RX 250

## 2023-12-24 PROCEDURE — 250N000013 HC RX MED GY IP 250 OP 250 PS 637: Performed by: NURSE PRACTITIONER

## 2023-12-24 PROCEDURE — 120N000001 HC R&B MED SURG/OB

## 2023-12-24 PROCEDURE — 99233 SBSQ HOSP IP/OBS HIGH 50: CPT | Mod: 25 | Performed by: INTERNAL MEDICINE

## 2023-12-24 PROCEDURE — 250N000013 HC RX MED GY IP 250 OP 250 PS 637

## 2023-12-24 PROCEDURE — 84478 ASSAY OF TRIGLYCERIDES: CPT | Performed by: STUDENT IN AN ORGANIZED HEALTH CARE EDUCATION/TRAINING PROGRAM

## 2023-12-24 PROCEDURE — 85027 COMPLETE CBC AUTOMATED: CPT | Performed by: INTERNAL MEDICINE

## 2023-12-24 PROCEDURE — 250N000013 HC RX MED GY IP 250 OP 250 PS 637: Performed by: INTERNAL MEDICINE

## 2023-12-24 PROCEDURE — 250N000013 HC RX MED GY IP 250 OP 250 PS 637: Performed by: HOSPITALIST

## 2023-12-24 PROCEDURE — 83735 ASSAY OF MAGNESIUM: CPT | Performed by: INTERNAL MEDICINE

## 2023-12-24 PROCEDURE — 84100 ASSAY OF PHOSPHORUS: CPT | Performed by: INTERNAL MEDICINE

## 2023-12-24 PROCEDURE — 250N000011 HC RX IP 250 OP 636: Mod: JZ | Performed by: INTERNAL MEDICINE

## 2023-12-24 PROCEDURE — 250N000013 HC RX MED GY IP 250 OP 250 PS 637: Performed by: STUDENT IN AN ORGANIZED HEALTH CARE EDUCATION/TRAINING PROGRAM

## 2023-12-24 PROCEDURE — 99233 SBSQ HOSP IP/OBS HIGH 50: CPT | Performed by: HOSPITALIST

## 2023-12-24 RX ORDER — ATORVASTATIN CALCIUM 80 MG/1
80 TABLET, FILM COATED ORAL AT BEDTIME
Status: DISCONTINUED | OUTPATIENT
Start: 2023-12-24 | End: 2024-01-03 | Stop reason: HOSPADM

## 2023-12-24 RX ORDER — CLOPIDOGREL BISULFATE 75 MG/1
75 TABLET ORAL DAILY
Status: DISCONTINUED | OUTPATIENT
Start: 2023-12-24 | End: 2023-12-26

## 2023-12-24 RX ORDER — MAGNESIUM OXIDE 400 MG/1
400 TABLET ORAL EVERY 4 HOURS
Status: COMPLETED | OUTPATIENT
Start: 2023-12-24 | End: 2023-12-24

## 2023-12-24 RX ADMIN — FINASTERIDE 5 MG: 5 TABLET, FILM COATED ORAL at 21:58

## 2023-12-24 RX ADMIN — ASCORBIC ACID, VITAMIN A PALMITATE, CHOLECALCIFEROL, THIAMINE HYDROCHLORIDE, RIBOFLAVIN-5 PHOSPHATE SODIUM, PYRIDOXINE HYDROCHLORIDE, NIACINAMIDE, DEXPANTHENOL, ALPHA-TOCOPHEROL ACETATE, VITAMIN K1, FOLIC ACID, BIOTIN, CYANOCOBALAMIN: 200; 3300; 200; 6; 3.6; 6; 40; 15; 10; 150; 600; 60; 5 INJECTION, SOLUTION INTRAVENOUS at 20:23

## 2023-12-24 RX ADMIN — ASPIRIN 81 MG CHEWABLE TABLET 81 MG: 81 TABLET CHEWABLE at 09:12

## 2023-12-24 RX ADMIN — CLOPIDOGREL BISULFATE 75 MG: 75 TABLET ORAL at 12:25

## 2023-12-24 RX ADMIN — SACUBITRIL AND VALSARTAN 1 TABLET: 24; 26 TABLET, FILM COATED ORAL at 09:12

## 2023-12-24 RX ADMIN — SACUBITRIL AND VALSARTAN 1 TABLET: 24; 26 TABLET, FILM COATED ORAL at 21:59

## 2023-12-24 RX ADMIN — ATORVASTATIN CALCIUM 80 MG: 80 TABLET, FILM COATED ORAL at 21:58

## 2023-12-24 RX ADMIN — Medication 400 MG: at 09:12

## 2023-12-24 RX ADMIN — LATANOPROST 1 DROP: 50 SOLUTION/ DROPS OPHTHALMIC at 21:59

## 2023-12-24 RX ADMIN — PANTOPRAZOLE SODIUM 40 MG: 40 TABLET, DELAYED RELEASE ORAL at 06:35

## 2023-12-24 RX ADMIN — OLANZAPINE 2.5 MG: 2.5 TABLET, FILM COATED ORAL at 06:35

## 2023-12-24 RX ADMIN — Medication 400 MG: at 12:25

## 2023-12-24 RX ADMIN — ENOXAPARIN SODIUM 40 MG: 40 INJECTION SUBCUTANEOUS at 12:25

## 2023-12-24 RX ADMIN — CARVEDILOL 12.5 MG: 12.5 TABLET, FILM COATED ORAL at 09:12

## 2023-12-24 RX ADMIN — CARVEDILOL 12.5 MG: 12.5 TABLET, FILM COATED ORAL at 20:43

## 2023-12-24 RX ADMIN — LEVOTHYROXINE SODIUM 100 MCG: 100 TABLET ORAL at 06:35

## 2023-12-24 RX ADMIN — ESCITALOPRAM OXALATE 20 MG: 10 TABLET ORAL at 09:12

## 2023-12-24 ASSESSMENT — ACTIVITIES OF DAILY LIVING (ADL)
ADLS_ACUITY_SCORE: 38
ADLS_ACUITY_SCORE: 37
ADLS_ACUITY_SCORE: 42
ADLS_ACUITY_SCORE: 42
ADLS_ACUITY_SCORE: 37
ADLS_ACUITY_SCORE: 42
ADLS_ACUITY_SCORE: 38
ADLS_ACUITY_SCORE: 37
ADLS_ACUITY_SCORE: 38

## 2023-12-24 NOTE — PROGRESS NOTES
[facility]    Medicine Progress Note - Hospitalist Service    Date of Admission:  12/8/2023    Assessment & Plan     88 year old male S/P Lap Appy for Perforated Appendicitis, and vascular assessment of questionable area on cecum through ICG administration.  He also has had issues with post op fluid collections that IR has placed a drain in and had a significant post-op ileus.  Initially underwent surgery on 12/8/2023 and required ICU management for septic shock.      Ultimately improved, and Hospitalist service assumed care on 12/11/2023.      Acute appendicitis with peritonitis and septic shock - s/p laparoscopic appendectomy 12/8/2023.  Pelvic fluid collections, question abscesses.  Postoperative ileus vs SBO, prolonged  Nutrition     -Initial presentation to OSH 12/7 as above. CT abdomen noted with acute appendicitis with 0.7 cm appendicolith, no abscess. Started on ampicillin-sulbactam. Subsequently transferred to [facility] 12/8.  On 12/8, underwent above surgery. Weaned off pressors. Antibiotics changed to pipericillin-tazobactam.   On 12/10, CT abdomen noted with multiple dilated small bowel loops with related decompression of colon with findings consistent with partial obstruction/ adynamic ileus.  On 12/12, diet advanced to clears as pt seemed to be improving with +flatus and BM. Later, developed more N/V and NG placed.  On 12/14, WBC elevated. Repeat abdominal CT showed prior appendectomy with findings suggestive of peritonitis, developing rim enhancing pelvic fluid collections, abscesses possible; increasing dilation of proximal small bowel with transition point in the right lower quadrant, suggestive of partial mechanical obstruction, developing adhesion is possible, no evidence of nadine bowel ischemia or acute perforation; distended gallbladder without additional CT evidence of acute cholecystitis.   -IR placed drain into right pelvic fluid collection (2/15/23)   -CT sinegram  12/19/23 - with drain in good position, min residual fluid and no fistula.   - Completed 14 days of IV pipericillin-tazobactam   - started TPN 12/15 - continuing but rate has been titrated down  -NG tube and ESTEFANY drain removed 12/21/23  -Surgery team has been following the patient-and he is having bowel movements and tolerating diet and we do plan to transition the patient off TPN and the surgery team is okay with Plavix and Lovenox for chemoprophylaxis and they have signed off     Acute hypoxic respiratory failure, suspect multifactorial related to atelectasis, recent abdominal surgery, resolved.  CARINA on home CPA:.  - Initial presentation as above. CXR at OSH negative.  - On 12/8, CXR showed new left basilar bandlike opacity, most likely atelectasis.   -This morning his lungs are clear to auscultation and he is on room air and we will continue to monitor he did require oxygen during the course of hospital stay and was on CPAP when sleeping-and he is on room air and we will monitor      FATIMAH on CKD3, suspect prerenal, meds (including sacubitril-valsartan, diuretics)-resolved  Hyperkalemia, resolved.  - Baseline creatinine 1-1.1.   -Creatinine 1.39 --> 1.8 at OSH.  - Cr up to 2.27 and K up to 5.5 on 12/8.  -Patient did receive IV Lasix on 12/10 and 12/11  -His renal function this morning is stable at 0.87 and we will continue to monitor     Troponin elevation, suspect demand ischemia (type 2 NSTEMI).  Hypertension (benign essential).  CAD s/p CABG (1994), stenting (2002, 2003, 2005, 2021)  Chronic HFrEF (systolic and diastolic), s/p ICD.  HLD.  Afib with RVR:  [PTA: carvedilol 12.5 mg BID; sacubitril-valsartan 49-51 mg BID; furosemide 20 mg daily.]  --has ICD and h/o CAD s/p CABG 1994  -Last stress testing in [place] 7/2023, which was negative for ischemia   -Last cath 1/2021 at [facility] showed patent CLAUDIA-RCA, LIMA occluded, vein-diagonal patent with placement of stent,  LAD, 40% circumflex, RCA occluded. Last stress  testing 7/2023 negative for ischemia. Echo 7/2023 showed LVEF 30-35%, global hypokinesis LV, moderate-severe cLVH, grade 1 diastolic dysfunction; RV systolic function mildly reduced.   - Initial presentation as above. BNP in ED at 2613, troponin 70. EKG w/ nonspecific t-w changes. PTA sacubitril-valsartan and furosemide held on admit given FATIMAH.  Briefly on IV diuretics.  -He was seen initially by cardiology on 12/8 and 12/9 for preop clearance and patient did had elevated troponin which was felt due to demand  -Patient did had RRT on 12/22 and his troponin was elevated  -Echo 12/22/23 - EF 20-25% with hyokinesis of the post wall.    -His prior echo in Care Everywhere showed that his EF was 30 to 35% with moderate hypokinesis of the left ventricle  -Cardiology was again reconsulted on 12/22 and patient was continued with aspirin 81 mg, Entresto was restarted on 12/22 and Plavix restarted on 12/24  -Plan for stress test on 12/26        Acute metabolic encephalopathy (delirium) -waxing and waning  -CT scan of the head on 12/21/2023 did not show any evidence of any acute intracranial hemorrhage and there is microvascular changes  -I have discussed with daughter and she told me that patient for the past 2 to 3 days has been more sleepy and he does seem to have hospital-acquired delirium and overnight he did get Zyprexa  -On my examination today patient is little sleepy but he is alert oriented x 2-3 and he is following commands and strength of the both upper and lower extremities is 5 x 5  -I also discussed with daughter and she told me that he used to be on Seroquel at 1 point in time in the past and that caused him to be sleepy  -Daughter also thinks that given that patient's Lexapro was held for few days along with gabapentin that might help withdrawal effect  -Discussed with daughter that given his ongoing on and off lethargy we will hold gabapentin for now and she understands and agrees     Hyperglycemia while on  TPN  - HbA1c is 6.8  -Given that he was getting TPN his he was on sliding scale insulin high-dose and was on Lantus 8 units  -Discussed with the pharmacy today and given that his TPN rate will be decreased they will be removing all insulin from the TPN that and we will continue with sliding scale insulin as he is eating and I have changed his Lantus to 10 units at bedtime and anticipate to wean off from Lantus in the next 1 to 2 days        Anemia, suspect partial blood loss component with surgery issues  -His last hemoglobin on 12/7/23 was hemoglobin of 16.8 with hematocrit of 49.3 and might have component of underlying dehydration  -His hemoglobin has been fluctuating during the course of hospital stay and is 12.6 today and is due to acute illness and is improving and there is no evidence of any GI bleed     Thrombocytopenia, unclear etiology, possibly due to sepsis, medication effect or consumptive or other cause result-resolved       Hypokalemi-Resolved       Hx CVA  -We will continue with aspirin 81 mg, resume Plavix 75 mg daily, resume atorvastatin 80 mg and Zetia 10 mg     Depression/anxiety.  - Continue to hold gabapentin for now   - Continue escitalopram daily - tolerating      Hypothyroidism.  - Continue levothyroxine.     GERD.  -Continue with PPI      BPH.  - will resume finasteride today     History of migraines  -Noted      Gallbladder distention  -This was found on the CT scan of the abdomen done on 12/14 and ultrasound was done on 12/15 without any evidence of cholecystitis                Diet: Snacks/Supplements Adult: Gelatein Plus; With Meals  Calorie Counts  parenteral nutrition - Clinimix E  Easy to Chew Diet (level 7)    DVT Prophylaxis: Enoxaparin (Lovenox) SQ  Hameed Catheter: Not present  Lines: PRESENT      PICC 12/15/23 Double Lumen Left Brachial vein medial TPN-Site Assessment: WDL      Cardiac Monitoring: ACTIVE order. Indication: Acute decompensated heart failure (48 hours)  Code Status:  "Full Code      Clinically Significant Risk Factors              # Hypoalbuminemia: Lowest albumin = 2.8 g/dL at 12/11/2023  5:26 AM, will monitor as appropriate     # Hypertension: Noted on problem list  # Chronic heart failure with reduced ejection fraction: last echo with EF <40%      # DMII: A1C = 6.8 % (Ref range: <5.7 %) within past 6 months   # Overweight: Estimated body mass index is 26.14 kg/m  as calculated from the following:    Height as of this encounter: 1.702 m (5' 7\").    Weight as of this encounter: 75.7 kg (166 lb 14.2 oz).             Disposition Plan      Expected Discharge Date: 12/27/2023,  3:00 PM      Discharge Comments: OR 12/8 12/16 CT guided drainage, JPs leaking.  Confused and agitated at times.            Chloe Rose MD  Hospitalist Service    Securely message with [facility] (more info)  Text page via [facility] Paging/Directory   ______________________________________________________________________    Interval History     Reviewed events of overnight and patient does get.  Of restlessness/agitation and received the zyprexa  last night.  Seen this morning and patient was in the bed and daughter and son-in-law were by side and that he was following commands and speaks some sentences but he is little sleepy.  I did discuss about advanced directives and he is full code    Physical Exam   Vital Signs: Temp: 97.5  F (36.4  C) Temp src: Oral BP: (!) 155/115 Pulse: 81   Resp: 24 SpO2: 96 % O2 Device: None (Room air)    Weight: 166 lbs 14.21 oz        General: aox2-3 but sleepy  HEENT: Head is atraumatic, normocephalic.  Pupils are equal, round and reactive to light.  No scleral icterus. Oral mucosa is moist   Neck: Neck is supple  Respiratory: Lungs are clear to auscultation bilaterally with no wheeze or crackles   Cardiovascular: Regular rate , S1 and S2 normal with no murmer or rubs or gallops  Abdomen:   soft , non tender , non distended and bowel sound present "   Skin: No skin rashes   Neurologic: No facial droop and strength is 4 x 5 over both upper and lower extremity  Musculoskeletal: Normal Range of motion over upper and lower extremities bilaterally   Psychiatric: cooperative      Medical Decision Making       Time spent in care of patient is 55 minutes and I reviewed his labs including CBC, basic metabolic panel, discussed his plan of care in detail with the patient, nursing staff, daughter and son-in-law and I also discussed in person with the surgery team and also reviewed note from cardiology and discussed with cardiology attending and the plan of care was formulated    Data     I have personally reviewed the following data over the past 24 hrs:    10.4  \   12.6 (L)   / 197     137 107 29.3 (H) /  198 (H)   4.8 24 0.87 \       Imaging results reviewed over the past 24 hrs:   No results found for this or any previous visit (from the past 24 hour(s)).

## 2023-12-24 NOTE — PROGRESS NOTES
"Acute Care Surgery Progress Note    Subjective: a bit more awake today. Daughter and son-in-law had question about the patient's having raspy voice. This could be secondary to irritation from NGT and ok to monitor for now. Continue to tolerate diet and having bowel functions.      Objective: BP (!) 155/115 (BP Location: Right arm)   Pulse 81   Temp 97.5  F (36.4  C) (Oral)   Resp 24   Ht 1.702 m (5' 7\")   Wt 75.7 kg (166 lb 14.2 oz)   SpO2 96%   BMI 26.14 kg/m    Gen: not in acute distress  CV: normal sinus  Pulm: nonlabored breathing on room air  Abd: soft, non-tender, mildly distended. Port site incisions healed  Ext: moving all extremities  Neuro/psych: somnolent but easily arousable. Not capable of consent    Assessment/Plan:   Garry Mckay  is a 88 year old male with complex cardiac history who was admitted with perforated appendicitis and septic shock on 12/18 s/p laparoscopic appendectomy and ICG evaluation of the cecum intra-op which was found to be viable. Course was complicated by prolonged ileus 2/2 intra-abdominal abscess and delirium. had been tolerating full liquid diet and delirium is steadily improving.    - continue soft diet  - transition the patient off TPN  - Lovenox for chemo ppx  - ok to resume Plavix from surgery standpoint  - cardiology is planning for cardiac stress test next week  - general surgery will follow peripherally. Please page or call if questions    Maylin Waters MD   PGY4 General Surgery Resident  [facility]            "

## 2023-12-24 NOTE — PROGRESS NOTES
"Cardiology Progress Note          Assessment and Plan:         88-year-old gentleman with extensive history of coronary artery disease status post CABG and known occlusion of the SVG to the OM with an atretic LIMA to LAD, recent nuclear perfusion study in 2023 demonstrating no evidence of ischemia, severe ischemic cardiomyopathy who was admitted to [facility] on 2023 for septic shock associated with perforated appendicitis.    He has been noted to have mildly elevated troponins during this hospitalization without any anginal symptoms.  Left ventricular systolic function is severely depressed on echocardiography but was also severely reduced on echocardiography in July of this year.  Guideline directed medical therapy is gradually being added back.  Plans are for potential stress perfusion imaging early next week.    IMPRESSION:    Coronary artery disease status post coronary bypass grafting in  and subsequent multiple PCI's.  Severe ischemic cardiomyopathy.  Septic shock secondary to perforated appendicitis.  Elevated troponins which are probably secondary to #3  Paroxysmal atrial fibrillation with spontaneous conversion to normal sinus rhythm on 2023  FATIMAH which has resolved    PLAN    -Entresto has been resumed appropriately.  -Will order stress perfusion study for 2023.  If no significant ischemia is identified then continued medical therapy would be appropriate.  -We will follow peripherally until the stress perfusion findings are available.        Interval History:     Much more alert this morning.  Denies any chest discomfort.             Review of Systems:   As per subjective, otherwise 5 systems reviewed and negative.           Physical Exam:   Blood pressure (!) 155/115, pulse 81, temperature 97.5  F (36.4  C), temperature source Oral, resp. rate 24, height 1.702 m (5' 7\"), weight 75.7 kg (166 lb 14.2 oz), SpO2 96%.      Vital Sign Ranges  Temperature Temp  Av.9  F " (36.6  C)  Min: 97.4  F (36.3  C)  Max: 98.4  F (36.9  C)   Blood pressure Systolic (24hrs), Av , Min:134 , Max:166        Diastolic (24hrs), Av, Min:62, Max:85      Pulse Pulse  Av.5  Min: 68  Max: 86   Respirations Resp  Av.6  Min: 14  Max: 32   Pulse oximetry SpO2  Av.2 %  Min: 95 %  Max: 98 %         Intake/Output Summary (Last 24 hours) at 2023 1122  Last data filed at 2023 0630  Gross per 24 hour   Intake 799 ml   Output 1200 ml   Net -401 ml       Constitutional: NAD  Skin: Warm and dry  Neck: No JVD  Lungs: CTA  Cardiovascular: RRR, no m/r/g  Abdomen: Soft, non tender.  Extremities and Back: No LE edema  Neurological: Nonfocal           Medications:     I have reviewed this patient's current medications.              Data:     Labs reviewed.             Minesh Phelps MD, M.D.

## 2023-12-24 NOTE — PLAN OF CARE
Orientation: A&Ox3, d/t situation, forgetful.  Vitals/Pain: VSS ex HTN, on RA. Denies pain.   Tele: SR w/inverted t-wave, BBB, freq PAC's & PVC's.   Diet: Tolerates easy to chew diet; calorie count; BG checks.   Lungs: Dim LS.  Lines/Drains: Double lumen PICC infusing TPN @60 ml/hr; no blood return noted after first 30 minute dwell time of alteplase in purple lumen, continue to dwell until ~2045.  Skin/Wounds: Abdominal lap sites BRENDAN. ESTEFANY drain site dressings CDI. R elbow skin tear, dressing CDI. Blanchable red bottom.   GI/: Incont of B&B; +BM; purewick w/AUO.   Labs: Replaced mg & phos per protocol.   Ambulation/Assist: Up A1 gb+w, sat in chair.   Plan: Lexican stress test on Tuesday.

## 2023-12-24 NOTE — PHARMACY-CONSULT NOTE
TPN formula evaluated based on today s labs results.    The following changes have been made:    Clinimix TPN (5% Amino Acids/ 15% Dextrose) reduced to 40 ml/hr starting at 8 pm today.     Insulin:  Removed all regular insulin from TPN (12 units per day).     Pharmacy will continue to follow and adjust as appropriate.     Selene Crum, YsabelD, BCPS

## 2023-12-24 NOTE — PROGRESS NOTES
CALORIE COUNT      Approximate Oral Intake for:    12/23/23  Calories:  684 kcal   Protein:  34 g protein       Intake from TF/PN:       Clinimix E (5/15) at 60 mL/hr (1440 mL), no Lipid = 1022 kcal, 72 g protein, 216 g CHO       Estimated Needs:    Dosing Weight: 71 kg (adjusted, based on 81.5 kg-- 12/14)  Estimated Energy Needs: 6022-8997 kcal (25-30 Kcal/Kg)  Justification: maintenance  Estimated Protein Needs:  grams protein (1.2-1.5 g pro/Kg)  Justification: post-op, hypercatabolism with acute illness  Estimated Fluid Needs: 1 mL/kcal  Justification: maintenance      Summary:   Total intake yesterday (TPN + oral) = 1706 kcal (24 kcal/kg), 106 g protein (1.5 g/kg)    Above oral intake is from 2 meals and 4 supplements (receiving Gelatein and [employer] 1.0)    Noted that diet advanced yesterday afternoon from Full Liquid to Easy to chew (level 7)    K/Mg/Phos NL  -260 (Medium sliding scale insulin + Lantus 18 units HS) - Running higher in anticipation of TPN discontinuing in the next day or so   I/O 1630/800, no weight since 12/22  BM x 1 today and x 4 yesterday     Discussed with Pharmacist --> Now that diet advanced to solids will decrease TPN further in hopes that oral intake will improve with advancement in diet     Orders placed to decrease TPN to D15 AA5 at 40 mL/hr= 682 kcal and 48 g protein (Meets ~40% energy and ~50% protein needs)    Plan to continue calorie count through 12/25 - recommend d/c'ing TPN once patient meeting 2/3 needs orally    Park Coleman, RD, LD, CNSC   Clinical Dietitian - [facility]

## 2023-12-24 NOTE — PROGRESS NOTES
Orientations: A&O to self and place. Hoarse speach  Vitals/Pain: VSS on room air. Denies pain  Tele: SR with ectopy and inverted T wave  Lines/Drains: L PICC- TPN at 60 ml/hr infusing  Skin/Wounds: abd lap sites x2. Scattered bruising. Scab to R forearm.   GI/: incontinent bowel and bladde- BM x1, voiding. Fair appetite- easy to chew diet.   Labs: Abnormal/Trends- elevated bg, Electrolyte Replacement- mag replaced po  Ambulation/Assist: X1 GBW  Sleep Quality: sleeping between cares  Plan: continue plan of care, reorient as needed. Transfer to [facility]/srug @ 1347

## 2023-12-24 NOTE — PROGRESS NOTES
Care Management Follow Up    Length of Stay (days): 16    Expected Discharge Date: 12/27/2023     Concerns to be Addressed: discharge planning     Patient plan of care discussed at interdisciplinary rounds: Yes    Anticipated Discharge Disposition: Transitional Care     Anticipated Discharge Services:    Anticipated Discharge DME:      Patient/family educated on Medicare website which has current facility and service quality ratings: yes  Education Provided on the Discharge Plan: Yes  Patient/Family in Agreement with the Plan: yes    Referrals Placed by CM/SW: Post Acute Facilities  Private pay costs discussed: Not applicable    Additional Information:  Writer sent referrals based on information obtained from chart review. Referrals sent in Epic near patients daughters home.     FRANCHESKA Colon

## 2023-12-24 NOTE — PLAN OF CARE
AxOx1-2, oriented to self and place; orientation does fluctuate. Forgetful. Lethargic, frequently gestures/whispers. Tele = SR w/ inverted T + frequent PAC's/PVC's.    VSS on RA. Assist x2; turn and repo q2. Easy-chew diet w/ calorie count, low appetite. Incontinent of B+B; adequate urine output throughout shift, BM x2.    L 2-lumen PICC, infusing TPN at 60. Blood return noted in purple lumen.    Blanchable redness to buttocks/perineum, barrier cream applied.  Abdominal lap sites BRENDAN w/ adhesive strips.  R abdominal ESTEFANY sites BRENDAN / covered with gauze.    Denies pain / nausea / shortness of breath.    , 182.    PRN PO Zyprexa given x1 for increased restlessness / agitation.     Plan for Lexiscan stress test on Tuesday. Continue plan of care.

## 2023-12-25 LAB
ALBUMIN SERPL BCG-MCNC: 3.2 G/DL (ref 3.5–5.2)
ALP SERPL-CCNC: 116 U/L (ref 40–150)
ALT SERPL W P-5'-P-CCNC: 14 U/L (ref 0–70)
ANION GAP SERPL CALCULATED.3IONS-SCNC: 9 MMOL/L (ref 7–15)
AST SERPL W P-5'-P-CCNC: 20 U/L (ref 0–45)
BILIRUB SERPL-MCNC: 0.5 MG/DL
BUN SERPL-MCNC: 29.5 MG/DL (ref 8–23)
CALCIUM SERPL-MCNC: 9.7 MG/DL (ref 8.8–10.2)
CHLORIDE SERPL-SCNC: 105 MMOL/L (ref 98–107)
CREAT SERPL-MCNC: 0.96 MG/DL (ref 0.67–1.17)
DEPRECATED HCO3 PLAS-SCNC: 23 MMOL/L (ref 22–29)
EGFRCR SERPLBLD CKD-EPI 2021: 77 ML/MIN/1.73M2
ERYTHROCYTE [DISTWIDTH] IN BLOOD BY AUTOMATED COUNT: 14 % (ref 10–15)
GLUCOSE BLDC GLUCOMTR-MCNC: 152 MG/DL (ref 70–99)
GLUCOSE BLDC GLUCOMTR-MCNC: 156 MG/DL (ref 70–99)
GLUCOSE BLDC GLUCOMTR-MCNC: 173 MG/DL (ref 70–99)
GLUCOSE BLDC GLUCOMTR-MCNC: 176 MG/DL (ref 70–99)
GLUCOSE BLDC GLUCOMTR-MCNC: 201 MG/DL (ref 70–99)
GLUCOSE BLDC GLUCOMTR-MCNC: 231 MG/DL (ref 70–99)
GLUCOSE SERPL-MCNC: 192 MG/DL (ref 70–99)
HCT VFR BLD AUTO: 45 % (ref 40–53)
HGB BLD-MCNC: 14.4 G/DL (ref 13.3–17.7)
MAGNESIUM SERPL-MCNC: 1.9 MG/DL (ref 1.7–2.3)
MCH RBC QN AUTO: 31.5 PG (ref 26.5–33)
MCHC RBC AUTO-ENTMCNC: 32 G/DL (ref 31.5–36.5)
MCV RBC AUTO: 99 FL (ref 78–100)
PHOSPHATE SERPL-MCNC: 3 MG/DL (ref 2.5–4.5)
PLATELET # BLD AUTO: 192 10E3/UL (ref 150–450)
POTASSIUM SERPL-SCNC: 5 MMOL/L (ref 3.4–5.3)
PROT SERPL-MCNC: 6.2 G/DL (ref 6.4–8.3)
RBC # BLD AUTO: 4.57 10E6/UL (ref 4.4–5.9)
SODIUM SERPL-SCNC: 137 MMOL/L (ref 135–145)
TRIGL SERPL-MCNC: 400 MG/DL
WBC # BLD AUTO: 10.4 10E3/UL (ref 4–11)

## 2023-12-25 PROCEDURE — 250N000013 HC RX MED GY IP 250 OP 250 PS 637: Performed by: STUDENT IN AN ORGANIZED HEALTH CARE EDUCATION/TRAINING PROGRAM

## 2023-12-25 PROCEDURE — 250N000013 HC RX MED GY IP 250 OP 250 PS 637

## 2023-12-25 PROCEDURE — 250N000013 HC RX MED GY IP 250 OP 250 PS 637: Performed by: NURSE PRACTITIONER

## 2023-12-25 PROCEDURE — 250N000011 HC RX IP 250 OP 636: Mod: JZ | Performed by: INTERNAL MEDICINE

## 2023-12-25 PROCEDURE — 250N000013 HC RX MED GY IP 250 OP 250 PS 637: Performed by: HOSPITALIST

## 2023-12-25 PROCEDURE — 84100 ASSAY OF PHOSPHORUS: CPT | Performed by: STUDENT IN AN ORGANIZED HEALTH CARE EDUCATION/TRAINING PROGRAM

## 2023-12-25 PROCEDURE — 250N000013 HC RX MED GY IP 250 OP 250 PS 637: Performed by: INTERNAL MEDICINE

## 2023-12-25 PROCEDURE — 120N000001 HC R&B MED SURG/OB

## 2023-12-25 PROCEDURE — 250N000009 HC RX 250: Performed by: INTERNAL MEDICINE

## 2023-12-25 PROCEDURE — 99232 SBSQ HOSP IP/OBS MODERATE 35: CPT | Performed by: HOSPITALIST

## 2023-12-25 PROCEDURE — 36415 COLL VENOUS BLD VENIPUNCTURE: CPT | Performed by: STUDENT IN AN ORGANIZED HEALTH CARE EDUCATION/TRAINING PROGRAM

## 2023-12-25 PROCEDURE — 250N000009 HC RX 250

## 2023-12-25 PROCEDURE — 80053 COMPREHEN METABOLIC PANEL: CPT | Performed by: STUDENT IN AN ORGANIZED HEALTH CARE EDUCATION/TRAINING PROGRAM

## 2023-12-25 PROCEDURE — 85027 COMPLETE CBC AUTOMATED: CPT | Performed by: HOSPITALIST

## 2023-12-25 PROCEDURE — 250N000011 HC RX IP 250 OP 636: Mod: JZ | Performed by: STUDENT IN AN ORGANIZED HEALTH CARE EDUCATION/TRAINING PROGRAM

## 2023-12-25 PROCEDURE — 83735 ASSAY OF MAGNESIUM: CPT | Performed by: STUDENT IN AN ORGANIZED HEALTH CARE EDUCATION/TRAINING PROGRAM

## 2023-12-25 RX ORDER — GABAPENTIN 100 MG/1
100 CAPSULE ORAL ONCE
Status: COMPLETED | OUTPATIENT
Start: 2023-12-25 | End: 2023-12-25

## 2023-12-25 RX ORDER — WATER 10 ML/10ML
INJECTION INTRAMUSCULAR; INTRAVENOUS; SUBCUTANEOUS
Status: COMPLETED
Start: 2023-12-25 | End: 2023-12-25

## 2023-12-25 RX ADMIN — CARVEDILOL 12.5 MG: 12.5 TABLET, FILM COATED ORAL at 08:26

## 2023-12-25 RX ADMIN — CLOPIDOGREL BISULFATE 75 MG: 75 TABLET ORAL at 08:26

## 2023-12-25 RX ADMIN — LEVOTHYROXINE SODIUM 100 MCG: 100 TABLET ORAL at 06:18

## 2023-12-25 RX ADMIN — ALTEPLASE 2 MG: 2.2 INJECTION, POWDER, LYOPHILIZED, FOR SOLUTION INTRAVENOUS at 20:34

## 2023-12-25 RX ADMIN — SACUBITRIL AND VALSARTAN 1 TABLET: 24; 26 TABLET, FILM COATED ORAL at 08:26

## 2023-12-25 RX ADMIN — SACUBITRIL AND VALSARTAN 1 TABLET: 24; 26 TABLET, FILM COATED ORAL at 20:42

## 2023-12-25 RX ADMIN — ESCITALOPRAM OXALATE 20 MG: 10 TABLET ORAL at 08:25

## 2023-12-25 RX ADMIN — WATER 10 ML: 1 INJECTION INTRAMUSCULAR; INTRAVENOUS; SUBCUTANEOUS at 20:42

## 2023-12-25 RX ADMIN — GABAPENTIN 100 MG: 100 CAPSULE ORAL at 21:50

## 2023-12-25 RX ADMIN — CARVEDILOL 12.5 MG: 12.5 TABLET, FILM COATED ORAL at 20:42

## 2023-12-25 RX ADMIN — LATANOPROST 1 DROP: 50 SOLUTION/ DROPS OPHTHALMIC at 21:51

## 2023-12-25 RX ADMIN — FINASTERIDE 5 MG: 5 TABLET, FILM COATED ORAL at 21:50

## 2023-12-25 RX ADMIN — PANTOPRAZOLE SODIUM 40 MG: 40 TABLET, DELAYED RELEASE ORAL at 06:18

## 2023-12-25 RX ADMIN — ATORVASTATIN CALCIUM 80 MG: 80 TABLET, FILM COATED ORAL at 21:50

## 2023-12-25 RX ADMIN — ENOXAPARIN SODIUM 40 MG: 40 INJECTION SUBCUTANEOUS at 12:02

## 2023-12-25 RX ADMIN — ASCORBIC ACID, VITAMIN A PALMITATE, CHOLECALCIFEROL, THIAMINE HYDROCHLORIDE, RIBOFLAVIN-5 PHOSPHATE SODIUM, PYRIDOXINE HYDROCHLORIDE, NIACINAMIDE, DEXPANTHENOL, ALPHA-TOCOPHEROL ACETATE, VITAMIN K1, FOLIC ACID, BIOTIN, CYANOCOBALAMIN: 200; 3300; 200; 6; 3.6; 6; 40; 15; 10; 150; 600; 60; 5 INJECTION, SOLUTION INTRAVENOUS at 20:37

## 2023-12-25 RX ADMIN — ASPIRIN 81 MG 81 MG: 81 TABLET ORAL at 08:25

## 2023-12-25 RX ADMIN — ACETAMINOPHEN 650 MG: 325 SUSPENSION ORAL at 18:49

## 2023-12-25 ASSESSMENT — ACTIVITIES OF DAILY LIVING (ADL)
ADLS_ACUITY_SCORE: 37

## 2023-12-25 NOTE — PLAN OF CARE
Pt is AOx2, not to time and situation, follow commands, poor appetite, intake encouraged, incontinent at times, PICC running TPN, assist x1, able to swallow pills whole, lap sites wnl, tele Bigeminy with inverted T waves, on call MD notified and aware, pending lexiscan on 12/26.

## 2023-12-25 NOTE — PROGRESS NOTES
CALORIE COUNT      Approximate Oral Intake for:    12/25  Calories: 148 kcal  Protein: 8 gm     Above reflects 1 meal (25%) and 0 supplements    Intake from TF/PN:   Clinimix E (5/15) at 60 mL/hr (1440 mL), no Lipid = 1022 kcal, 72 g protein, 216 g CHO     (12 am - 8 pm = 777 kcal and 54 gm protein)     D15 AA5 at 40 mL/hr= 682 kcal and 48 g protein (Meets ~40% energy and ~50% protein needs)   (8 pm - 12 pm = 171 kcal and 36 gm protein)      Estimated Needs:    Dosing Weight: 71 kg (adjusted, based on 81.5 kg-- 12/14)  Estimated Energy Needs: 1062-2499 kcal (25-30 Kcal/Kg)  Justification: maintenance  Estimated Protein Needs:  grams protein (1.2-1.5 g pro/Kg)  Justification: post-op, hypercatabolism with acute illness  Estimated Fluid Needs: 1 mL/kcal  Justification: maintenance      Summary: Diet - now NPO vs Easy to Chew (level 7) and Gelatein with meals    Total intake yesterday (both TPN formulas + oral) = 1096 kcal (15 kcal/kg) and 98 gm protein (1.4 g/kg)    Lytes and Alk Phos - NL  BG - 177   TG - 400  I/O: +1138 ml (no output volume recorded but x5 urine)   Wt: old (12/22) - 75.7 kg   BM: x2 yesterday    Calorie count to continue through today and hopefully may consider discontinuing TPN if po intake today is adequate (>66% of est needs orally)    Raghav Dao RD, LD

## 2023-12-25 NOTE — PROGRESS NOTES
[facility]    Medicine Progress Note - Hospitalist Service    Date of Admission:  12/8/2023    Assessment & Plan     88 year old male S/P Lap Appy for Perforated Appendicitis, and vascular assessment of questionable area on cecum through ICG administration.  He also has had issues with post op fluid collections that IR has placed a drain in and had a significant post-op ileus.  Initially underwent surgery on 12/8/2023 and required ICU management for septic shock.      Ultimately improved, and Hospitalist service assumed care on 12/11/2023.      Acute appendicitis with peritonitis and septic shock - s/p laparoscopic appendectomy 12/8/2023.  Pelvic fluid collections, question abscesses.  Postoperative ileus vs SBO, prolonged  Nutrition     -Initial presentation to OSH 12/7 as above. CT abdomen noted with acute appendicitis with 0.7 cm appendicolith, no abscess. Started on ampicillin-sulbactam. Subsequently transferred to [facility] 12/8.  On 12/8, underwent above surgery. Weaned off pressors. Antibiotics changed to pipericillin-tazobactam.   On 12/10, CT abdomen noted with multiple dilated small bowel loops with related decompression of colon with findings consistent with partial obstruction/ adynamic ileus.  On 12/12, diet advanced to clears as pt seemed to be improving with +flatus and BM. Later, developed more N/V and NG placed.  On 12/14, WBC elevated. Repeat abdominal CT showed prior appendectomy with findings suggestive of peritonitis, developing rim enhancing pelvic fluid collections, abscesses possible; increasing dilation of proximal small bowel with transition point in the right lower quadrant, suggestive of partial mechanical obstruction, developing adhesion is possible, no evidence of nadine bowel ischemia or acute perforation; distended gallbladder without additional CT evidence of acute cholecystitis.   -IR placed drain into right pelvic fluid collection (2/15/23)   -CT sinegram  12/19/23 - with drain in good position, min residual fluid and no fistula.   - Completed 14 days of IV pipericillin-tazobactam   - started TPN 12/15 - continuing but rate has been titrated down  -NG tube and ESTEFANY drain removed 12/21/23  -Surgery team has been following the patient-and he is having bowel movements and tolerating diet and we do plan to transition the patient off TPN and the surgery team is okay with Plavix and Lovenox for chemoprophylaxis and they have signed off  -continue calorie count with plan to wean tpn off soon     Acute hypoxic respiratory failure, suspect multifactorial related to atelectasis, recent abdominal surgery, resolved.  CARINA on home CPA:.  - Initial presentation as above. CXR at OSH negative.  - On 12/8, CXR showed new left basilar bandlike opacity, most likely atelectasis.   -This morning his lungs are clear to auscultation and he is on room air and we will continue to monitor he did require oxygen during the course of hospital stay and was on CPAP when sleeping-and he is on room air and we will monitor      FATIMAH on CKD3, suspect prerenal, meds (including sacubitril-valsartan, diuretics)-resolved  Hyperkalemia, resolved.  - Baseline creatinine 1-1.1.   -Creatinine 1.39 --> 1.8 at OSH.  - Cr up to 2.27 and K up to 5.5 on 12/8.  -Patient did receive IV Lasix on 12/10 and 12/11  -His renal function this morning is stable at 0.96 and we will continue to monitor     Troponin elevation, suspect demand ischemia (type 2 NSTEMI).  Hypertension (benign essential).  CAD s/p CABG (1994), stenting (2002, 2003, 2005, 2021)  Chronic HFrEF (systolic and diastolic), s/p ICD.  HLD.  Afib with RVR:  [PTA: carvedilol 12.5 mg BID; sacubitril-valsartan 49-51 mg BID; furosemide 20 mg daily.]  --has ICD and h/o CAD s/p CABG 1994  -Last stress testing in [place] 7/2023, which was negative for ischemia   -Last cath 1/2021 at [facility] showed patent CLAUDIA-RCA, LIMA occluded, vein-diagonal patent with placement of  stent,  LAD, 40% circumflex, RCA occluded. Last stress testing 7/2023 negative for ischemia. Echo 7/2023 showed LVEF 30-35%, global hypokinesis LV, moderate-severe cLVH, grade 1 diastolic dysfunction; RV systolic function mildly reduced.   - Initial presentation as above. BNP in ED at 2613, troponin 70. EKG w/ nonspecific t-w changes. PTA sacubitril-valsartan and furosemide held on admit given FATIMAH.  Briefly on IV diuretics.  -He was seen initially by cardiology on 12/8 and 12/9 for preop clearance and patient did had elevated troponin which was felt due to demand  -Patient did had RRT on 12/22 and his troponin was elevated  -Echo 12/22/23 - EF 20-25% with hyokinesis of the post wall.    -His prior echo in [facility] showed that his EF was 30 to 35% with moderate hypokinesis of the left ventricle  -Cardiology was again reconsulted on 12/22 and patient was continued with aspirin 81 mg, Entresto was restarted on 12/22 and Plavix restarted on 12/24  -Plan for stress test on 12/26  - will make NPO after midnight         Acute metabolic encephalopathy (delirium) -waxing and waning  -CT scan of the head on 12/21/2023 did not show any evidence of any acute intracranial hemorrhage and there is microvascular changes  -I have discussed with daughter and she told me that patient for the past 2 to 3 days has been more sleepy and he does seem to have hospital-acquired delirium and on night of 12/23 he did get Zyprexa  -I also discussed with daughter and she told me that he used to be on Seroquel at 1 point in time in the past and that caused him to be sleepy  -Daughter also thinks that given that patient's Lexapro was held for few days along with gabapentin that might help withdrawal effect  -Discussed with daughter  and he is much more alert today and did not receive any overnight PRNs and will review patient again later in the day and plan to introduce evening dose of gabapentin if he is more alert as per daughter it  might help him sleep.     Hyperglycemia while on TPN  - HbA1c is 6.8  -Patient blood sugars are being monitored and his TPN is being weaned off and he is currently on Lantus 10 units at bedtime along with medium dose sliding scale and will continue to monitor        Anemia, suspect partial blood loss component with surgery [id number]  -His last hemoglobin on 12/7/23 was hemoglobin of 16.8 with hematocrit of 49.3 and might have component of underlying dehydration  -His hemoglobin has been fluctuating during the course of hospital stay and is 12.6 today and is due to acute illness and is improving and there is no evidence of any GI bleed  -Hb is normal today at 14.4     Thrombocytopenia, unclear etiology, possibly due to sepsis, medication effect or consumptive or other cause result-resolved       Hypokalemi-Resolved       Hx CVA  -We will continue with aspirin 81 mg, resume Plavix 75 mg daily, resume atorvastatin 80 mg and Zetia 10 mg     Depression/anxiety.  - Continue to hold gabapentin for now   - Continue escitalopram daily - tolerating      Hypothyroidism.  - Continue levothyroxine.     GERD.  -Continue with PPI      BPH.  - will resume finasteride today     History of migraines  -Noted      Gallbladder distention  -This was found on the CT scan of the abdomen done on 12/14 and ultrasound was done on 12/15 without any evidence of cholecystitis                Diet: Snacks/Supplements Adult: Gelatein Plus; With Meals  Calorie Counts  Easy to Chew Diet (level 7)  parenteral nutrition - Clinimix E  parenteral nutrition - Clinimix E    DVT Prophylaxis: Enoxaparin (Lovenox) SQ  Hameed Catheter: Not present  Lines: PRESENT      PICC 12/15/23 Double Lumen Left Brachial vein medial TPN-Site Assessment: WDL      Cardiac Monitoring: ACTIVE order. Indication: Acute decompensated heart failure (48 hours)  Code Status: Full Code      Clinically Significant Risk Factors           # Hypercalcemia: corrected calcium  "is >10.1, will monitor as appropriate    # Hypoalbuminemia: Lowest albumin = 2.8 g/dL at 12/11/2023  5:26 AM, will monitor as appropriate     # Hypertension: Noted on problem list    # Chronic heart failure with reduced ejection fraction: last echo with EF <40%      # DMII: A1C = 6.8 % (Ref range: <5.7 %) within past 6 months   # Overweight: Estimated body mass index is 26.14 kg/m  as calculated from the following:    Height as of this encounter: 1.702 m (5' 7\").    Weight as of this encounter: 75.7 kg (166 lb 14.2 oz).             Disposition Plan      Expected Discharge Date: 12/27/2023,  3:00 PM      Discharge Comments: OR 12/8 12/16 CT guided drainage, JPs leaking.  Confused and agitated at times.            Chloe Rose MD  Hospitalist Service  [facility]  Securely message with [employer] (more info)  Text page via [employer] Paging/Directory   ______________________________________________________________________    Interval History       Seen today and patient is laying in the bed and her daughter and grandson were visiting and he is much more alert than yesterday.  He did not receive any PRNs for agitation or restless last night.  Daughter was wondering if he can try the evening dose of gabapentin and I told her that we will revisit the patient later in the day and if he is alert then we can order and she was okay with the plan.  Discussed with nursing staff and the family count is being done and the plan is to be off TPN if his the oral intake is adequate    Physical Exam   Vital Signs: Temp: 98  F (36.7  C) Temp src: Axillary BP: 137/73 Pulse: 65   Resp: 17 SpO2: 98 % O2 Device: BiPAP/CPAP    Weight: 166 lbs 14.21 oz        General: aox2-3 and is more alert than yesterday  Respiratory: Lungs are clear to auscultation bilaterally with no wheeze or crackles   Cardiovascular: Regular rate , S1 and S2 normal with no murmer or rubs or gallops  Abdomen:   soft , non tender , non distended and " bowel sound present   Skin: No skin rashes   Neurologic: No facial droop and strength is 4 x 5 over both upper and lower extremity  Musculoskeletal: Normal Range of motion over upper and lower extremities bilaterally   Psychiatric: cooperative      Medical Decision Making       Time spent in care of patient is 40 minutes and I reviewed his labs including CBC, basic metabolic panel, vital signs, discussed plan of care in detail with the patient, nursing staff with RN Tano, daughter and the patient and questions were answered to satisfaction    Data     I have personally reviewed the following data over the past 24 hrs:    10.4  \   14.4   / 192     137 105 29.5 (H) /  231 (H)   5.0 23 0.96 \     ALT: 14 AST: 20 AP: 116 TBILI: 0.5   ALB: 3.2 (L) TOT PROTEIN: 6.2 (L) LIPASE: N/A       Imaging results reviewed over the past 24 hrs:   No results found for this or any previous visit (from the past 24 hour(s)).

## 2023-12-25 NOTE — PLAN OF CARE
Date & Time: 12/24/23 2915-5964  Surgery/POD#: Laparoscopic appendectomy 12/8  Fall Risk: Yes  Orientation:A&Ox2, disoriented to time and situation  ABNL VS/O2:VSS on RA, elevated /75  ABNL Labs:   Pain Management:Denies  Bowel/Bladder: Inc of B/B  Drains: Double lumen PICC infusing TPN at 60ml/hr  Diet:Easy to chew  Activity Level: Up with 1gb and walker  Tests/Procedures: Lexiscan on 12/26  Anticipated  DC Date: Pending  Significant Information: SW/Cardiology/Surgery following.

## 2023-12-26 ENCOUNTER — APPOINTMENT (OUTPATIENT)
Dept: NUCLEAR MEDICINE | Facility: CLINIC | Age: 88
DRG: 853 | End: 2023-12-26
Attending: INTERNAL MEDICINE
Payer: MEDICARE

## 2023-12-26 LAB
ANION GAP SERPL CALCULATED.3IONS-SCNC: 12 MMOL/L (ref 7–15)
BUN SERPL-MCNC: 29.3 MG/DL (ref 8–23)
CALCIUM SERPL-MCNC: 9.7 MG/DL (ref 8.8–10.2)
CHLORIDE SERPL-SCNC: 104 MMOL/L (ref 98–107)
CHOLEST SERPL-MCNC: 134 MG/DL
CREAT SERPL-MCNC: 0.97 MG/DL (ref 0.67–1.17)
CV STRESS MAX HR HE: 80
DEPRECATED HCO3 PLAS-SCNC: 22 MMOL/L (ref 22–29)
EGFRCR SERPLBLD CKD-EPI 2021: 76 ML/MIN/1.73M2
ERYTHROCYTE [DISTWIDTH] IN BLOOD BY AUTOMATED COUNT: 14.2 % (ref 10–15)
GLUCOSE BLDC GLUCOMTR-MCNC: 158 MG/DL (ref 70–99)
GLUCOSE BLDC GLUCOMTR-MCNC: 163 MG/DL (ref 70–99)
GLUCOSE BLDC GLUCOMTR-MCNC: 166 MG/DL (ref 70–99)
GLUCOSE BLDC GLUCOMTR-MCNC: 177 MG/DL (ref 70–99)
GLUCOSE SERPL-MCNC: 174 MG/DL (ref 70–99)
HCT VFR BLD AUTO: 46.6 % (ref 40–53)
HDLC SERPL-MCNC: 21 MG/DL
HGB BLD-MCNC: 15.3 G/DL (ref 13.3–17.7)
LDLC SERPL CALC-MCNC: 74 MG/DL
MAGNESIUM SERPL-MCNC: 1.9 MG/DL (ref 1.7–2.3)
MCH RBC QN AUTO: 32.1 PG (ref 26.5–33)
MCHC RBC AUTO-ENTMCNC: 32.8 G/DL (ref 31.5–36.5)
MCV RBC AUTO: 98 FL (ref 78–100)
NONHDLC SERPL-MCNC: 113 MG/DL
PHOSPHATE SERPL-MCNC: 3.2 MG/DL (ref 2.5–4.5)
PLATELET # BLD AUTO: 164 10E3/UL (ref 150–450)
POTASSIUM SERPL-SCNC: 4.6 MMOL/L (ref 3.4–5.3)
RATE PRESSURE PRODUCT: 9760
RBC # BLD AUTO: 4.77 10E6/UL (ref 4.4–5.9)
SODIUM SERPL-SCNC: 138 MMOL/L (ref 135–145)
STRESS ECHO BASELINE DIASTOLIC HE: 69
STRESS ECHO BASELINE HR: 71 BPM
STRESS ECHO BASELINE SYSTOLIC BP: 114
STRESS ECHO CALCULATED PERCENT HR: 60 %
STRESS ECHO LAST STRESS DIASTOLIC BP: 73
STRESS ECHO LAST STRESS SYSTOLIC BP: 122
STRESS ECHO TARGET HR: 134
TRIGL SERPL-MCNC: 196 MG/DL
WBC # BLD AUTO: 10 10E3/UL (ref 4–11)

## 2023-12-26 PROCEDURE — 78452 HT MUSCLE IMAGE SPECT MULT: CPT | Mod: MF

## 2023-12-26 PROCEDURE — 250N000011 HC RX IP 250 OP 636: Mod: JZ | Performed by: INTERNAL MEDICINE

## 2023-12-26 PROCEDURE — 99232 SBSQ HOSP IP/OBS MODERATE 35: CPT | Performed by: HOSPITALIST

## 2023-12-26 PROCEDURE — 250N000013 HC RX MED GY IP 250 OP 250 PS 637

## 2023-12-26 PROCEDURE — 93016 CV STRESS TEST SUPVJ ONLY: CPT | Performed by: INTERNAL MEDICINE

## 2023-12-26 PROCEDURE — 250N000013 HC RX MED GY IP 250 OP 250 PS 637: Performed by: INTERNAL MEDICINE

## 2023-12-26 PROCEDURE — 93455 CORONARY ART/GRFT ANGIO S&I: CPT | Mod: 26 | Performed by: INTERNAL MEDICINE

## 2023-12-26 PROCEDURE — A9500 TC99M SESTAMIBI: HCPCS | Performed by: INTERNAL MEDICINE

## 2023-12-26 PROCEDURE — 93017 CV STRESS TEST TRACING ONLY: CPT

## 2023-12-26 PROCEDURE — 85347 COAGULATION TIME ACTIVATED: CPT

## 2023-12-26 PROCEDURE — 343N000001 HC RX 343: Performed by: INTERNAL MEDICINE

## 2023-12-26 PROCEDURE — 250N000013 HC RX MED GY IP 250 OP 250 PS 637: Performed by: HOSPITALIST

## 2023-12-26 PROCEDURE — 210N000001 HC R&B IMCU HEART CARE

## 2023-12-26 PROCEDURE — 85027 COMPLETE CBC AUTOMATED: CPT | Performed by: HOSPITALIST

## 2023-12-26 PROCEDURE — 250N000013 HC RX MED GY IP 250 OP 250 PS 637: Performed by: NURSE PRACTITIONER

## 2023-12-26 PROCEDURE — C1769 GUIDE WIRE: HCPCS | Performed by: INTERNAL MEDICINE

## 2023-12-26 PROCEDURE — 027034Z DILATION OF CORONARY ARTERY, ONE ARTERY WITH DRUG-ELUTING INTRALUMINAL DEVICE, PERCUTANEOUS APPROACH: ICD-10-PCS | Performed by: INTERNAL MEDICINE

## 2023-12-26 PROCEDURE — 02F03ZZ FRAGMENTATION IN CORONARY ARTERY, ONE ARTERY, PERCUTANEOUS APPROACH: ICD-10-PCS | Performed by: INTERNAL MEDICINE

## 2023-12-26 PROCEDURE — 80048 BASIC METABOLIC PNL TOTAL CA: CPT | Performed by: HOSPITALIST

## 2023-12-26 PROCEDURE — C1760 CLOSURE DEV, VASC: HCPCS | Performed by: INTERNAL MEDICINE

## 2023-12-26 PROCEDURE — 82465 ASSAY BLD/SERUM CHOLESTEROL: CPT | Performed by: INTERNAL MEDICINE

## 2023-12-26 PROCEDURE — C1725 CATH, TRANSLUMIN NON-LASER: HCPCS | Performed by: INTERNAL MEDICINE

## 2023-12-26 PROCEDURE — 258N000003 HC RX IP 258 OP 636: Performed by: INTERNAL MEDICINE

## 2023-12-26 PROCEDURE — 99152 MOD SED SAME PHYS/QHP 5/>YRS: CPT | Performed by: INTERNAL MEDICINE

## 2023-12-26 PROCEDURE — 250N000013 HC RX MED GY IP 250 OP 250 PS 637: Performed by: STUDENT IN AN ORGANIZED HEALTH CARE EDUCATION/TRAINING PROGRAM

## 2023-12-26 PROCEDURE — 78452 HT MUSCLE IMAGE SPECT MULT: CPT | Mod: 26 | Performed by: INTERNAL MEDICINE

## 2023-12-26 PROCEDURE — 92937 PRQ TRLUML REVSC CAB GRF 1: CPT | Mod: LD | Performed by: INTERNAL MEDICINE

## 2023-12-26 PROCEDURE — 93005 ELECTROCARDIOGRAM TRACING: CPT

## 2023-12-26 PROCEDURE — 272N000001 HC OR GENERAL SUPPLY STERILE: Performed by: INTERNAL MEDICINE

## 2023-12-26 PROCEDURE — C1874 STENT, COATED/COV W/DEL SYS: HCPCS | Performed by: INTERNAL MEDICINE

## 2023-12-26 PROCEDURE — C9604 PERC D-E COR REVASC T CABG S: HCPCS | Performed by: INTERNAL MEDICINE

## 2023-12-26 PROCEDURE — C1753 CATH, INTRAVAS ULTRASOUND: HCPCS | Performed by: INTERNAL MEDICINE

## 2023-12-26 PROCEDURE — 0715T HC PRQ TRLUML CORONARY LITHOTRIPSY: CPT | Performed by: INTERNAL MEDICINE

## 2023-12-26 PROCEDURE — 93018 CV STRESS TEST I&R ONLY: CPT | Performed by: INTERNAL MEDICINE

## 2023-12-26 PROCEDURE — 36415 COLL VENOUS BLD VENIPUNCTURE: CPT | Performed by: HOSPITALIST

## 2023-12-26 PROCEDURE — C1887 CATHETER, GUIDING: HCPCS | Performed by: INTERNAL MEDICINE

## 2023-12-26 PROCEDURE — 83735 ASSAY OF MAGNESIUM: CPT | Performed by: INTERNAL MEDICINE

## 2023-12-26 PROCEDURE — 250N000009 HC RX 250: Performed by: INTERNAL MEDICINE

## 2023-12-26 PROCEDURE — 250N000011 HC RX IP 250 OP 636: Performed by: INTERNAL MEDICINE

## 2023-12-26 PROCEDURE — 999N000049 HC STATISTIC ECHO STRESS OR NM NPI

## 2023-12-26 PROCEDURE — 0715T PR PERCUTANEOUS TRANSLUMINAL CORONARY LITHOTRIPSY: CPT | Mod: 59 | Performed by: INTERNAL MEDICINE

## 2023-12-26 PROCEDURE — B2111ZZ FLUOROSCOPY OF MULTIPLE CORONARY ARTERIES USING LOW OSMOLAR CONTRAST: ICD-10-PCS | Performed by: INTERNAL MEDICINE

## 2023-12-26 PROCEDURE — G1010 CDSM STANSON: HCPCS | Performed by: INTERNAL MEDICINE

## 2023-12-26 PROCEDURE — 84100 ASSAY OF PHOSPHORUS: CPT | Performed by: INTERNAL MEDICINE

## 2023-12-26 PROCEDURE — 93010 ELECTROCARDIOGRAM REPORT: CPT | Performed by: INTERNAL MEDICINE

## 2023-12-26 PROCEDURE — 99233 SBSQ HOSP IP/OBS HIGH 50: CPT | Mod: 25 | Performed by: INTERNAL MEDICINE

## 2023-12-26 PROCEDURE — B2131ZZ FLUOROSCOPY OF MULTIPLE CORONARY ARTERY BYPASS GRAFTS USING LOW OSMOLAR CONTRAST: ICD-10-PCS | Performed by: INTERNAL MEDICINE

## 2023-12-26 PROCEDURE — 99153 MOD SED SAME PHYS/QHP EA: CPT | Performed by: INTERNAL MEDICINE

## 2023-12-26 PROCEDURE — 93455 CORONARY ART/GRFT ANGIO S&I: CPT | Performed by: INTERNAL MEDICINE

## 2023-12-26 DEVICE — STENT CORONARY DES SYNERGY XD MR US 4.00X16MM H7493941816400: Type: IMPLANTABLE DEVICE | Status: FUNCTIONAL

## 2023-12-26 DEVICE — CLOSURE ANGIOSEAL 6FR 610130: Type: IMPLANTABLE DEVICE | Status: FUNCTIONAL

## 2023-12-26 RX ORDER — ACYCLOVIR 200 MG/1
0-1 CAPSULE ORAL
Status: DISCONTINUED | OUTPATIENT
Start: 2023-12-26 | End: 2023-12-26

## 2023-12-26 RX ORDER — METOPROLOL TARTRATE 1 MG/ML
5 INJECTION, SOLUTION INTRAVENOUS
Status: DISCONTINUED | OUTPATIENT
Start: 2023-12-26 | End: 2023-12-28

## 2023-12-26 RX ORDER — FLUMAZENIL 0.1 MG/ML
0.2 INJECTION, SOLUTION INTRAVENOUS
Status: ACTIVE | OUTPATIENT
Start: 2023-12-26 | End: 2023-12-27

## 2023-12-26 RX ORDER — NALOXONE HYDROCHLORIDE 0.4 MG/ML
0.4 INJECTION, SOLUTION INTRAMUSCULAR; INTRAVENOUS; SUBCUTANEOUS
Status: DISCONTINUED | OUTPATIENT
Start: 2023-12-26 | End: 2023-12-26

## 2023-12-26 RX ORDER — SODIUM CHLORIDE 9 MG/ML
INJECTION, SOLUTION INTRAVENOUS CONTINUOUS
Status: CANCELLED | OUTPATIENT
Start: 2023-12-26

## 2023-12-26 RX ORDER — LORAZEPAM 2 MG/ML
0.5 INJECTION INTRAMUSCULAR
Status: CANCELLED | OUTPATIENT
Start: 2023-12-26

## 2023-12-26 RX ORDER — CLOPIDOGREL BISULFATE 75 MG/1
75 TABLET ORAL DAILY
Qty: 90 TABLET | Refills: 3 | Status: SHIPPED | OUTPATIENT
Start: 2023-12-27 | End: 2024-01-03

## 2023-12-26 RX ORDER — ASPIRIN 81 MG/1
81 TABLET ORAL DAILY
Qty: 30 TABLET | Refills: 3 | Status: SHIPPED | OUTPATIENT
Start: 2023-12-27 | End: 2024-01-03

## 2023-12-26 RX ORDER — MAGNESIUM OXIDE 400 MG/1
400 TABLET ORAL EVERY 4 HOURS
Status: COMPLETED | OUTPATIENT
Start: 2023-12-26 | End: 2023-12-26

## 2023-12-26 RX ORDER — ALBUTEROL SULFATE 90 UG/1
2 AEROSOL, METERED RESPIRATORY (INHALATION) EVERY 5 MIN PRN
Status: DISCONTINUED | OUTPATIENT
Start: 2023-12-26 | End: 2023-12-26

## 2023-12-26 RX ORDER — ASPIRIN 81 MG/1
81 TABLET ORAL DAILY
Status: DISCONTINUED | OUTPATIENT
Start: 2023-12-27 | End: 2024-01-03 | Stop reason: HOSPADM

## 2023-12-26 RX ORDER — ASPIRIN 81 MG/1
81 TABLET, CHEWABLE ORAL ONCE
Status: DISCONTINUED | OUTPATIENT
Start: 2023-12-26 | End: 2023-12-26

## 2023-12-26 RX ORDER — ONDANSETRON 2 MG/ML
4 INJECTION INTRAMUSCULAR; INTRAVENOUS EVERY 6 HOURS PRN
Status: DISCONTINUED | OUTPATIENT
Start: 2023-12-26 | End: 2024-01-03 | Stop reason: HOSPADM

## 2023-12-26 RX ORDER — NITROGLYCERIN 0.4 MG/1
0.4 TABLET SUBLINGUAL EVERY 5 MIN PRN
Status: DISCONTINUED | OUTPATIENT
Start: 2023-12-26 | End: 2024-01-03 | Stop reason: HOSPADM

## 2023-12-26 RX ORDER — HEPARIN SODIUM 1000 [USP'U]/ML
INJECTION, SOLUTION INTRAVENOUS; SUBCUTANEOUS
Status: DISCONTINUED | OUTPATIENT
Start: 2023-12-26 | End: 2023-12-26 | Stop reason: HOSPADM

## 2023-12-26 RX ORDER — SODIUM CHLORIDE 9 MG/ML
INJECTION, SOLUTION INTRAVENOUS CONTINUOUS
Status: ACTIVE | OUTPATIENT
Start: 2023-12-26 | End: 2023-12-26

## 2023-12-26 RX ORDER — NALOXONE HYDROCHLORIDE 0.4 MG/ML
0.2 INJECTION, SOLUTION INTRAMUSCULAR; INTRAVENOUS; SUBCUTANEOUS
Status: DISCONTINUED | OUTPATIENT
Start: 2023-12-26 | End: 2023-12-26

## 2023-12-26 RX ORDER — ONDANSETRON 4 MG/1
4 TABLET, ORALLY DISINTEGRATING ORAL EVERY 6 HOURS PRN
Status: DISCONTINUED | OUTPATIENT
Start: 2023-12-26 | End: 2024-01-03 | Stop reason: HOSPADM

## 2023-12-26 RX ORDER — ATROPINE SULFATE 0.1 MG/ML
0.5 INJECTION INTRAVENOUS
Status: ACTIVE | OUTPATIENT
Start: 2023-12-26 | End: 2023-12-27

## 2023-12-26 RX ORDER — CLOPIDOGREL BISULFATE 75 MG/1
75 TABLET ORAL DAILY
Status: DISCONTINUED | OUTPATIENT
Start: 2023-12-27 | End: 2024-01-03 | Stop reason: HOSPADM

## 2023-12-26 RX ORDER — ASPIRIN 325 MG
325 TABLET ORAL ONCE
Status: CANCELLED | OUTPATIENT
Start: 2023-12-26 | End: 2023-12-26

## 2023-12-26 RX ORDER — FENTANYL CITRATE 50 UG/ML
INJECTION, SOLUTION INTRAMUSCULAR; INTRAVENOUS
Status: DISCONTINUED | OUTPATIENT
Start: 2023-12-26 | End: 2023-12-26 | Stop reason: HOSPADM

## 2023-12-26 RX ORDER — ASPIRIN 81 MG/1
243 TABLET, CHEWABLE ORAL ONCE
Status: CANCELLED | OUTPATIENT
Start: 2023-12-26

## 2023-12-26 RX ORDER — POTASSIUM CHLORIDE 1500 MG/1
20 TABLET, EXTENDED RELEASE ORAL
Status: CANCELLED | OUTPATIENT
Start: 2023-12-26

## 2023-12-26 RX ORDER — REGADENOSON 0.08 MG/ML
0.4 INJECTION, SOLUTION INTRAVENOUS ONCE
Status: COMPLETED | OUTPATIENT
Start: 2023-12-26 | End: 2023-12-26

## 2023-12-26 RX ORDER — IOPAMIDOL 755 MG/ML
INJECTION, SOLUTION INTRAVASCULAR
Status: DISCONTINUED | OUTPATIENT
Start: 2023-12-26 | End: 2023-12-26 | Stop reason: HOSPADM

## 2023-12-26 RX ORDER — LIDOCAINE 40 MG/G
CREAM TOPICAL
Status: CANCELLED | OUTPATIENT
Start: 2023-12-26

## 2023-12-26 RX ORDER — ACETAMINOPHEN 325 MG/1
650 TABLET ORAL EVERY 4 HOURS PRN
Status: DISCONTINUED | OUTPATIENT
Start: 2023-12-26 | End: 2024-01-03 | Stop reason: HOSPADM

## 2023-12-26 RX ORDER — CAFFEINE CITRATE 20 MG/ML
60 SOLUTION INTRAVENOUS
Status: DISCONTINUED | OUTPATIENT
Start: 2023-12-26 | End: 2023-12-26

## 2023-12-26 RX ORDER — AMINOPHYLLINE 25 MG/ML
50-100 INJECTION, SOLUTION INTRAVENOUS
Status: DISCONTINUED | OUTPATIENT
Start: 2023-12-26 | End: 2023-12-26

## 2023-12-26 RX ORDER — HYDRALAZINE HYDROCHLORIDE 20 MG/ML
10 INJECTION INTRAMUSCULAR; INTRAVENOUS EVERY 4 HOURS PRN
Status: DISCONTINUED | OUTPATIENT
Start: 2023-12-26 | End: 2024-01-03 | Stop reason: HOSPADM

## 2023-12-26 RX ORDER — LORAZEPAM 0.5 MG/1
0.5 TABLET ORAL
Status: CANCELLED | OUTPATIENT
Start: 2023-12-26

## 2023-12-26 RX ADMIN — Medication 11 MILLICURIE: at 07:30

## 2023-12-26 RX ADMIN — ACETAMINOPHEN 650 MG: 325 SUSPENSION ORAL at 11:08

## 2023-12-26 RX ADMIN — Medication 31.5 MILLICURIE: at 10:05

## 2023-12-26 RX ADMIN — Medication 400 MG: at 01:28

## 2023-12-26 RX ADMIN — LEVOTHYROXINE SODIUM 100 MCG: 100 TABLET ORAL at 04:55

## 2023-12-26 RX ADMIN — Medication 400 MG: at 21:17

## 2023-12-26 RX ADMIN — SACUBITRIL AND VALSARTAN 1 TABLET: 24; 26 TABLET, FILM COATED ORAL at 21:16

## 2023-12-26 RX ADMIN — OLANZAPINE 2.5 MG: 2.5 TABLET, FILM COATED ORAL at 03:08

## 2023-12-26 RX ADMIN — ENOXAPARIN SODIUM 40 MG: 40 INJECTION SUBCUTANEOUS at 14:26

## 2023-12-26 RX ADMIN — REGADENOSON 0.4 MG: 0.08 INJECTION, SOLUTION INTRAVENOUS at 10:04

## 2023-12-26 RX ADMIN — ACETAMINOPHEN 650 MG: 325 SUSPENSION ORAL at 04:13

## 2023-12-26 RX ADMIN — FINASTERIDE 5 MG: 5 TABLET, FILM COATED ORAL at 21:17

## 2023-12-26 RX ADMIN — ASPIRIN 81 MG CHEWABLE TABLET 81 MG: 81 TABLET CHEWABLE at 10:53

## 2023-12-26 RX ADMIN — ACETAMINOPHEN 650 MG: 325 TABLET, FILM COATED ORAL at 21:16

## 2023-12-26 RX ADMIN — SACUBITRIL AND VALSARTAN 1 TABLET: 24; 26 TABLET, FILM COATED ORAL at 10:54

## 2023-12-26 RX ADMIN — CARVEDILOL 12.5 MG: 12.5 TABLET, FILM COATED ORAL at 21:17

## 2023-12-26 RX ADMIN — LATANOPROST 1 DROP: 50 SOLUTION/ DROPS OPHTHALMIC at 21:17

## 2023-12-26 RX ADMIN — CARVEDILOL 12.5 MG: 12.5 TABLET, FILM COATED ORAL at 10:53

## 2023-12-26 RX ADMIN — OLANZAPINE 2.5 MG: 2.5 TABLET, FILM COATED ORAL at 21:17

## 2023-12-26 RX ADMIN — GABAPENTIN 100 MG: 100 CAPSULE ORAL at 21:16

## 2023-12-26 RX ADMIN — ATORVASTATIN CALCIUM 80 MG: 80 TABLET, FILM COATED ORAL at 21:16

## 2023-12-26 RX ADMIN — GABAPENTIN 100 MG: 100 CAPSULE ORAL at 10:54

## 2023-12-26 RX ADMIN — PANTOPRAZOLE SODIUM 40 MG: 40 TABLET, DELAYED RELEASE ORAL at 04:55

## 2023-12-26 RX ADMIN — CLOPIDOGREL BISULFATE 75 MG: 75 TABLET ORAL at 10:54

## 2023-12-26 RX ADMIN — Medication 400 MG: at 04:55

## 2023-12-26 RX ADMIN — SODIUM CHLORIDE: 9 INJECTION, SOLUTION INTRAVENOUS at 16:44

## 2023-12-26 RX ADMIN — ESCITALOPRAM OXALATE 20 MG: 10 TABLET ORAL at 10:54

## 2023-12-26 ASSESSMENT — ACTIVITIES OF DAILY LIVING (ADL)
ADLS_ACUITY_SCORE: 41
ADLS_ACUITY_SCORE: 37
ADLS_ACUITY_SCORE: 37
ADLS_ACUITY_SCORE: 42
ADLS_ACUITY_SCORE: 37
ADLS_ACUITY_SCORE: 42
ADLS_ACUITY_SCORE: 41
ADLS_ACUITY_SCORE: 37
ADLS_ACUITY_SCORE: 42
ADLS_ACUITY_SCORE: 37

## 2023-12-26 NOTE — PROGRESS NOTES
Patient has continuously been using call light all night roughly every 10-20 minutes, alternating between requesting warm/additional blankets and asking for blankets to be removed. Patient refuses to set blankets to side of bed when hot, which would allow him to cover himself when cold. Instead, he pulls blankets off and throws them on floor, or will request them be removed, but will then request blankets be put back on shortly thereafter. Adjusted patient's thermostat numerous times as well.    Educated patient that staff are attending to other patients' needs and this is an inappropriate use of the call light, but he continued to insist that staff be present to either remove or add blankets when he requests. Patient is alert and oriented and showing no significant signs of confusion. Afebrile overnight. Attempts at redirection, medication administration, and promoting a restful environment to allow patient to sleep were ineffective.

## 2023-12-26 NOTE — PLAN OF CARE
Pt is AOx3-4, forgetful, much more alert tonight, requested gabapentin for sleep, on call MD paged and placed one time order, abdomen soft, non tender, passing gas, no BM for shift, frequently on his call light regarding feeling hot/cold, inconsolable at times, refused CPAP overnight, PICC with TPN running, no blood return, alteplase used without success, ambulated in kunz in evening assist x1, PIZARRO noted, voiding, swallows pill whole ok, NPO since midnight pending lexiscan.

## 2023-12-26 NOTE — PROVIDER NOTIFICATION
"MD Notification    Notified Person: Minesh Phelps MD    Notified Person Name:    Notification Date/Time: 12/26/2023, 1328    Notification Interaction: Vocera msg    Purpose of Notification:\"Hi, stress test from this AM is abnormal. Can you advance the pt's diet? Pt is currently NPO. Marianne RODRIGUEZ RN, gen surg\"    Orders Received:    Comments:    "

## 2023-12-26 NOTE — PROGRESS NOTES
Date & Time: 0700-1930  Surgery/POD#: Perforated appendix, Sepsis    Behavior & Aggression: Green  Fall Risk: Yes  Orientation: Can be forgetful   ABNL VS/O2:Room air  ABNL Labs: See chart  Pain Management:Denies  Bowel/Bladder: Incontinent   Drains: PIC  Diet:Easy to chew  Activity Level: Ax2  Tests/Procedures: Stress test  Anticipated  DC Date: Pending   Significant Information: Pt to be NPO at midnight for a scheduled stress test tmrw. Otherwise no acute changes this shift

## 2023-12-26 NOTE — PLAN OF CARE
Problem: Sepsis/Septic Shock  Goal: Optimal Nutrition Intake  Intervention: Optimize Nutrition Delivery    Goal Outcome Evaluation:    Plan of care reviewed with: daughter + daughter's SO    Calorie count 3 day summary: 321 kcal, 17g protein (meets 18% kcal needs, 20% protein needs). Recommend pt to meet >60% nutritional needs w/ PO intake alone to discontinue TPN.     Recommend continue current TPN regimen. Will continue to try and wean TPN further in coming days. See note for more details.    Nathalie Zuniga RD, LD  Clinical Dietitian - [facility]

## 2023-12-26 NOTE — PROGRESS NOTES
Lexiscan Stress: Pt tolerated well. VSS. Pt denied chest pain or pressure prior to injection. After injection Lexiscan, pt reports feeling like needing to go the bathroom.  Sx subsides post procedure.    1022  Pt transferred back to Rm 2203 per cart. . Detailed report called to Marianne, bedside  RN.

## 2023-12-26 NOTE — PROGRESS NOTES
[facility]    Medicine Progress Note - Hospitalist Service    Date of Admission:  12/8/2023    Assessment & Plan     88 year old male S/P Lap Appy for Perforated Appendicitis, and vascular assessment of questionable area on cecum through ICG administration.  He also has had issues with post op fluid collections that IR has placed a drain in and had a significant post-op ileus.  Initially underwent surgery on 12/8/2023 and required ICU management for septic shock.      Ultimately improved, and Hospitalist service assumed care on 12/11/2023.      Acute appendicitis with peritonitis and septic shock - s/p laparoscopic appendectomy 12/8/2023.  Pelvic fluid collections, question abscesses.  Postoperative ileus vs SBO, prolonged  Nutrition     -Initial presentation to OSH 12/7 as above. CT abdomen noted with acute appendicitis with 0.7 cm appendicolith, no abscess. Started on ampicillin-sulbactam. Subsequently transferred to [facility] 12/8.  On 12/8, underwent above surgery. Weaned off pressors. Antibiotics changed to pipericillin-tazobactam.   On 12/10, CT abdomen noted with multiple dilated small bowel loops with related decompression of colon with findings consistent with partial obstruction/ adynamic ileus.  On 12/12, diet advanced to clears as pt seemed to be improving with +flatus and BM. Later, developed more N/V and NG placed.  On 12/14, WBC elevated. Repeat abdominal CT showed prior appendectomy with findings suggestive of peritonitis, developing rim enhancing pelvic fluid collections, abscesses possible; increasing dilation of proximal small bowel with transition point in the right lower quadrant, suggestive of partial mechanical obstruction, developing adhesion is possible, no evidence of nadine bowel ischemia or acute perforation; distended gallbladder without additional CT evidence of acute cholecystitis.   -IR placed drain into right pelvic fluid collection (2/15/23)   -CT sinegram  12/19/23 - with drain in good position, min residual fluid and no fistula.   - Completed 14 days of IV pipericillin-tazobactam   - started TPN 12/15 - continuing but rate has been titrated down  -NG tube and ESTEFANY drain removed 12/21/23  -Surgery team has been following the patient-and he is having bowel movements and tolerating diet and we do plan to transition the patient off TPN and the surgery team is okay with Plavix and Lovenox for chemoprophylaxis and they have signed off  -continue calorie count with plan to wean tpn off soon     Acute hypoxic respiratory failure, suspect multifactorial related to atelectasis, recent abdominal surgery, resolved.  CARINA on home CPA:.  - Initial presentation as above. CXR at OSH negative.  - On 12/8, CXR showed new left basilar bandlike opacity, most likely atelectasis.   -This morning his lungs are clear to auscultation and he is on room air and we will continue to monitor he did require oxygen during the course of hospital stay and was on CPAP when sleeping-and he is on room air and we will monitor      FATIMAH on CKD3, suspect prerenal, meds (including sacubitril-valsartan, diuretics)-resolved  Hyperkalemia, resolved.  - Baseline creatinine 1-1.1.   -Creatinine 1.39 --> 1.8 at OSH.  - Cr up to 2.27 and K up to 5.5 on 12/8.  -Patient did receive IV Lasix on 12/10 and 12/11  -His renal function this morning is stable at 0.96 and we will continue to monitor     Troponin elevation, suspect demand ischemia (type 2 NSTEMI).  Hypertension (benign essential).  CAD s/p CABG (1994), stenting (2002, 2003, 2005, 2021)  Chronic HFrEF (systolic and diastolic), s/p ICD.  HLD.  Afib with RVR  Abnormal stress test  [PTA: carvedilol 12.5 mg BID; sacubitril-valsartan 49-51 mg BID; furosemide 20 mg daily.]  --has ICD and h/o CAD s/p CABG 1994  -Last stress testing in [place] 7/2023, which was negative for ischemia   -Last cath 1/2021 at [facility] showed patent CLAUDIA-RCA, LIMA occluded, vein-diagonal  patent with placement of stent,  LAD, 40% circumflex, RCA occluded. Last stress testing 7/2023 negative for ischemia. Echo 7/2023 showed LVEF 30-35%, global hypokinesis LV, moderate-severe cLVH, grade 1 diastolic dysfunction; RV systolic function mildly reduced.   - Initial presentation as above. BNP in ED at 2613, troponin 70. EKG w/ nonspecific t-w changes. PTA sacubitril-valsartan and furosemide held on admit given FATIMAH.  Briefly was  on IV diuretics.  -He was seen initially by cardiology on 12/8 and 12/9 for preop clearance and patient did had elevated troponin which was felt due to demand  -Patient did had RRT on 12/22 and his troponin was elevated  -Echo 12/22/23 - EF 20-25% with hyokinesis of the post wall.    -His prior echo in Care Everywhere showed that his EF was 30 to 35% with moderate hypokinesis of the left ventricle  -Cardiology was again reconsulted on 12/22 and patient was continued with aspirin 81 mg, Entresto was restarted on 12/22 and Plavix restarted on 12/24  -stress test is abnormal today and he Is scheduled for [facility] today        Acute metabolic encephalopathy (delirium) -waxing and waning- Improving  -CT scan of the head on 12/21/2023 did not show any evidence of any acute intracranial hemorrhage and there is microvascular changes  -I have discussed with daughter and she told me that patient for the past 2 to 3 days has been more sleepy and he does seem to have hospital-acquired delirium and on night of 12/23 he did get Zyprexa  -I also discussed with daughter and she told me that he used to be on Seroquel at 1 point in time in the past and that caused him to be sleepy  -Daughter also thinks that given that patient's Lexapro was held for few days along with gabapentin that might help withdrawal effect  -Restarted on gabapentin today     Hyperglycemia while on TPN  - HbA1c is 6.8  -Patient blood sugars are being monitored and his TPN is being weaned off and he is currently on Lantus 10 units  at bedtime along with medium dose sliding scale and will continue to monitor        Anemia, suspect partial blood loss component with surgery [id number]  -His last hemoglobin on 12/7/23 was hemoglobin of 16.8 with hematocrit of 49.3 and might have component of underlying dehydration  -His hemoglobin has been fluctuating during the course of hospital stay and is 12.6 today and is due to acute illness and is improving and there is no evidence of any GI bleed  -Hb is normal today at 15.3     Thrombocytopenia, unclear etiology, possibly due to sepsis, medication effect or consumptive or other cause result-resolved       Hypokalemi-Resolved       Hx CVA  -We will continue with aspirin 81 mg, resume Plavix 75 mg daily, resume atorvastatin 80 mg and Zetia 10 mg     Depression/anxiety.  - Continue to hold gabapentin for now   - Continue escitalopram daily - tolerating      Hypothyroidism.  - Continue levothyroxine.     GERD.  -Continue with PPI      BPH.  - will continue finasteride today     History of migraines  -Noted      Gallbladder distention  -This was found on the CT scan of the abdomen done on 12/14 and ultrasound was done on 12/15 without any evidence of cholecystitis                Diet: Snacks/Supplements Adult: Gelatein Plus; With Meals  Calorie Counts  NPO for Medical/Clinical Reasons Except for: Meds  parenteral nutrition - Clinimix E    DVT Prophylaxis: Enoxaparin (Lovenox) SQ  Hameed Catheter: Not present  Lines: PRESENT      PICC 12/15/23 Double Lumen Left Brachial vein medial TPN-Site Assessment: WDL      Cardiac Monitoring: ACTIVE order. Indication: Acute decompensated heart failure (48 hours)  Code Status: Full Code      Clinically Significant Risk Factors           # Hypercalcemia: corrected calcium is >10.1, will monitor as appropriate    # Hypoalbuminemia: Lowest albumin = 2.8 g/dL at 12/11/2023  5:26 AM, will monitor as appropriate     # Hypertension: Noted on problem list    # Chronic  "heart failure with reduced ejection fraction: last echo with EF <40%      # DMII: A1C = 6.8 % (Ref range: <5.7 %) within past 6 months   # Overweight: Estimated body mass index is 26.1 kg/m  as calculated from the following:    Height as of this encounter: 1.702 m (5' 7\").    Weight as of this encounter: 75.6 kg (166 lb 10.7 oz).             Disposition Plan     Expected Discharge Date: 12/27/2023,  3:00 PM      Discharge Comments: OR 12/8 12/16 CT guided drainage, JPs leaking.  Confused and agitated at times.            Chloe Rose MD  Hospitalist Service  [facility]  Securely message with [employer] (more info)  Text page via [employer] Paging/Directory   ______________________________________________________________________    Interval History       Seen today and patient is more awake and speech seemed clear   Encouraged eating as much as he can   Stress test was abnormal and plan for [facility] today  Discussed with daughter and son in law   TPN is still on     D/w GAEL vilchis    Physical Exam   Vital Signs: Temp: 97.3  F (36.3  C) Temp src: Oral BP: 115/64 Pulse: 67   Resp: 16 SpO2: 97 % O2 Device: None (Room air)    Weight: 166 lbs 10.68 oz        General: aox2-3 and is more alert than yesterday  Respiratory: Lungs are clear to auscultation bilaterally with no wheeze or crackles   Cardiovascular: Regular rate , S1 and S2 normal with no murmer or rubs or gallops  Abdomen:   soft , non tender , non distended and bowel sound present   Skin: No skin rashes   Neurologic: No facial droop and strength is 4 x 5 over both upper and lower extremity  Musculoskeletal: Normal Range of motion over upper and lower extremities bilaterally   Psychiatric: cooperative      Medical Decision Making       Time spent in care of patient is 45 minutes and I reviewed his labs including CBC, basic metabolic panel, vital signs, discussed plan of care in detail with the patient, nursing staff with RN, daughter and the patient " and questions were answered to satisfaction and reviewed stress test     Data     I have personally reviewed the following data over the past 24 hrs:    10.0  \   15.3   / 164     138 104 29.3 (H) /  174 (H)   4.6 22 0.97 \       Imaging results reviewed over the past 24 hrs:   No results found for this or any previous visit (from the past 24 hour(s)).

## 2023-12-26 NOTE — PROGRESS NOTES
[facility]    Cardiology Progress Note     Assessment & Plan       This is an 88-year-old gentleman with extensive history of coronary artery disease status post CABG and known occlusion of the SVG to the OM with an atretic LIMA to LAD, recent nuclear perfusion study in July 2023 demonstrating no evidence of ischemia, severe ischemic cardiomyopathy who was admitted to [facility] on 12/8/2023 for septic shock associated with perforated appendicitis. Course c/b mild TN elevation. Echocardiogram showing severely reduced LV function.     Plan:     Coronary artery disease status post coronary bypass grafting in 1994 and subsequent multiple PCI's.  -on aspirin 81 mg daily  -TN elevation likely demand  ischemia in setting of septic shock   -Nuclear stress - positive ischemia in the distal apical, anteroseptal and inferoseptal segments (new)   -coronary angiogram today, keep NPO     Severe ischemic cardiomyopathy: resumed entresto, continue beta blocker with coreg. Coronary angiogram today.    3. AFIB: converted to normal sinus rhythm 12/16/2023.     4. FATIMAH resolved.       Meaghan Hdz MD  Text Page  (Monday - Friday, 8 am- 5 pm)    Interval History     No events overnight.    Physical Exam   Temp: 98.9  F (37.2  C) Temp src: Oral BP: 121/69 Pulse: 77   Resp: 18 SpO2: 97 % O2 Device: None (Room air) Oxygen Delivery: 2 LPM  Vitals:    12/19/23 0000 12/22/23 0629 12/26/23 0550   Weight: 80.2 kg (176 lb 12.9 oz) 75.7 kg (166 lb 14.2 oz) 75.6 kg (166 lb 10.7 oz)     Vital Signs with Ranges  Temp:  [97.2  F (36.2  C)-98.9  F (37.2  C)] 98.9  F (37.2  C)  Pulse:  [65-82] 77  Resp:  [14-18] 18  BP: (106-149)/(64-80) 121/69  SpO2:  [95 %-100 %] 97 %  I/O last 3 completed shifts:  In: 150 [P.O.:150]  Out: 1100 [Urine:1100]  Patient Active Problem List   Diagnosis    Benign essential hypertension    BPH (benign prostatic hyperplasia)    Coronary atherosclerosis    Panic disorder with agoraphobia     Intractable chronic migraine without aura    Secondary hyperparathyroidism of renal origin (H24)    Stage 3 chronic kidney disease (H)    Glaucoma    Hyperlipidemia    Anemia    Chronic cough    Degenerative cervical spinal stenosis    Right rotator cuff tendinitis    Reflux esophagitis    Nocturia    Insomnia    Hypothyroidism (acquired)    Hypertension    S/P coronary artery stent placement    History of coronary artery bypass surgery    Heart failure (H)    Bilateral carotid artery stenosis    Obstructive sleep apnea    NSTEMI (non-ST elevated myocardial infarction) (H)    Ischemic cardiomyopathy    Stroke, hemorrhagic (H)    Anxiety    Major depression, melancholic type    Presence of permanent cardiac pacemaker    Occlusion and stenosis of bilateral carotid arteries    Thrombocytopenia (H24)    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    Chronic combined systolic and diastolic heart failure (H)    Acute sepsis (H)    Acute appendicitis with localized peritonitis, without perforation, abscess, or gangrene       Constitutional: Awake, alert, cooperative, no apparent distress  Respiratory: Clear to auscultation bilaterally, no crackles or wheezing  Cardiovascular: Regular rate and rhythm, normal S1 and S2, and no murmur noted  GI: Normal bowel sounds, soft, non-distended, non-tender  Skin/Integumen: No rashes, no cyanosis, no edema  Other:      Medications    dextrose      parenteral nutrition - Clinimix E 40 mL/hr at 12/25/23 2037      aspirin  81 mg Oral Daily    Or    aspirin  81 mg Per NG tube Daily    atorvastatin  80 mg Oral At Bedtime    carvedilol  12.5 mg Oral BID    clopidogrel  75 mg Oral Daily    enoxaparin ANTICOAGULANT  40 mg Subcutaneous Q24H    escitalopram  20 mg Oral or NG Tube Daily    finasteride  5 mg Oral At Bedtime    gabapentin  100 mg Oral TID    insulin aspart  1-7 Units Subcutaneous TID AC    insulin aspart  1-5 Units Subcutaneous At Bedtime    insulin glargine   10 Units Subcutaneous At Bedtime    latanoprost  1 drop Both Eyes At Bedtime    levothyroxine  100 mcg Oral QAM AC    pantoprazole  40 mg Oral QAM AC    sacubitril-valsartan  1 tablet Oral BID    [Held by provider] senna-docusate  1 tablet Oral BID    sodium chloride (PF)  10-40 mL Intracatheter Q7 Days    sodium chloride (PF)  3 mL Intracatheter Q8H       Data   Results for orders placed or performed during the hospital encounter of 12/08/23 (from the past 24 hour(s))   Glucose by meter   Result Value Ref Range    GLUCOSE BY METER POCT 201 (H) 70 - 99 mg/dL   Glucose by meter   Result Value Ref Range    GLUCOSE BY METER POCT 156 (H) 70 - 99 mg/dL   Glucose by meter   Result Value Ref Range    GLUCOSE BY METER POCT 163 (H) 70 - 99 mg/dL   Glucose by meter   Result Value Ref Range    GLUCOSE BY METER POCT 166 (H) 70 - 99 mg/dL   CBC with platelets   Result Value Ref Range    WBC Count 10.0 4.0 - 11.0 10e3/uL    RBC Count 4.77 4.40 - 5.90 10e6/uL    Hemoglobin 15.3 13.3 - 17.7 g/dL    Hematocrit 46.6 40.0 - 53.0 %    MCV 98 78 - 100 fL    MCH 32.1 26.5 - 33.0 pg    MCHC 32.8 31.5 - 36.5 g/dL    RDW 14.2 10.0 - 15.0 %    Platelet Count 164 150 - 450 10e3/uL   Basic metabolic panel   Result Value Ref Range    Sodium 138 135 - 145 mmol/L    Potassium 4.6 3.4 - 5.3 mmol/L    Chloride 104 98 - 107 mmol/L    Carbon Dioxide (CO2) 22 22 - 29 mmol/L    Anion Gap 12 7 - 15 mmol/L    Urea Nitrogen 29.3 (H) 8.0 - 23.0 mg/dL    Creatinine 0.97 0.67 - 1.17 mg/dL    GFR Estimate 76 >60 mL/min/1.73m2    Calcium 9.7 8.8 - 10.2 mg/dL    Glucose 174 (H) 70 - 99 mg/dL   Phosphorus   Result Value Ref Range    Phosphorus 3.2 2.5 - 4.5 mg/dL   Magnesium   Result Value Ref Range    Magnesium 1.9 1.7 - 2.3 mg/dL   NM Lexiscan stress test (nuc card)   Result Value Ref Range    Target      Baseline Systolic      Baseline Diastolic BP 69     Last Stress Systolic      Last Stress Diastolic BP 73     Baseline HR 71 bpm    Max HR   80     Max Predicted HR  60 %    Rate Pressure Product 9,760.0     Narrative      The nuclear stress test is abnormal.    There is a medium sized area of mild ischemia in the distal apical,   anteroseptal and inferoseptal segment(s) of the left ventricle.    There is a medium sized area of a moderate degree of infarction in the   mid to basal inferior and inferolateral segment(s) of the left ventricle.    Left ventricular function is severely reduced, EF 17%.    A prior study was conducted on 7/6/2023.  In comparison with the prior   report, distal septal and apical ischemia is new.       All of the above were reviewed personally by me.

## 2023-12-26 NOTE — PROGRESS NOTES
CLINICAL NUTRITION SERVICES - REASSESSMENT NOTE    RECOMMENDATIONS FOR MD/PROVIDER:   Calorie count 3 day summary: 321 kcal, 17g protein (meets 18% kcal needs, 20% protein needs). Recommend pt to meet >60% nutritional needs w/ PO intake alone to discontinue TPN.   Future/Additional Recommendations:   - Monitor need to increase TPN if decreased intakes continue and pending POC  - Will change KF supplement to chocolate once diet advances from NPO   Malnutrition: (12/22)  % Weight Loss: > 2% in 1 week (severe malnutrition)  % Intake:  </= 50% for >/= 5 days (severe malnutrition) (12/14)-- improving slightly w/ TPN  Subcutaneous Fat Loss:  None observed (12/14)  Muscle Loss:  Patellar region mild depletion and Posterior calf region mild depletion-- may be partially age-related (12/14)  Fluid Retention:  None noted     Malnutrition Diagnosis: Severe malnutrition  In Context of:  Acute illness or injury  Chronic illness or disease     EVALUATION OF PROGRESS TOWARD GOALS   Diet:  NPO for stress test today  12/23: Easy to chew (level 7)  12/22: FLD    Supplements: Gelatein BID, [employer] 1.0 BID    Nutrition Support:    12/24 (current regimen): TPN decreased further - D15 AA5 at 40 mL/hr= 682 kcal and 48 g protein (Meets ~40% energy and ~50% protein needs)   12/22: TPN wean continued, decreased rate to 60 mL/hr  12/21: lipids discontinued w/ diet advancement     Intake/Tolerance:    Calorie count summary:  12/23- 684 kcal and 34g protein (2 meals, 4 supps)   12/24- 148 kcal and 8g protein   12/25- 133 kcal and 10g protein (1 meal, 1 supp)  3 day average: 321 kcal, 17g protein (meets 18% kcal needs, 20% protein needs)  TPN + PO intake total = 1003 kcal, 65g protein (meets 56% energy needs, 76% protein needs)  - Spoke with patient's daughter and dtr's significant other at bedside (pt at stress test). Per dtr, patient's intakes have been improving. She thinks the calorie counts may not have been accurately recorded as she  sometimes saw trays being taken away in which she thought the food wasn't being accounted for. Writer discussed we are tracking intakes as best we can and did discuss goal of pt consuming >60% of nutritional needs to discontinue TPN. Dtr requested writer writes goal kcal/protein on white board in room so patient has a target. Writer wrote provisions on board. Dtr also requested patients supplements get switched to chocolate as he's not tolerating the vanilla as well.     ASSESSED NUTRITION NEEDS:  Dosing Weight: 71 kg (adjusted, based on 81.5 kg-- 12/14)  Estimated Energy Needs: 8146-0532 kcal (25-30 Kcal/Kg)  Justification: maintenance  Estimated Protein Needs:  grams protein (1.2-1.5 g pro/Kg)  Justification: post-op, hypercatabolism with acute illness  Estimated Fluid Needs: 1 mL/kcal  Justification: maintenance    NEW FINDINGS:   Meds: medium sliding scale insulin, 10 units lantus, levothyroxine    Weight: wt seems to be decreasing however suspect some fluid component. 5.7% wt loss in 1 week if accurate.  12/26/23 0550 75.6 kg (166 lb 10.7 oz) Bed scale   12/22/23 0629 75.7 kg (166 lb 14.2 oz) Bed scale   12/19/23 0000 80.2 kg (176 lb 12.9 oz) --   12/18/23 0400 79.5 kg (175 lb 4.3 oz) Bed scale   12/17/23 1000 78.5 kg (173 lb 1 oz) Standing scale   12/16/23 0655 80.6 kg (177 lb 11.1 oz) Bed scale   12/14/23 0606 81.5 kg (179 lb 10.8 oz) Bed scale     Previous Goals:   Wean off TPN w/in 72 hours  Diet advancement past FLD w/in 48 hours  Pt to consume >75% of 3 meals/day  Wt >81 kg   Evaluation: Not met  Evaluation: Met  Evaluation: Not met  Evaluation: Not met    Previous Nutrition Diagnosis:   Inadequate oral intake related to slow diet advancement as evidenced by FLD starting today, minimal PO intake yet, continued supplemental TPN   Evaluation: Declining    CURRENT NUTRITION DIAGNOSIS  Inadequate protein-energy intake related to TPN not meeting needs, poor appetite as evidenced by pt meeting only 56%  energy needs, 76% protein needs the past 3 days on average    INTERVENTIONS  Recommendations / Nutrition Prescription  Continue current TPN regimen    Implementation  PN Composition and PN Schedule - continue  Medical food supplement - will change to chocolate once diet advanced from NPO    Goals  Wean off TPN w/in 72 hours  Pt to consume >75% of 3 meals/day or equivalent with snacks/supplements    MONITORING AND EVALUATION:  Progress towards goals will be monitored and evaluated per protocol and Practice Guidelines    Nathalie Zuniga RD, LD  Clinical Dietitian - [facility]

## 2023-12-27 ENCOUNTER — APPOINTMENT (OUTPATIENT)
Dept: GENERAL RADIOLOGY | Facility: CLINIC | Age: 88
DRG: 853 | End: 2023-12-27
Payer: MEDICARE

## 2023-12-27 ENCOUNTER — RESULTS ONLY (OUTPATIENT)
Dept: ADMINISTRATIVE | Facility: CLINIC | Age: 88
End: 2023-12-27
Payer: MEDICARE

## 2023-12-27 ENCOUNTER — APPOINTMENT (OUTPATIENT)
Dept: CT IMAGING | Facility: CLINIC | Age: 88
DRG: 853 | End: 2023-12-27
Attending: HOSPITALIST
Payer: MEDICARE

## 2023-12-27 LAB
ACT BLD: 356 SECONDS (ref 74–150)
ACT BLD: 398 SECONDS (ref 74–150)
ALBUMIN SERPL BCG-MCNC: 3.1 G/DL (ref 3.5–5.2)
ALLEN'S TEST: YES
ALLEN'S TEST: YES
ALP SERPL-CCNC: 118 U/L (ref 40–150)
ALT SERPL W P-5'-P-CCNC: 11 U/L (ref 0–70)
AMMONIA PLAS-SCNC: 24 UMOL/L (ref 16–60)
ANION GAP SERPL CALCULATED.3IONS-SCNC: 9 MMOL/L (ref 7–15)
AST SERPL W P-5'-P-CCNC: 16 U/L (ref 0–45)
ATRIAL RATE - MUSE: 71 BPM
BASE EXCESS BLDA CALC-SCNC: 0.9 MMOL/L (ref -9–1.8)
BASE EXCESS BLDA CALC-SCNC: 2.5 MMOL/L (ref -9–1.8)
BASE EXCESS BLDV CALC-SCNC: -20 MMOL/L (ref -7.7–1.9)
BASOPHILS # BLD AUTO: 0.1 10E3/UL (ref 0–0.2)
BASOPHILS NFR BLD AUTO: 1 %
BILIRUB SERPL-MCNC: 0.4 MG/DL
BUN SERPL-MCNC: 26.3 MG/DL (ref 8–23)
CALCIUM SERPL-MCNC: 9.4 MG/DL (ref 8.8–10.2)
CHLORIDE SERPL-SCNC: 105 MMOL/L (ref 98–107)
CREAT SERPL-MCNC: 0.93 MG/DL (ref 0.67–1.17)
DEPRECATED HCO3 PLAS-SCNC: 24 MMOL/L (ref 22–29)
DIASTOLIC BLOOD PRESSURE - MUSE: NORMAL MMHG
EGFRCR SERPLBLD CKD-EPI 2021: 80 ML/MIN/1.73M2
EOSINOPHIL # BLD AUTO: 0.3 10E3/UL (ref 0–0.7)
EOSINOPHIL NFR BLD AUTO: 4 %
ERYTHROCYTE [DISTWIDTH] IN BLOOD BY AUTOMATED COUNT: 14.1 % (ref 10–15)
ERYTHROCYTE [DISTWIDTH] IN BLOOD BY AUTOMATED COUNT: 16.1 % (ref 10–15)
GLUCOSE BLDC GLUCOMTR-MCNC: 161 MG/DL (ref 70–99)
GLUCOSE BLDC GLUCOMTR-MCNC: 162 MG/DL (ref 70–99)
GLUCOSE BLDC GLUCOMTR-MCNC: 177 MG/DL (ref 70–99)
GLUCOSE BLDC GLUCOMTR-MCNC: 178 MG/DL (ref 70–99)
GLUCOSE BLDC GLUCOMTR-MCNC: 186 MG/DL (ref 70–99)
GLUCOSE SERPL-MCNC: 181 MG/DL (ref 70–99)
HCO3 BLD-SCNC: 26 MMOL/L (ref 21–28)
HCO3 BLD-SCNC: 26 MMOL/L (ref 21–28)
HCO3 BLDV-SCNC: 13 MMOL/L (ref 21–28)
HCT VFR BLD AUTO: 40 % (ref 40–53)
HCT VFR BLD AUTO: 41.4 % (ref 40–53)
HGB BLD-MCNC: 13.7 G/DL (ref 13.3–17.7)
HGB BLD-MCNC: 9.7 G/DL (ref 13.3–17.7)
IMM GRANULOCYTES # BLD: 0.1 10E3/UL
IMM GRANULOCYTES NFR BLD: 1 %
INTERPRETATION ECG - MUSE: NORMAL
LACTATE SERPL-SCNC: 1.8 MMOL/L (ref 0.7–2)
LYMPHOCYTES # BLD AUTO: 1.7 10E3/UL (ref 0.8–5.3)
LYMPHOCYTES NFR BLD AUTO: 18 %
MAGNESIUM SERPL-MCNC: 2 MG/DL (ref 1.7–2.3)
MAGNESIUM SERPL-MCNC: 2.5 MG/DL (ref 1.7–2.3)
MCH RBC QN AUTO: 32.2 PG (ref 26.5–33)
MCH RBC QN AUTO: 33.8 PG (ref 26.5–33)
MCHC RBC AUTO-ENTMCNC: 24.3 G/DL (ref 31.5–36.5)
MCHC RBC AUTO-ENTMCNC: 33.1 G/DL (ref 31.5–36.5)
MCV RBC AUTO: 139 FL (ref 78–100)
MCV RBC AUTO: 97 FL (ref 78–100)
MONOCYTES # BLD AUTO: 1 10E3/UL (ref 0–1.3)
MONOCYTES NFR BLD AUTO: 11 %
NEUTROPHILS # BLD AUTO: 6.3 10E3/UL (ref 1.6–8.3)
NEUTROPHILS NFR BLD AUTO: 65 %
NRBC # BLD AUTO: 0 10E3/UL
NRBC BLD AUTO-RTO: 0 /100
NT-PROBNP SERPL-MCNC: 1407 PG/ML (ref 0–1800)
O2/TOTAL GAS SETTING VFR VENT: 21 %
O2/TOTAL GAS SETTING VFR VENT: 35 %
O2/TOTAL GAS SETTING VFR VENT: 35 %
OXYHGB MFR BLD: 97 % (ref 92–100)
P AXIS - MUSE: 9 DEGREES
PCO2 BLD: 37 MM HG (ref 35–45)
PCO2 BLD: 42 MM HG (ref 35–45)
PCO2 BLDV: 62 MM HG (ref 40–50)
PH BLD: 7.4 [PH] (ref 7.35–7.45)
PH BLD: 7.46 [PH] (ref 7.35–7.45)
PH BLDV: 6.92 [PH] (ref 7.32–7.43)
PHOSPHATE SERPL-MCNC: 10.2 MG/DL (ref 2.5–4.5)
PHOSPHATE SERPL-MCNC: 3.4 MG/DL (ref 2.5–4.5)
PLATELET # BLD AUTO: 147 10E3/UL (ref 150–450)
PLATELET # BLD AUTO: 185 10E3/UL (ref 150–450)
PO2 BLD: 102 MM HG (ref 80–105)
PO2 BLD: 143 MM HG (ref 80–105)
PO2 BLDV: 36 MM HG (ref 25–47)
POTASSIUM SERPL-SCNC: 4.8 MMOL/L (ref 3.4–5.3)
PR INTERVAL - MUSE: 108 MS
PROT SERPL-MCNC: 6.1 G/DL (ref 6.4–8.3)
QRS DURATION - MUSE: 108 MS
QT - MUSE: 444 MS
QTC - MUSE: 482 MS
R AXIS - MUSE: 62 DEGREES
RBC # BLD AUTO: 2.87 10E6/UL (ref 4.4–5.9)
RBC # BLD AUTO: 4.25 10E6/UL (ref 4.4–5.9)
SODIUM SERPL-SCNC: 138 MMOL/L (ref 135–145)
SYSTOLIC BLOOD PRESSURE - MUSE: NORMAL MMHG
T AXIS - MUSE: 166 DEGREES
VENTRICULAR RATE- MUSE: 71 BPM
WBC # BLD AUTO: 8.1 10E3/UL (ref 4–11)
WBC # BLD AUTO: 9.6 10E3/UL (ref 4–11)

## 2023-12-27 PROCEDURE — 999N000157 HC STATISTIC RCP TIME EA 10 MIN

## 2023-12-27 PROCEDURE — 83735 ASSAY OF MAGNESIUM: CPT | Performed by: INTERNAL MEDICINE

## 2023-12-27 PROCEDURE — 93010 ELECTROCARDIOGRAM REPORT: CPT | Performed by: INTERNAL MEDICINE

## 2023-12-27 PROCEDURE — 93005 ELECTROCARDIOGRAM TRACING: CPT

## 2023-12-27 PROCEDURE — 250N000013 HC RX MED GY IP 250 OP 250 PS 637: Performed by: HOSPITALIST

## 2023-12-27 PROCEDURE — 85027 COMPLETE CBC AUTOMATED: CPT | Performed by: HOSPITALIST

## 2023-12-27 PROCEDURE — 83735 ASSAY OF MAGNESIUM: CPT | Performed by: HOSPITALIST

## 2023-12-27 PROCEDURE — 210N000001 HC R&B IMCU HEART CARE

## 2023-12-27 PROCEDURE — 94660 CPAP INITIATION&MGMT: CPT

## 2023-12-27 PROCEDURE — 82040 ASSAY OF SERUM ALBUMIN: CPT | Performed by: HOSPITALIST

## 2023-12-27 PROCEDURE — 5A09357 ASSISTANCE WITH RESPIRATORY VENTILATION, LESS THAN 24 CONSECUTIVE HOURS, CONTINUOUS POSITIVE AIRWAY PRESSURE: ICD-10-PCS

## 2023-12-27 PROCEDURE — 36600 WITHDRAWAL OF ARTERIAL BLOOD: CPT

## 2023-12-27 PROCEDURE — 83880 ASSAY OF NATRIURETIC PEPTIDE: CPT | Performed by: PHYSICIAN ASSISTANT

## 2023-12-27 PROCEDURE — 84100 ASSAY OF PHOSPHORUS: CPT | Performed by: INTERNAL MEDICINE

## 2023-12-27 PROCEDURE — 99233 SBSQ HOSP IP/OBS HIGH 50: CPT | Mod: 25 | Performed by: PHYSICIAN ASSISTANT

## 2023-12-27 PROCEDURE — 82803 BLOOD GASES ANY COMBINATION: CPT | Performed by: HOSPITALIST

## 2023-12-27 PROCEDURE — 250N000013 HC RX MED GY IP 250 OP 250 PS 637: Performed by: STUDENT IN AN ORGANIZED HEALTH CARE EDUCATION/TRAINING PROGRAM

## 2023-12-27 PROCEDURE — 250N000009 HC RX 250

## 2023-12-27 PROCEDURE — 36415 COLL VENOUS BLD VENIPUNCTURE: CPT | Performed by: INTERNAL MEDICINE

## 2023-12-27 PROCEDURE — 83605 ASSAY OF LACTIC ACID: CPT | Performed by: HOSPITALIST

## 2023-12-27 PROCEDURE — 71045 X-RAY EXAM CHEST 1 VIEW: CPT

## 2023-12-27 PROCEDURE — 250N000013 HC RX MED GY IP 250 OP 250 PS 637

## 2023-12-27 PROCEDURE — 36415 COLL VENOUS BLD VENIPUNCTURE: CPT | Performed by: HOSPITALIST

## 2023-12-27 PROCEDURE — 250N000009 HC RX 250: Performed by: INTERNAL MEDICINE

## 2023-12-27 PROCEDURE — 99291 CRITICAL CARE FIRST HOUR: CPT

## 2023-12-27 PROCEDURE — 250N000013 HC RX MED GY IP 250 OP 250 PS 637: Performed by: INTERNAL MEDICINE

## 2023-12-27 PROCEDURE — 250N000013 HC RX MED GY IP 250 OP 250 PS 637: Performed by: NURSE PRACTITIONER

## 2023-12-27 PROCEDURE — 82140 ASSAY OF AMMONIA: CPT | Performed by: HOSPITALIST

## 2023-12-27 PROCEDURE — 82805 BLOOD GASES W/O2 SATURATION: CPT

## 2023-12-27 PROCEDURE — 70450 CT HEAD/BRAIN W/O DYE: CPT | Mod: MG

## 2023-12-27 PROCEDURE — 250N000011 HC RX IP 250 OP 636: Performed by: INTERNAL MEDICINE

## 2023-12-27 PROCEDURE — 85025 COMPLETE CBC W/AUTO DIFF WBC: CPT | Performed by: HOSPITALIST

## 2023-12-27 PROCEDURE — 99233 SBSQ HOSP IP/OBS HIGH 50: CPT | Performed by: HOSPITALIST

## 2023-12-27 PROCEDURE — 84100 ASSAY OF PHOSPHORUS: CPT | Performed by: HOSPITALIST

## 2023-12-27 PROCEDURE — 99418 PROLNG IP/OBS E/M EA 15 MIN: CPT | Performed by: HOSPITALIST

## 2023-12-27 RX ORDER — CARBOXYMETHYLCELLULOSE SODIUM 5 MG/ML
1 SOLUTION/ DROPS OPHTHALMIC
Status: DISCONTINUED | OUTPATIENT
Start: 2023-12-27 | End: 2024-01-01

## 2023-12-27 RX ORDER — MAGNESIUM OXIDE 400 MG/1
400 TABLET ORAL EVERY 4 HOURS
Status: COMPLETED | OUTPATIENT
Start: 2023-12-27 | End: 2023-12-27

## 2023-12-27 RX ADMIN — CARVEDILOL 12.5 MG: 12.5 TABLET, FILM COATED ORAL at 14:38

## 2023-12-27 RX ADMIN — SACUBITRIL AND VALSARTAN 1 TABLET: 24; 26 TABLET, FILM COATED ORAL at 14:36

## 2023-12-27 RX ADMIN — Medication 400 MG: at 21:01

## 2023-12-27 RX ADMIN — FINASTERIDE 5 MG: 5 TABLET, FILM COATED ORAL at 20:51

## 2023-12-27 RX ADMIN — LATANOPROST 1 DROP: 50 SOLUTION/ DROPS OPHTHALMIC at 21:01

## 2023-12-27 RX ADMIN — ATORVASTATIN CALCIUM 80 MG: 80 TABLET, FILM COATED ORAL at 20:51

## 2023-12-27 RX ADMIN — ASCORBIC ACID, VITAMIN A PALMITATE, CHOLECALCIFEROL, THIAMINE HYDROCHLORIDE, RIBOFLAVIN-5 PHOSPHATE SODIUM, PYRIDOXINE HYDROCHLORIDE, NIACINAMIDE, DEXPANTHENOL, ALPHA-TOCOPHEROL ACETATE, VITAMIN K1, FOLIC ACID, BIOTIN, CYANOCOBALAMIN: 200; 3300; 200; 6; 3.6; 6; 40; 15; 10; 150; 600; 60; 5 INJECTION, SOLUTION INTRAVENOUS at 11:38

## 2023-12-27 RX ADMIN — ESCITALOPRAM OXALATE 20 MG: 10 TABLET ORAL at 14:38

## 2023-12-27 RX ADMIN — CARVEDILOL 12.5 MG: 12.5 TABLET, FILM COATED ORAL at 20:51

## 2023-12-27 RX ADMIN — PANTOPRAZOLE SODIUM 40 MG: 40 TABLET, DELAYED RELEASE ORAL at 14:35

## 2023-12-27 RX ADMIN — ASPIRIN 81 MG: 81 TABLET, COATED ORAL at 14:36

## 2023-12-27 RX ADMIN — CLOPIDOGREL BISULFATE 75 MG: 75 TABLET ORAL at 14:35

## 2023-12-27 RX ADMIN — ENOXAPARIN SODIUM 40 MG: 40 INJECTION SUBCUTANEOUS at 14:39

## 2023-12-27 RX ADMIN — Medication 400 MG: at 18:47

## 2023-12-27 RX ADMIN — SODIUM BICARBONATE 50 MEQ: 84 INJECTION, SOLUTION INTRAVENOUS at 11:29

## 2023-12-27 RX ADMIN — LEVOTHYROXINE SODIUM 100 MCG: 100 TABLET ORAL at 14:36

## 2023-12-27 RX ADMIN — SACUBITRIL AND VALSARTAN 1 TABLET: 24; 26 TABLET, FILM COATED ORAL at 20:52

## 2023-12-27 ASSESSMENT — ACTIVITIES OF DAILY LIVING (ADL)
ADLS_ACUITY_SCORE: 41
ADLS_ACUITY_SCORE: 37
ADLS_ACUITY_SCORE: 37
ADLS_ACUITY_SCORE: 39
ADLS_ACUITY_SCORE: 41
ADLS_ACUITY_SCORE: 37

## 2023-12-27 NOTE — PROGRESS NOTES
Last Arterial Blood Gas:  pH Arterial   Date Value Ref Range Status   12/27/2023 7.40 7.35 - 7.45 Final     pCO2 Arterial   Date Value Ref Range Status   12/27/2023 42 35 - 45 mm Hg Final     pO2 Arterial   Date Value Ref Range Status   12/27/2023 143 (H) 80 - 105 mm Hg Final     Bicarbonate Arterial   Date Value Ref Range Status   12/27/2023 26 21 - 28 mmol/L Final     Base Excess/Deficit   Date Value Ref Range Status   12/27/2023 0.9 -9.0 - 1.8 mmol/L Final      ABG drawn after placed on BiPAP for 3 hours. Patient placed on 2L NC.    Plan to wear CPAP @ [facility].    MARIA M GARCIA RRT

## 2023-12-27 NOTE — PROGRESS NOTES
Care Management Follow Up    Length of Stay (days): 19    Expected Discharge Date: 12/29/2023     Concerns to be Addressed: discharge planning     Patient plan of care discussed at interdisciplinary rounds: No    Anticipated Discharge Disposition: Transitional Care     Anticipated Discharge Services:    Anticipated Discharge DME:      Patient/family educated on Medicare website which has current facility and service quality ratings: yes  Education Provided on the Discharge Plan: Yes  Patient/Family in Agreement with the Plan: yes    Referrals Placed by CM/SW: Post Acute Facilities  Private pay costs discussed: Not applicable    Additional Information:  Writer called Radu Garcia for the [facility] to ask if patient could be accepted to TCU. Patient can not be accepted until off of TPN. Looks like patient will eventually discontinue from TPN.    Rafita Coulter [facility]  Care Management

## 2023-12-27 NOTE — PROGRESS NOTES
CALORIE COUNT      Approximate Oral Intake for:    12/26  Calories: 188 kcal  Protein: 13 g protein       Intake from TF/PN:       D15 AA5 @ 40 mL/hr   Provides 682 kcal, 48 g protein to meet ~40% energy needs, ~50% protein needs.       Estimated Needs:    Dosing Weight: 71 kg (adjusted, based on 81.5 kg-- 12/14)  Estimated Energy Needs: 9996-6571 kcal (25-30 Kcal/Kg)  Justification: maintenance  Estimated Protein Needs:  grams protein (1.2-1.5 g pro/Kg)  Justification: post-op, hypercatabolism with acute illness      Summary:     - Met 10% of estimated energy needs and 15% estimated protein needs yesterday with 1 meal and 1 snack. Noted that pt was NPO much of the day.     - Continue TPN as outlined above. Pt should improve PO to >2/3 assessed needs in order to discontinue TPN and avoid transition to TF. Discussed with pharmacy.     - Changed supplement from [employer] vanilla to chocolate.     - Calorie counts will continue another 2-3 days, pending progress.     Tiara Landry RD, LD  Pager: [phone]  Weekend Pager: [phone]

## 2023-12-27 NOTE — PROGRESS NOTES
[facility]    Medicine Progress Note - Hospitalist Service    Date of Admission:  12/8/2023    Assessment & Plan     88 year old male S/P Lap Appy for Perforated Appendicitis, and vascular assessment of questionable area on cecum through ICG administration.  He also has had issues with post op fluid collections that IR has placed a drain in and had a significant post-op ileus.  Initially underwent surgery on 12/8/2023 and required ICU management for septic shock.      Ultimately improved, and Hospitalist service assumed care on 12/11/2023.      Acute appendicitis with peritonitis and septic shock - s/p laparoscopic appendectomy 12/8/2023.  Pelvic fluid collections, question abscesses.  Postoperative ileus vs SBO, prolonged  Nutrition     -Initial presentation to OSH 12/7 as above. CT abdomen noted with acute appendicitis with 0.7 cm appendicolith, no abscess. Started on ampicillin-sulbactam. Subsequently transferred to [facility] 12/8.  On 12/8, underwent above surgery. Weaned off pressors. Antibiotics changed to pipericillin-tazobactam.   On 12/10, CT abdomen noted with multiple dilated small bowel loops with related decompression of colon with findings consistent with partial obstruction/ adynamic ileus.  On 12/12, diet advanced to clears as pt seemed to be improving with +flatus and BM. Later, developed more N/V and NG placed.  On 12/14, WBC elevated. Repeat abdominal CT showed prior appendectomy with findings suggestive of peritonitis, developing rim enhancing pelvic fluid collections, abscesses possible; increasing dilation of proximal small bowel with transition point in the right lower quadrant, suggestive of partial mechanical obstruction, developing adhesion is possible, no evidence of nadine bowel ischemia or acute perforation; distended gallbladder without additional CT evidence of acute cholecystitis.   -IR placed drain into right pelvic fluid collection (2/15/23)   -CT sinegram  12/19/23 - with drain in good position, min residual fluid and no fistula.   - Completed 14 days of IV pipericillin-tazobactam   - started TPN 12/15 - continuing but rate has been titrated down  -NG tube and ESTEFANY drain removed 12/21/23  -Surgery team has been following the patient-and he is having bowel movements and tolerating diet and we do plan to transition the patient off TPN and the surgery team is okay with Plavix and Lovenox for chemoprophylaxis and they have signed off  -continue calorie count with plan to wean tpn off soon     Acute hypoxic respiratory failure, suspect multifactorial related to atelectasis, recent abdominal surgery, resolved.  CARINA on home CPA:.  - Initial presentation as above. CXR at OSH negative.  - On 12/8, CXR showed new left basilar bandlike opacity, most likely atelectasis.   -continue with home cpap      FATIMAH on CKD3, suspect prerenal, meds (including sacubitril-valsartan, diuretics)-resolved  Hyperkalemia, resolved.  - Baseline creatinine 1-1.1.   -Creatinine 1.39 --> 1.8 at OSH.  - Cr up to 2.27 and K up to 5.5 on 12/8.  -Patient did receive IV Lasix on 12/10 and 12/11  -His renal function this morning is stable at 0.93 and we will continue to monitor     Troponin elevation, suspect demand ischemia (type 2 NSTEMI).  Hypertension (benign essential).  CAD s/p CABG (1994), stenting (2002, 2003, 2005, 2021)  Chronic HFrEF (systolic and diastolic), s/p ICD.  HLD.  Afib with RVR  Abnormal stress test  [PTA: carvedilol 12.5 mg BID; sacubitril-valsartan 49-51 mg BID; furosemide 20 mg daily.]  --has ICD and h/o CAD s/p CABG 1994  -Last stress testing in [place] 7/2023, which was negative for ischemia   -Last cath 1/2021 at [facility] showed patent CLAUDIA-RCA, LIMA occluded, vein-diagonal patent with placement of stent,  LAD, 40% circumflex, RCA occluded. Last stress testing 7/2023 negative for ischemia. Echo 7/2023 showed LVEF 30-35%, global hypokinesis LV, moderate-severe cLVH, grade 1  diastolic dysfunction; RV systolic function mildly reduced.   - Initial presentation as above. BNP in ED at 2613, troponin 70. EKG w/ nonspecific t-w changes. PTA sacubitril-valsartan and furosemide held on admit given FATIMAH.  Briefly was  on IV diuretics.  -He was seen initially by cardiology on 12/8 and 12/9 for preop clearance and patient did had elevated troponin which was felt due to demand  -Patient did had RRT on 12/22 and his troponin was elevated  -Echo 12/22/23 - EF 20-25% with hyokinesis of the post wall.    -His prior echo in [facility] showed that his EF was 30 to 35% with moderate hypokinesis of the left ventricle  -stress test is abnormal  on 12/26/23  -s/pLHC on showed occluded LAD, occluded proximal RCA, 40% sent circumflex with patent CLAUDIA to the RCA occluded graft to the OM1 and nearly occluded graft to the D1.  Patient underwent lithotripsy and drug-eluting stent to the vein graft to the D1.   -Cardiology on board and continue the patient with aspirin 81 mg, Plavix 75 mg, Entresto        Acute metabolic encephalopathy (delirium) -waxing and waning-   -CT scan of the head on 12/21/2023 did not show any evidence of any acute intracranial hemorrhage and there is microvascular changes  -I have discussed with daughter and she told me that patient for the past 2 to 3 days has been more sleepy and he does seem to have hospital-acquired delirium and on night of 12/23 he did get Zyprexa  -I also discussed with daughter and she told me that he used to be on Seroquel at 1 point in time in the past and that caused him to be sleepy  -Daughter also thinks that given that patient's Lexapro was held for few days along with gabapentin that might help withdrawal effect  -This morning when I saw him patient was very lethargic and is a change from yesterday as I had seen the patient for the past few days  -Daughter was present in the room and I ordered stat labs including CBC, comprehensive metabolic panel, VBG  and CT scan of the head  -Initial VBG was concerning for pH of 6.9 with pCO2 of 92  -CT scan of the head did not show any acute process  -Given concerning VBG and mentation changes he was put on BiPAP and of note arterial blood was was done which showed that pH and CO2 was stable.  -Of note during RRT CBC was done which seems to have an accurate value and also his K was high as per nursing staff and apparently the above VBG seems to be wrong and must have been collected from the line  -Patient was kept on BiPAP for few hours and repeat ABG looks good  -I saw the patient around 315 and again and he was off BiPAP and the patient seems to have significant improvement in mentation and he was able to recognize me, his daughter, president as Niesha and he was able to move upper and lower extremities and does follow commands.  I have reviewed his CBC, basic metabolic panel and ammonia and they seems to be okay.  He does not seem to have any focal deficit as he moves his upper and lower extremities bilaterally without any difficulty and did not seem to have any facial droop and was able to move his tongue.  He also was able to give me thumbs up with both his hands  -During the course of hospitalization patient has this waxing and waning status when he will be lethargic but then he will be good  -No Seroquel use as patient can get sleepy as per past history  -We will continue to monitor closely and I am also going to consult neurology for tomorrow and will order EEG  - will keep npo today given his ongoing on and off lethargy and he is already on TPN and speech will be consulted and daughter is on board with the plan     Hyperglycemia while on TPN  - HbA1c is 6.8  -Patient has been n.p.o. most of the day and TPN is continuously running and I will hold his evening Lantus and will switch to sliding scale insulin every 4 hours as he will be kept n.p.o.        Anemia, suspect partial blood loss component with surgery  [id number]  -His last hemoglobin on 12/7/23 was hemoglobin of 16.8 with hematocrit of 49.3 and might have component of underlying dehydration  -His hemoglobin has been fluctuating during the course of hospital stay and is 12.6 today and is due to acute illness and is improving and there is no evidence of any GI bleed  -Hb is normal today at 13.7     Thrombocytopenia, unclear etiology, possibly due to sepsis, medication effect or consumptive or other cause result-resolved       Hypokalemi-Resolved       Hx CVA  -We will continue with aspirin 81 mg, Plavix 75 mg daily, resume atorvastatin 80 mg and Zetia 10 mg  -CT scan of the head did not show any acute process and he has chronic ischemic changes     Depression/anxiety.  - Continue to hold gabapentin for now   - Continue escitalopram daily - tolerating      Hypothyroidism.  - Continue levothyroxine.     GERD.  -Continue with PPI      BPH.  - will continue finasteride today     History of migraines  -Noted      Gallbladder distention  -This was found on the CT scan of the abdomen done on 12/14 and ultrasound was done on 12/15 without any evidence of cholecystitis                Diet: Snacks/Supplements Adult: Gelatein Plus; With Meals  parenteral nutrition - Clinimix E  Low Saturated Fat Na <2400 mg  Snacks/Supplements Adult: [employer] Standard Oral Supplement; Between Meals  Calorie Counts    DVT Prophylaxis: Enoxaparin (Lovenox) SQ  Hameed Catheter: Not present  Lines: PRESENT      PICC 12/15/23 Double Lumen Left Brachial vein medial TPN-Site Assessment: WDL      Cardiac Monitoring: ACTIVE order. Indication: Post- PCI/Angiogram (24 hours)  Code Status: Full Code      Clinically Significant Risk Factors              # Hypoalbuminemia: Lowest albumin = 2.8 g/dL at 12/11/2023  5:26 AM, will monitor as appropriate     # Hypertension: Noted on problem list    # Chronic heart failure with reduced ejection fraction: last echo with EF <40%      # DMII: A1C =  "6.8 % (Ref range: <5.7 %) within past 6 months   # Overweight: Estimated body mass index is 25.55 kg/m  as calculated from the following:    Height as of this encounter: 1.702 m (5' 7\").    Weight as of this encounter: 74 kg (163 lb 2.3 oz).   # Severe Malnutrition: based on nutrition assessment           Disposition Plan      Expected Discharge Date: 12/29/2023,  3:00 PM      Discharge Comments: OR 12/8 12/16 CT guided drainage, JPs leaking.  Confused and agitated at times.  12/26-Stress test            Chloe Rose MD  Hospitalist Service  [facility]  Securely message with [employer] (more info)  Text page via [employer] Paging/Directory   ______________________________________________________________________    Interval History     Patient seen multiple times during course of the day and when I saw him he was very lethargic and did not respond.  RRT was called and patient initially had VBG which was concerning and he was put on BiPAP and repeat ABG was showing and apparently VBG might have been incorrect as it might have been drawn from the line.  Patient mentation did improve during the course of the day and I last saw him at 3:15 PM with his daughter and he is more alert and was able to recognize me, daughter, did tell me that he has 1 son and he was following commands.  He does get sleepy at times and as per daughter this has happened in the past when he was recovering from stroke.    Physical Exam   Vital Signs: Temp: 97.4  F (36.3  C) Temp src: Axillary BP: 126/84 Pulse: 70   Resp: 21 SpO2: 98 % O2 Device: None (Room air) Oxygen Delivery: 2 LPM  Weight: 163 lbs 2.25 oz        General: Initially was AO x 0 this morning and was lethargic but later in the day that did improve  Respiratory: Lungs are clear to auscultation bilaterally with no wheeze or crackles   Cardiovascular: Regular rate , S1 and S2 normal with no murmer or rubs or gallops  Abdomen:   soft , non tender , non distended and " bowel sound present   Skin: No skin rashes   Neurologic: No facial droop and strength is 4/ 5 over both upper and lower extremity  Musculoskeletal: Normal Range of motion over upper and lower extremities bilaterally   Psychiatric: cooperative      Medical Decision Making       Time spent in care of patient is 70 minutes and I reviewed his labs including CBC, basic metabolic panel, lactic acid, ammonia, chest x-ray, VBG, ABG and discussed plan of care in detail with the patient, nursing staff, daughter and house team and appreciate input    Data     I have personally reviewed the following data over the past 24 hrs:    9.6  \   13.7   / 185     138 105 26.3 (H) /  181 (H)   4.8 24 0.93 \     ALT: 11 AST: 16 AP: 118 TBILI: 0.4   ALB: 3.1 (L) TOT PROTEIN: 6.1 (L) LIPASE: N/A     Trop: N/A BNP: 1,407     Procal: N/A CRP: N/A Lactic Acid: 1.8         Imaging results reviewed over the past 24 hrs:   Recent Results (from the past 24 hour(s))   NM Lexiscan stress test (nuc card)   Result Value    Target     Baseline Systolic     Baseline Diastolic BP 69    Last Stress Systolic     Last Stress Diastolic BP 73    Baseline HR 71    Max HR  80    Max Predicted HR  60    Rate Pressure Product 9,760.0    Narrative      The nuclear stress test is abnormal.    There is a medium sized area of mild ischemia in the distal apical,   anteroseptal and inferoseptal segment(s) of the left ventricle.    There is a medium sized area of a moderate degree of infarction in the   mid to basal inferior and inferolateral segment(s) of the left ventricle.    Left ventricular function is severely reduced, EF 17%.    A prior study was conducted on 7/6/2023.  In comparison with the prior   report, distal septal and apical ischemia is new.     Cardiac Catheterization    Narrative      Ost Cx to Prox Cx lesion is 40% stenosed.    Prox LAD lesion is 100% stenosed.    Ost RCA to Prox RCA lesion is 95% stenosed.    Prox RCA lesion is 100%  stenosed.    Origin to Prox Graft lesion is 100% stenosed.    Origin to Prox Graft lesion is 99% stenosed.    Lithotripsy and MAX to ostial SVG to Diagonal vessel.

## 2023-12-27 NOTE — PROGRESS NOTES
Last Arterial Blood Gas:  pH Arterial   Date Value Ref Range Status   12/27/2023 7.46 (H) 7.35 - 7.45 Final     pCO2 Arterial   Date Value Ref Range Status   12/27/2023 37 35 - 45 mm Hg Final     pO2 Arterial   Date Value Ref Range Status   12/27/2023 102 80 - 105 mm Hg Final     Bicarbonate Arterial   Date Value Ref Range Status   12/27/2023 26 21 - 28 mmol/L Final     Base Excess/Deficit   Date Value Ref Range Status   12/27/2023 2.5 (H) -9.0 - 1.8 mmol/L Final     ABG drawn from right radial.     MARIA M GARCIA, RRT

## 2023-12-27 NOTE — PROGRESS NOTES
[id number]  Alert and oriented times 2-3, disoriented to situation  Sitter at bedside due to pulling at lines  Right groin site WDL  Tele: SR  A 2 W with GB, not out of bed during shift   TPN at 40 ml/hr   Plan: continue to monitor

## 2023-12-27 NOTE — PROGRESS NOTES
Called lab to get blood drawn. Unable to draw blood off pt's PICC.     Lab did not come to draw blood.

## 2023-12-27 NOTE — PROVIDER NOTIFICATION
Discussed with provider pt's lethargy. Stated if pt does not wake up in half hour to call RRT. It was reported that pt was up all night and that pt needed a sit due to confusion and pulling at lines. Pt has had int confusion and lethargy this stay, CT previously ordered during that episode was ok.

## 2023-12-27 NOTE — PROGRESS NOTES
[facility]  Cardiology Progress Note  Date of Service: 12/27/2023  Primary Cardiologist: in [place]     Assessment & Plan    Garry Mckay is a 88 year old male with past medical history significant for CAD with hx of CABG, HTN, HLD, ICM admitted on 12/8/2023 with appendicitis with sepsis.  We are consulted for cp and + trop.      Assessment:  1.  Acute appendicitis with sepsis, s/p laparoscopic appendectomy 12/8, per IM and surgery    2.  Coronary artery disease with history of CABG and multiple subsequent PCI's.  NSTEMI here with troponins peaked at 308.  Nuclear stress test was abnormal.  - Angiogram 12/26 showed occluded LAD, occluded proximal RCA, 40% sent circumflex with patent CLAUDIA to the RCA occluded graft to the OM1 and nearly occluded graft to the D1.  Patient underwent lithotripsy and drug-eluting stent to the vein graft to the D1.    3.  Ischemic cardiomyopathy with chronic systolic heart failure EF 20 to 25% previously noted to be 35 to 40% but trending down since 2021.   -RRT this morning for lethargy reveals markedly abnormal VBG with pH of 6.9 pCO2 of 62 bicarb of 13 and O2 of 36.  -Will get chest x-ray, BNP and diurese if necessary.  Daughter reports that patient is open to short-term intubation if need be.  -On Entresto, Coreg, and furosemide 20 mg daily prior to admission.  Entresto and carvedilol have been restarted at lower doses.  No diuretic at this a.m.  -Weight 179 on admission down to 163, but in the context of sepsis requiring TPN for nutrition unclear volume status.  Was not able to do exam today given patient leaving for CT of brain.    4.  Lethargy, being worked up for CVA, history of hemorrhagic CVA post carotid stenting.  If need be patient may need to consider referral for MRI of the brain.  He has an ICD in place.  Per chart review at [facility] this is an Medtronic Jeff XT  ICD with Medtronic lead model 6935.    5.  Hypertension well-controlled    6.   "Hyperlipidemia on statin and Zetia prior to admission    7.  ICD in place      Plan:   Greatly appreciate house NP workup and our team.  BNP, and chest x-ray, CT of the brain pending.  If any suggestion of volume overload recommend aggressive diuresis  Continue DAPT  We will continue to follow with you, discussed at length with daughter who was present.    Carlita Escalante PA-C  [facility]  Pager: [phone]     Interval History   Patient noted to be lethargic this morning and somnolent did not seem to be himself.  Currently did not get his BiPAP placed overnight.  RRT called with improvement in mental status with BiPAP in place.    Physical Exam   Temp: 97.4  F (36.3  C) Temp src: Axillary BP: 126/84 Pulse: 70   Resp: 21 SpO2: 98 % O2 Device: None (Room air)    Vitals:    12/26/23 0550 12/26/23 1100 12/27/23 0630   Weight: 75.6 kg (166 lb 10.7 oz) 78.2 kg (172 lb 6.4 oz) 74 kg (163 lb 2.3 oz)     Elderly, frail gentleman BiPAP in place.  Unable to complete exam further as patient leaving for CT.  Clinically Significant Risk Factors              # Hypoalbuminemia: Lowest albumin = 2.8 g/dL at 12/11/2023  5:26 AM, will monitor as appropriate     # Hypertension: Noted on problem list  # Chronic heart failure with reduced ejection fraction: last echo with EF <40%      # DMII: A1C = 6.8 % (Ref range: <5.7 %) within past 6 months   # Overweight: Estimated body mass index is 25.55 kg/m  as calculated from the following:    Height as of this encounter: 1.702 m (5' 7\").    Weight as of this encounter: 74 kg (163 lb 2.3 oz).   # Severe Malnutrition: based on nutrition assessment        Systolic chronic    Dehydration    Acute kidney failure, unspecified    Encephalopathy: Encephalopathy, unspecified            Medications    - MEDICATION INSTRUCTIONS -      - MEDICATION INSTRUCTIONS -      dextrose      parenteral nutrition - Clinimix E      parenteral nutrition - Clinimix E 40 mL/hr at 12/26/23 2119    Percutaneous Coronary " Intervention orders placed (this is information for BPA alerting)        aspirin  81 mg Oral Daily    atorvastatin  80 mg Oral At Bedtime    carvedilol  12.5 mg Oral BID    clopidogrel  75 mg Oral Daily    enoxaparin ANTICOAGULANT  40 mg Subcutaneous Q24H    escitalopram  20 mg Oral or NG Tube Daily    finasteride  5 mg Oral At Bedtime    [Held by provider] gabapentin  100 mg Oral TID    insulin aspart  1-7 Units Subcutaneous TID AC    insulin aspart  1-5 Units Subcutaneous At Bedtime    insulin glargine  10 Units Subcutaneous At Bedtime    latanoprost  1 drop Both Eyes At Bedtime    levothyroxine  100 mcg Oral QAM AC    pantoprazole  40 mg Oral QAM AC    sacubitril-valsartan  1 tablet Oral BID    [Held by provider] senna-docusate  1 tablet Oral BID    sodium chloride (PF)  10-40 mL Intracatheter Q7 Days    sodium chloride (PF)  3 mL Intracatheter Q8H       Data   Last 24 hours labs reviewed

## 2023-12-27 NOTE — SIGNIFICANT EVENT
RRT called for lethargy.   CT, blood gases, labs, xray ordered.   Mentation improved with bipap.   Repeat ABG at 1400 and then trial off bipap to give meds

## 2023-12-27 NOTE — CODE/RAPID RESPONSE
[facility]    RRT Note  12/27/2023   Time Called: 10:29 AM    Code Status: Full Code    I was called to evaluate Garry Mckay, who is a 88 year old male with a PMH significant for CAD with hx of CABG, HTN, HLD, ICM admitted on 12/8/2023 for appendicitis with sepsis.     Assessment & Plan     Altered Mental Status suspect 2/2 hypercapnic respiratory failure vs. Sleep deprivation vs. Hypoactive delirium   Primary respiratory acidosis with secondary metabolic acidosis  Prior to RRT patient was evaluated by primary Hospitalist Dr. Rose at bedside. He ordered VBG, CBC, BMP, and head CT for further evaluation of lethargy. Upon arrival patient is non-toxic appearing. He is lethargic, arousable to voice.  He opens his eyes but drifts back to sleep. He is able to provide me with one-word answers. Oriented to self and place. He can follow commands appropriately.  Skin is warm and dry. Lungs are CTA, no wheezing or rales. HR irregular. No murmur, rub, or gallop. Abdomen soft, non-tender.     Patient had coronary angiogram yesterday with PCI. Patient usually wears BiPAP with home settings, however patient did not wear BiPAP overnight. Suspect patients AMS due to hypercapnia. VBG consistent with respiratory acidosis with secondary metabolic acidosis. Patient has been encephalopathic this admission with waxing and waning mental status.     Differentials include acute ischemic stroke, hemorrhagic stroke, hypoglycemia, hypercapnic respiratory failure, hypoactive delirium, and infection.      INTERVENTIONS:  - Head CT w/o contrast  - BiPAP  - Consider Brain MRI  - 50 meq bicarb  - CXR  - RT consult for BiPAP with home settings at night  - [place] [facility] [place]    After wearing BiPAP for about 45 minutes and receiving Bicarb, repeat ABG shows pH 7.46, CO2 37, O2 102, and HCO3 26. Patient is more alert. Dr. Rose assessed patient again with me at the bedside. Discussed with and defer further cares  "to primary Hospitalist Dr. Milton Oswald NP  [facility]  Securely message with the [employer] (learn more here)  Text page via [employer] Paging/Directory      Physical Exam   Vital Signs with Ranges:  Temp:  [97.4  F (36.3  C)] 97.4  F (36.3  C)  Pulse:  [67-78] 70  Resp:  [11-26] 21  BP: (126-158)/() 126/84  SpO2:  [96 %-98 %] 98 %  I/O last 3 completed shifts:  In: -   Out: 1200 [Urine:1200]        Physical Exam   EXAM:   Nursing Notes Reviewed.  /84 (BP Location: Right arm)   Pulse 70   Temp 97.4  F (36.3  C) (Axillary)   Resp 21   Ht 1.702 m (5' 7\")   Wt 74 kg (163 lb 2.3 oz)   SpO2 98%   BMI 25.55 kg/m     General:  Appears stated age, no acute distress. Lethargic,arouseable to voice. Oriented to self and place.  Skin:  Warm, dry. No rashes or lesions on exposed skin.  HEENT:  Normocephalic, atraumatic.  Neck:  Supple.  Chest:  Breath sounds CTA and no increased work of breathing on room air.  Cardiovascular:  RRR, no rub or murmur. Trace peripheral edema.   Abdomen:  Soft, non-tender, non-distended.  Musculoskeletal:  Moves all four extremities.   Neurological:  Patient is able to follow all commands.  Psychiatric:  Affect and mood congruent.    Data     EKG:  Interpreted by Julian Oswald NP  Time reviewed: 11:41 AM  Symptoms at time of EKG: AMS   Rhythm: Sinus Rhythm  Rate: 84  Axis: Left Axis Deviation  Ectopy: premature ventricular contractions (frequent)  Conduction: normal  ST Segments/ T Waves: No acute ischemic changes  Q Waves: none  Comparison to prior: NH interval increased    Clinical Impression: Prolonged QT, Sinus rhythm with frequent PVCs.     IMAGING: (X-ray/CT/MRI)   Recent Results (from the past 24 hour(s))   NM Lexiscan stress test (nuc card)   Result Value    Target     Baseline Systolic     Baseline Diastolic BP 69    Last Stress Systolic     Last Stress Diastolic BP 73    Baseline HR 71    Max HR  80    Max " "Predicted HR  60    Rate Pressure Product 9,760.0    Narrative      The nuclear stress test is abnormal.    There is a medium sized area of mild ischemia in the distal apical,   anteroseptal and inferoseptal segment(s) of the left ventricle.    There is a medium sized area of a moderate degree of infarction in the   mid to basal inferior and inferolateral segment(s) of the left ventricle.    Left ventricular function is severely reduced, EF 17%.    A prior study was conducted on 7/6/2023.  In comparison with the prior   report, distal septal and apical ischemia is new.     Cardiac Catheterization    Narrative      Ost Cx to Prox Cx lesion is 40% stenosed.    Prox LAD lesion is 100% stenosed.    Ost RCA to Prox RCA lesion is 95% stenosed.    Prox RCA lesion is 100% stenosed.    Origin to Prox Graft lesion is 100% stenosed.    Origin to Prox Graft lesion is 99% stenosed.    Lithotripsy and MAX to ostial SVG to Diagonal vessel.         CBC with Diff:  Recent Labs   Lab Test 12/26/23  1142 12/07/23  1816 01/10/21  1020   WBC 10.0   < > 9.3   HGB 15.3   < > 17.7   MCV 98   < > 98      < > 153   INR  --   --  1.06    < > = values in this interval not displayed.      No results found for: \"RETICABSCT\"  No results found for: \"RETP\"    Comprehensive Metabolic Panel:  Recent Labs   Lab 12/27/23  1036 12/26/23  1759 12/26/23  1142 12/25/23  0823 12/25/23  0817   NA  --   --  138  --  137   POTASSIUM  --   --  4.6  --  5.0   CHLORIDE  --   --  104  --  105   CO2  --   --  22  --  23   ANIONGAP  --   --  12  --  9   *   < > 174*   < > 192*   BUN  --   --  29.3*  --  29.5*   CR  --   --  0.97  --  0.96   GFRESTIMATED  --   --  76  --  77   RHODA  --   --  9.7  --  9.7   MAG  --   --  1.9  --  1.9   PHOS  --   --  3.2  --  3.0   PROTTOTAL  --   --   --   --  6.2*   ALBUMIN  --   --   --   --  3.2*   BILITOTAL  --   --   --   --  0.5   ALKPHOS  --   --   --   --  116   AST  --   --   --   --  20   ALT  --   --   --   -- "  14    < > = values in this interval not displayed.         Time Spent on this Encounter     CRITICAL CARE TIME  I spent 45 minutes of critical care time on the unit/floor managing the care of Garry Mckay. Upon evaluation, this patient had a high probability of imminent or life-threatening deterioration due to Acute Hypercapnic Respiratory Failure which required my direct attention, intervention, and personal management. 100% of my time was spent at the bedside counseling the patient and/or coordinating care regarding services listed in this note.

## 2023-12-27 NOTE — CONSULTS
Addressing standard consult received for heart healthy diet education. Pt focusing on increasing oral intake to wean off TPN. HH ed not appropriate at this time.

## 2023-12-27 NOTE — PLAN OF CARE
"Goal Outcome Evaluation:  Neuro- x1-2, forgetful, restless at times. Sitter at bedside to help with bedrest post angioram. Zyprex   Most Recent Vitals- BP (!) 158/86   Pulse 71   Temp 97.4  F (36.3  C) (Axillary)   Resp 16   Ht 1.702 m (5' 7\")   Wt 78.2 kg (172 lb 6.4 oz)   SpO2 98%   BMI 27.00 kg/m    Tele/Cardiac- Sr w/ pvc's and inverted T wave   Resp- RA denies sob/castillo  Activity- bedrest most of shift. 2A gb walker  Pain- mild HA received tylenol x1 w/ relief   GI/-  1 loose stool. 50/50 inc of b/b   Diet: Snacks/Supplements Adult: Gelatein Plus; With Meals  parenteral nutrition - Clinimix E  Low Saturated Fat Na <2400 mg    Low po intake at bedtime 25% of dinner   Plan- continue poc     "

## 2023-12-28 ENCOUNTER — HOSPITAL ENCOUNTER (INPATIENT)
Dept: NEUROLOGY | Facility: CLINIC | Age: 88
Discharge: HOME | DRG: 853 | End: 2023-12-28
Attending: HOSPITALIST
Payer: MEDICARE

## 2023-12-28 ENCOUNTER — APPOINTMENT (OUTPATIENT)
Dept: SPEECH THERAPY | Facility: CLINIC | Age: 88
DRG: 853 | End: 2023-12-28
Attending: HOSPITALIST
Payer: MEDICARE

## 2023-12-28 ENCOUNTER — APPOINTMENT (OUTPATIENT)
Dept: PHYSICAL THERAPY | Facility: CLINIC | Age: 88
DRG: 853 | End: 2023-12-28
Attending: INTERNAL MEDICINE
Payer: MEDICARE

## 2023-12-28 LAB
ALBUMIN UR-MCNC: NEGATIVE MG/DL
ANION GAP SERPL CALCULATED.3IONS-SCNC: 7 MMOL/L (ref 7–15)
APPEARANCE UR: CLEAR
ATRIAL RATE - MUSE: 67 BPM
ATRIAL RATE - MUSE: 79 BPM
BILIRUB UR QL STRIP: NEGATIVE
BUN SERPL-MCNC: 31.2 MG/DL (ref 8–23)
CALCIUM SERPL-MCNC: 9.3 MG/DL (ref 8.8–10.2)
CHLORIDE SERPL-SCNC: 105 MMOL/L (ref 98–107)
COLOR UR AUTO: YELLOW
CREAT SERPL-MCNC: 1.13 MG/DL (ref 0.67–1.17)
DEPRECATED HCO3 PLAS-SCNC: 23 MMOL/L (ref 22–29)
DIASTOLIC BLOOD PRESSURE - MUSE: NORMAL MMHG
DIASTOLIC BLOOD PRESSURE - MUSE: NORMAL MMHG
EGFRCR SERPLBLD CKD-EPI 2021: 63 ML/MIN/1.73M2
ERYTHROCYTE [DISTWIDTH] IN BLOOD BY AUTOMATED COUNT: 13.9 % (ref 10–15)
GLUCOSE BLDC GLUCOMTR-MCNC: 160 MG/DL (ref 70–99)
GLUCOSE BLDC GLUCOMTR-MCNC: 167 MG/DL (ref 70–99)
GLUCOSE BLDC GLUCOMTR-MCNC: 170 MG/DL (ref 70–99)
GLUCOSE BLDC GLUCOMTR-MCNC: 181 MG/DL (ref 70–99)
GLUCOSE BLDC GLUCOMTR-MCNC: 188 MG/DL (ref 70–99)
GLUCOSE BLDC GLUCOMTR-MCNC: 235 MG/DL (ref 70–99)
GLUCOSE SERPL-MCNC: 192 MG/DL (ref 70–99)
GLUCOSE UR STRIP-MCNC: NEGATIVE MG/DL
HCT VFR BLD AUTO: 37 % (ref 40–53)
HGB BLD-MCNC: 12.3 G/DL (ref 13.3–17.7)
HGB UR QL STRIP: NEGATIVE
INTERPRETATION ECG - MUSE: NORMAL
INTERPRETATION ECG - MUSE: NORMAL
KETONES UR STRIP-MCNC: NEGATIVE MG/DL
LEUKOCYTE ESTERASE UR QL STRIP: NEGATIVE
MAGNESIUM SERPL-MCNC: 2 MG/DL (ref 1.7–2.3)
MCH RBC QN AUTO: 32.5 PG (ref 26.5–33)
MCHC RBC AUTO-ENTMCNC: 33.2 G/DL (ref 31.5–36.5)
MCV RBC AUTO: 98 FL (ref 78–100)
MUCOUS THREADS #/AREA URNS LPF: PRESENT /LPF
NITRATE UR QL: NEGATIVE
P AXIS - MUSE: -12 DEGREES
P AXIS - MUSE: 28 DEGREES
PH UR STRIP: 5.5 [PH] (ref 5–7)
PHOSPHATE SERPL-MCNC: 3.3 MG/DL (ref 2.5–4.5)
PLATELET # BLD AUTO: 174 10E3/UL (ref 150–450)
POTASSIUM SERPL-SCNC: 4.6 MMOL/L (ref 3.4–5.3)
PR INTERVAL - MUSE: 126 MS
PR INTERVAL - MUSE: 136 MS
QRS DURATION - MUSE: 110 MS
QRS DURATION - MUSE: 98 MS
QT - MUSE: 408 MS
QT - MUSE: 456 MS
QTC - MUSE: 467 MS
QTC - MUSE: 481 MS
R AXIS - MUSE: 38 DEGREES
R AXIS - MUSE: 60 DEGREES
RBC # BLD AUTO: 3.79 10E6/UL (ref 4.4–5.9)
RBC URINE: 1 /HPF
SODIUM SERPL-SCNC: 135 MMOL/L (ref 135–145)
SP GR UR STRIP: 1.02 (ref 1–1.03)
SYSTOLIC BLOOD PRESSURE - MUSE: NORMAL MMHG
SYSTOLIC BLOOD PRESSURE - MUSE: NORMAL MMHG
T AXIS - MUSE: 146 DEGREES
T AXIS - MUSE: 194 DEGREES
UROBILINOGEN UR STRIP-MCNC: NORMAL MG/DL
VENTRICULAR RATE- MUSE: 67 BPM
VENTRICULAR RATE- MUSE: 79 BPM
WBC # BLD AUTO: 9.9 10E3/UL (ref 4–11)
WBC URINE: 1 /HPF

## 2023-12-28 PROCEDURE — 97116 GAIT TRAINING THERAPY: CPT | Mod: GP

## 2023-12-28 PROCEDURE — 250N000013 HC RX MED GY IP 250 OP 250 PS 637: Performed by: INTERNAL MEDICINE

## 2023-12-28 PROCEDURE — 84100 ASSAY OF PHOSPHORUS: CPT | Performed by: HOSPITALIST

## 2023-12-28 PROCEDURE — 83735 ASSAY OF MAGNESIUM: CPT | Performed by: INTERNAL MEDICINE

## 2023-12-28 PROCEDURE — 250N000013 HC RX MED GY IP 250 OP 250 PS 637: Performed by: STUDENT IN AN ORGANIZED HEALTH CARE EDUCATION/TRAINING PROGRAM

## 2023-12-28 PROCEDURE — 97164 PT RE-EVAL EST PLAN CARE: CPT | Mod: GP

## 2023-12-28 PROCEDURE — 95816 EEG AWAKE AND DROWSY: CPT

## 2023-12-28 PROCEDURE — 92526 ORAL FUNCTION THERAPY: CPT | Mod: GN | Performed by: SPEECH-LANGUAGE PATHOLOGIST

## 2023-12-28 PROCEDURE — 250N000011 HC RX IP 250 OP 636: Performed by: INTERNAL MEDICINE

## 2023-12-28 PROCEDURE — 250N000013 HC RX MED GY IP 250 OP 250 PS 637: Performed by: HOSPITALIST

## 2023-12-28 PROCEDURE — 250N000013 HC RX MED GY IP 250 OP 250 PS 637: Performed by: PHYSICIAN ASSISTANT

## 2023-12-28 PROCEDURE — 36415 COLL VENOUS BLD VENIPUNCTURE: CPT | Performed by: HOSPITALIST

## 2023-12-28 PROCEDURE — 250N000013 HC RX MED GY IP 250 OP 250 PS 637

## 2023-12-28 PROCEDURE — 82607 VITAMIN B-12: CPT | Performed by: PSYCHIATRY & NEUROLOGY

## 2023-12-28 PROCEDURE — 84443 ASSAY THYROID STIM HORMONE: CPT | Performed by: PSYCHIATRY & NEUROLOGY

## 2023-12-28 PROCEDURE — 85027 COMPLETE CBC AUTOMATED: CPT | Performed by: HOSPITALIST

## 2023-12-28 PROCEDURE — 99232 SBSQ HOSP IP/OBS MODERATE 35: CPT | Performed by: HOSPITALIST

## 2023-12-28 PROCEDURE — 120N000013 HC R&B IMCU

## 2023-12-28 PROCEDURE — 92610 EVALUATE SWALLOWING FUNCTION: CPT | Mod: GN | Performed by: SPEECH-LANGUAGE PATHOLOGIST

## 2023-12-28 PROCEDURE — 82746 ASSAY OF FOLIC ACID SERUM: CPT | Performed by: PSYCHIATRY & NEUROLOGY

## 2023-12-28 PROCEDURE — 82374 ASSAY BLOOD CARBON DIOXIDE: CPT | Performed by: HOSPITALIST

## 2023-12-28 PROCEDURE — 81001 URINALYSIS AUTO W/SCOPE: CPT | Performed by: HOSPITALIST

## 2023-12-28 PROCEDURE — 36415 COLL VENOUS BLD VENIPUNCTURE: CPT | Performed by: PSYCHIATRY & NEUROLOGY

## 2023-12-28 PROCEDURE — 83921 ORGANIC ACID SINGLE QUANT: CPT | Performed by: PSYCHIATRY & NEUROLOGY

## 2023-12-28 PROCEDURE — 99232 SBSQ HOSP IP/OBS MODERATE 35: CPT | Mod: FS | Performed by: PHYSICIAN ASSISTANT

## 2023-12-28 PROCEDURE — 250N000009 HC RX 250: Performed by: INTERNAL MEDICINE

## 2023-12-28 PROCEDURE — 250N000013 HC RX MED GY IP 250 OP 250 PS 637: Performed by: NURSE PRACTITIONER

## 2023-12-28 PROCEDURE — 97530 THERAPEUTIC ACTIVITIES: CPT | Mod: GP

## 2023-12-28 RX ORDER — MAGNESIUM OXIDE 400 MG/1
400 TABLET ORAL EVERY 4 HOURS
Status: COMPLETED | OUTPATIENT
Start: 2023-12-28 | End: 2023-12-28

## 2023-12-28 RX ORDER — DEXTROSE MONOHYDRATE 25 G/50ML
25-50 INJECTION, SOLUTION INTRAVENOUS
Status: DISCONTINUED | OUTPATIENT
Start: 2023-12-28 | End: 2023-12-31

## 2023-12-28 RX ORDER — FUROSEMIDE 20 MG
20 TABLET ORAL DAILY
Status: DISCONTINUED | OUTPATIENT
Start: 2023-12-28 | End: 2024-01-03 | Stop reason: HOSPADM

## 2023-12-28 RX ORDER — NICOTINE POLACRILEX 4 MG
15-30 LOZENGE BUCCAL
Status: DISCONTINUED | OUTPATIENT
Start: 2023-12-28 | End: 2023-12-31

## 2023-12-28 RX ADMIN — LATANOPROST 1 DROP: 50 SOLUTION/ DROPS OPHTHALMIC at 21:00

## 2023-12-28 RX ADMIN — CARVEDILOL 12.5 MG: 12.5 TABLET, FILM COATED ORAL at 10:11

## 2023-12-28 RX ADMIN — FUROSEMIDE 20 MG: 20 TABLET ORAL at 15:32

## 2023-12-28 RX ADMIN — ACETAMINOPHEN 650 MG: 325 SUSPENSION ORAL at 23:57

## 2023-12-28 RX ADMIN — SACUBITRIL AND VALSARTAN 1 TABLET: 24; 26 TABLET, FILM COATED ORAL at 20:51

## 2023-12-28 RX ADMIN — ENOXAPARIN SODIUM 40 MG: 40 INJECTION SUBCUTANEOUS at 15:30

## 2023-12-28 RX ADMIN — Medication 400 MG: at 09:39

## 2023-12-28 RX ADMIN — SACUBITRIL AND VALSARTAN 1 TABLET: 24; 26 TABLET, FILM COATED ORAL at 09:39

## 2023-12-28 RX ADMIN — LEVOTHYROXINE SODIUM 100 MCG: 100 TABLET ORAL at 04:32

## 2023-12-28 RX ADMIN — ATORVASTATIN CALCIUM 80 MG: 80 TABLET, FILM COATED ORAL at 20:51

## 2023-12-28 RX ADMIN — ASCORBIC ACID, VITAMIN A PALMITATE, CHOLECALCIFEROL, THIAMINE HYDROCHLORIDE, RIBOFLAVIN-5 PHOSPHATE SODIUM, PYRIDOXINE HYDROCHLORIDE, NIACINAMIDE, DEXPANTHENOL, ALPHA-TOCOPHEROL ACETATE, VITAMIN K1, FOLIC ACID, BIOTIN, CYANOCOBALAMIN: 200; 3300; 200; 6; 3.6; 6; 40; 15; 10; 150; 600; 60; 5 INJECTION, SOLUTION INTRAVENOUS at 00:19

## 2023-12-28 RX ADMIN — CLOPIDOGREL BISULFATE 75 MG: 75 TABLET ORAL at 09:38

## 2023-12-28 RX ADMIN — CARVEDILOL 12.5 MG: 12.5 TABLET, FILM COATED ORAL at 20:50

## 2023-12-28 RX ADMIN — INSULIN ASPART 2 UNITS: 100 INJECTION, SOLUTION INTRAVENOUS; SUBCUTANEOUS at 15:45

## 2023-12-28 RX ADMIN — FINASTERIDE 5 MG: 5 TABLET, FILM COATED ORAL at 20:50

## 2023-12-28 RX ADMIN — PANTOPRAZOLE SODIUM 40 MG: 40 TABLET, DELAYED RELEASE ORAL at 04:32

## 2023-12-28 RX ADMIN — ASCORBIC ACID, VITAMIN A PALMITATE, CHOLECALCIFEROL, THIAMINE HYDROCHLORIDE, RIBOFLAVIN-5 PHOSPHATE SODIUM, PYRIDOXINE HYDROCHLORIDE, NIACINAMIDE, DEXPANTHENOL, ALPHA-TOCOPHEROL ACETATE, VITAMIN K1, FOLIC ACID, BIOTIN, CYANOCOBALAMIN: 200; 3300; 200; 6; 3.6; 6; 40; 15; 10; 150; 600; 60; 5 INJECTION, SOLUTION INTRAVENOUS at 23:57

## 2023-12-28 RX ADMIN — ACETAMINOPHEN 650 MG: 325 TABLET, FILM COATED ORAL at 04:32

## 2023-12-28 RX ADMIN — Medication 400 MG: at 15:32

## 2023-12-28 RX ADMIN — ESCITALOPRAM OXALATE 20 MG: 10 TABLET ORAL at 09:38

## 2023-12-28 RX ADMIN — ASPIRIN 81 MG: 81 TABLET, COATED ORAL at 09:38

## 2023-12-28 ASSESSMENT — ACTIVITIES OF DAILY LIVING (ADL)
ADLS_ACUITY_SCORE: 44
ADLS_ACUITY_SCORE: 44
ADLS_ACUITY_SCORE: 39
ADLS_ACUITY_SCORE: 39
ADLS_ACUITY_SCORE: 44
ADLS_ACUITY_SCORE: 39
ADLS_ACUITY_SCORE: 44
ADLS_ACUITY_SCORE: 39
ADLS_ACUITY_SCORE: 39
ADLS_ACUITY_SCORE: 44

## 2023-12-28 NOTE — DISCHARGE INSTRUCTIONS
Please call [facility] ([facility]) with any concerns:  Monday-Friday 8:00 AM to 4:30 PM   CORE Nurse Line: [phone]  After hours:  [phone]    -- Please start weighing self daily and write in wt log chart to bring into your cardiology/CORE clinic follow up appts.   -- Please bring in current medication bottles to cardiology/CORE appts for review.   -- Call RN with weight gain greater than 2 lbs overnight, and/or 5 lbs in a week, and/or worsening shortness of breath/edema/abdominal bloating.

## 2023-12-28 NOTE — PROGRESS NOTES
[facility]    Medicine Progress Note - Hospitalist Service    Date of Admission:  12/8/2023    Assessment & Plan     88 year old male S/P Lap Appy for Perforated Appendicitis, and vascular assessment of questionable area on cecum through ICG administration.  He also has had issues with post op fluid collections that IR has placed a drain in and had a significant post-op ileus.  Initially underwent surgery on 12/8/2023 and required ICU management for septic shock.      Ultimately improved, and Hospitalist service assumed care on 12/11/2023.      Acute appendicitis with peritonitis and septic shock - s/p laparoscopic appendectomy 12/8/2023.  Pelvic fluid collections, question abscesses.  Postoperative ileus vs SBO, prolonged  Nutrition     -Initial presentation to OSH 12/7 as above. CT abdomen noted with acute appendicitis with 0.7 cm appendicolith, no abscess. Started on ampicillin-sulbactam. Subsequently transferred to [facility] 12/8.  On 12/8, underwent above surgery. Weaned off pressors. Antibiotics changed to pipericillin-tazobactam.   On 12/10, CT abdomen noted with multiple dilated small bowel loops with related decompression of colon with findings consistent with partial obstruction/ adynamic ileus.  On 12/12, diet advanced to clears as pt seemed to be improving with +flatus and BM. Later, developed more N/V and NG placed.  On 12/14, WBC elevated. Repeat abdominal CT showed prior appendectomy with findings suggestive of peritonitis, developing rim enhancing pelvic fluid collections, abscesses possible; increasing dilation of proximal small bowel with transition point in the right lower quadrant, suggestive of partial mechanical obstruction, developing adhesion is possible, no evidence of nadine bowel ischemia or acute perforation; distended gallbladder without additional CT evidence of acute cholecystitis.   -IR placed drain into right pelvic fluid collection (2/15/23)   -CT sinegram  12/19/23 - with drain in good position, min residual fluid and no fistula.   - Completed 14 days of IV pipericillin-tazobactam   - started TPN 12/15 - continuing but rate has been titrated down  -NG tube and ESTEFANY drain removed 12/21/23  -Surgery team has been following the patient-and he is having bowel movements and tolerating diet and we do plan to transition the patient off TPN and the surgery team is okay with Plavix and Lovenox for chemoprophylaxis and they have signed off  -continue calorie count with plan to wean tpn when deemed appropriate when his caloric intake is appropriate     Acute hypoxic respiratory failure, suspect multifactorial related to atelectasis, recent abdominal surgery, resolved.  CARINA on home CPA:.  - Initial presentation as above. CXR at OSH negative.  - On 12/8, CXR showed new left basilar bandlike opacity, most likely atelectasis.   -continue with home cpap      FATIMAH on CKD3, suspect prerenal, meds (including sacubitril-valsartan, diuretics)-resolved  Hyperkalemia, resolved.  - Baseline creatinine 1-1.1.   -Creatinine 1.39 --> 1.8 at OSH.  - Cr up to 2.27 and K up to 5.5 on 12/8.  -Patient did receive IV Lasix on 12/10 and 12/11  -His renal function this morning is stable at 1.13 and we will continue to monitor     Troponin elevation, suspect demand ischemia (type 2 NSTEMI).  Hypertension (benign essential).  CAD s/p CABG (1994), stenting (2002, 2003, 2005, 2021)  Chronic HFrEF (systolic and diastolic), s/p ICD.  HLD.  Afib with RVR  Abnormal stress test s/p C on 12/26/23 s/p MAX to vein graft to D1  [PTA: carvedilol 12.5 mg BID; sacubitril-valsartan 49-51 mg BID; furosemide 20 mg daily.]  --has ICD and h/o CAD s/p CABG 1994  -Last stress testing in [place] 7/2023, which was negative for ischemia   -Last cath 1/2021 at [facility] showed patent CLAUDIA-RCA, LIMA occluded, vein-diagonal patent with placement of stent,  LAD, 40% circumflex, RCA occluded. Last stress testing 7/2023 negative for  ischemia. Echo 7/2023 showed LVEF 30-35%, global hypokinesis LV, moderate-severe cLVH, grade 1 diastolic dysfunction; RV systolic function mildly reduced.   - Initial presentation as above. BNP in ED at 2613, troponin 70. EKG w/ nonspecific t-w changes. PTA sacubitril-valsartan and furosemide held on admit given FATIMAH.  Briefly was  on IV diuretics.  -He was seen initially by cardiology on 12/8 and 12/9 for preop clearance and patient did had elevated troponin which was felt due to demand  -Patient did had RRT on 12/22 and his troponin was elevated  -Echo 12/22/23 - EF 20-25% with hyokinesis of the post wall.    -His prior echo in Care Everywhere showed that his EF was 30 to 35% with moderate hypokinesis of the left ventricle  -stress test is abnormal  on 12/26/23  -s/pLHC on 12/26/23 which showed occluded LAD, occluded proximal RCA, 40% sent circumflex with patent CLAUDIA to the RCA occluded graft to the OM1 and nearly occluded graft to the D1.  Patient underwent lithotripsy and drug-eluting stent to the vein graft to the D1.   -Cardiology on board and continue the patient with aspirin 81 mg, Plavix 75 mg, Entresto and he was restarted on Lasix 20 mg daily and further up titration of his medications as outpatient  -Uninterrupted aspirin and Plavix for 1 year  -Outpatient follow-up with cardiology and they have signed off today on 12/28/2023        Acute metabolic encephalopathy (delirium) -waxing and waning-   -CT scan of the head on 12/21/2023 did not show any evidence of any acute intracranial hemorrhage and there is microvascular changes  -I have discussed with daughter and she told me that patient for the past 2 to 3 days has been more sleepy and he does seem to have hospital-acquired delirium and on night of 12/23 he did get Zyprexa  -I also discussed with daughter and she told me that he used to be on Seroquel at 1 point in time in the past and that caused him to be sleepy  -Daughter also thinks that given that  patient's Lexapro was held for few days along with gabapentin that might help withdrawal effect  -On 12/27 RRT was called as patient was very somnolent and less responsive.  VBG was obtained which was initially concerning for pH of 6.9 with pCO2 of 92 and bicarb was low and patient was given an ampule of bicarb and was started on BiPAP.  Pretty soon after starting on BiPAP ABG was done which showed abnormal values and apparently the initial VBG done during RRT was not a good sample as it was drawn from the line  -CT scan of the head was done during the rapid response on 12/27 and shows chronic ischemic changes  -Patient improved significantly during course of the day and he was taken off BiPAP on 12/20 7 PM  -Most likely cause of RRT can be that patient was not getting CPAP  -I did order EEG and neurology team was consulted  -12/28-patient is significantly improved and is near baseline and has been the most alert and following commands.  He did tell me that he has been feeling hot.  I did discuss that his CBC and other labs look good and we will order TSH, urine exam  -Will avoid ongoing use of benzodiazepines, gabapentin, olanzapine  -Continue to monitor     Hyperglycemia while on TPN  - HbA1c is 6.8  -Patient is started back on diet and we will continue with the TPN and we will continue with sliding scale insulin with meals and also will use Lantus 5 units at bedtime        Anemia, suspect partial blood loss component with surgery [id number]  -His last hemoglobin on 12/7/23 was hemoglobin of 16.8 with hematocrit of 49.3 and might have component of underlying dehydration  -His hemoglobin has been fluctuating during the course of hospital stay and is 12.6 today and is due to acute illness and is improving and there is no evidence of any GI bleed  -Hb today is 12.3 with no evidence of any local bleeding     Thrombocytopenia, unclear etiology, possibly due to sepsis, medication effect or consumptive or  "other cause result-resolved       Hypokalemi-Resolved       Hx CVA  -We will continue with aspirin 81 mg, Plavix 75 mg daily, resume atorvastatin 80 mg and Zetia 10 mg  -CT scan of the head on 12/21 and 12/27  did not show any acute process and he has chronic ischemic changes     Depression/anxiety.  - Continue to hold gabapentin for now   - Continue escitalopram daily - tolerating      Hypothyroidism.  - Continue levothyroxine.     GERD.  -Continue with PPI      BPH.  - will continue finasteride      History of migraines  -Noted      Gallbladder distention  -This was found on the CT scan of the abdomen done on 12/14 and ultrasound was done on 12/15 without any evidence of cholecystitis                Diet: Snacks/Supplements Adult: Gelatein Plus; With Meals  Snacks/Supplements Adult: Traceyeros Breen Standard Oral Supplement; Between Meals  Calorie Counts  parenteral nutrition - Clinimix E  NPO for Medical/Clinical Reasons Except for: No Exceptions    DVT Prophylaxis: Enoxaparin (Lovenox) SQ  Hameed Catheter: Not present  Lines: PRESENT      PICC 12/15/23 Double Lumen Left Brachial vein medial TPN-Site Assessment: WDL except      Cardiac Monitoring: ACTIVE order. Indication: Post- PCI/Angiogram (24 hours)  Code Status: Full Code      Clinically Significant Risk Factors           # Hypercalcemia: corrected calcium is >10.1, will monitor as appropriate    # Hypoalbuminemia: Lowest albumin = 2.8 g/dL at 12/11/2023  5:26 AM, will monitor as appropriate     # Hypertension: Noted on problem list    # Chronic heart failure with reduced ejection fraction: last echo with EF <40%      # DMII: A1C = 6.8 % (Ref range: <5.7 %) within past 6 months   # Overweight: Estimated body mass index is 25.55 kg/m  as calculated from the following:    Height as of this encounter: 1.702 m (5' 7\").    Weight as of this encounter: 74 kg (163 lb 2.3 oz).   # Severe Malnutrition: based on nutrition assessment           Disposition Plan      Expected " Discharge Date: 12/31/2023,  3:00 PM      Discharge Comments: OR 12/8 12/16 CT guided drainage, JPs leaking.  Confused and agitated at times.  12/26-Stress test            Chloe Rose MD  Hospitalist Service  [facility]  Securely message with [employer] (more info)  Text page via [employer] Paging/Directory     I am off service from tomorrow morning and his care will be taken over by hospital medicine team  ______________________________________________________________________    Interval History   Seen this morning and daughter was present and patient is AOx3 and was sitting in the chair.  He told me that he is anxious.  The daughter also told me that patient has been feeling hot and this has been ongoing since he has been in the hospital.  He denies any urinary complaints.  He also denies any shortness of breath or cough.  Patient was hungry and diet was initiated.    I did discuss the plan of care with the daughter and the patient.    .    Physical Exam   Vital Signs: Temp: 98.2  F (36.8  C) Temp src: Oral BP: 98/61 Pulse: 66   Resp: 24 SpO2: 95 % O2 Device: Nasal cannula Oxygen Delivery: 1 LPM  Weight: 163 lbs 2.25 oz        General: Initially was AO x 0 this morning and was lethargic but later in the day that did improve  Respiratory: Lungs are clear to auscultation bilaterally with no wheeze or crackles   Cardiovascular: Regular rate , S1 and S2 normal with no murmer or rubs or gallops  Abdomen:   soft , non tender , non distended and bowel sound present   Skin: No skin rashes   Neurologic: No facial droop and strength is 4/ 5 over both upper and lower extremity  Musculoskeletal: Normal Range of motion over upper and lower extremities bilaterally   Psychiatric: cooperative      Medical Decision Making       Time spent in care of patient is 46 minutes and I reviewed his labs including CBC, comprehensive metabolic panel, discussed plan of care in detail with the patient, daughter, nursing  staff and the patient and questions were answered to satisfaction.  I also reviewed the note from the neurology and the cardiology team    Data     I have personally reviewed the following data over the past 24 hrs:    9.9  \   12.3 (L)   / 174     135 105 31.2 (H) /  192 (H)   4.6 23 1.13 \     ALT: 11 AST: 16 AP: 118 TBILI: 0.4   ALB: 3.1 (L) TOT PROTEIN: 6.1 (L) LIPASE: N/A     Trop: N/A BNP: 1,407     Procal: N/A CRP: N/A Lactic Acid: 1.8         Imaging results reviewed over the past 24 hrs:   Recent Results (from the past 24 hour(s))   CT Head w/o Contrast    Narrative    CT SCAN OF THE HEAD WITHOUT CONTRAST   12/27/2023 11:15 AM     HISTORY: Altered mental status.    TECHNIQUE:  Axial images of the head and coronal reformations without  IV contrast material. Radiation dose for this scan was reduced using  automated exposure control, adjustment of the mA and/or kV according  to patient size, or iterative reconstruction technique.    COMPARISON: CT of the head dated 12/21/2023.    FINDINGS: Mild asymmetric dilation of the atrium, occipital horn, and  temporal horn of the right lateral ventricle, likely due to ex vacuo  phenomenon. No definite hydrocephalus. There is mild to moderate  generalized brain parenchymal volume loss. Small chronic area of  cortical/subcortical gliosis and encephalomalacia involving the  lateral right temporal lobe, unchanged. Moderate patchy and confluent  nonspecific hypoattenuation in the cerebral white matter, as before,  likely due to chronic small vessel ischemic disease. This extends into  the basal nuclei regions bilaterally. Small focus of hypoattenuation  in the left basal ganglia region may represent a small chronic lacunar  infarct. There is a probable tiny chronic infarct in the superior  right cerebellar hemisphere. Small patchy areas of calcification of  the bilateral cerebellar hemispheres, as before. No definite CT  findings of large transcortical acute or subacute  infarct, acute  intracranial hemorrhage, extra axial fluid collection, or mass effect.  Prominent scattered atherosclerotic calcifications involving the  vertebrobasilar and the carotid siphons.    Bilateral lens implants. The paranasal sinuses are clear. The mastoid  air cells are grossly clear. The calvarium appears intact.      Impression    IMPRESSION:  1. No significant interval change.  2. Small area of gliosis/encephalomalacia involving the lateral right  temporal lobe with ex vacuo dilatation of the right lateral ventricle.  3. Brain atrophy and presumed chronic ischemic changes, as described.  Please see the body of report for details.    MORENO CRUZ MD         SYSTEM ID:  [id number]   XR Chest Port 1 View    Narrative    XR CHEST PORT 1 VIEW 12/27/2023 11:29 AM    HISTORY: evaluation for pulmonary edema    COMPARISON: 12/20/2023    FINDINGS: No consolidation. No pulmonary edema. No pleural effusions  or pneumothorax. Normal cardiac size. Median sternotomy. Left-sided  implantable cardiac defibrillator/pacer. Mild aortic atherosclerosis.  Small clips in the right neck.    ADDISON SCRUGGS MD         SYSTEM ID:  [id number]

## 2023-12-28 NOTE — PLAN OF CARE
Neuro- orientation waxes/wanes, forgetful; D/O only to time of day this morning, alert and following commands  Tele/Cardiac- SR w/ inverted T wave and frequent PVCs  Resp- on 1L NC, LS clear/diminished  Activity- 2A GB/W, turn/repo  Pain- PRN Tylenol given x1 for headache  Drips- TPN infusing @ 40 ml/hr  Drains/Tubes- PICC  Skin- R groin site CDI, CMS intact; scattered bruising  GI/- incontinent of bowel, 1 loose BM NOC; int. urinary incontinence; external cath in place  Plan- plan of care ongoing; discharge to TCU once off TPN    Hilary Brooks RN

## 2023-12-28 NOTE — CONSULTS
"[facility]    Neurology Consultation     Date of Admission:  12/8/2023    Assessment & Plan   Garry Mckay is a 88 year old male who was admitted on 12/8/2023. I was asked to see the patient for \"on and off lethargy\".  The patient is an 88-year-old gentleman who is encephalopathic and somnolent right now, abdominal pain and occasional short of breath during my visit with him.  The patient is feeling cold.  He is status post septic shock and appendectomy as well as coronary angiogram with stent placement.  The patient has history of obstructive sleep apnea I am not sure if the BiPAP or CPAP was used.  The patient stated that last night he did not use the mask.  He has episodes of somnolence and difficulty arousing with normal vital signs might be related to untreated obstructive sleep apnea.    -Will do EEG to look for epileptiform discharges or seizures however I think this is less likely the case.  -Recommend to stop the Dilaudid and not use medications that can cause mental status changes.  Olanzapine is not a very good choice..  -Continue metabolic and infectious workup as the patient feels cold  -Physical therapy and Occupational Therapy to see the patient.    Will follow-up with you    Angie Schmitt MD    Code Status    Full Code    Reason for Consult   Reason for consult: I was asked by Dr Rose to evaluate this patient for \"on and off lethargy\"..    Primary Care Physician   [facility]    Chief Complaint   \"on and off lethargy\".    History is obtained from the patient and electronic health record    History of Present Illness   Garry Mckay is a 88 year old male who was admitted on 12/8/2023 with abdominal pain.  CT scan of the abdomen shows acute appendicitis the patient had emergent surgery.  He was also diagnosed with septic shock and blood pressure, systolic went down to 70.  Postop.  Tolerated the patient history complicated with nausea and " vomiting and hypoxia probable aspiration.  The patient has had an NG tube placement and last Thursday this was discontinued.  The patient was started on clear fluids.    Last Tuesday the patient has had a stress test and was found to have obstructed artery, the patient has had an angiogram and a stent placement.  On postoperative order time the patient did okay however yesterday while the daughter was visiting she found the patient not responsive.  He was actually in deep sleep will wake up briefly but fall asleep right away.  No cyanosis was noticed.  The code was called.  Blood pressure was 126/84 with O2 sats of 98%.  Respiratory rate 21, I do not have a blood glucose.  He was lethargic and arousable to voice.  The patient was started on BiPAP and eventually improved.    However this morning the patient was complaining of feeling hot and cold on and off.  Patient's daughter who is at bedside states that when the patient feels called the heart he will be sleepy and at times confused.  If he does not feel cold or hot he will be back to baseline alert and oriented.      Patient's daughter states that in 2017 the patient has had a hemorrhagic stroke in the context of surgery, CEA    The patient lives in [place] he was just visiting family.  He does not smoke does not drink.    The patient cannot give much history the history was obtained mainly from the daughter as well as reading the chart.    During my visit with the patient he was complaining of feeling cold and hot as well as of the abdominal pain.      Past Medical History   I have reviewed this patient's medical history and updated it with pertinent information if needed.   Chronic HFrEF  Ischemic CM  CAD s/p CABG, stenting  HTN  HLD  Hx CVA  MDD/ANSON  Hypothyroidism   BPH  CARINA  GERD    Past Surgical History   I have reviewed this patient's surgical history and updated it with pertinent information if needed.  Past Surgical History:   Procedure Laterality Date     CV CORONARY ANGIOGRAM N/A 12/26/2023    Procedure: Coronary Angiogram;  Surgeon: Ashvin Crawley MD;  Location:  HEART CARDIAC CATH LAB    CV CORONARY LITHOTRIPSY PCI N/A 12/26/2023    Procedure: Percutaneous Coronary Intervention - Lithotripsy;  Surgeon: Ashvin Crawley MD;  Location:  HEART CARDIAC CATH LAB    CV PCI N/A 12/26/2023    Procedure: Percutaneous Coronary Intervention;  Surgeon: Ashvin Crawley MD;  Location:  HEART CARDIAC CATH LAB    LAPAROSCOPIC APPENDECTOMY N/A 12/8/2023    Procedure: APPENDECTOMY, LAPAROSCOPIC, ICG INSPECTION OF BOWEL;  Surgeon: Jeannette Garcia MD;  Location:  OR       Prior to Admission Medications   Prior to Admission Medications   Prescriptions Last Dose Informant Patient Reported? Taking?   B Complex-C (SUPER B COMPLEX PO) 12/7/2023 at am Care Giver, Self Yes Yes   Sig: Take 1 tablet by mouth daily   Botulinum Toxin Type A (BOTOX) 200 units injection More than a month Care Giver, Self Yes Yes   Sig: every 3 months   ENTRESTO 49-51 MG per tablet 12/7/2023 at 1900 Care Giver, Self Yes Yes   Sig: Take 1 tablet by mouth 2 times daily   Vitamin D3 (CHOLECALCIFEROL) 125 MCG (5000 UT) tablet 12/7/2023 at am Care Giver, Self Yes Yes   Sig: Take 1 tablet by mouth daily   aspirin (ASA) 81 MG EC tablet 12/7/2023 at am Care Giver, Self Yes Yes   Sig: Take 1 tablet by mouth daily   atorvastatin (LIPITOR) 80 MG tablet 12/7/2023 at 1900 Care Giver, Self Yes Yes   Sig: Take 80 mg by mouth At Bedtime   carvedilol (COREG) 12.5 MG tablet 12/6/2023 at 1900 Care Giver, Self Yes Yes   Sig: Take 1 tablet by mouth 2 times daily   clopidogrel (PLAVIX) 75 MG tablet 12/7/2023 at am Care Giver, Self Yes Yes   Sig: Take 75 mg by mouth daily   escitalopram (LEXAPRO) 20 MG tablet 12/7/2023 at am Care Giver, Self Yes Yes   Sig: Take 20 mg by mouth daily   ezetimibe (ZETIA) 10 MG tablet 12/7/2023 at 1900 Care Giver, Self Yes Yes   Sig: Take 10 mg by mouth daily   finasteride  (PROSCAR) 5 MG tablet 12/7/2023 at 1900 Care Giver, Self Yes Yes   Sig: Take 1 tablet by mouth At Bedtime    furosemide (LASIX) 20 MG tablet 12/7/2023 at am Care Giver, Self Yes Yes   Sig: Take 20 mg by mouth daily   gabapentin (NEURONTIN) 100 MG capsule 12/7/2023 at am Care Giver, Self Yes Yes   Sig: Take 200 mg by mouth every morning   gabapentin (NEURONTIN) 100 MG capsule 12/7/2023 at 1200  Yes Yes   Sig: Take 100 mg by mouth every 24 hours At noon   gabapentin (NEURONTIN) 300 MG capsule 12/6/2023 at hs Care Giver, Self Yes Yes   Sig: Take 300 mg by mouth at bedtime   latanoprost (XALATAN) 0.005 % ophthalmic solution 12/6/2023 at hs Care Giver, Self Yes Yes   Sig: Place 1 drop into both eyes at bedtime   levothyroxine (SYNTHROID/LEVOTHROID) 100 MCG tablet 12/7/2023 at am Care Giver, Self Yes Yes   Sig: Take 100 mcg by mouth daily   magnesium oxide (MAG-OX) 400 (241.3 Mg) MG tablet 12/7/2023 at 1900 Care Giver, Self Yes Yes   Sig: Take 1 tablet by mouth At Bedtime    melatonin 3 MG tablet 12/6/2023 at hs Self, Care Giver Yes Yes   Sig: Take 3 mg by mouth nightly as needed for sleep   multivitamin (CENTRUM SILVER) tablet 12/7/2023 at am Care Giver, Self Yes Yes   Sig: Take 1 tablet by mouth daily   nitroGLYcerin (NITROSTAT) 0.4 MG sublingual tablet  at prn Care Giver, Self Yes Yes   Sig: Place 1 tablet under the tongue every 5 minutes as needed for chest pain      Facility-Administered Medications: None     Allergies   Allergies   Allergen Reactions    Avelox [Moxifloxacin] Itching    Cephalexin     Hydroxyzine     Oxycodone     Ultram [Tramadol]        Social History   I have reviewed this patient's social history and updated it with pertinent information if needed. Garry Mckay      Family History   I have reviewed this patient's family history and updated it with pertinent information if needed.   No family history on file.    Review of Systems   The 10 point Review of Systems is negative other than noted in  the HPI or here.     Physical Exam   Temp: 98.2  F (36.8  C) Temp src: Oral BP: 111/77 Pulse: 70   Resp: 18 SpO2: 97 % O2 Device: None (Room air) Oxygen Delivery: 1 LPM  Vital Signs with Ranges  Temp:  [97.4  F (36.3  C)-98.2  F (36.8  C)] 98.2  F (36.8  C)  Pulse:  [60-79] 70  Resp:  [6-30] 18  BP: ()/(51-92) 111/77  FiO2 (%):  [35 %] 35 %  SpO2:  [90 %-100 %] 97 %  168 lbs 0 oz    Constitutional: Normal  Eyes: No conjunctival erythema  ENT: Neck is supple  Respiratory: CTA  Cardiovascular: RRR  Skin: No obvious lesions  Musculoskeletal: No pedal edema  The patient is alert oriented x3 no acute distress.  Speech is fluent there is no aphasia.  The patient knows the name of the president but not of the prior 1.  He knows the name of the hospital.  He spells world forward but does not want to try to spell it backwards.  He is not participant to Mini-Mental status exam because of somnolence.  He was also complaining of feeling cold.  Pupils are equal round reactive to light extraocular movements are intact, there is no nystagmus.  Facial muscles are equal bilaterally.  Uvula and tongue are midline.  Muscular mass tone and strength are normal in all 4 extremities there is no pronator drift.  Reflexes are present symmetric in upper and lower extremities with 0 ankle jerks.  Babinski is absent.    Sensory exam is normal to light touch.  Vibratory sense was decreased at the knees however I am not sure how accurate was examined.  Finger-nose-finger without dysmetria.  The patient does not follow with fine movements.  Gait was deferred the patient is very sleepy.    Neuropsychiatric: Unable to assess due to somnolence.    Data   Results for orders placed or performed during the hospital encounter of 12/08/23 (from the past 24 hour(s))   Blood gas arterial and oxyhgb   Result Value Ref Range    pH Arterial 7.46 (H) 7.35 - 7.45    pCO2 Arterial 37 35 - 45 mm Hg    pO2 Arterial 102 80 - 105 mm Hg    Bicarbonate Arterial  26 21 - 28 mmol/L    Oxyhemoglobin Arterial 97 92 - 100 %    Base Excess/Deficit 2.5 (H) -9.0 - 1.8 mmol/L    FIO2 35     Angelo's Test Yes    Lactic acid whole blood   Result Value Ref Range    Lactic Acid 1.8 0.7 - 2.0 mmol/L   Comprehensive metabolic panel   Result Value Ref Range    Sodium 138 135 - 145 mmol/L    Potassium 4.8 3.4 - 5.3 mmol/L    Carbon Dioxide (CO2) 24 22 - 29 mmol/L    Anion Gap 9 7 - 15 mmol/L    Urea Nitrogen 26.3 (H) 8.0 - 23.0 mg/dL    Creatinine 0.93 0.67 - 1.17 mg/dL    GFR Estimate 80 >60 mL/min/1.73m2    Calcium 9.4 8.8 - 10.2 mg/dL    Chloride 105 98 - 107 mmol/L    Glucose 181 (H) 70 - 99 mg/dL    Alkaline Phosphatase 118 40 - 150 U/L    AST 16 0 - 45 U/L    ALT 11 0 - 70 U/L    Protein Total 6.1 (L) 6.4 - 8.3 g/dL    Albumin 3.1 (L) 3.5 - 5.2 g/dL    Bilirubin Total 0.4 <=1.2 mg/dL   CBC with Platelets & Differential    Narrative    The following orders were created for panel order CBC with Platelets & Differential.  Procedure                               Abnormality         Status                     ---------                               -----------         ------                     CBC with platelets and d...[[id number]]  Abnormal            Final result                 Please view results for these tests on the individual orders.   CBC with platelets and differential   Result Value Ref Range    WBC Count 9.6 4.0 - 11.0 10e3/uL    RBC Count 4.25 (L) 4.40 - 5.90 10e6/uL    Hemoglobin 13.7 13.3 - 17.7 g/dL    Hematocrit 41.4 40.0 - 53.0 %    MCV 97 78 - 100 fL    MCH 32.2 26.5 - 33.0 pg    MCHC 33.1 31.5 - 36.5 g/dL    RDW 14.1 10.0 - 15.0 %    Platelet Count 185 150 - 450 10e3/uL    % Neutrophils 65 %    % Lymphocytes 18 %    % Monocytes 11 %    % Eosinophils 4 %    % Basophils 1 %    % Immature Granulocytes 1 %    NRBCs per 100 WBC 0 <1 /100    Absolute Neutrophils 6.3 1.6 - 8.3 10e3/uL    Absolute Lymphocytes 1.7 0.8 - 5.3 10e3/uL    Absolute Monocytes 1.0 0.0 - 1.3 10e3/uL     Absolute Eosinophils 0.3 0.0 - 0.7 10e3/uL    Absolute Basophils 0.1 0.0 - 0.2 10e3/uL    Absolute Immature Granulocytes 0.1 <=0.4 10e3/uL    Absolute NRBCs 0.0 10e3/uL   Magnesium   Result Value Ref Range    Magnesium 2.0 1.7 - 2.3 mg/dL   Phosphorus   Result Value Ref Range    Phosphorus 3.4 2.5 - 4.5 mg/dL   Blood gas arterial   Result Value Ref Range    pH Arterial 7.40 7.35 - 7.45    pCO2 Arterial 42 35 - 45 mm Hg    pO2 Arterial 143 (H) 80 - 105 mm Hg    FIO2 35     Bicarbonate Arterial 26 21 - 28 mmol/L    Base Excess/Deficit 0.9 -9.0 - 1.8 mmol/L    Angelo's Test Yes    Glucose by meter   Result Value Ref Range    GLUCOSE BY METER POCT 178 (H) 70 - 99 mg/dL   Glucose by meter   Result Value Ref Range    GLUCOSE BY METER POCT 177 (H) 70 - 99 mg/dL   Glucose by meter   Result Value Ref Range    GLUCOSE BY METER POCT 170 (H) 70 - 99 mg/dL   Glucose by meter   Result Value Ref Range    GLUCOSE BY METER POCT 181 (H) 70 - 99 mg/dL   Basic metabolic panel   Result Value Ref Range    Sodium 135 135 - 145 mmol/L    Potassium 4.6 3.4 - 5.3 mmol/L    Chloride 105 98 - 107 mmol/L    Carbon Dioxide (CO2) 23 22 - 29 mmol/L    Anion Gap 7 7 - 15 mmol/L    Urea Nitrogen 31.2 (H) 8.0 - 23.0 mg/dL    Creatinine 1.13 0.67 - 1.17 mg/dL    GFR Estimate 63 >60 mL/min/1.73m2    Calcium 9.3 8.8 - 10.2 mg/dL    Glucose 192 (H) 70 - 99 mg/dL   CBC with platelets   Result Value Ref Range    WBC Count 9.9 4.0 - 11.0 10e3/uL    RBC Count 3.79 (L) 4.40 - 5.90 10e6/uL    Hemoglobin 12.3 (L) 13.3 - 17.7 g/dL    Hematocrit 37.0 (L) 40.0 - 53.0 %    MCV 98 78 - 100 fL    MCH 32.5 26.5 - 33.0 pg    MCHC 33.2 31.5 - 36.5 g/dL    RDW 13.9 10.0 - 15.0 %    Platelet Count 174 150 - 450 10e3/uL   Phosphorus   Result Value Ref Range    Phosphorus 3.3 2.5 - 4.5 mg/dL   Magnesium   Result Value Ref Range    Magnesium 2.0 1.7 - 2.3 mg/dL   Glucose by meter   Result Value Ref Range    GLUCOSE BY METER POCT 188 (H) 70 - 99 mg/dL

## 2023-12-28 NOTE — PROGRESS NOTES
CALORIE COUNT    Approximate Oral Intake for 12/27:      Calories: 0 kcal  Protein: 0 g   Patient NPO yesterday      Intake from TF/PN:       D15 AA5 at 40 mL/hr= 682 kcal and 48 g protein (Meets ~40% energy and ~50% protein needs)       Estimated Needs:    Energy: 8525-7800 kcal (25-30 Kcal/Kg)   Protein:  grams protein (1.2-1.5 g pro/Kg)       Summary:    - patient met 0% energy and 0% protein needs yesterday d/t being NPO  - Recommend no change to TPN regimen. May need to consider increasing regimen to better meet needs if intakes continue to remain poor.   - Calorie counts to continue for 1 more day    Nathalie Zuniga RD, LD  Clinical Dietitian - [facility]

## 2023-12-28 NOTE — PROGRESS NOTES
SLP Bedside Swallow Evaluation  12/28/23 4712   Appointment Info   Signing Clinician's Name / Credentials (SLP) Amalia Ross MA [facility] SLP   General Information   Onset of Illness/Injury or Date of Surgery 12/08/23   Referring Physician Dr. Rose   Patient/Family Therapy Goal Statement (SLP) To eat and drink ASAP   Pertinent History of Current Problem Per notes: Garry Mckay is a 88 year old male with past medical history significant for CAD with hx of CABG, HTN, HLD, ICM admitted on 12/8/2023 with appendicitis with sepsis.  We are consulted for cp and + trop.   Assessment:  1.  Acute appendicitis with sepsis, s/p laparoscopic appendectomy 12/8, per IM and surgery   2.  Coronary artery disease with history of CABG and multiple subsequent PCI's.  NSTEMI here with troponins peaked at 308.  Nuclear stress test was abnormal.  - Angiogram 12/26 showed occluded LAD, occluded proximal RCA, 40% sent circumflex with patent CLAUDIA to the RCA occluded graft to the OM1 and nearly occluded graft to the D1.  Patient underwent lithotripsy and drug-eluting stent to the vein graft to the D1.   3.  Ischemic cardiomyopathy with chronic systolic heart failure EF 20 to 25% previously noted to be 35 to 40% but trending down since 2021.   --On Entresto, Coreg, and furosemide 20 mg daily prior to admission.  Entresto and carvedilol have been restarted at lower doses.  No diuretic at this a.m.  -Weight 179 on admission down to 168, bnp and cxr 12/27 are normal suggesting euvolemia    4.  Lethargy 12/27 with RRT, neg w/u for CVA, initial VBG likely inaccurate, in the end most likely secondary being off CPAP overnight.    5.  Hypertension well-controlled    6.  Hyperlipidemia on statin, statin restarted, start zetia on d/c;     12/27/23 CT: IMPRESSION:  1. No significant interval change.  2. Small area of gliosis/encephalomalacia involving the lateral right  temporal lobe with ex vacuo dilatation of the right lateral ventricle.  3.  Brain atrophy and presumed chronic ischemic changes, as described.  Please see the body of report for details.    12/27/23 CXR: negative for consolildation   General Observations Pt was alert and cooperative. Pt's daughter was present to provide information.  Decreased pt recall reported over the past few days.  Pt has been whispering at times and did not use his voice when asked to state his name during SLP interview.  Pt then produced fair to good voicing with cues from family to use his voice.  Question of decreased respiratory status and concerns for neuro event reported 12/27, no issues with alertness or breathing today.  RN reports pt had a slight cough after swallowing thin liquids earlier today.  Pt has been slowly advanced from liquids to easy to chew foods earlier during this hospital stay due to GI concerns per family report.  No hx of dysphagia noted per family.  Decreased alertness has impacted pt's ability to take po during this stay.   Type of Evaluation   Type of Evaluation Swallow Evaluation   Oral Motor   Oral Musculature generally intact   Dentition (Oral Motor)   Dentition (Oral Motor) adequate dentition  (Unable to view posterior teeth due to decreased mouth opening)   Cough/Swallow/Gag Reflex (Oral Motor)   Comment, Cough/Swallow/Gag Reflex (Oral Motor) Able to complete on command with cues   Vocal Quality/Secretion Management (Oral Motor)   Comment, Vocal Quality/Secretion Management (Oral Motor) Fair - good voicing with cues to use voice rather than whisper   General Swallowing Observations   Past History of Dysphagia No dysphagia hx reported   Respiratory Support room air   Current Diet/Method of Nutritional Intake (General Swallowing Observations, NIS) easy to chew (level 7);thin liquids (level 0)   Swallowing Evaluation Clinical swallow evaluation   Clinical Swallow Evaluation   Clinical Swallow Evaluation Textures Trialed thin liquids;solid foods   Clinical Swallow Eval: Thin Liquid  Texture Trial   Mode of Presentation, Thin Liquids cup   Volume of Liquid or Food Presented sips x 5   Oral Phase of Swallow WFL   Pharyngeal Phase of Swallow intact  (? slight delay)   Diagnostic Statement no signs of aspiration   Clinical Swallow Evaluation: Solid Food Texture Trial   Mode of Presentation self-fed   Volume Presented bites from 1 whole cracker   Oral Phase residue in oral cavity;WFL  (dry mouth)   Pharyngeal Phase intact   Diagnostic Statement no signs of aspiration, alternating textures used to clear oral cavity   Esophageal Phase of Swallow   Esophageal comments Mild baseline GERD   Swallowing Recommendations   Diet Consistency Recommendations regular diet;thin liquids (level 0)   Supervision Level for Intake close supervision needed   Mode of Delivery Recommendations bolus size, small;slow rate of intake   Swallowing Maneuver Recommendations alternate food and liquid intake   Monitoring/Assistance Required (Eating/Swallowing) check mouth frequently for oral residue/pocketing;monitor for cough or change in vocal quality with intake   Recommended Feeding/Eating Techniques (Swallow Eval) maintain upright posture during/after eating for 30 minutes   Medication Administration Recommendations, Swallowing (SLP) Meds with small sips, use puree as needed   Comment, Swallowing Recommendations Hold po if aspiration signs are noted/respiratory status declines   Clinical Impression   Criteria for Skilled Therapeutic Interventions Met (SLP Eval) Yes, treatment indicated   SLP Diagnosis Mild oral-pharyngeal dysphagia   Clinical Impression Comments Mild oral-pharyngeal dysphagia was observed at bedside.  Deficits/risk factors include hx of CVA, decreased SHILO during this hospital stay, cough after thin liquids x 1 per RN, decreased voicing at times, possible slight swallow delay, dry mouth, and oral residue.  Pt tolerated thin liquids by cup and regular solids during today's SLP swallow evaluation with min-mod  cues to use safe swallow strategies.  Recommend initiation of a regular diet and thin liquids with supervision/reminders to use safe swallow strategies.  Hold po if aspiration signs are noted/respiratory status declines. Plan to provide 1-2 additional swallow Tx sessions to insure diet tolerance and train strategies as indicated.   SLP Total Evaluation Time   Eval: oral/pharyngeal swallow function, clinical swallow Minutes (50858) 15   SLP Goals   Therapy Frequency (SLP Eval) 3 times/week   SLP Predicted Duration/Target Date for Goal Attainment 01/03/24   SLP Goals Swallow   SLP: Safely tolerate diet without signs/symptoms of aspiration Regular diet;Thin liquids;With use of swallow precautions;With use of compensatory swallow strategies;Independently   Interventions   Interventions Quick Adds Swallowing Dysfunction   Swallowing Dysfunction &/or Oral Function for Feeding   Treatment of Swallowing Dysfunction &/or Oral Function for Feeding Minutes (93968) 10   Symptoms Noted During/After Treatment None   Treatment Detail/Skilled Intervention Education was provided to pt, family, and RN regarding current findings and recommended strategies with a regular diet.  All verbalized agreement.  Pt was given mod cues to alternate textures to help clear oral residue.  Pt took a small sip of thin in Tx after solid trials given min cues.  No aspiration signs noted.   SLP Discharge Planning   SLP Plan SLP - assess diet tolerance, train strategies   SLP Discharge Recommendation home   SLP Rationale for DC Rec Anticipate swallow Tx goal will be met as an IP   SLP Brief overview of current status  Mild oral-pharyngeal dysphagia; Rec: regular diet and thin liquids with reminders to use safe swallow strategies; Hold po if aspiration signs are noted/respiratory status declines.    Total Session Time   Total Session Time (sum of timed and untimed services) 25

## 2023-12-28 NOTE — CONSULTS
[facility]- C.O.R.E. Clinic    Received CORE Clinic Consult from CINTHYA Gomes.    Reviewed Garry's chart and note they are admitted for hypotension, persistent abdominal pain, acute appendicitis, severe sepsis, infectious encephalopathy, FATIMAH. Echocardiogram on 12/22/23 shows LVEF 20-25%.  This is their 2nd admission(s) in the past year for concerns of heart failure.    Given above information, Garry meets criteria for CORE Clinic as patients EF is =/<40%.  Pt typically follows with [facility] and/or [facility]. Pt was in town visiting his daughter    Reviewed pt with CINTHYA Gomes who would like the pt to follow-up at least once at our [facility]. Plans for TCU in that are after discharge.     Patient's primary insurance:  [employer]    Living situation:  plans for TCU at discharge     I have arranged a lab appointment and CORE visit with CINTHYA Yang on 1/15/24, see below.     Instructions placed on discharge instruction to call [facility] CORE Team at [phone] with questions/concerns prior to this visit. Specifically instructed them to call RN with weight gain greater than 2 lbs overnight, and/or 5 lbs in a week, and/or worsening SOB/edema/abdominal bloating.     Future Appointments   Date Time Provider Department Center   12/29/2023  8:00 AM  SLP 2 WAITLIST [facility]   12/29/2023 11:00 AM Zabrina Dennis, OT SHOT [facility]   1/15/2024  1:45 PM Madelaine Nieves PA-C [facility]     Please call with any further questions.    Kenia Otero RN, [facility]- [place]  C.O.R.E. Clinic Care Coordinator  [phone]

## 2023-12-28 NOTE — PLAN OF CARE
Goal Outcome Evaluation:      Plan of Care Reviewed With: patient, family    Overall Patient Progress: no changeOverall Patient Progress: no change    Cared for pt from 700-1500 and [id number]    [facility] Nursing Note    Patient Information  Name: Garry Mckay  Age: 88 year old    Admission Information  Date: 12/8/2023   Reason:FATIMAH (acute kidney injury) (H24) [N17.9]  Acute sepsis (H) [A41.9]  Acute appendicitis with localized peritonitis, without perforation, abscess, or gangrene [K35.30]     Assessment  Orientation/Neuro: Disorientated to, Time, and Situation, int lethargy and confusion  Cardiac/Tele: SR with PVCs  Resp:  no shortness of breath  GI/:No nausea, vomiting, abdominal pain, diarrhea, constipation or change in bowel habits   Mobility: unsafe without assistance, unable to get pt OOB this shift due to lethargy  Pain: denies   Diet: Orders Placed This Encounter      NPO for Medical/Clinical Reasons Except for: No Exceptions  Procedures/Imaging: CT done today due to lethargy - no acute changes    Vital Signs  B/P: 105/80, T: 97.9, P: 72, R: 20, O2: 95% on 2L NC, on bipap this am due to lethargy, slightly improved with bipap        Plan  Pt's home cpap at noc, am labs not accurate - ABG improved over VBGs. Potential discharge in 2-3 days to TCU    Essence Gotti RN

## 2023-12-28 NOTE — PROGRESS NOTES
[facility]  Cardiology Progress Note  Date of Service: 12/28/2023  Primary Cardiologist: in [place]     Assessment & Plan    Garry Mckay is a 88 year old male with past medical history significant for CAD with hx of CABG, HTN, HLD, ICM admitted on 12/8/2023 with appendicitis with sepsis.  We are consulted for cp and + trop.      Assessment:  1.  Acute appendicitis with sepsis, s/p laparoscopic appendectomy 12/8, per IM and surgery    2.  Coronary artery disease with history of CABG and multiple subsequent PCI's.  NSTEMI here with troponins peaked at 308.  Nuclear stress test was abnormal.  - Angiogram 12/26 showed occluded LAD, occluded proximal RCA, 40% sent circumflex with patent CLAUDIA to the RCA occluded graft to the OM1 and nearly occluded graft to the D1.  Patient underwent lithotripsy and drug-eluting stent to the vein graft to the D1.    3.  Ischemic cardiomyopathy with chronic systolic heart failure EF 20 to 25% previously noted to be 35 to 40% but trending down since 2021.   --On Entresto, Coreg, and furosemide 20 mg daily prior to admission.  Entresto and carvedilol have been restarted at lower doses.  No diuretic at this a.m.  -Weight 179 on admission down to 168, bnp and cxr 12/27 are normal suggesting euvolemia    4.  Lethargy 12/27 with RRT, neg w/u for CVA, initial VBG likely inaccurate, in the end most likely secondary being off CPAP overnight.    5.  Hypertension well-controlled    6.  Hyperlipidemia on statin, statin restarted, start zetia on d/c    7.  ICD in place      Plan:   Restart lasix 20 mg daily today  Conintue current doses of Entresto and coreg, will uptitrate as outpt if bp allows  Asa and plavix uninterrupted for 1 year   Will arrange f/u in core clinic on discharge for at least one visit  Cardiology will sign off, please call with any questions or concerns.      Carlita Escalante PA-C  [facility]  Pager: [phone]     Interval History   Patient feeling much  "better today.  He is alert and oriented.  Notes that his son is in the hospital with perforated colon as well.  Denies shortness of breath or chest pain.  Happy to hear he is back on his meds.  Patient is from [place] in the state at baseline.  Will be in TCU here for some time before potentially going home.  On discussions with the daughter it is unclear if he will be well enough to move back into his home home in [place], at time will tell.    Physical Exam   Temp: 98.2  F (36.8  C) Temp src: Oral BP: 111/77 Pulse: 67   Resp: 16 SpO2: 98 % O2 Device: None (Room air) Oxygen Delivery: 1 LPM  Vitals:    12/26/23 1100 12/27/23 0630 12/28/23 0900   Weight: 78.2 kg (172 lb 6.4 oz) 74 kg (163 lb 2.3 oz) 76.2 kg (168 lb)     Elderly, frail gentleman in no acute distress.  Alert and oriented today.  Normocephalic/atraumatic.  Heart is regular rate and rhythm without murmur rub or gallop.  Lungs are clear without wheezes rales or rhonchi.  Extremities without peripheral edema.  Do not appreciate JVP 35 degrees.    Clinically Significant Risk Factors           # Hypercalcemia: corrected calcium is >10.1, will monitor as appropriate    # Hypoalbuminemia: Lowest albumin = 2.8 g/dL at 12/11/2023  5:26 AM, will monitor as appropriate     # Hypertension: Noted on problem list    # Chronic heart failure with reduced ejection fraction: last echo with EF <40%      # DMII: A1C = 6.8 % (Ref range: <5.7 %) within past 6 months   # Overweight: Estimated body mass index is 26.31 kg/m  as calculated from the following:    Height as of this encounter: 1.702 m (5' 7\").    Weight as of this encounter: 76.2 kg (168 lb).   # Severe Malnutrition: based on nutrition assessment        Systolic chronic    Dehydration    Acute kidney failure, unspecified    Encephalopathy: Encephalopathy, unspecified            Medications    - MEDICATION INSTRUCTIONS -      - MEDICATION INSTRUCTIONS -      dextrose      - MEDICATION INSTRUCTIONS -      " parenteral nutrition - Clinimix E      parenteral nutrition - Clinimix E 40 mL/hr at 12/28/23 0019    Percutaneous Coronary Intervention orders placed (this is information for BPA alerting)        aspirin  81 mg Oral Daily    atorvastatin  80 mg Oral At Bedtime    carvedilol  12.5 mg Oral BID    clopidogrel  75 mg Oral Daily    enoxaparin ANTICOAGULANT  40 mg Subcutaneous Q24H    escitalopram  20 mg Oral or NG Tube Daily    finasteride  5 mg Oral At Bedtime    insulin aspart  1-6 Units Subcutaneous Q4H    [Held by provider] insulin glargine  10 Units Subcutaneous At Bedtime    latanoprost  1 drop Both Eyes At Bedtime    levothyroxine  100 mcg Oral QAM AC    magnesium oxide  400 mg Oral Q4H    pantoprazole  40 mg Oral QAM AC    sacubitril-valsartan  1 tablet Oral BID    [Held by provider] senna-docusate  1 tablet Oral BID    sodium chloride (PF)  10-40 mL Intracatheter Q7 Days    sodium chloride (PF)  3 mL Intracatheter Q8H       Data   Last 24 hours labs reviewed

## 2023-12-29 ENCOUNTER — APPOINTMENT (OUTPATIENT)
Dept: PHYSICAL THERAPY | Facility: CLINIC | Age: 88
DRG: 853 | End: 2023-12-29
Attending: HOSPITALIST
Payer: MEDICARE

## 2023-12-29 ENCOUNTER — APPOINTMENT (OUTPATIENT)
Dept: SPEECH THERAPY | Facility: CLINIC | Age: 88
DRG: 853 | End: 2023-12-29
Attending: HOSPITALIST
Payer: MEDICARE

## 2023-12-29 ENCOUNTER — APPOINTMENT (OUTPATIENT)
Dept: OCCUPATIONAL THERAPY | Facility: CLINIC | Age: 88
DRG: 853 | End: 2023-12-29
Attending: HOSPITALIST
Payer: MEDICARE

## 2023-12-29 LAB
CREAT SERPL-MCNC: 1.1 MG/DL (ref 0.67–1.17)
EGFRCR SERPLBLD CKD-EPI 2021: 65 ML/MIN/1.73M2
ERYTHROCYTE [DISTWIDTH] IN BLOOD BY AUTOMATED COUNT: 14 % (ref 10–15)
FOLATE SERPL-MCNC: 18.6 NG/ML (ref 4.6–34.8)
GLUCOSE BLDC GLUCOMTR-MCNC: 148 MG/DL (ref 70–99)
GLUCOSE BLDC GLUCOMTR-MCNC: 174 MG/DL (ref 70–99)
GLUCOSE BLDC GLUCOMTR-MCNC: 191 MG/DL (ref 70–99)
GLUCOSE BLDC GLUCOMTR-MCNC: 194 MG/DL (ref 70–99)
HCT VFR BLD AUTO: 37 % (ref 40–53)
HGB BLD-MCNC: 12.1 G/DL (ref 13.3–17.7)
MAGNESIUM SERPL-MCNC: 1.9 MG/DL (ref 1.7–2.3)
MCH RBC QN AUTO: 32.2 PG (ref 26.5–33)
MCHC RBC AUTO-ENTMCNC: 32.7 G/DL (ref 31.5–36.5)
MCV RBC AUTO: 98 FL (ref 78–100)
PHOSPHATE SERPL-MCNC: 2.7 MG/DL (ref 2.5–4.5)
PLATELET # BLD AUTO: 151 10E3/UL (ref 150–450)
PLATELET # BLD AUTO: 151 10E3/UL (ref 150–450)
POTASSIUM SERPL-SCNC: 4.3 MMOL/L (ref 3.4–5.3)
RBC # BLD AUTO: 3.76 10E6/UL (ref 4.4–5.9)
TSH SERPL DL<=0.005 MIU/L-ACNC: 2.23 UIU/ML (ref 0.3–4.2)
VIT B12 SERPL-MCNC: 779 PG/ML (ref 232–1245)
WBC # BLD AUTO: 9 10E3/UL (ref 4–11)

## 2023-12-29 PROCEDURE — 250N000013 HC RX MED GY IP 250 OP 250 PS 637

## 2023-12-29 PROCEDURE — 250N000013 HC RX MED GY IP 250 OP 250 PS 637: Performed by: INTERNAL MEDICINE

## 2023-12-29 PROCEDURE — 36415 COLL VENOUS BLD VENIPUNCTURE: CPT | Performed by: INTERNAL MEDICINE

## 2023-12-29 PROCEDURE — 250N000013 HC RX MED GY IP 250 OP 250 PS 637: Performed by: STUDENT IN AN ORGANIZED HEALTH CARE EDUCATION/TRAINING PROGRAM

## 2023-12-29 PROCEDURE — 250N000009 HC RX 250: Performed by: INTERNAL MEDICINE

## 2023-12-29 PROCEDURE — 250N000011 HC RX IP 250 OP 636: Performed by: HOSPITALIST

## 2023-12-29 PROCEDURE — 99233 SBSQ HOSP IP/OBS HIGH 50: CPT | Performed by: HOSPITALIST

## 2023-12-29 PROCEDURE — 250N000013 HC RX MED GY IP 250 OP 250 PS 637: Performed by: HOSPITALIST

## 2023-12-29 PROCEDURE — 36415 COLL VENOUS BLD VENIPUNCTURE: CPT | Performed by: PSYCHIATRY & NEUROLOGY

## 2023-12-29 PROCEDURE — 85027 COMPLETE CBC AUTOMATED: CPT | Performed by: HOSPITALIST

## 2023-12-29 PROCEDURE — 92526 ORAL FUNCTION THERAPY: CPT | Mod: GN

## 2023-12-29 PROCEDURE — 250N000013 HC RX MED GY IP 250 OP 250 PS 637: Performed by: NURSE PRACTITIONER

## 2023-12-29 PROCEDURE — 97530 THERAPEUTIC ACTIVITIES: CPT | Mod: GP

## 2023-12-29 PROCEDURE — 84100 ASSAY OF PHOSPHORUS: CPT | Performed by: HOSPITALIST

## 2023-12-29 PROCEDURE — 250N000011 HC RX IP 250 OP 636: Performed by: INTERNAL MEDICINE

## 2023-12-29 PROCEDURE — 120N000013 HC R&B IMCU

## 2023-12-29 PROCEDURE — 94660 CPAP INITIATION&MGMT: CPT

## 2023-12-29 PROCEDURE — 97535 SELF CARE MNGMENT TRAINING: CPT | Mod: GO

## 2023-12-29 PROCEDURE — 82565 ASSAY OF CREATININE: CPT | Performed by: INTERNAL MEDICINE

## 2023-12-29 PROCEDURE — 85049 AUTOMATED PLATELET COUNT: CPT | Performed by: INTERNAL MEDICINE

## 2023-12-29 PROCEDURE — 83735 ASSAY OF MAGNESIUM: CPT | Performed by: HOSPITALIST

## 2023-12-29 PROCEDURE — 250N000013 HC RX MED GY IP 250 OP 250 PS 637: Performed by: PHYSICIAN ASSISTANT

## 2023-12-29 PROCEDURE — 84425 ASSAY OF VITAMIN B-1: CPT | Performed by: PSYCHIATRY & NEUROLOGY

## 2023-12-29 PROCEDURE — 84132 ASSAY OF SERUM POTASSIUM: CPT | Performed by: HOSPITALIST

## 2023-12-29 PROCEDURE — 999N000157 HC STATISTIC RCP TIME EA 10 MIN

## 2023-12-29 PROCEDURE — 97116 GAIT TRAINING THERAPY: CPT | Mod: GP

## 2023-12-29 RX ORDER — MAGNESIUM SULFATE HEPTAHYDRATE 40 MG/ML
2 INJECTION, SOLUTION INTRAVENOUS ONCE
Status: COMPLETED | OUTPATIENT
Start: 2023-12-29 | End: 2023-12-29

## 2023-12-29 RX ADMIN — INSULIN ASPART 2 UNITS: 100 INJECTION, SOLUTION INTRAVENOUS; SUBCUTANEOUS at 12:26

## 2023-12-29 RX ADMIN — SACUBITRIL AND VALSARTAN 1 TABLET: 24; 26 TABLET, FILM COATED ORAL at 20:51

## 2023-12-29 RX ADMIN — ASPIRIN 81 MG: 81 TABLET, COATED ORAL at 09:17

## 2023-12-29 RX ADMIN — CLOPIDOGREL BISULFATE 75 MG: 75 TABLET ORAL at 09:17

## 2023-12-29 RX ADMIN — MAGNESIUM SULFATE HEPTAHYDRATE 2 G: 40 INJECTION, SOLUTION INTRAVENOUS at 06:48

## 2023-12-29 RX ADMIN — ASCORBIC ACID, VITAMIN A PALMITATE, CHOLECALCIFEROL, THIAMINE HYDROCHLORIDE, RIBOFLAVIN-5 PHOSPHATE SODIUM, PYRIDOXINE HYDROCHLORIDE, NIACINAMIDE, DEXPANTHENOL, ALPHA-TOCOPHEROL ACETATE, VITAMIN K1, FOLIC ACID, BIOTIN, CYANOCOBALAMIN: 200; 3300; 200; 6; 3.6; 6; 40; 15; 10; 150; 600; 60; 5 INJECTION, SOLUTION INTRAVENOUS at 20:59

## 2023-12-29 RX ADMIN — INSULIN ASPART 1 UNITS: 100 INJECTION, SOLUTION INTRAVENOUS; SUBCUTANEOUS at 09:15

## 2023-12-29 RX ADMIN — ESCITALOPRAM OXALATE 20 MG: 10 TABLET ORAL at 09:17

## 2023-12-29 RX ADMIN — CARVEDILOL 12.5 MG: 12.5 TABLET, FILM COATED ORAL at 20:51

## 2023-12-29 RX ADMIN — LEVOTHYROXINE SODIUM 100 MCG: 100 TABLET ORAL at 09:17

## 2023-12-29 RX ADMIN — ACETAMINOPHEN 650 MG: 325 TABLET, FILM COATED ORAL at 21:06

## 2023-12-29 RX ADMIN — FINASTERIDE 5 MG: 5 TABLET, FILM COATED ORAL at 21:06

## 2023-12-29 RX ADMIN — FUROSEMIDE 20 MG: 20 TABLET ORAL at 09:17

## 2023-12-29 RX ADMIN — LATANOPROST 1 DROP: 50 SOLUTION/ DROPS OPHTHALMIC at 21:06

## 2023-12-29 RX ADMIN — PANTOPRAZOLE SODIUM 40 MG: 40 TABLET, DELAYED RELEASE ORAL at 09:17

## 2023-12-29 RX ADMIN — CARVEDILOL 12.5 MG: 12.5 TABLET, FILM COATED ORAL at 09:17

## 2023-12-29 RX ADMIN — SACUBITRIL AND VALSARTAN 1 TABLET: 24; 26 TABLET, FILM COATED ORAL at 09:17

## 2023-12-29 RX ADMIN — ENOXAPARIN SODIUM 40 MG: 40 INJECTION SUBCUTANEOUS at 12:26

## 2023-12-29 RX ADMIN — INSULIN ASPART 2 UNITS: 100 INJECTION, SOLUTION INTRAVENOUS; SUBCUTANEOUS at 17:21

## 2023-12-29 RX ADMIN — ATORVASTATIN CALCIUM 80 MG: 80 TABLET, FILM COATED ORAL at 21:06

## 2023-12-29 ASSESSMENT — ACTIVITIES OF DAILY LIVING (ADL)
ADLS_ACUITY_SCORE: 44
ADLS_ACUITY_SCORE: 44
ADLS_ACUITY_SCORE: 40
ADLS_ACUITY_SCORE: 40
ADLS_ACUITY_SCORE: 44
ADLS_ACUITY_SCORE: 40
ADLS_ACUITY_SCORE: 40
ADLS_ACUITY_SCORE: 44
ADLS_ACUITY_SCORE: 44
ADLS_ACUITY_SCORE: 39
ADLS_ACUITY_SCORE: 40
ADLS_ACUITY_SCORE: 40

## 2023-12-29 NOTE — PROGRESS NOTES
CLINICAL NUTRITION SERVICES - REASSESSMENT NOTE      Recommendations Ordered by Registered Dietitian (RD):   Ordered 8 pm chocolate Ensure Enlive w/ ice cream shake  Ordered trial of Magic Cup  Continue w/ daily chocolate Tracey [employer] ONS  Continue w/ BID Gel+    Continue w/ TPN @ 40 mL/hr  Continue w/ kcal ct (12/29-31)    Provided encouragement to pt that he ate better yesterday, discussed importance of continuing to try to eat at each meal, have supplements to wean off TPN. Dtr also provided encouragement for pt. Pt likes the chocolate [employer] supplement, would like to try chocolate Ensure Enlive shake w/ ice cream. He would also like to try a Magic Cup. Discussed that pt is now on Regular diet- if it would help, dtr may bring in food/drinks for pt- just include nutrition facts for kcal ct data. Discussed recent kcal ct and plans to continue w/ kcal ct as pt was NPO a couple days and needs more time to improve PO intake. Reviewed kcal and protein needs to wean off TPN.      Malnutrition:   % Weight Loss:  > 5% in 1 month (severe malnutrition)-- 6.5% loss this admit  % Intake:  <75% for > 7 days (moderate malnutrition)  Subcutaneous Fat Loss:  None observed (12/14)  Muscle Loss:  Patellar region mild depletion and Posterior calf region mild depletion-- may be partially age-related (12/14)  Fluid Retention:  None noted    Malnutrition Diagnosis: Moderate malnutrition in the context of --  Acute illness or injury         EVALUATION OF PROGRESS TOWARD GOALS   Diet: Regular Diet Adult, Thin Liquids (level 0)   Supplements Adult: Gelatein Plus; With Meals  Supplements Adult: Tracey JDF Standard Oral Supplement; Between Meals    12/28: SLP = Mild oral-pharyngeal dysphagia; Rec: regular diet and thin liquids     Intake/Tolerance:  Visited w/ pt and his dtr this AM. Pt was quite tired and in/out of discussion. Provided encouragement to pt that he ate better yesterday, discussed importance of continuing to try to eat at  each meal, have supplements to wean off TPN. Dtr also provided encouragement for pt. Pt likes the chocolate [employer] supplement, would like to try chocolate Ensure Enlive shake w/ ice cream. He would also like to try a Magic Cup. Discussed that pt is now on Regular diet- if it would help, dtr may bring in food/drinks for pt- just include nutrition facts for kcal ct data. Discussed recent kcal ct and plans to continue w/ kcal ct as pt was NPO a couple days and needs more time to improve PO intake. Reviewed kcal and protein needs to wean off TPN.   Pt has been receiving 1-2 meals/day when able + supplements lately per health touch. 50% intakes documented recently per nursing flow sheet.    Calorie Count =   12/26: 188 kcal and 13 g protein (3 meals, 2 supplements)  12/27: 0 kcal and 0 g protein (pt was NPO)  12/28: 883 kcal and 36 g protein (2 meals, 3 supplements)    2 day (will not account for NPO day) average PO intake = 536 kcal (30% minimum energy needs) and 25 g protein (29% minimum protein needs)      Nutrition Support: Parenteral  Type of Access: PICC  Parenteral Frequency:  Continuous   Parenteral Regimen: Clinimix E (5/15) @ 40 mL/hr   Total Parenteral Provisions: 960 mL, 682 kcal, 48 g protein    12/15: TPN started = Clinimix E @ 80 mL/hr + daily lipids (1793 kcal, 96 g protein)  12/21: TPN wean started (lipids dc'd w/ diet advancement)  12/22: TPN decreased to 60 mL/hr  12/24: TPN decreased to 40 mL/hr   Will continue @ 40 mL/hr for now   Msg'd hospitalist regarding difficulty weaning off TPN and nutrition POC      ASSESSED NUTRITION NEEDS:  Dosing Weight: 71 kg (adjusted, based on 81.5 kg-- 12/14)  Estimated Energy Needs: 8470-5209 kcal (25-30 Kcal/Kg)  Justification: maintenance  Estimated Protein Needs:  grams protein (1.2-1.5 g pro/Kg)  Justification: post-op, hypercatabolism with acute illness  Estimated Fluid Needs: 1 mL/kcal  Justification: maintenance      NEW FINDINGS:   General: will need  TCU at discharge       Weight: wt loss of 5.3 kg from baseline wt this admit (6.5%)  Date/Time Weight Weight Method   12/28/23 0900 76.2 kg (168 lb) --   12/27/23 0630 74 kg (163 lb 2.3 oz) Bed scale   12/26/23 1100 78.2 kg (172 lb 6.4 oz) Bed scale   12/26/23 0550 75.6 kg (166 lb 10.7 oz) Bed scale   12/22/23 0629 75.7 kg (166 lb 14.2 oz) Bed scale   12/19/23 0000 80.2 kg (176 lb 12.9 oz) --   12/18/23 0400 79.5 kg (175 lb 4.3 oz) Bed scale   12/17/23 1000 78.5 kg (173 lb 1 oz) Standing scale   12/16/23 0655 80.6 kg (177 lb 11.1 oz) Bed scale   12/14/23 0606 81.5 kg (179 lb 10.8 oz) Bed scale   12/12/23 0000 84.2 kg (185 lb 10 oz) Bed scale   12/11/23 0400 84.6 kg (186 lb 8.2 oz) Bed scale   12/10/23 0400 86 kg (189 lb 9.5 oz) Bed scale   12/09/23 0400 85.5 kg (188 lb 7.9 oz) Bed scale   12/08/23 0035 84.6 kg (186 lb 8.2 oz)      I/O: Net IO Since Admission: -228.32 mL [12/29/23 0859].  Stool output = 4x BM yesterday, 6x on 12/26, 3x on 12/24    Labs: reviewed   Component Value Date   BUN 31.2 (H) 12/28/2023     Lab 12/29/23  0623 12/28/23  2350 12/28/23  2030 12/28/23  1539 12/28/23  0854 12/28/23  0628   * 167* 160* 235* 188* 192*      Medications: reviewed   escitalopram  20 mg Oral or NG Tube Daily    furosemide  20 mg Oral Daily    insulin aspart (MSSI)  1-7 Units Subcutaneous TID AC    insulin aspart (MSSI)  1-5 Units Subcutaneous At Bedtime    insulin glargine  5 Units Subcutaneous At Bedtime    levothyroxine  100 mcg Oral QAM AC    pantoprazole  40 mg Oral QAM AC     Resp.: room air vis BiPAP/CPAP        Previous Goals:   Wean off TPN w/in 72 hours  Evaluation: Not met  Pt to consume >75% of 3 meals/day or equivalent with snacks/supplements  Evaluation: Not met    Previous Nutrition Diagnosis:   Inadequate protein-energy intake related to TPN not meeting needs, poor appetite as evidenced by pt meeting only 56% energy needs, 76% protein needs the past 3 days on average   Evaluation:  Declining        MALNUTRITION  % Weight Loss:  > 5% in 1 month (severe malnutrition)-- 6.5% loss this admit  % Intake:  <75% for > 7 days (moderate malnutrition)  Subcutaneous Fat Loss:  None observed (12/14)  Muscle Loss:  Patellar region mild depletion and Posterior calf region mild depletion-- may be partially age-related (12/14)  Fluid Retention:  None noted    Malnutrition Diagnosis: Moderate malnutrition in the context of --  Acute illness or injury        CURRENT NUTRITION DIAGNOSIS  Inadequate oral intake related to poor appetite, lethargy as evidenced by continued need for supplemental TPN, recent kcal ct meeting <30% of min energy/protein needs         INTERVENTIONS  Recommendations / Nutrition Prescription  Ordered 8 pm chocolate Ensure Enlive w/ ice cream shake  Ordered trial of Magic Cup  Continue w/ daily chocolate [employer] ONS  Continue w/ BID Gel+  Continue w/ TPN @ 40 mL/hr  Continue w/ kcal ct (12/29-31)  Provided encouragement to pt that he ate better yesterday, discussed importance of continuing to try to eat at each meal, have supplements to wean off TPN. Dtr also provided encouragement for pt. Pt likes the chocolate [employer] supplement, would like to try chocolate Ensure Enlive shake w/ ice cream. He would also like to try a Magic Cup. Discussed that pt is now on Regular diet- if it would help, dtr may bring in food/drinks for pt- just include nutrition facts for kcal ct data. Discussed recent kcal ct and plans to continue w/ kcal ct as pt was NPO a couple days and needs more time to improve PO intake. Reviewed kcal and protein needs to wean off TPN.     Implementation  Collaboration with other nutrition professionals  Collaboration with other providers  Medical food supplement therapy  Nutrition education for recommended modifications  Parenteral Nutrition/IV Fluids     Goals  Wean off TPN w/in 4 days  PO intake to meet >60% of min estimated nutrition needs  Wt >76 kg       MONITORING AND  EVALUATION:  Progress towards goals will be monitored and evaluated per protocol and Practice Guidelines      Yusra Arthur RD, LD  Pager: [phone]

## 2023-12-29 NOTE — PLAN OF CARE
Goal Outcome Evaluation:      Plan of Care Reviewed With: patient    Overall Patient Progress: improvingOverall Patient Progress: improving    [facility]    Patient Information  Name: Garry Mckay  Age: 88 year old    Admission Information  Date: 12/8/2023   Reason:FATIMAH (acute kidney injury) (H24) [N17.9]  Acute sepsis (H) [A41.9]  Acute appendicitis with localized peritonitis, without perforation, abscess, or gangrene [K35.30]     Assessment  Orientation/Neuro: Alert and Oriented x4 but forgetful  Cardiac/Tele: SR with frequent PVCs  Resp: PIZARRO, pt weaned to RA, home cpap at [facility]  GI/: pt having loose stools due to TPN, incont of bowel and bladder at times    Mobility: walks with assist   Pain: denies   Diet: Orders Placed This Encounter      Regular Diet Adult Thin Liquids (level 0) (Sit at 90 degrees, stay up for 30 minutes, small bites/sips, alternate textures)    Vital Signs  B/P: 129/79, T: 98.2, P: 79, R: 20, O2: 98% on RA      Plan  Speech saw pt today and pt was able to go to regular diet, CORE consult placed, lethargy has improved, pt was up in chair for meals, plan to discharge to TCU when bed available     Essence Gotti RN

## 2023-12-29 NOTE — PLAN OF CARE
December 29, 2023  Shift:NOC  Garry Mckay  88 year old  YOB: 1935    Reason for admission:FATIMAH (acute kidney injury) (H24) [N17.9]  Acute sepsis (H) [A41.9]  Acute appendicitis with localized peritonitis, without perforation, abscess, or gangrene [K35.30]    Cognition/Mentation:A/O  Neuros/CMS:intact  VS:stable  Cardiac: SR w/ frequent PVC's. Had 8 beats VTAC this shift. Not sustained. Not symptomatic.   GI:No BM this shift.   :Uses male external cath. Incontinent   Pulmonary: home cpap NOC. RA during day.  Pain:headache, took tylenol and it helped.   Drains/Lines:2 lumen PICC. PICC does not pull back blood but flushes and works well with TPN. Lab is to draw all further labs.   Skin:scattered brusing    Activity: 1 w/ walker  Diet:Regular   Discharge:pending medical clearance.    Shift summary:Pt transferred to [facility] floor at about 0000 this shift. Has been stable and sleeping well. Is using home cpap during sleep. Sats well on RA. Is A/Ox4. Has complained of a headache but can have oral tylenol. Is able to swallow medications and thin liquids. Tried using male external cath and pt was not able to pee even though they felt the urge. Pt was then bladder scanned and only say 122cc in scan. Does wear briefs in bed due to incontinence. Neuros intact.     Mitzi Puckett RN on 12/29/2023 at 5:43 AM

## 2023-12-29 NOTE — PROGRESS NOTES
[facility]    Hospitalist Progress Note    Interval History   Patient was awake this morning but very fatigued.  Would fall asleep on me during conversation.  Very reduced oral intake.  Denies any chest pain or shortness of breath.    -Data reviewed today: I reviewed all new labs and imaging results over the last 24 hours. I personally reviewed the chest x-ray image(s) showing no pneumonia or consolidation.  No effusion. .    Physical Exam   Temp: 96.8  F (36  C) Temp src: Axillary BP: 117/57 Pulse: 66   Resp: 24 SpO2: 98 % O2 Device: BiPAP/CPAP    Vitals:    12/26/23 1100 12/27/23 0630 12/28/23 0900   Weight: 78.2 kg (172 lb 6.4 oz) 74 kg (163 lb 2.3 oz) 76.2 kg (168 lb)     Vital Signs with Ranges  Temp:  [96.8  F (36  C)-98.7  F (37.1  C)] 96.8  F (36  C)  Pulse:  [59-84] 66  Resp:  [11-33] 24  BP: (100-133)/() 117/57  FiO2 (%):  [0 %] 0 %  SpO2:  [94 %-100 %] 98 %  I/O last 3 completed shifts:  In: 2027.33 [P.O.:920]  Out: 750 [Urine:750]    Physical Exam  Constitutional:       Appearance: He is ill-appearing.      Comments: Old and frail   Cardiovascular:      Rate and Rhythm: Normal rate and regular rhythm.      Pulses: Normal pulses.      Heart sounds: Normal heart sounds.   Pulmonary:      Effort: Pulmonary effort is normal. No respiratory distress.      Breath sounds: Normal breath sounds.   Abdominal:      General: Abdomen is flat. Bowel sounds are normal. There is no distension.      Tenderness: There is no abdominal tenderness. There is no guarding.   Skin:     General: Skin is warm and dry.      Coloration: Skin is pale.      Comments: Mottled appearance of both eyes   Neurological:      General: No focal deficit present.           Medications    - MEDICATION INSTRUCTIONS -      - MEDICATION INSTRUCTIONS -      dextrose      - MEDICATION INSTRUCTIONS -      parenteral nutrition - Clinimix E      parenteral nutrition - Clinimix E 40 mL/hr at 12/28/23 2357    Percutaneous  Coronary Intervention orders placed (this is information for BPA alerting)        aspirin  81 mg Oral Daily    atorvastatin  80 mg Oral At Bedtime    carvedilol  12.5 mg Oral BID    clopidogrel  75 mg Oral Daily    enoxaparin ANTICOAGULANT  40 mg Subcutaneous Q24H    escitalopram  20 mg Oral or NG Tube Daily    finasteride  5 mg Oral At Bedtime    furosemide  20 mg Oral Daily    insulin aspart  1-7 Units Subcutaneous TID AC    insulin aspart  1-5 Units Subcutaneous At Bedtime    insulin glargine  5 Units Subcutaneous At Bedtime    latanoprost  1 drop Both Eyes At Bedtime    levothyroxine  100 mcg Oral QAM AC    pantoprazole  40 mg Oral QAM AC    sacubitril-valsartan  1 tablet Oral BID    sodium chloride (PF)  10-40 mL Intracatheter Q7 Days       Data   Recent Labs   Lab 12/29/23  1146 12/29/23  0623 12/29/23  0509 12/28/23  2350 12/28/23  0854 12/28/23  0628 12/27/23  1703 12/27/23  1352 12/27/23  1052 12/26/23  1759 12/26/23  1142 12/25/23  0823 12/25/23  0817   WBC  --   --   --   --   --  9.9  --  9.6 8.1  --  10.0  --  10.4   HGB  --   --   --   --   --  12.3*  --  13.7 9.7*  --  15.3  --  14.4   MCV  --   --   --   --   --  98  --  97 139*  --  98  --  99   PLT  --   --  151  --   --  174  --  185 147*  --  164  --  192   NA  --   --   --   --   --  135  --  138  --   --  138  --  137   POTASSIUM  --   --  4.3  --   --  4.6  --  4.8  --   --  4.6  --  5.0   CHLORIDE  --   --   --   --   --  105  --  105  --   --  104  --  105   CO2  --   --   --   --   --  23  --  24  --   --  22  --  23   BUN  --   --   --   --   --  31.2*  --  26.3*  --   --  29.3*  --  29.5*   CR  --   --  1.10  --   --  1.13  --  0.93  --   --  0.97  --  0.96   ANIONGAP  --   --   --   --   --  7  --  9  --   --  12  --  9   RHODA  --   --   --   --   --  9.3  --  9.4  --   --  9.7  --  9.7   * 148*  --  167*   < > 192*   < > 181*  --    < > 174*   < > 192*   ALBUMIN  --   --   --   --   --   --   --  3.1*  --   --   --   --  3.2*    PROTTOTAL  --   --   --   --   --   --   --  6.1*  --   --   --   --  6.2*   BILITOTAL  --   --   --   --   --   --   --  0.4  --   --   --   --  0.5   ALKPHOS  --   --   --   --   --   --   --  118  --   --   --   --  116   ALT  --   --   --   --   --   --   --  11  --   --   --   --  14   AST  --   --   --   --   --   --   --  16  --   --   --   --  20    < > = values in this interval not displayed.       No results found for this or any previous visit (from the past 24 hour(s)).      Assessment & Plan     88 year old male S/P Lap Appy for Perforated Appendicitis, and vascular assessment of questionable area on cecum through ICG administration.  He also has had issues with post op fluid collections that IR has placed a drain in and had a significant post-op ileus.  Initially underwent surgery on 12/8/2023 and required ICU management for septic shock.      Ultimately improved, and Hospitalist service assumed care on 12/11/2023.      Acute appendicitis with peritonitis and septic shock - s/p laparoscopic appendectomy 12/8/2023.  Pelvic fluid collections, question abscesses.  Postoperative ileus vs SBO, prolonged  Nutrition     -Initial presentation to OSH 12/7 as above. CT abdomen noted with acute appendicitis with 0.7 cm appendicolith, no abscess. Started on ampicillin-sulbactam. Subsequently transferred to [facility] 12/8.  On 12/8, underwent above surgery. Weaned off pressors. Antibiotics changed to pipericillin-tazobactam.   On 12/10, CT abdomen noted with multiple dilated small bowel loops with related decompression of colon with findings consistent with partial obstruction/ adynamic ileus.  On 12/12, diet advanced to clears as pt seemed to be improving with +flatus and BM. Later, developed more N/V and NG placed.  On 12/14, WBC elevated. Repeat abdominal CT showed prior appendectomy with findings suggestive of peritonitis, developing rim enhancing pelvic fluid collections, abscesses possible; increasing  dilation of proximal small bowel with transition point in the right lower quadrant, suggestive of partial mechanical obstruction, developing adhesion is possible, no evidence of nadien bowel ischemia or acute perforation; distended gallbladder without additional CT evidence of acute cholecystitis.   -IR placed drain into right pelvic fluid collection (2/15/23)   -CT sinegram 12/19/23 - with drain in good position, min residual fluid and no fistula.   - Completed 14 days of IV pipericillin-tazobactam   - started TPN 12/15 - continuing but rate has been titrated down  -NG tube and ESTEFANY drain removed 12/21/23  -Surgery team has been following the patient-and he is having bowel movements and tolerating diet and we do plan to transition the patient off TPN and the surgery team is okay with Plavix and Lovenox for chemoprophylaxis and they have signed off  -continue calorie count with plan to wean tpn when deemed appropriate when his caloric intake is appropriate  -He is not having adequate oral intake.  Discussed with patient regarding placement of NG.  He did not want to do it today and wanted to attempt increasing oral intake.  He will consider an NG tube tomorrow (12/30/2023 ) if he is unable to keep up with oral requirements.     Acute hypoxic respiratory failure, suspect multifactorial related to atelectasis, recent abdominal surgery, resolved.  CARINA on home CPA:.  - Initial presentation as above. CXR at OSH negative.  - On 12/8, CXR showed new left basilar bandlike opacity, most likely atelectasis.   -continue with home cpap  -On room air while awake      FATIMAH on CKD3, suspect prerenal, meds (including sacubitril-valsartan, diuretics)-resolved  Hyperkalemia, resolved.  - Baseline creatinine 1-1.1.   -Creatinine 1.39 --> 1.8 at OSH.  - Cr up to 2.27 and K up to 5.5 on 12/8.  -Patient did receive IV Lasix on 12/10 and 12/11  -Renal function currently at baseline at 1.1     Troponin elevation, suspect demand ischemia (type  2 NSTEMI).  Hypertension (benign essential).  CAD s/p CABG (1994), stenting (2002, 2003, 2005, 2021)  Chronic HFrEF (systolic and diastolic), s/p ICD.  HLD.  Afib with RVR  Abnormal stress test s/p LHC on 12/26/23 s/p MAX to vein graft to D1  [PTA: carvedilol 12.5 mg BID; sacubitril-valsartan 49-51 mg BID; furosemide 20 mg daily.]  --has ICD and h/o CAD s/p CABG 1994  -Last stress testing in [place] 7/2023, which was negative for ischemia   -Last cath 1/2021 at [facility] showed patent CLAUDIA-RCA, LIMA occluded, vein-diagonal patent with placement of stent,  LAD, 40% circumflex, RCA occluded. Last stress testing 7/2023 negative for ischemia. Echo 7/2023 showed LVEF 30-35%, global hypokinesis LV, moderate-severe cLVH, grade 1 diastolic dysfunction; RV systolic function mildly reduced.   - Initial presentation as above. BNP in ED at 2613, troponin 70. EKG w/ nonspecific t-w changes. PTA sacubitril-valsartan and furosemide held on admit given FATIMAH.  Briefly was  on IV diuretics.  -He was seen initially by cardiology on 12/8 and 12/9 for preop clearance and patient did had elevated troponin which was felt due to demand  -Patient did had RRT on 12/22 and his troponin was elevated  -Echo 12/22/23 - EF 20-25% with hyokinesis of the post wall.    -His prior echo in [facility] showed that his EF was 30 to 35% with moderate hypokinesis of the left ventricle  -stress test is abnormal  on 12/26/23  -s/pLHC on 12/26/23 which showed occluded LAD, occluded proximal RCA, 40% sent circumflex with patent CLAUDIA to the RCA occluded graft to the OM1 and nearly occluded graft to the D1.  Patient underwent lithotripsy and drug-eluting stent to the vein graft to the D1.   -Cardiology on board and continue the patient with aspirin 81 mg, Plavix 75 mg, Entresto and he was restarted on Lasix 20 mg daily and further up titration of his medications as outpatient  -Uninterrupted aspirin and Plavix for 1 year  -Outpatient follow-up with  cardiology and they have signed off today on 12/28/2023           Acute metabolic encephalopathy (delirium) -waxing and waning-   -CT scan of the head on 12/21/2023 did not show any evidence of any acute intracranial hemorrhage and there is microvascular changes  -I have discussed with daughter and she told me that patient for the past 2 to 3 days has been more sleepy and he does seem to have hospital-acquired delirium and on night of 12/23 he did get Zyprexa  -I also discussed with daughter and she told me that he used to be on Seroquel at 1 point in time in the past and that caused him to be sleepy  -Daughter also thinks that given that patient's Lexapro was held for few days along with gabapentin that might help withdrawal effect  -On 12/27 RRT was called as patient was very somnolent and less responsive.  VBG was obtained which was initially concerning for pH of 6.9 with pCO2 of 92 and bicarb was low and patient was given an ampule of bicarb and was started on BiPAP.  Pretty soon after starting on BiPAP ABG was done which showed abnormal values and apparently the initial VBG done during RRT was not a good sample as it was drawn from the line  -CT scan of the head was done during the rapid response on 12/27 and shows chronic ischemic changes  -Patient improved significantly during course of the day and he was taken off BiPAP on 12/20 7 PM  -Most likely cause of RRT can be that patient was not getting CPAP  -EEG done.  Shows generalized intermittent slowing suggestive of encephalopathy.  No seizure activity noted.  Neurology was consulted.  Recommended stopping Dilaudid and avoiding medications that can affect his mental status.  -12/28-patient is significantly improved and is near baseline and has been the most alert and following commands.  TSH within normal limits.  Urinalysis within normal limits  -Will avoid ongoing use of benzodiazepines, gabapentin, olanzapine  -Started having watery diarrhea in the last 24  hours.  C. difficile ordered and pending.  CBC ordered and pending..     Hyperglycemia while on TPN  - HbA1c is 6.8  -Patient is started back on diet and we will continue with the TPN and we will continue with sliding scale insulin with meals and also will use Lantus 5 units at bedtime        Anemia, suspect partial blood loss component with surgery [id number]  -His last hemoglobin on 12/7/23 was hemoglobin of 16.8 with hematocrit of 49.3 and might have component of underlying dehydration  -His hemoglobin has been fluctuating during the course of hospital stay and is 12.6 today and is due to acute illness and is improving and there is no evidence of any GI bleed  -Hemoglobin stable at 12.  CBC today pending.     Thrombocytopenia, unclear etiology, possibly due to sepsis, medication effect or consumptive or other cause result-resolved        Hypokalemi-Resolved        Hx CVA  -We will continue with aspirin 81 mg, Plavix 75 mg daily, resume atorvastatin 80 mg and Zetia 10 mg  -CT scan of the head on 12/21 and 12/27  did not show any acute process and he has chronic ischemic changes      Depression/anxiety.  - Continue to hold gabapentin for now   - Continue escitalopram daily - tolerating      Hypothyroidism.  - Continue levothyroxine.     GERD.  -Continue with PPI      BPH.  - will continue finasteride      History of migraines  -Noted        Gallbladder distention  -This was found on the CT scan of the abdomen done on 12/14 and ultrasound was done on 12/15 without any evidence of cholecystitis     Diet: Snacks/Supplements Adult: Gelatein Plus; With Meals  Snacks/Supplements Adult: Tracey Breen Standard Oral Supplement; Between Meals  Calorie Counts  parenteral nutrition - Clinimix E  NPO for Medical/Clinical Reasons Except for: No Exceptions    DVT Prophylaxis: Enoxaparin (Lovenox) SQ  Hameed Catheter: Not present  Lines: PRESENT      PICC 12/15/23 Double Lumen Left Brachial vein medial TPN-Site  "Assessment: WDL except    Clinically Significant Risk Factors              # Hypoalbuminemia: Lowest albumin = 2.8 g/dL at 12/11/2023  5:26 AM, will monitor as appropriate     # Hypertension: Noted on problem list  # Chronic heart failure with reduced ejection fraction: last echo with EF <40%      # DMII: A1C = 6.8 % (Ref range: <5.7 %) within past 6 months   # Overweight: Estimated body mass index is 26.31 kg/m  as calculated from the following:    Height as of this encounter: 1.702 m (5' 7\").    Weight as of this encounter: 76.2 kg (168 lb).   # Moderate Malnutrition: based on nutrition assessment            DVT Prophylaxis: Pneumatic Compression Devices  Code Status: Full Code  Disposition: Expected discharge when medically stable      Dona Benavides MD, MD  [phone](p)   "

## 2023-12-29 NOTE — PROGRESS NOTES
[facility]    Neurology Progress Note     Assessment & Plan   Garry Mckay is a 88 year old male who was admitted on 12/8/2023.  Patient continues to be encephalopathic and very fatigued with poor p.o. intake.  Acute hypoxic respiratory failure secondary to obstructive sleep apnea and not use CPAP, acute renal insufficiency on chronic, all of this can participate to his encephalopathy and fatigue.  Status post acute appendicitis and peritonitis and septic shock.    EEG was mildly abnormal with episode of slowing on normal background.  No seizures.  Vitamin B12 was 779, folate normal, TSH normal.  UA negative    -Will continue with conservative care, nutrition evaluation and follow-up.  -Occupational Therapy and physical therapy to evaluate the patient  -Treatment of acute renal insufficiency  -Avoid medication that can interfere with mental status.    Neurology will sign off if there are any questions or concerns please feel free to contact us.    Angie Schmitt MD    Interval History   The patient seems more awake today.  No complaints.    Physical Exam   Temp: 97.5  F (36.4  C) Temp src: Oral BP: 114/69 Pulse: 68   Resp: 15 SpO2: 99 % O2 Device: None (Room air)    Vitals:    12/26/23 1100 12/27/23 0630 12/28/23 0900   Weight: 78.2 kg (172 lb 6.4 oz) 74 kg (163 lb 2.3 oz) 76.2 kg (168 lb)     Vital Signs with Ranges  Temp:  [96.8  F (36  C)-98.7  F (37.1  C)] 97.5  F (36.4  C)  Pulse:  [59-84] 68  Resp:  [11-33] 15  BP: (100-133)/() 114/69  FiO2 (%):  [0 %] 0 %  SpO2:  [94 %-100 %] 99 %  I/O last 3 completed shifts:  In: 1767.33 [P.O.:660]  Out: 200 [Urine:200]    He continues to have soft speech.  He is oriented x 3.  He does not follow with full Mini-Mental status exam.  Does not know the name of the president.  He knows where he is.  He was able to tell me that his daughter  was admitted to the hospital as well.  Face is symmetric.  There is no pronator  drift.  I do not see any asterixis.    Medications    - MEDICATION INSTRUCTIONS -      - MEDICATION INSTRUCTIONS -      dextrose      - MEDICATION INSTRUCTIONS -      parenteral nutrition - Clinimix E      parenteral nutrition - Clinimix E 40 mL/hr at 12/28/23 2357    Percutaneous Coronary Intervention orders placed (this is information for BPA alerting)        aspirin  81 mg Oral Daily    atorvastatin  80 mg Oral At Bedtime    carvedilol  12.5 mg Oral BID    clopidogrel  75 mg Oral Daily    enoxaparin ANTICOAGULANT  40 mg Subcutaneous Q24H    escitalopram  20 mg Oral or NG Tube Daily    finasteride  5 mg Oral At Bedtime    furosemide  20 mg Oral Daily    insulin aspart  1-7 Units Subcutaneous TID AC    insulin aspart  1-5 Units Subcutaneous At Bedtime    insulin glargine  5 Units Subcutaneous At Bedtime    latanoprost  1 drop Both Eyes At Bedtime    levothyroxine  100 mcg Oral QAM AC    pantoprazole  40 mg Oral QAM AC    sacubitril-valsartan  1 tablet Oral BID    sodium chloride (PF)  10-40 mL Intracatheter Q7 Days       Data   Results for orders placed or performed during the hospital encounter of 12/08/23 (from the past 24 hour(s))   UA with Microscopic reflex to Culture    Specimen: Urine, Clean Catch   Result Value Ref Range    Color Urine Yellow Colorless, Straw, Light Yellow, Yellow    Appearance Urine Clear Clear    Glucose Urine Negative Negative mg/dL    Bilirubin Urine Negative Negative    Ketones Urine Negative Negative mg/dL    Specific Gravity Urine 1.023 1.003 - 1.035    Blood Urine Negative Negative    pH Urine 5.5 5.0 - 7.0    Protein Albumin Urine Negative Negative mg/dL    Urobilinogen Urine Normal Normal, 2.0 mg/dL    Nitrite Urine Negative Negative    Leukocyte Esterase Urine Negative Negative    Mucus Urine Present (A) None Seen /LPF    RBC Urine 1 <=2 /HPF    WBC Urine 1 <=5 /HPF    Narrative    Urine Culture not indicated   Glucose by meter   Result Value Ref Range    GLUCOSE BY METER POCT  160 (H) 70 - 99 mg/dL   TSH with free T4 reflex   Result Value Ref Range    TSH 2.23 0.30 - 4.20 uIU/mL   Vitamin B12   Result Value Ref Range    Vitamin B12 779 232 - 1,245 pg/mL   Folate   Result Value Ref Range    Folic Acid 18.6 4.6 - 34.8 ng/mL   Glucose by meter   Result Value Ref Range    GLUCOSE BY METER POCT 167 (H) 70 - 99 mg/dL   Platelet count   Result Value Ref Range    Platelet Count 151 150 - 450 10e3/uL   Creatinine   Result Value Ref Range    Creatinine 1.10 0.67 - 1.17 mg/dL    GFR Estimate 65 >60 mL/min/1.73m2   Potassium   Result Value Ref Range    Potassium 4.3 3.4 - 5.3 mmol/L   Phosphorus   Result Value Ref Range    Phosphorus 2.7 2.5 - 4.5 mg/dL   Magnesium   Result Value Ref Range    Magnesium 1.9 1.7 - 2.3 mg/dL   CBC with platelets   Result Value Ref Range    WBC Count 9.0 4.0 - 11.0 10e3/uL    RBC Count 3.76 (L) 4.40 - 5.90 10e6/uL    Hemoglobin 12.1 (L) 13.3 - 17.7 g/dL    Hematocrit 37.0 (L) 40.0 - 53.0 %    MCV 98 78 - 100 fL    MCH 32.2 26.5 - 33.0 pg    MCHC 32.7 31.5 - 36.5 g/dL    RDW 14.0 10.0 - 15.0 %    Platelet Count 151 150 - 450 10e3/uL   Glucose by meter   Result Value Ref Range    GLUCOSE BY METER POCT 148 (H) 70 - 99 mg/dL   Glucose by meter   Result Value Ref Range    GLUCOSE BY METER POCT 191 (H) 70 - 99 mg/dL

## 2023-12-29 NOTE — PROGRESS NOTES
Patient is AO X 4, confused at times. His speech is slow.  Garry had two episodes of watery diarrhea.  C Diff was ordered by physician.  He is on TPN; poor appetite; calorie counting is in progress.  BG is reasonably controlled; insulin was administered as prescribed.  VSS; on RA; up/1/FWW/GB.

## 2023-12-29 NOTE — PROGRESS NOTES
Care Management Follow Up    Length of Stay (days): 21    Expected Discharge Date: 12/31/2023     Concerns to be Addressed: discharge planning     Patient plan of care discussed at interdisciplinary rounds: Yes    Anticipated Discharge Disposition: Transitional Care     Anticipated Discharge Services:    Anticipated Discharge DME:      Patient/family educated on Medicare website which has current facility and service quality ratings: yes  Education Provided on the Discharge Plan: Yes  Patient/Family in Agreement with the Plan: yes    Referrals Placed by CM/SW: Post Acute Facilities  Private pay costs discussed: Not applicable    Additional Information:  No accepting TCU's due to pt's need for TPN. Anticipate pt will need to be off TPN before transitioning to TCU. SW following.     LAURA Simpson, [facility] CenterSW  [phone] Desk phone  [phone] Cell/text (Preferred)  [facility]

## 2023-12-29 NOTE — PLAN OF CARE
Goal Outcome Evaluation:      Plan of Care Reviewed With: patient, child    Overall Patient Progress: improvingOverall Patient Progress: improving    Outcome Evaluation: PO intake slowly improving but still remains inadequate. continue w/ supplemental TPN and kcal ct.    Yusra Arthur RD, LD

## 2023-12-30 ENCOUNTER — APPOINTMENT (OUTPATIENT)
Dept: OCCUPATIONAL THERAPY | Facility: CLINIC | Age: 88
DRG: 853 | End: 2023-12-30
Attending: HOSPITALIST
Payer: MEDICARE

## 2023-12-30 LAB
C DIFF TOX B STL QL: NEGATIVE
GLUCOSE BLDC GLUCOMTR-MCNC: 154 MG/DL (ref 70–99)
GLUCOSE BLDC GLUCOMTR-MCNC: 163 MG/DL (ref 70–99)
GLUCOSE BLDC GLUCOMTR-MCNC: 179 MG/DL (ref 70–99)
GLUCOSE BLDC GLUCOMTR-MCNC: 179 MG/DL (ref 70–99)
GLUCOSE BLDC GLUCOMTR-MCNC: 239 MG/DL (ref 70–99)
MAGNESIUM SERPL-MCNC: 2 MG/DL (ref 1.7–2.3)
PHOSPHATE SERPL-MCNC: 3 MG/DL (ref 2.5–4.5)
POTASSIUM SERPL-SCNC: 4.4 MMOL/L (ref 3.4–5.3)

## 2023-12-30 PROCEDURE — 87493 C DIFF AMPLIFIED PROBE: CPT | Performed by: HOSPITALIST

## 2023-12-30 PROCEDURE — 83735 ASSAY OF MAGNESIUM: CPT | Performed by: HOSPITALIST

## 2023-12-30 PROCEDURE — 999N000040 HC STATISTIC CONSULT NO CHARGE VASC ACCESS

## 2023-12-30 PROCEDURE — 250N000011 HC RX IP 250 OP 636: Performed by: INTERNAL MEDICINE

## 2023-12-30 PROCEDURE — 250N000013 HC RX MED GY IP 250 OP 250 PS 637: Performed by: NURSE PRACTITIONER

## 2023-12-30 PROCEDURE — 97535 SELF CARE MNGMENT TRAINING: CPT | Mod: GO | Performed by: OCCUPATIONAL THERAPIST

## 2023-12-30 PROCEDURE — 84132 ASSAY OF SERUM POTASSIUM: CPT | Performed by: HOSPITALIST

## 2023-12-30 PROCEDURE — 99233 SBSQ HOSP IP/OBS HIGH 50: CPT | Performed by: INTERNAL MEDICINE

## 2023-12-30 PROCEDURE — 99207 PR MOONLIGHTING INDICATOR: CPT | Performed by: INTERNAL MEDICINE

## 2023-12-30 PROCEDURE — 250N000013 HC RX MED GY IP 250 OP 250 PS 637: Performed by: PHYSICIAN ASSISTANT

## 2023-12-30 PROCEDURE — 250N000013 HC RX MED GY IP 250 OP 250 PS 637: Performed by: INTERNAL MEDICINE

## 2023-12-30 PROCEDURE — 250N000013 HC RX MED GY IP 250 OP 250 PS 637: Performed by: HOSPITALIST

## 2023-12-30 PROCEDURE — 250N000013 HC RX MED GY IP 250 OP 250 PS 637: Performed by: STUDENT IN AN ORGANIZED HEALTH CARE EDUCATION/TRAINING PROGRAM

## 2023-12-30 PROCEDURE — 120N000013 HC R&B IMCU

## 2023-12-30 PROCEDURE — 250N000013 HC RX MED GY IP 250 OP 250 PS 637

## 2023-12-30 PROCEDURE — 36415 COLL VENOUS BLD VENIPUNCTURE: CPT | Performed by: HOSPITALIST

## 2023-12-30 PROCEDURE — 84100 ASSAY OF PHOSPHORUS: CPT | Performed by: HOSPITALIST

## 2023-12-30 PROCEDURE — 250N000009 HC RX 250: Performed by: INTERNAL MEDICINE

## 2023-12-30 RX ORDER — MAGNESIUM SULFATE HEPTAHYDRATE 40 MG/ML
2 INJECTION, SOLUTION INTRAVENOUS ONCE
Status: COMPLETED | OUTPATIENT
Start: 2023-12-30 | End: 2023-12-30

## 2023-12-30 RX ADMIN — ASPIRIN 81 MG: 81 TABLET, COATED ORAL at 10:12

## 2023-12-30 RX ADMIN — ESCITALOPRAM OXALATE 20 MG: 10 TABLET ORAL at 10:12

## 2023-12-30 RX ADMIN — MAGNESIUM SULFATE HEPTAHYDRATE 2 G: 40 INJECTION, SOLUTION INTRAVENOUS at 08:22

## 2023-12-30 RX ADMIN — CARVEDILOL 12.5 MG: 12.5 TABLET, FILM COATED ORAL at 21:05

## 2023-12-30 RX ADMIN — ACETAMINOPHEN 650 MG: 325 TABLET, FILM COATED ORAL at 21:21

## 2023-12-30 RX ADMIN — ACETAMINOPHEN 650 MG: 325 TABLET, FILM COATED ORAL at 01:26

## 2023-12-30 RX ADMIN — LATANOPROST 1 DROP: 50 SOLUTION/ DROPS OPHTHALMIC at 21:06

## 2023-12-30 RX ADMIN — INSULIN ASPART 1 UNITS: 100 INJECTION, SOLUTION INTRAVENOUS; SUBCUTANEOUS at 17:33

## 2023-12-30 RX ADMIN — FINASTERIDE 5 MG: 5 TABLET, FILM COATED ORAL at 21:05

## 2023-12-30 RX ADMIN — ENOXAPARIN SODIUM 40 MG: 40 INJECTION SUBCUTANEOUS at 13:47

## 2023-12-30 RX ADMIN — CLOPIDOGREL BISULFATE 75 MG: 75 TABLET ORAL at 10:12

## 2023-12-30 RX ADMIN — ATORVASTATIN CALCIUM 80 MG: 80 TABLET, FILM COATED ORAL at 21:05

## 2023-12-30 RX ADMIN — ASCORBIC ACID, VITAMIN A PALMITATE, CHOLECALCIFEROL, THIAMINE HYDROCHLORIDE, RIBOFLAVIN-5 PHOSPHATE SODIUM, PYRIDOXINE HYDROCHLORIDE, NIACINAMIDE, DEXPANTHENOL, ALPHA-TOCOPHEROL ACETATE, VITAMIN K1, FOLIC ACID, BIOTIN, CYANOCOBALAMIN: 200; 3300; 200; 6; 3.6; 6; 40; 15; 10; 150; 600; 60; 5 INJECTION, SOLUTION INTRAVENOUS at 21:13

## 2023-12-30 RX ADMIN — INSULIN ASPART 2 UNITS: 100 INJECTION, SOLUTION INTRAVENOUS; SUBCUTANEOUS at 13:47

## 2023-12-30 RX ADMIN — PANTOPRAZOLE SODIUM 40 MG: 40 TABLET, DELAYED RELEASE ORAL at 10:12

## 2023-12-30 RX ADMIN — SACUBITRIL AND VALSARTAN 1 TABLET: 24; 26 TABLET, FILM COATED ORAL at 21:05

## 2023-12-30 RX ADMIN — LEVOTHYROXINE SODIUM 100 MCG: 100 TABLET ORAL at 10:12

## 2023-12-30 RX ADMIN — INSULIN ASPART 1 UNITS: 100 INJECTION, SOLUTION INTRAVENOUS; SUBCUTANEOUS at 08:22

## 2023-12-30 RX ADMIN — FUROSEMIDE 20 MG: 20 TABLET ORAL at 10:13

## 2023-12-30 RX ADMIN — SACUBITRIL AND VALSARTAN 1 TABLET: 24; 26 TABLET, FILM COATED ORAL at 10:12

## 2023-12-30 RX ADMIN — CARVEDILOL 12.5 MG: 12.5 TABLET, FILM COATED ORAL at 10:13

## 2023-12-30 ASSESSMENT — ACTIVITIES OF DAILY LIVING (ADL)
ADLS_ACUITY_SCORE: 40
ADLS_ACUITY_SCORE: 37
ADLS_ACUITY_SCORE: 39
ADLS_ACUITY_SCORE: 39
ADLS_ACUITY_SCORE: 37
ADLS_ACUITY_SCORE: 40
ADLS_ACUITY_SCORE: 37
ADLS_ACUITY_SCORE: 39
ADLS_ACUITY_SCORE: 38
ADLS_ACUITY_SCORE: 39
ADLS_ACUITY_SCORE: 38
ADLS_ACUITY_SCORE: 39

## 2023-12-30 NOTE — PROGRESS NOTES
[facility]    Hospitalist Progress Note    Interval History   Overnight with some delirium which was redirectable.    On evaluation this am, he is resting on the bed. Able to converse with me. Denies any fevers, chills, HA, dizziness. Reports eating come cookies at lunch and he was waiting for his  dinner today at 1600. He was encouraged to continue eating    -Data reviewed today: I reviewed all new labs and imaging results over the last 24 hours. I personally reviewed the chest x-ray image(s) showing no pneumonia or consolidation.  No effusion. .    Physical Exam   Temp: 97.5  F (36.4  C) Temp src: Axillary BP: 117/66 Pulse: 68   Resp: 22 SpO2: 97 % O2 Device: None (Room air)    Vitals:    12/27/23 0630 12/28/23 0900 12/30/23 0600   Weight: 74 kg (163 lb 2.3 oz) 76.2 kg (168 lb) 75.2 kg (165 lb 12.6 oz)     Vital Signs with Ranges  Temp:  [96.8  F (36  C)-97.5  F (36.4  C)] 97.5  F (36.4  C)  Pulse:  [55-73] 68  Resp:  [10-27] 22  BP: ()/(49-82) 117/66  SpO2:  [96 %-100 %] 97 %  I/O last 3 completed shifts:  In: 460 [P.O.:460]  Out: 750 [Urine:750]    Physical Exam  Constitutional:       Appearance: He is ill-appearing.      Comments: Old and frail   Cardiovascular:      Rate and Rhythm: Normal rate and regular rhythm.      Pulses: Normal pulses.      Heart sounds: Normal heart sounds.   Pulmonary:      Effort: Pulmonary effort is normal. No respiratory distress.      Breath sounds: Normal breath sounds.   Abdominal:      General: Abdomen is flat. Bowel sounds are normal. There is no distension.      Tenderness: There is no abdominal tenderness. There is no guarding.   Skin:     General: Skin is warm and dry.      Coloration: Skin is pale.      Comments: Mottled appearance of both eyes   Neurological:      General: No focal deficit present.           Medications    - MEDICATION INSTRUCTIONS -      - MEDICATION INSTRUCTIONS -      dextrose      - MEDICATION INSTRUCTIONS  -      parenteral nutrition - Clinimix E 40 mL/hr at 12/30/23 0821    Percutaneous Coronary Intervention orders placed (this is information for BPA alerting)        aspirin  81 mg Oral Daily    atorvastatin  80 mg Oral At Bedtime    carvedilol  12.5 mg Oral BID    clopidogrel  75 mg Oral Daily    enoxaparin ANTICOAGULANT  40 mg Subcutaneous Q24H    escitalopram  20 mg Oral or NG Tube Daily    finasteride  5 mg Oral At Bedtime    furosemide  20 mg Oral Daily    insulin aspart  1-7 Units Subcutaneous TID AC    insulin aspart  1-5 Units Subcutaneous At Bedtime    insulin glargine  5 Units Subcutaneous At Bedtime    latanoprost  1 drop Both Eyes At Bedtime    levothyroxine  100 mcg Oral QAM AC    pantoprazole  40 mg Oral QAM AC    sacubitril-valsartan  1 tablet Oral BID    sodium chloride (PF)  10-40 mL Intracatheter Q7 Days       Data   Recent Labs   Lab 12/30/23  0754 12/30/23  0526 12/30/23  0159 12/29/23  2204 12/29/23  0623 12/29/23  0509 12/28/23  0854 12/28/23  0628 12/27/23  1703 12/27/23  1352 12/26/23  1759 12/26/23  1142 12/25/23  0823 12/25/23  0817   WBC  --   --   --   --   --  9.0  --  9.9  --  9.6   < > 10.0  --  10.4   HGB  --   --   --   --   --  12.1*  --  12.3*  --  13.7   < > 15.3  --  14.4   MCV  --   --   --   --   --  98  --  98  --  97   < > 98  --  99   PLT  --   --   --   --   --  151  151  --  174  --  185   < > 164  --  192   NA  --   --   --   --   --   --   --  135  --  138  --  138  --  137   POTASSIUM  --  4.4  --   --   --  4.3  --  4.6  --  4.8  --  4.6  --  5.0   CHLORIDE  --   --   --   --   --   --   --  105  --  105  --  104  --  105   CO2  --   --   --   --   --   --   --  23  --  24  --  22  --  23   BUN  --   --   --   --   --   --   --  31.2*  --  26.3*  --  29.3*  --  29.5*   CR  --   --   --   --   --  1.10  --  1.13  --  0.93  --  0.97  --  0.96   ANIONGAP  --   --   --   --   --   --   --  7  --  9  --  12  --  9   RHODA  --   --   --   --   --   --   --  9.3  --  9.4  --   9.7  --  9.7   *  --  163* 174*   < >  --    < > 192*   < > 181*   < > 174*   < > 192*   ALBUMIN  --   --   --   --   --   --   --   --   --  3.1*  --   --   --  3.2*   PROTTOTAL  --   --   --   --   --   --   --   --   --  6.1*  --   --   --  6.2*   BILITOTAL  --   --   --   --   --   --   --   --   --  0.4  --   --   --  0.5   ALKPHOS  --   --   --   --   --   --   --   --   --  118  --   --   --  116   ALT  --   --   --   --   --   --   --   --   --  11  --   --   --  14   AST  --   --   --   --   --   --   --   --   --  16  --   --   --  20    < > = values in this interval not displayed.       No results found for this or any previous visit (from the past 24 hour(s)).      Assessment & Plan     88 year old male S/P Lap Appy for Perforated Appendicitis, and vascular assessment of questionable area on cecum through ICG administration.  He also has had issues with post op fluid collections that IR has placed a drain in and had a significant post-op ileus.  Initially underwent surgery on 12/8/2023 and required ICU management for septic shock.      Ultimately improved, and Hospitalist service assumed care on 12/11/2023.     Moderate Protein Malnutrition  Has lost 6.5% of weight during this admission  Plan  - Continue calorie counting via nutritionist. If not meeting adequate caloric intake of at least 2/3 of estimated needs by tomorrow (12/31/23), will need to proceed with NG tube. If able to meet, can wean off of TPN    Acute Toxic Metabolic Encephalopathy  Likely multifactorial from shock, inadequate use of CPAP for CARINA, protein malnutrition and hospital acquired delirum  CT Head WO 12/21/2023 w/o any acute changes  Neurology signed off on 12/29/2023  Plan  - Encourage CPAP usage and PO intake  - Avoid sedating medications if feasible  - Minimize night time disturbances    Diarrhea  Unsure if from TPN vs infectious process  C Diff test pending     Acute appendicitis with peritonitis and septic shock - s/p  laparoscopic appendectomy 12/8/2023.  Pelvic fluid collections, question abscesses.  Postoperative ileus vs SBO, prolonged  -Initial presentation to OSH 12/7 as above. CT abdomen noted with acute appendicitis with 0.7 cm appendicolith, no abscess. Started on ampicillin-sulbactam. Subsequently transferred to [facility] 12/8.  On 12/8, underwent above surgery. Weaned off pressors. Antibiotics changed to pipericillin-tazobactam.   On 12/10, CT abdomen noted with multiple dilated small bowel loops with related decompression of colon with findings consistent with partial obstruction/ adynamic ileus.  On 12/12, diet advanced to clears as pt seemed to be improving with +flatus and BM. Later, developed more N/V and NG placed.  On 12/14, WBC elevated. Repeat abdominal CT showed prior appendectomy with findings suggestive of peritonitis, developing rim enhancing pelvic fluid collections, abscesses possible; increasing dilation of proximal small bowel with transition point in the right lower quadrant, suggestive of partial mechanical obstruction, developing adhesion is possible, no evidence of nadine bowel ischemia or acute perforation; distended gallbladder without additional CT evidence of acute cholecystitis.   -IR placed drain into right pelvic fluid collection (2/15/23)   -CT sinegram 12/19/23 - with drain in good position, min residual fluid and no fistula.   - Completed 14 days of IV pipericillin-tazobactam   - started TPN 12/15 - continuing but rate has been titrated down  -NG tube and ESTEFANY drain removed 12/21/23  -Surgery team has been following the patient-and he is having bowel movements and tolerating diet and we do plan to transition the patient off TPN and the surgery team is okay with Plavix and Lovenox for chemoprophylaxis and they have signed off     Acute hypoxic respiratory failure, suspect multifactorial related to atelectasis, recent abdominal surgery and CARINA, resolved.  CARINA on home CPA:.  - Initial  presentation as above. CXR at OSH negative.  - On 12/8, CXR showed new left basilar bandlike opacity, most likely atelectasis.   -continue with home cpap  -On room air while awake      FATIMAH on CKD3, suspect prerenal, meds (including sacubitril-valsartan, diuretics)-resolved  Hyperkalemia, resolved.  - Baseline creatinine 1-1.1.   -Creatinine 1.39 --> 1.8 at OSH.  - Cr up to 2.27 and K up to 5.5 on 12/8.  -Patient did receive IV Lasix on 12/10 and 12/11  -Renal function currently at baseline at 1.1     Troponin elevation, suspect demand ischemia (type 2 NSTEMI).  Hypertension (benign essential).  CAD s/p CABG (1994), stenting (2002, 2003, 2005, 2021)  Chronic HFrEF (systolic and diastolic), s/p ICD.  HLD.  Afib with RVR  Abnormal stress test s/p LHC on 12/26/23 s/p MAX to vein graft to D1  [PTA: carvedilol 12.5 mg BID; sacubitril-valsartan 49-51 mg BID; furosemide 20 mg daily.]  --has ICD and h/o CAD s/p CABG 1994  -Last stress testing in [place] 7/2023, which was negative for ischemia   -Last cath 1/2021 at [facility] showed patent CLAUDIA-RCA, LIMA occluded, vein-diagonal patent with placement of stent,  LAD, 40% circumflex, RCA occluded. Last stress testing 7/2023 negative for ischemia. Echo 7/2023 showed LVEF 30-35%, global hypokinesis LV, moderate-severe cLVH, grade 1 diastolic dysfunction; RV systolic function mildly reduced.   - Initial presentation as above. BNP in ED at 2613, troponin 70. EKG w/ nonspecific t-w changes. PTA sacubitril-valsartan and furosemide held on admit given FATIMAH.  Briefly was  on IV diuretics.  -He was seen initially by cardiology on 12/8 and 12/9 for preop clearance and patient did had elevated troponin which was felt due to demand  -Patient did had RRT on 12/22 and his troponin was elevated  -Echo 12/22/23 - EF 20-25% with hyokinesis of the post wall.    -His prior echo in [facility] showed that his EF was 30 to 35% with moderate hypokinesis of the left ventricle  -stress test is  abnormal  on 12/26/23  -s/pLHC on 12/26/23 which showed occluded LAD, occluded proximal RCA, 40% sent circumflex with patent CLAUDIA to the RCA occluded graft to the OM1 and nearly occluded graft to the D1.  Patient underwent lithotripsy and drug-eluting stent to the vein graft to the D1.   -Cardiology on board and continue the patient with aspirin 81 mg, Plavix 75 mg, Entresto and he was restarted on Lasix 20 mg daily and further up titration of his medications as outpatient  -Uninterrupted aspirin and Plavix for 1 year  -Outpatient follow-up with cardiology and they have signed off today on 12/28/2023     Hyperglycemia while on TPN  - HbA1c is 6.8  -Patient is started back on diet and we will continue with the TPN and we will continue with sliding scale insulin with meals and also will use Lantus 5 units at bedtime     Anemia, suspect partial blood loss component with surgery [id number]  -His last hemoglobin on 12/7/23 was hemoglobin of 16.8 with hematocrit of 49.3 and might have component of underlying dehydration  -His hemoglobin has been fluctuating during the course of hospital stay and is 12.6 today and is due to acute illness and is improving and there is no evidence of any GI bleed  -Hemoglobin stable at 12.  CBC today pending.     Thrombocytopenia, unclear etiology, possibly due to sepsis, medication effect or consumptive or other cause result-resolved    Hypokalemi-Resolved        Hx CVA  -We will continue with aspirin 81 mg, Plavix 75 mg daily, resume atorvastatin 80 mg and Zetia 10 mg  -CT scan of the head on 12/21 and 12/27  did not show any acute process and he has chronic ischemic changes      Depression/anxiety.  - Continue to hold gabapentin for now   - Continue escitalopram daily - tolerating      Hypothyroidism.  - Continue levothyroxine.     GERD.  -Continue with PPI      BPH.  - will continue finasteride      History of migraines  -Noted        Gallbladder distention  -This was found  "on the CT scan of the abdomen done on 12/14 and ultrasound was done on 12/15 without any evidence of cholecystitis     Diet: Snacks/Supplements Adult: Gelatein Plus; With Meals  Snacks/Supplements Adult: Tracey Breen Standard Oral Supplement; Between Meals  Calorie Counts  parenteral nutrition - Clinimix E  NPO for Medical/Clinical Reasons Except for: No Exceptions    DVT Prophylaxis: Enoxaparin (Lovenox) SQ  Hameed Catheter: Not present  Lines: PRESENT      PICC 12/15/23 Double Lumen Left Brachial vein medial TPN-Site Assessment: WDL except    Clinically Significant Risk Factors              # Hypoalbuminemia: Lowest albumin = 2.8 g/dL at 12/11/2023  5:26 AM, will monitor as appropriate     # Hypertension: Noted on problem list    # Chronic heart failure with reduced ejection fraction: last echo with EF <40%      # DMII: A1C = 6.8 % (Ref range: <5.7 %) within past 6 months   # Overweight: Estimated body mass index is 25.97 kg/m  as calculated from the following:    Height as of this encounter: 1.702 m (5' 7\").    Weight as of this encounter: 75.2 kg (165 lb 12.6 oz).   # Moderate Malnutrition: based on nutrition assessment            DVT Prophylaxis: Pneumatic Compression Devices  Code Status: Full Code  Disposition: Expected discharge when medically stable      David Portillo MD, MD  [phone](p)   "

## 2023-12-30 NOTE — PROGRESS NOTES
CALORIE COUNT      Approximate Oral Intake for:   (12/29)   Calories: 1302 cals (73% needs)  Protein: 63 gm pro (74% needs)    TPN via PICC:  Clinimix E (5/15) @ 40 mL/hr  = 682 kcal, 48 g protein        Estimated Needs:    Dosing Weight: 71 kg (adjusted, based on 81.5 kg-- 12/14)  Estimated Energy Needs: 2642-1529 kcal (25-30 Kcal/Kg)  Estimated Protein Needs:  grams protein (1.2-1.5 g pro/Kg)      Summary:   Diet: Regular           Nutrition supplements being sent 5 times/day  Plan to continue calorie count through tomorrow (12/31)  It pt able to meet at least 2/3 estimated needs over the next 2 days, ok to wean off TPN    Sarika Graves RD, LD  Clinical Dietitian - [facility]   Pager - [phone]  Weekend - [phone]

## 2023-12-30 NOTE — PLAN OF CARE
Reason for Admission: Acute appendicitis with peritonitis and septic shock     Cognitive/Mentation: A/Ox 4; forgetful  Neuros/CMS: Intact ex Left pupil 4mm, Right pupil 3 mm  VS: Stable on RA.   Tele: NSR.  GI: Last BM 12/30. Continent.  : Incontinent at times.  Pulmonary: LS clear, diminished.  Pain: Denies.     Drains/Lines: PICC double lumen LUE.  TPN infusing @40 mL/hr  Skin: Intact, scattered bruising.  Blanchable redness to coccyx.  Activity: Assist x 1 with walker.  Diet: Regular with thin liquids. Takes pills whole.     Therapies recs: TCU  Discharge: Pending medical clearance and improvement in PO intake.      Aggression Stoplight Tool: Green    End of shift summary: Blood sugars 179 and 239.  Eating approximately 75% of meal  during day; on calorie count.  K+ and P+ standard protocols in place; recheck in AM.  Magnesium high protocol in place; replaced, recheck in AM.  Stool sample collected for rule-out C. Diff.  Right groin site CDI; open to air; CMS intact.  Appendectomy incision CDI; open to air.

## 2023-12-31 ENCOUNTER — APPOINTMENT (OUTPATIENT)
Dept: PHYSICAL THERAPY | Facility: CLINIC | Age: 88
DRG: 853 | End: 2023-12-31
Attending: HOSPITALIST
Payer: MEDICARE

## 2023-12-31 LAB
GLUCOSE BLDC GLUCOMTR-MCNC: 175 MG/DL (ref 70–99)
GLUCOSE BLDC GLUCOMTR-MCNC: 181 MG/DL (ref 70–99)
GLUCOSE BLDC GLUCOMTR-MCNC: 192 MG/DL (ref 70–99)
GLUCOSE BLDC GLUCOMTR-MCNC: 193 MG/DL (ref 70–99)
GLUCOSE BLDC GLUCOMTR-MCNC: 194 MG/DL (ref 70–99)
MAGNESIUM SERPL-MCNC: 1.9 MG/DL (ref 1.7–2.3)
PHOSPHATE SERPL-MCNC: 2.5 MG/DL (ref 2.5–4.5)
POTASSIUM SERPL-SCNC: 4.2 MMOL/L (ref 3.4–5.3)

## 2023-12-31 PROCEDURE — 250N000013 HC RX MED GY IP 250 OP 250 PS 637: Performed by: HOSPITALIST

## 2023-12-31 PROCEDURE — 250N000009 HC RX 250

## 2023-12-31 PROCEDURE — 84132 ASSAY OF SERUM POTASSIUM: CPT | Performed by: INTERNAL MEDICINE

## 2023-12-31 PROCEDURE — 84478 ASSAY OF TRIGLYCERIDES: CPT | Performed by: STUDENT IN AN ORGANIZED HEALTH CARE EDUCATION/TRAINING PROGRAM

## 2023-12-31 PROCEDURE — 250N000013 HC RX MED GY IP 250 OP 250 PS 637: Performed by: STUDENT IN AN ORGANIZED HEALTH CARE EDUCATION/TRAINING PROGRAM

## 2023-12-31 PROCEDURE — 250N000011 HC RX IP 250 OP 636: Mod: JZ | Performed by: INTERNAL MEDICINE

## 2023-12-31 PROCEDURE — 250N000013 HC RX MED GY IP 250 OP 250 PS 637: Performed by: NURSE PRACTITIONER

## 2023-12-31 PROCEDURE — 84100 ASSAY OF PHOSPHORUS: CPT | Performed by: INTERNAL MEDICINE

## 2023-12-31 PROCEDURE — 250N000011 HC RX IP 250 OP 636: Performed by: INTERNAL MEDICINE

## 2023-12-31 PROCEDURE — 250N000013 HC RX MED GY IP 250 OP 250 PS 637

## 2023-12-31 PROCEDURE — 250N000013 HC RX MED GY IP 250 OP 250 PS 637: Performed by: INTERNAL MEDICINE

## 2023-12-31 PROCEDURE — 36415 COLL VENOUS BLD VENIPUNCTURE: CPT | Performed by: INTERNAL MEDICINE

## 2023-12-31 PROCEDURE — 250N000012 HC RX MED GY IP 250 OP 636 PS 637: Performed by: HOSPITALIST

## 2023-12-31 PROCEDURE — 99232 SBSQ HOSP IP/OBS MODERATE 35: CPT | Performed by: HOSPITALIST

## 2023-12-31 PROCEDURE — 83735 ASSAY OF MAGNESIUM: CPT | Performed by: INTERNAL MEDICINE

## 2023-12-31 PROCEDURE — 250N000009 HC RX 250: Performed by: INTERNAL MEDICINE

## 2023-12-31 PROCEDURE — 250N000013 HC RX MED GY IP 250 OP 250 PS 637: Performed by: PHYSICIAN ASSISTANT

## 2023-12-31 PROCEDURE — 120N000001 HC R&B MED SURG/OB

## 2023-12-31 PROCEDURE — 97116 GAIT TRAINING THERAPY: CPT | Mod: GP

## 2023-12-31 PROCEDURE — 97530 THERAPEUTIC ACTIVITIES: CPT | Mod: GP

## 2023-12-31 PROCEDURE — 36415 COLL VENOUS BLD VENIPUNCTURE: CPT | Performed by: STUDENT IN AN ORGANIZED HEALTH CARE EDUCATION/TRAINING PROGRAM

## 2023-12-31 RX ORDER — WATER 10 ML/10ML
INJECTION INTRAMUSCULAR; INTRAVENOUS; SUBCUTANEOUS
Status: COMPLETED
Start: 2023-12-31 | End: 2023-12-31

## 2023-12-31 RX ORDER — LANOLIN ALCOHOL/MO/W.PET/CERES
3 CREAM (GRAM) TOPICAL
Status: DISCONTINUED | OUTPATIENT
Start: 2023-12-31 | End: 2024-01-03 | Stop reason: HOSPADM

## 2023-12-31 RX ORDER — LOPERAMIDE HCL 2 MG
2 CAPSULE ORAL
Status: COMPLETED | OUTPATIENT
Start: 2023-12-31 | End: 2023-12-31

## 2023-12-31 RX ORDER — LOPERAMIDE HCL 2 MG
2 CAPSULE ORAL 3 TIMES DAILY PRN
Status: DISCONTINUED | OUTPATIENT
Start: 2023-12-31 | End: 2024-01-03 | Stop reason: HOSPADM

## 2023-12-31 RX ADMIN — FINASTERIDE 5 MG: 5 TABLET, FILM COATED ORAL at 20:35

## 2023-12-31 RX ADMIN — PANTOPRAZOLE SODIUM 40 MG: 40 TABLET, DELAYED RELEASE ORAL at 08:39

## 2023-12-31 RX ADMIN — INSULIN ASPART 2 UNITS: 100 INJECTION, SOLUTION INTRAVENOUS; SUBCUTANEOUS at 14:57

## 2023-12-31 RX ADMIN — ACETAMINOPHEN 650 MG: 325 TABLET, FILM COATED ORAL at 22:10

## 2023-12-31 RX ADMIN — INSULIN ASPART 1 UNITS: 100 INJECTION, SOLUTION INTRAVENOUS; SUBCUTANEOUS at 18:29

## 2023-12-31 RX ADMIN — ACETAMINOPHEN 650 MG: 325 TABLET, FILM COATED ORAL at 09:13

## 2023-12-31 RX ADMIN — CARVEDILOL 12.5 MG: 12.5 TABLET, FILM COATED ORAL at 08:38

## 2023-12-31 RX ADMIN — WATER 10 ML: 1 INJECTION INTRAMUSCULAR; INTRAVENOUS; SUBCUTANEOUS at 22:04

## 2023-12-31 RX ADMIN — LATANOPROST 1 DROP: 50 SOLUTION/ DROPS OPHTHALMIC at 22:04

## 2023-12-31 RX ADMIN — SACUBITRIL AND VALSARTAN 1 TABLET: 24; 26 TABLET, FILM COATED ORAL at 08:38

## 2023-12-31 RX ADMIN — ATORVASTATIN CALCIUM 80 MG: 80 TABLET, FILM COATED ORAL at 20:36

## 2023-12-31 RX ADMIN — LEVOTHYROXINE SODIUM 100 MCG: 100 TABLET ORAL at 08:38

## 2023-12-31 RX ADMIN — ACETAMINOPHEN 650 MG: 325 TABLET, FILM COATED ORAL at 15:32

## 2023-12-31 RX ADMIN — CLOPIDOGREL BISULFATE 75 MG: 75 TABLET ORAL at 08:38

## 2023-12-31 RX ADMIN — SACUBITRIL AND VALSARTAN 1 TABLET: 24; 26 TABLET, FILM COATED ORAL at 20:35

## 2023-12-31 RX ADMIN — ALTEPLASE 2 MG: 2.2 INJECTION, POWDER, LYOPHILIZED, FOR SOLUTION INTRAVENOUS at 22:03

## 2023-12-31 RX ADMIN — LOPERAMIDE HYDROCHLORIDE 2 MG: 2 CAPSULE ORAL at 11:33

## 2023-12-31 RX ADMIN — ENOXAPARIN SODIUM 40 MG: 40 INJECTION SUBCUTANEOUS at 11:34

## 2023-12-31 RX ADMIN — INSULIN ASPART 2 UNITS: 100 INJECTION, SOLUTION INTRAVENOUS; SUBCUTANEOUS at 09:00

## 2023-12-31 RX ADMIN — FUROSEMIDE 20 MG: 20 TABLET ORAL at 08:38

## 2023-12-31 RX ADMIN — ESCITALOPRAM OXALATE 20 MG: 10 TABLET ORAL at 08:39

## 2023-12-31 RX ADMIN — ASPIRIN 81 MG: 81 TABLET, COATED ORAL at 08:38

## 2023-12-31 RX ADMIN — ASCORBIC ACID, VITAMIN A PALMITATE, CHOLECALCIFEROL, THIAMINE HYDROCHLORIDE, RIBOFLAVIN-5 PHOSPHATE SODIUM, PYRIDOXINE HYDROCHLORIDE, NIACINAMIDE, DEXPANTHENOL, ALPHA-TOCOPHEROL ACETATE, VITAMIN K1, FOLIC ACID, BIOTIN, CYANOCOBALAMIN: 200; 3300; 200; 6; 3.6; 6; 40; 15; 10; 150; 600; 60; 5 INJECTION, SOLUTION INTRAVENOUS at 20:43

## 2023-12-31 RX ADMIN — CARVEDILOL 12.5 MG: 12.5 TABLET, FILM COATED ORAL at 20:36

## 2023-12-31 RX ADMIN — ACETAMINOPHEN 650 MG: 325 TABLET, FILM COATED ORAL at 01:22

## 2023-12-31 ASSESSMENT — ACTIVITIES OF DAILY LIVING (ADL)
ADLS_ACUITY_SCORE: 39
ADLS_ACUITY_SCORE: 37
ADLS_ACUITY_SCORE: 39
ADLS_ACUITY_SCORE: 37
ADLS_ACUITY_SCORE: 37

## 2023-12-31 NOTE — PLAN OF CARE
IMC status discontinued.   Orientation: A&Ox4, forgetful.   Vitals/Pain: VSS on RA, CPAP @HS. C/o pain in his bottom & abdomen, improved w/repositioning & PRN tylenol.   Tele: SR w/bigeminal PVC, BBB, ST abnormality, inverted T wave.   Diet: Regular, calorie count, good appetite, BG checks.   Lungs: Clear LS.   Lines/Drains: L double lumen PICC, infusing TPN @40 ml/hr.   Skin/Wounds: Scattered bruises. Edmar red bottom, barrier cream applied.   GI/: Incont of B&B; +BM's, gave PRN imodium; voiding.    Ambulation/Assist: Up A1 gb+w to BS, sat in chair, ambulated in hallway.   Plan: TCU at discharge.     Report given to RN on gen/surg unit. Belongings & meds sent w/patient.

## 2023-12-31 NOTE — PROVIDER NOTIFICATION
MD Notification    Notified Person: MD    Notified Person Name: Richard Sesay    Notification Date/Time: 12/31/23 @0752    Notification Interaction: [employer] webpage    Purpose of Notification: FYI, pt's tele is SR w/bigeminal PVC, BBB, ST abnormality, & inverted T wave. Otherwise vitals are stable.    Orders Received: MD aware, will continue to monitor.

## 2023-12-31 NOTE — PROGRESS NOTES
CALORIE COUNT      Approximate Oral Intake for:  (12/30 - 2 meals ordered)    Calories: 907 cals (51% needs)  Protein: 29 gm pro (34% needs)    TPN via PICC:  Clinimix E (5/15) @ 40 mL/hr  = 682 kcal, 48 g protein      Estimated Needs:    Dosing Weight: 71 kg (adjusted, based on 81.5 kg-- 12/14)  Estimated Energy Needs: 8468-8810 kcal (25-30 Kcal/Kg)  Estimated Protein Needs:  grams protein (1.2-1.5 g pro/Kg)      Summary:   Diet: Regular + supplements    Note that pt ate 100% of his breakfast today (545 cals, 22 gm pro)    Will continue calorie count through today  Hopeful that pt will meet at least 2/3 needs in order to wean TPN (~1200 cals, ~55 gm pro)    Sarika Graves RD, LD  Clinical Dietitian - [facility]   Pager - [phone]  Weekend - [phone]

## 2023-12-31 NOTE — PLAN OF CARE
Goal Outcome Evaluation: Progressing    Reason for Admission: Sepsis    Evaluation of Goal:   Patient is A&Ox 4 majority of the night however disoriented to time occasionally and intermittently forgetful. Vitals are stable on RA during day and CPAP @ NOC. Tele NSR. Patient is quite restless and did not sleep much throughout the night. Patient is up with assist of one with gait belt and walker. Patient reported a headache intermittently throughout the night for which prn tylenol given. Adequate urine output. Patient scoring green on the Aggression Stoplight Tool. Pt calm and cooperative with cares, able to make needs known, call light within reach, bed alarm on for safety. Discharge disposition is pending.     Skylar Mark RN on 12/31/2023 at 6:09 AM

## 2023-12-31 NOTE — PROGRESS NOTES
[facility]    Medicine Progress Note - Hospitalist Service    Date of Admission:  12/8/2023    Assessment & Plan   88 year old male S/P Lap Appy for Perforated Appendicitis, and vascular assessment of questionable area on cecum through ICG administration.  He also has had issues with post op fluid collections that IR has placed a drain in and had a significant post-op ileus.  Initially underwent surgery on 12/8/2023 and required ICU management for septic shock.      Ultimately improved, and Hospitalist service assumed care on 12/11/2023.      Moderate Protein Malnutrition  Has lost 6.5% of weight during this admission  Plan  - Continue calorie counting via nutritionist. If not meeting adequate caloric intake of at least 2/3 of estimated needs by tomorrow (12/31/23), will need to proceed with NG tube. If able to meet, can wean off of TPN as able.  - encourage PO intake     Acute Toxic Metabolic Encephalopathy  Likely multifactorial from shock, inadequate use of CPAP for CARINA, protein malnutrition and hospital acquired delirum  CT Head WO 12/21/2023 w/o any acute changes  Neurology signed off on 12/29/2023  Plan  - Encourage CPAP usage and PO intake  - Avoid sedating medications if feasible  - Minimize night time disturbances     Diarrhea  Unsure if from TPN vs concern of infectious process  - C Diff test NEGATIVE 12/30  - can trial prn imodium     Acute appendicitis with peritonitis and septic shock - s/p laparoscopic appendectomy 12/8/2023.  Pelvic fluid collections, question abscesses.  Postoperative ileus vs SBO, prolonged  -Initial presentation to OSH 12/7 as above. CT abdomen noted with acute appendicitis with 0.7 cm appendicolith, no abscess. Started on ampicillin-sulbactam. Subsequently transferred to [facility] 12/8.  On 12/8, underwent above surgery. Weaned off pressors. Antibiotics changed to pipericillin-tazobactam.   On 12/10, CT abdomen noted with multiple dilated small bowel  loops with related decompression of colon with findings consistent with partial obstruction/ adynamic ileus.  On 12/12, diet advanced to clears as pt seemed to be improving with +flatus and BM. Later, developed more N/V and NG placed.  On 12/14, WBC elevated. Repeat abdominal CT showed prior appendectomy with findings suggestive of peritonitis, developing rim enhancing pelvic fluid collections, abscesses possible; increasing dilation of proximal small bowel with transition point in the right lower quadrant, suggestive of partial mechanical obstruction, developing adhesion is possible, no evidence of nadine bowel ischemia or acute perforation; distended gallbladder without additional CT evidence of acute cholecystitis.   - IR placed drain into right pelvic fluid collection (2/15/23)   - CT sinegram 12/19/23 - with drain in good position, min residual fluid and no fistula.   - Completed 14 days of IV pipericillin-tazobactam   - started TPN 12/15 - continuing but rate has been titrated down  - NG tube and ESTEFANY drain removed 12/21/23  - Surgery team has been following the patient-and he is having bowel movements and tolerating diet and we do plan to transition the patient off TPN and the surgery team is okay with Plavix and Lovenox for chemoprophylaxis and they have signed off     Acute hypoxic respiratory failure, suspect multifactorial related to atelectasis, recent abdominal surgery and CARINA, resolved.  CARINA on home CPA  - Initial presentation as above. CXR at OSH negative.  - On 12/8, CXR showed new left basilar bandlike opacity, most likely atelectasis.   - continue with home cpap  - On room air while awake      FATIMAH on CKD3, suspect prerenal, meds (including sacubitril-valsartan, diuretics)-resolved  Hyperkalemia, resolved  - Baseline creatinine 1-1.1.   - Creatinine 1.39 --> 1.8 at OSH.  - Cr up to 2.27 and K up to 5.5 on 12/8.  - Patient did receive IV Lasix on 12/10 and 12/11  - Renal function currently at baseline  at 1.1 12/29     Troponin elevation, suspect demand ischemia (type 2 NSTEMI)  Hypertension (benign essential)  CAD s/p CABG (1994), stenting (2002, 2003, 2005, 2021)  Chronic HFrEF (systolic and diastolic), s/p ICD  HLD  Afib with RVR  Abnormal stress test s/p LHC on 12/26/23 s/p MAX to vein graft to D1  [PTA: carvedilol 12.5 mg BID; sacubitril-valsartan 49-51 mg BID; furosemide 20 mg daily.]  *has ICD and h/o CAD s/p CABG 1994  *Last stress testing in [place] 7/2023, which was negative for ischemia  *Last cath 1/2021 at [facility] showed patent CLAUDIA-RCA, LIMA occluded, vein-diagonal patent with placement of stent,  LAD, 40% circumflex, RCA occluded. Last stress testing 7/2023 negative for ischemia. Echo 7/2023 showed LVEF 30-35%, global hypokinesis LV, moderate-severe cLVH, grade 1 diastolic dysfunction; RV systolic function mildly reduced.   - Initial presentation as above. BNP in ED at 2613, troponin 70. EKG w/ nonspecific t-w changes. PTA sacubitril-valsartan and furosemide held on admit given FATIMAH.  Briefly was  on IV diuretics.  - He was seen initially by cardiology on 12/8 and 12/9 for preop clearance and patient did had elevated troponin which was felt due to demand  - Patient did had RRT on 12/22 and his troponin was elevated  - Echo 12/22/23 - EF 20-25% with hyokinesis of the post wall.    - His prior echo in [facility] Everywhere showed that his EF was 30 to 35% with moderate hypokinesis of the left ventricle  - stress test is abnormal  on 12/26/23  - s/p LHC on 12/26/23 which showed occluded LAD, occluded proximal RCA, 40% sent circumflex with patent CLAUDIA to the RCA occluded graft to the OM1 and nearly occluded graft to the D1.  Patient underwent lithotripsy and drug-eluting stent to the vein graft to the D1.   - Cardiology on board and continue the patient with aspirin 81 mg, Plavix 75 mg, Entresto and he was restarted on Lasix 20 mg daily and further up titration of his medications as outpatient  -  Uninterrupted aspirin and Plavix for 1 year  - Outpatient follow-up with cardiology and they have signed off as of 12/28/2023     Hyperglycemia while on TPN  *HbA1c is 6.8%  - Patient is started back on diet and we will continue with the TPN and we will continue with sliding scale insulin with meals and also will use Lantus 5 units at bedtime  - 12/31 - glucose mostly at goal mid/upper 100s     Anemia, suspect partial blood loss component with surgery [id number]  *Hgb 12/7/23 was 16.8, fluctuating in hospital, 11-15 range; 12/29 hgb 12.1  - follow CBC periodically or sooner if indicated     Thrombocytopenia, unclear etiology, possibly due to sepsis, medication effect or consumptive or other cause result-resolved     Hypokalemi-Resolved     Hx CVA  *We will continue with aspirin 81 mg, Plavix 75 mg daily, resume atorvastatin 80 mg and Zetia 10 mg  - CT scan of the head on 12/21 and 12/27  did not show any acute process and he has chronic ischemic changes     Depression/anxiety  - Continue to hold gabapentin for now   - Continue escitalopram daily - tolerating      Hypothyroidism  - Continue levothyroxine.     GERD  - Continue with PPI      BPH  - will continue finasteride      History of migraines  - Noted     Gallbladder distention  - This was found on the CT scan of the abdomen done on 12/14 and ultrasound was done on 12/15 without any evidence of cholecystitis          Diet: Snacks/Supplements Adult: Gelatein Plus; With Meals  Snacks/Supplements Adult: [employer] Standard Oral Supplement; Between Meals  Regular Diet Adult Thin Liquids (level 0) (Sit at 90 degrees, stay up for 30 minutes, small bites/sips, alternate textures)  Calorie Counts  parenteral nutrition - Clinimix E    DVT Prophylaxis: Pneumatic Compression Devices  Hameed Catheter: Not present  Lines: PRESENT      PICC 12/15/23 Double Lumen Left Brachial vein medial TPN-Site Assessment: WDL      Cardiac Monitoring: ACTIVE order. Indication:  "Acute decompensated heart failure (48 hours)  Code Status: Full Code      Clinically Significant Risk Factors              # Hypoalbuminemia: Lowest albumin = 2.8 g/dL at 12/11/2023  5:26 AM, will monitor as appropriate     # Hypertension: Noted on problem list  # Chronic heart failure with reduced ejection fraction: last echo with EF <40%      # DMII: A1C = 6.8 % (Ref range: <5.7 %) within past 6 months   # Overweight: Estimated body mass index is 25.97 kg/m  as calculated from the following:    Height as of this encounter: 1.702 m (5' 7\").    Weight as of this encounter: 75.2 kg (165 lb 12.6 oz).   # Moderate Malnutrition: based on nutrition assessment           Disposition Plan     Expected Discharge Date: 01/02/2024,  3:00 PM      Discharge Comments: OR 12/8 12/16 CT guided drainage, JPs leaking.  Confused and agitated at times.  12/26-Stress test  12/28 Tx from heart center. IMC  12/29 Calorie count started, plan to wean to TPN            Richard Sesay MD  Hospitalist Service  [facility]  Securely message with [email or web address] (more info)  Text page via [employer] Paging/Directory   ______________________________________________________________________    Interval History   Pt seen and examined. Care assumed today. No new complaints or issues but did note difficulty sleeping. Oriented x3 currently. No new pain, sob, f or c.    Physical Exam   Vital Signs: Temp: 97.8  F (36.6  C) Temp src: Axillary BP: 115/72 Pulse: 56   Resp: 10 SpO2: 97 % O2 Device: BiPAP/CPAP    Weight: 165 lbs 12.57 oz    Gen: NAD, pleasant  HEENT: EOMI, MMM  Resp: no focal crackles,  no wheezes, no increased work of resp  CV: S1S2 heard, reg rhythm, reg rate  Abdo: soft, nontender, nondistended, bowel sounds present  Ext: calves nontender, well perfused  Neuro: aa, conversant, moving ext, CN grossly intact, no facial asymmetry      Medical Decision Making       40 MINUTES SPENT BY ME on the date of service doing chart " review, history, exam, documentation & further activities per the note.      Data     I have personally reviewed the following data over the past 24 hrs:    N/A  \   N/A   / N/A     N/A N/A N/A /  194 (H)   4.2 N/A N/A \

## 2024-01-01 LAB
ALBUMIN SERPL BCG-MCNC: 3.1 G/DL (ref 3.5–5.2)
ALP SERPL-CCNC: 100 U/L (ref 40–150)
ALT SERPL W P-5'-P-CCNC: 19 U/L (ref 0–70)
ANION GAP SERPL CALCULATED.3IONS-SCNC: 8 MMOL/L (ref 7–15)
AST SERPL W P-5'-P-CCNC: 21 U/L (ref 0–45)
BILIRUB SERPL-MCNC: 0.4 MG/DL
BUN SERPL-MCNC: 26.8 MG/DL (ref 8–23)
CALCIUM SERPL-MCNC: 9.2 MG/DL (ref 8.8–10.2)
CHLORIDE SERPL-SCNC: 102 MMOL/L (ref 98–107)
CREAT SERPL-MCNC: 0.87 MG/DL (ref 0.67–1.17)
DEPRECATED HCO3 PLAS-SCNC: 24 MMOL/L (ref 22–29)
EGFRCR SERPLBLD CKD-EPI 2021: 84 ML/MIN/1.73M2
GLUCOSE BLDC GLUCOMTR-MCNC: 172 MG/DL (ref 70–99)
GLUCOSE BLDC GLUCOMTR-MCNC: 186 MG/DL (ref 70–99)
GLUCOSE BLDC GLUCOMTR-MCNC: 189 MG/DL (ref 70–99)
GLUCOSE BLDC GLUCOMTR-MCNC: 190 MG/DL (ref 70–99)
GLUCOSE BLDC GLUCOMTR-MCNC: 253 MG/DL (ref 70–99)
GLUCOSE SERPL-MCNC: 157 MG/DL (ref 70–99)
MAGNESIUM SERPL-MCNC: 1.8 MG/DL (ref 1.7–2.3)
PHOSPHATE SERPL-MCNC: 2.4 MG/DL (ref 2.5–4.5)
PLATELET # BLD AUTO: 150 10E3/UL (ref 150–450)
POTASSIUM SERPL-SCNC: 4.7 MMOL/L (ref 3.4–5.3)
PROT SERPL-MCNC: 6 G/DL (ref 6.4–8.3)
SODIUM SERPL-SCNC: 134 MMOL/L (ref 135–145)
TRIGL SERPL-MCNC: 228 MG/DL

## 2024-01-01 PROCEDURE — 250N000013 HC RX MED GY IP 250 OP 250 PS 637: Performed by: STUDENT IN AN ORGANIZED HEALTH CARE EDUCATION/TRAINING PROGRAM

## 2024-01-01 PROCEDURE — 250N000013 HC RX MED GY IP 250 OP 250 PS 637: Performed by: NURSE PRACTITIONER

## 2024-01-01 PROCEDURE — 250N000013 HC RX MED GY IP 250 OP 250 PS 637: Performed by: HOSPITALIST

## 2024-01-01 PROCEDURE — 250N000013 HC RX MED GY IP 250 OP 250 PS 637: Performed by: PHYSICIAN ASSISTANT

## 2024-01-01 PROCEDURE — 84100 ASSAY OF PHOSPHORUS: CPT | Performed by: STUDENT IN AN ORGANIZED HEALTH CARE EDUCATION/TRAINING PROGRAM

## 2024-01-01 PROCEDURE — 250N000013 HC RX MED GY IP 250 OP 250 PS 637: Performed by: INTERNAL MEDICINE

## 2024-01-01 PROCEDURE — 250N000011 HC RX IP 250 OP 636: Mod: JZ | Performed by: INTERNAL MEDICINE

## 2024-01-01 PROCEDURE — 85049 AUTOMATED PLATELET COUNT: CPT | Performed by: INTERNAL MEDICINE

## 2024-01-01 PROCEDURE — 99233 SBSQ HOSP IP/OBS HIGH 50: CPT | Performed by: HOSPITALIST

## 2024-01-01 PROCEDURE — 250N000013 HC RX MED GY IP 250 OP 250 PS 637

## 2024-01-01 PROCEDURE — 80053 COMPREHEN METABOLIC PANEL: CPT | Performed by: STUDENT IN AN ORGANIZED HEALTH CARE EDUCATION/TRAINING PROGRAM

## 2024-01-01 PROCEDURE — 120N000001 HC R&B MED SURG/OB

## 2024-01-01 PROCEDURE — 83735 ASSAY OF MAGNESIUM: CPT | Performed by: STUDENT IN AN ORGANIZED HEALTH CARE EDUCATION/TRAINING PROGRAM

## 2024-01-01 PROCEDURE — 250N000011 HC RX IP 250 OP 636: Performed by: INTERNAL MEDICINE

## 2024-01-01 RX ADMIN — SACUBITRIL AND VALSARTAN 1 TABLET: 24; 26 TABLET, FILM COATED ORAL at 08:52

## 2024-01-01 RX ADMIN — SACUBITRIL AND VALSARTAN 1 TABLET: 24; 26 TABLET, FILM COATED ORAL at 20:07

## 2024-01-01 RX ADMIN — LATANOPROST 1 DROP: 50 SOLUTION/ DROPS OPHTHALMIC at 22:04

## 2024-01-01 RX ADMIN — ESCITALOPRAM OXALATE 20 MG: 10 TABLET ORAL at 08:52

## 2024-01-01 RX ADMIN — ENOXAPARIN SODIUM 40 MG: 40 INJECTION SUBCUTANEOUS at 12:40

## 2024-01-01 RX ADMIN — ALTEPLASE 2 MG: 2.2 INJECTION, POWDER, LYOPHILIZED, FOR SOLUTION INTRAVENOUS at 00:22

## 2024-01-01 RX ADMIN — INSULIN ASPART 1 UNITS: 100 INJECTION, SOLUTION INTRAVENOUS; SUBCUTANEOUS at 17:32

## 2024-01-01 RX ADMIN — CARVEDILOL 12.5 MG: 12.5 TABLET, FILM COATED ORAL at 20:07

## 2024-01-01 RX ADMIN — ACETAMINOPHEN 650 MG: 325 TABLET, FILM COATED ORAL at 05:35

## 2024-01-01 RX ADMIN — ACETAMINOPHEN 650 MG: 325 TABLET, FILM COATED ORAL at 15:29

## 2024-01-01 RX ADMIN — ATORVASTATIN CALCIUM 80 MG: 80 TABLET, FILM COATED ORAL at 20:07

## 2024-01-01 RX ADMIN — MELATONIN TAB 3 MG 3 MG: 3 TAB at 20:07

## 2024-01-01 RX ADMIN — ACETAMINOPHEN 650 MG: 325 TABLET, FILM COATED ORAL at 20:07

## 2024-01-01 RX ADMIN — PANTOPRAZOLE SODIUM 40 MG: 40 TABLET, DELAYED RELEASE ORAL at 05:35

## 2024-01-01 RX ADMIN — LEVOTHYROXINE SODIUM 100 MCG: 100 TABLET ORAL at 05:35

## 2024-01-01 RX ADMIN — ASPIRIN 81 MG: 81 TABLET, COATED ORAL at 08:52

## 2024-01-01 RX ADMIN — CLOPIDOGREL BISULFATE 75 MG: 75 TABLET ORAL at 08:52

## 2024-01-01 RX ADMIN — INSULIN ASPART 1 UNITS: 100 INJECTION, SOLUTION INTRAVENOUS; SUBCUTANEOUS at 08:54

## 2024-01-01 RX ADMIN — FUROSEMIDE 20 MG: 20 TABLET ORAL at 08:52

## 2024-01-01 RX ADMIN — CARVEDILOL 12.5 MG: 12.5 TABLET, FILM COATED ORAL at 08:52

## 2024-01-01 RX ADMIN — FINASTERIDE 5 MG: 5 TABLET, FILM COATED ORAL at 20:07

## 2024-01-01 RX ADMIN — INSULIN ASPART 3 UNITS: 100 INJECTION, SOLUTION INTRAVENOUS; SUBCUTANEOUS at 12:40

## 2024-01-01 ASSESSMENT — ACTIVITIES OF DAILY LIVING (ADL)
ADLS_ACUITY_SCORE: 37
ADLS_ACUITY_SCORE: 37
ADLS_ACUITY_SCORE: 33
ADLS_ACUITY_SCORE: 37
ADLS_ACUITY_SCORE: 41
ADLS_ACUITY_SCORE: 37

## 2024-01-01 NOTE — PLAN OF CARE
Date & Time: 1530-1930 12/31  Here since 12/8  Behavior & Aggression: Green  Fall Risk: Yes  Orientation:A&Ox2-3  ABNL VS/O2:VSS  ABNL Labs:   Pain Management:Tylenol  Bowel/Bladder: external male catheter  Drains: PICC line  Diet:Regular, calorie count  Activity Level: A1, GB, W  Tests/Procedures: NA  Anticipated  DC Date: TCU pending  Significant Information: Plan to give melatonin tonight

## 2024-01-01 NOTE — PROGRESS NOTES
[facility]    Medicine Progress Note - Hospitalist Service    Date of Admission:  12/8/2023    Assessment & Plan   88 year old male S/P Lap Appy for Perforated Appendicitis, and vascular assessment of questionable area on cecum through ICG administration.  He also has had issues with post op fluid collections that IR has placed a drain in and had a significant post-op ileus.  Initially underwent surgery on 12/8/2023 and required ICU management for septic shock.      Ultimately improved, and Hospitalist service assumed care on 12/11/2023.      Moderate Protein Malnutrition  Has lost 6.5% of weight during this admission  Plan  - Continue calorie counting via nutritionist. If not meeting adequate caloric intake of at least 2/3 of estimated needs by tomorrow (12/31/23), will need to proceed with NG tube. If able to meet, can wean off of TPN as able.  - encourage PO intake     Acute Toxic Metabolic Encephalopathy  Likely multifactorial from shock, inadequate use of CPAP for CARINA, protein malnutrition and hospital acquired delirum  CT Head WO 12/21/2023 w/o any acute changes  Neurology signed off on 12/29/2023  Plan  - Encourage CPAP usage and PO intake  - Avoid sedating medications if feasible  - Minimize night time disturbances     Diarrhea  Unsure if from TPN vs concern of infectious process  - C Diff test NEGATIVE 12/30  - can trial prn imodium     Acute appendicitis with peritonitis and septic shock - s/p laparoscopic appendectomy 12/8/2023.  Pelvic fluid collections, question abscesses.  Postoperative ileus vs SBO, prolonged  -Initial presentation to OSH 12/7 as above. CT abdomen noted with acute appendicitis with 0.7 cm appendicolith, no abscess. Started on ampicillin-sulbactam. Subsequently transferred to [facility] 12/8.  On 12/8, underwent above surgery. Weaned off pressors. Antibiotics changed to pipericillin-tazobactam.   On 12/10, CT abdomen noted with multiple dilated small bowel  loops with related decompression of colon with findings consistent with partial obstruction/ adynamic ileus.  On 12/12, diet advanced to clears as pt seemed to be improving with +flatus and BM. Later, developed more N/V and NG placed.  On 12/14, WBC elevated. Repeat abdominal CT showed prior appendectomy with findings suggestive of peritonitis, developing rim enhancing pelvic fluid collections, abscesses possible; increasing dilation of proximal small bowel with transition point in the right lower quadrant, suggestive of partial mechanical obstruction, developing adhesion is possible, no evidence of nadine bowel ischemia or acute perforation; distended gallbladder without additional CT evidence of acute cholecystitis.   - IR placed drain into right pelvic fluid collection (2/15/23)   - CT sinegram 12/19/23 - with drain in good position, min residual fluid and no fistula.   - Completed 14 days of IV pipericillin-tazobactam   - started TPN 12/15 - continuing but rate has been titrated down  - NG tube and ESTEFANY drain removed 12/21/23  - Surgery team has been following the patient-and he is having bowel movements and tolerating diet and we do plan to transition the patient off TPN and the surgery team is okay with Plavix and Lovenox for chemoprophylaxis and they have signed off  - 1/1 appears to be taking in at least 50% of his nutritional needs orally, will stop TPN - will need to reach out to /CT 1/2 for TCU options. Discussed with patient and dtr in room.     Acute hypoxic respiratory failure, suspect multifactorial related to atelectasis, recent abdominal surgery and CARINA, resolved.  CARINA on home CPA  - Initial presentation as above. CXR at OSH negative.  - On 12/8, CXR showed new left basilar bandlike opacity, most likely atelectasis.   - continue with home cpap  - On room air while awake      FATIMAH on CKD3, suspect prerenal, meds (including sacubitril-valsartan, diuretics)-resolved  Hyperkalemia, resolved  - Baseline  creatinine 1-1.1.   - Creatinine 1.39 --> 1.8 at OSH.  - Cr up to 2.27 and K up to 5.5 on 12/8.  - Patient did receive IV Lasix on 12/10 and 12/11  - Renal function at baseline at 1.1 12/29     Troponin elevation, suspect demand ischemia (type 2 NSTEMI)  Hypertension (benign essential)  CAD s/p CABG (1994), stenting (2002, 2003, 2005, 2021)  Chronic HFrEF (systolic and diastolic), s/p ICD  HLD  Afib with RVR  Abnormal stress test s/p LHC on 12/26/23 s/p MAX to vein graft to D1  [PTA: carvedilol 12.5 mg BID; sacubitril-valsartan 49-51 mg BID; furosemide 20 mg daily.]  *has ICD and h/o CAD s/p CABG 1994  *Last stress testing in [place] 7/2023, which was negative for ischemia  *Last cath 1/2021 at [facility] showed patent CLAUDIA-RCA, LIMA occluded, vein-diagonal patent with placement of stent,  LAD, 40% circumflex, RCA occluded. Last stress testing 7/2023 negative for ischemia. Echo 7/2023 showed LVEF 30-35%, global hypokinesis LV, moderate-severe cLVH, grade 1 diastolic dysfunction; RV systolic function mildly reduced.   - Initial presentation as above. BNP in ED at 2613, troponin 70. EKG w/ nonspecific t-w changes. PTA sacubitril-valsartan and furosemide held on admit given FATIMAH.  Briefly was  on IV diuretics.  - He was seen initially by cardiology on 12/8 and 12/9 for preop clearance and patient did had elevated troponin which was felt due to demand  - Patient did had RRT on 12/22 and his troponin was elevated  - Echo 12/22/23 - EF 20-25% with hyokinesis of the post wall.    - His prior echo in [facility] showed that his EF was 30 to 35% with moderate hypokinesis of the left ventricle  - stress test is abnormal  on 12/26/23  - s/p LHC on 12/26/23 which showed occluded LAD, occluded proximal RCA, 40% sent circumflex with patent CLAUDIA to the RCA occluded graft to the OM1 and nearly occluded graft to the D1.  Patient underwent lithotripsy and drug-eluting stent to the vein graft to the D1.   - Cardiology on board and  continue the patient with aspirin 81 mg, Plavix 75 mg, Entresto and he was restarted on Lasix 20 mg daily and further up titration of his medications as outpatient  - Uninterrupted aspirin and Plavix for 1 year  - Outpatient follow-up with cardiology and they have signed off as of 12/28/2023     Hyperglycemia while on TPN  *HbA1c is 6.8%  - Patient is started back on diet and we will continue with the TPN and we will continue with sliding scale insulin with meals and also will use Lantus 5 units at bedtime  - 12/31 - glucose mostly at goal mid/upper 100s     Anemia, suspect partial blood loss component with surgery [id number]  *Hgb 12/7/23 was 16.8, fluctuating in hospital, 11-15 range; 12/29 hgb 12.1  - follow CBC periodically or sooner if indicated     Thrombocytopenia, unclear etiology, possibly due to sepsis, medication effect or consumptive or other cause result-resolved     Hypokalemi-Resolved     Hx CVA  *We will continue with aspirin 81 mg, Plavix 75 mg daily, resume atorvastatin 80 mg and Zetia 10 mg  - CT scan of the head on 12/21 and 12/27  did not show any acute process and he has chronic ischemic changes     Depression/anxiety  - Continue to hold gabapentin for now   - Continue escitalopram daily - tolerating      Hypothyroidism  - Continue levothyroxine.     GERD  - Continue with PPI      BPH  - will continue finasteride      History of migraines  - Noted     Gallbladder distention  - This was found on the CT scan of the abdomen done on 12/14 and ultrasound was done on 12/15 without any evidence of cholecystitis          Diet: Snacks/Supplements Adult: Gelatein Plus; With Meals  Snacks/Supplements Adult: [employer] Standard Oral Supplement; Between Meals  Regular Diet Adult Thin Liquids (level 0) (Sit at 90 degrees, stay up for 30 minutes, small bites/sips, alternate textures)  Calorie Counts  parenteral nutrition - Clinimix E  Room Service    DVT Prophylaxis: Pneumatic Compression  "Devices  Hameed Catheter: Not present  Lines: PRESENT      PICC 12/15/23 Double Lumen Left Brachial vein medial TPN-Site Assessment: WDL      Cardiac Monitoring: ACTIVE order. Indication: Acute decompensated heart failure (48 hours)  Code Status: Full Code      Clinically Significant Risk Factors              # Hypoalbuminemia: Lowest albumin = 2.8 g/dL at 12/11/2023  5:26 AM, will monitor as appropriate     # Hypertension: Noted on problem list  # Chronic heart failure with reduced ejection fraction: last echo with EF <40%      # DMII: A1C = 6.8 % (Ref range: <5.7 %) within past 6 months   # Overweight: Estimated body mass index is 25.9 kg/m  as calculated from the following:    Height as of this encounter: 1.702 m (5' 7\").    Weight as of this encounter: 75 kg (165 lb 5.5 oz).   # Moderate Malnutrition: based on nutrition assessment           Disposition Plan     Expected Discharge Date: 01/03/2024,  3:00 PM      Discharge Comments: OR 12/8 12/16 CT guided drainage, JPs leaking.  Confused and agitated at times.  12/26-Stress test  12/28 Tx from heart center. IMC  12/29 Calorie count started, plan to wean to TPN            Richard Sesay MD  Hospitalist Service  [facility]  Securely message with [address] [address] (more info)  Text page via [employer] Paging/Directory   ______________________________________________________________________    Interval History   Pt seen and examined, dtr at bedside. Pt up in chair and eating breakfast. No new pain, sob, or other issues. Briefly disoriented last night, likely in large part due to room change and disorientation. Discussed at length with patient, dtr, and RN - will stop TPN later today as dietician reports >50% calories PO now. This should help with TCU discharge options as well.     Physical Exam   Vital Signs: Temp: (!) 96.1  F (35.6  C) Temp src: Axillary BP: (!) 155/70 Pulse: 65   Resp: 20 SpO2: 95 % O2 Device: None (Room air)    Weight: 165 lbs 5.52 " oz    Gen: NAD, pleasant  HEENT: EOMI, MMM  Resp: no focal crackles,  no wheezes, no increased work of resp  CV: S1S2 heard, reg rhythm, reg rate  Abdo: soft, nontender, nondistended, bowel sounds present  Ext: calves nontender, well perfused  Neuro: aa, conversant, moving ext, CN grossly intact, no facial asymmetry      Medical Decision Making       53 MINUTES SPENT BY ME on the date of service doing chart review, history, exam, documentation & further activities per the note.      Data     I have personally reviewed the following data over the past 24 hrs:    N/A  \   N/A   / 150     134 (L) 102 26.8 (H) /  253 (H)   4.7 24 0.87 \     ALT: 19 AST: 21 AP: 100 TBILI: 0.4   ALB: 3.1 (L) TOT PROTEIN: 6.0 (L) LIPASE: N/A

## 2024-01-01 NOTE — PLAN OF CARE
Goal: Improved Oral Intake  Intervention: Promote and Optimize Oral Intake     Goal Outcome Evaluation:    Discussed with MD and RN - plan to continue TPN for another day. Patient's calorie counts reflect improvement however per RN he had a couple tator tots last night and hasn't had a great appetite (at least when by himself and dtr not in room) so unsure accuracy of >2000 kcal ct yesterday. Continue current regimen.    Nathalie Zuniga RD, LD  Clinical Dietitian - [facility]

## 2024-01-01 NOTE — PROGRESS NOTES
CLINICAL NUTRITION SERVICES - REASSESSMENT NOTE    Recommendations Ordered by Registered Dietitian (RD):   Discussed with MD and RN - plan to continue TPN for another day. Patient's calorie counts reflect improvement however per RN he had a couple tator tots last night and hasn't had a great appetite (at least when by himself and dtr not in room) so unsure accuracy of >2000 kcal ct yesterday.    Malnutrition: (12/29)  % Weight Loss:  > 5% in 1 month (severe malnutrition)-- 6.5% loss this admit  % Intake:  <75% for > 7 days (moderate malnutrition)  Subcutaneous Fat Loss:  None observed (12/14)  Muscle Loss:  Patellar region mild depletion and Posterior calf region mild depletion-- may be partially age-related (12/14)  Fluid Retention:  None noted     Malnutrition Diagnosis: Moderate malnutrition in the context of --  Acute illness or injury     EVALUATION OF PROGRESS TOWARD GOALS   Diet:  Regular Diet  Supplements Adult: Gelatein Plus @ lunch + dinner  Supplements Adult: Tracey uFaber Standard Oral Supplement @ 10am  Supplements Adult: Ensure Enlive + ice cream @ 2pm    Nutrition Support: Parenteral  Type of Access: PICC  Parenteral Frequency:  Continuous   Parenteral Regimen: Clinimix E (5/15) @ 40 mL/hr   Total Parenteral Provisions: 960 mL, 682 kcal, 48 g protein     12/15: TPN started = Clinimix E @ 80 mL/hr + daily lipids (1793 kcal, 96 g protein)  12/21: TPN wean started (lipids dc'd w/ diet advancement)  12/22: TPN decreased to 60 mL/hr  12/24: TPN decreased to 40 mL/hr     Intake/Tolerance:    Calorie count summary:  12/28: 883 kcal, 36g protein (2 meal, 3 supp)   12/29: 1302 kcal, 63g protein (3 meals, 5 supps)   12/30: 907 kcal, 29g protein (2 meals)   12/31: 2675g kcal, 135g protein    - Discussed with MD and RN - plan to continue TPN for another day. Patient's calorie counts reflect improvement however per RN he had a couple tator tots last night and hasn't had a great appetite (at least when by himself and  dtr not in room) so unsure accuracy of >2000 kcal ct and 135g protein yesterday.     - 2 episodes of watery diarrhea 12/29 (c diff negative 12/20 - trial PRN imodium per MD note)    - Spoke with patient at bedside. Patient quite confused but said he was consuming his current supplements. There were about 4 Gel+ on his table and 2 KF shakes. Per calorie counts has been consuming ~ 50% of most supplements.     ASSESSED NUTRITION NEEDS:  Dosing Weight: 71 kg (adjusted, based on 81.5 kg-- 12/14)  Estimated Energy Needs: 0100-3604 kcal (25-30 Kcal/Kg)  Justification: maintenance  Estimated Protein Needs:  grams protein (1.2-1.5 g pro/Kg)  Justification: post-op, hypercatabolism with acute illness  Estimated Fluid Needs: 1 mL/kcal  Justification: maintenance    NEW FINDINGS:   Labs: Na 134 (L), BUN 26,8 (H), Phos 2.4 (L), Triglycerides 228 (H), -194 (H)    Meds: lasix, medium sliding scale insulin, 5 units lantus, levothyroxine    Weight: stable  01/01/24 0600 75 kg (165 lb 5.5 oz) Bed scale   12/31/23 0514 75.2 kg (165 lb 12.6 oz) Bed scale   12/30/23 0600 75.2 kg (165 lb 12.6 oz) Bed scale   12/28/23 0900 76.2 kg (168 lb) --   12/27/23 0630 74 kg (163 lb 2.3 oz) Bed scale     Previous Goals:   Wean off TPN w/in 4 days  PO intake to meet >60% of min estimated nutrition needs  Wt >76 kg   Evaluation: progressing  Evaluation: Met  Evaluation: Not met (currently 75 kg)    Previous Nutrition Diagnosis:   Inadequate oral intake related to poor appetite, lethargy as evidenced by continued need for supplemental TPN, recent kcal ct meeting <30% of min energy/protein needs    Evaluation: Improving    CURRENT NUTRITION DIAGNOSIS  Inadequate oral intake related to poor appetite, lethargy as evidenced by continued need for supplemental TPN    INTERVENTIONS  Recommendations / Nutrition Prescription  Continue current supplements  TPN x1 more day at least    Implementation  TPN - continue  Medical Food Supplement -  continue    Goals  TPN to discontinue within 24-48 hours  PO intake to meet >60% nutritional needs  Wt >75 kg     MONITORING AND EVALUATION:  Progress towards goals will be monitored and evaluated per protocol and Practice Guidelines    Nathalie Zuniga RD, LD  Clinical Dietitian - [facility]

## 2024-01-01 NOTE — PLAN OF CARE
Surgery/POD#: 12/8 lap appe   Orientation: Disoriented to time w/ increased confusion noted overnight  ABNL VS/O2: VSS on RA. Home CPAP on overnight.  ABNL Labs:  & 190.  Pain Management: PRN Tylenol  Bowel/Bladder: Incontinent at times. External catheter in place. No BM overnight.  Drains: PICC infusing TPN at 40 ml/hr. Alteplase instilled w/ blood return now noted in both lumens.  Diet: Regular  Activity Level: Ax1 gb/walker  Anticipated  DC Date: pending  Significant Information: Tele: Occasional PACs w/ BBB.

## 2024-01-02 ENCOUNTER — APPOINTMENT (OUTPATIENT)
Dept: SPEECH THERAPY | Facility: CLINIC | Age: 89
DRG: 853 | End: 2024-01-02
Attending: HOSPITALIST
Payer: MEDICARE

## 2024-01-02 ENCOUNTER — APPOINTMENT (OUTPATIENT)
Dept: OCCUPATIONAL THERAPY | Facility: CLINIC | Age: 89
DRG: 853 | End: 2024-01-02
Attending: HOSPITALIST
Payer: MEDICARE

## 2024-01-02 ENCOUNTER — APPOINTMENT (OUTPATIENT)
Dept: PHYSICAL THERAPY | Facility: CLINIC | Age: 89
DRG: 853 | End: 2024-01-02
Attending: HOSPITALIST
Payer: MEDICARE

## 2024-01-02 LAB
GLUCOSE BLDC GLUCOMTR-MCNC: 121 MG/DL (ref 70–99)
GLUCOSE BLDC GLUCOMTR-MCNC: 123 MG/DL (ref 70–99)
GLUCOSE BLDC GLUCOMTR-MCNC: 147 MG/DL (ref 70–99)
GLUCOSE BLDC GLUCOMTR-MCNC: 154 MG/DL (ref 70–99)
GLUCOSE BLDC GLUCOMTR-MCNC: 169 MG/DL (ref 70–99)
VIT B1 PYROPHOSHATE BLD-SCNC: 264 NMOL/L

## 2024-01-02 PROCEDURE — 250N000013 HC RX MED GY IP 250 OP 250 PS 637: Performed by: NURSE PRACTITIONER

## 2024-01-02 PROCEDURE — 120N000001 HC R&B MED SURG/OB

## 2024-01-02 PROCEDURE — 250N000013 HC RX MED GY IP 250 OP 250 PS 637

## 2024-01-02 PROCEDURE — 250N000013 HC RX MED GY IP 250 OP 250 PS 637: Performed by: INTERNAL MEDICINE

## 2024-01-02 PROCEDURE — 97116 GAIT TRAINING THERAPY: CPT | Mod: GP

## 2024-01-02 PROCEDURE — 250N000011 HC RX IP 250 OP 636: Performed by: INTERNAL MEDICINE

## 2024-01-02 PROCEDURE — 97535 SELF CARE MNGMENT TRAINING: CPT | Mod: GO | Performed by: OCCUPATIONAL THERAPIST

## 2024-01-02 PROCEDURE — 250N000013 HC RX MED GY IP 250 OP 250 PS 637: Performed by: HOSPITALIST

## 2024-01-02 PROCEDURE — 250N000013 HC RX MED GY IP 250 OP 250 PS 637: Performed by: STUDENT IN AN ORGANIZED HEALTH CARE EDUCATION/TRAINING PROGRAM

## 2024-01-02 PROCEDURE — 92526 ORAL FUNCTION THERAPY: CPT | Mod: GN

## 2024-01-02 PROCEDURE — 97530 THERAPEUTIC ACTIVITIES: CPT | Mod: GP

## 2024-01-02 PROCEDURE — 99232 SBSQ HOSP IP/OBS MODERATE 35: CPT | Performed by: HOSPITALIST

## 2024-01-02 PROCEDURE — 250N000013 HC RX MED GY IP 250 OP 250 PS 637: Performed by: PHYSICIAN ASSISTANT

## 2024-01-02 RX ADMIN — CARVEDILOL 12.5 MG: 12.5 TABLET, FILM COATED ORAL at 20:51

## 2024-01-02 RX ADMIN — CARVEDILOL 12.5 MG: 12.5 TABLET, FILM COATED ORAL at 07:48

## 2024-01-02 RX ADMIN — SACUBITRIL AND VALSARTAN 1 TABLET: 24; 26 TABLET, FILM COATED ORAL at 21:59

## 2024-01-02 RX ADMIN — LATANOPROST 1 DROP: 50 SOLUTION/ DROPS OPHTHALMIC at 22:04

## 2024-01-02 RX ADMIN — FUROSEMIDE 20 MG: 20 TABLET ORAL at 09:32

## 2024-01-02 RX ADMIN — ENOXAPARIN SODIUM 40 MG: 40 INJECTION SUBCUTANEOUS at 12:41

## 2024-01-02 RX ADMIN — INSULIN ASPART 1 UNITS: 100 INJECTION, SOLUTION INTRAVENOUS; SUBCUTANEOUS at 07:48

## 2024-01-02 RX ADMIN — PANTOPRAZOLE SODIUM 40 MG: 40 TABLET, DELAYED RELEASE ORAL at 07:48

## 2024-01-02 RX ADMIN — ASPIRIN 81 MG: 81 TABLET, COATED ORAL at 09:32

## 2024-01-02 RX ADMIN — LEVOTHYROXINE SODIUM 100 MCG: 100 TABLET ORAL at 07:48

## 2024-01-02 RX ADMIN — ATORVASTATIN CALCIUM 80 MG: 80 TABLET, FILM COATED ORAL at 21:59

## 2024-01-02 RX ADMIN — CLOPIDOGREL BISULFATE 75 MG: 75 TABLET ORAL at 09:32

## 2024-01-02 RX ADMIN — ESCITALOPRAM OXALATE 20 MG: 10 TABLET ORAL at 07:48

## 2024-01-02 RX ADMIN — INSULIN ASPART 1 UNITS: 100 INJECTION, SOLUTION INTRAVENOUS; SUBCUTANEOUS at 17:16

## 2024-01-02 RX ADMIN — SACUBITRIL AND VALSARTAN 1 TABLET: 24; 26 TABLET, FILM COATED ORAL at 09:32

## 2024-01-02 RX ADMIN — ACETAMINOPHEN 650 MG: 325 TABLET, FILM COATED ORAL at 21:58

## 2024-01-02 RX ADMIN — FINASTERIDE 5 MG: 5 TABLET, FILM COATED ORAL at 21:58

## 2024-01-02 ASSESSMENT — ACTIVITIES OF DAILY LIVING (ADL)
ADLS_ACUITY_SCORE: 37

## 2024-01-02 NOTE — PROGRESS NOTES
[facility]    Medicine Progress Note - Hospitalist Service    Date of Admission:  12/8/2023    Assessment & Plan   88 year old male S/P Lap Appy for Perforated Appendicitis, and vascular assessment of questionable area on cecum through ICG administration.  He also has had issues with post op fluid collections that IR has placed a drain in and had a significant post-op ileus.  Initially underwent surgery on 12/8/2023 and required ICU management for septic shock.      Ultimately improved, and Hospitalist service assumed care on 12/11/2023.      Moderate Protein Malnutrition  Has lost 6.5% of weight during this admission  Plan  - Continue calorie counting via nutritionist. If not meeting adequate caloric intake of at least 2/3 of estimated needs by tomorrow (12/31/23), will need to proceed with NG tube. If able to meet, can wean off of TPN as able.  - encourage PO intake     Acute Toxic Metabolic Encephalopathy - improved/resolved  Likely multifactorial from shock, inadequate use of CPAP for CARINA, protein malnutrition and hospital acquired delirum  CT Head WO 12/21/2023 w/o any acute changes  Neurology signed off on 12/29/2023  Plan  - Encourage CPAP usage and PO intake  - Avoid sedating medications if feasible  - Minimize night time disturbances     Diarrhea  Unsure if from TPN vs concern of infectious process  - C Diff test NEGATIVE 12/30  - can trial prn imodium     Acute appendicitis with peritonitis and septic shock - s/p laparoscopic appendectomy 12/8/2023.  Pelvic fluid collections, question abscesses.  Postoperative ileus vs SBO, prolonged  -Initial presentation to OSH 12/7 as above. CT abdomen noted with acute appendicitis with 0.7 cm appendicolith, no abscess. Started on ampicillin-sulbactam. Subsequently transferred to [facility] 12/8.  On 12/8, underwent above surgery. Weaned off pressors. Antibiotics changed to pipericillin-tazobactam.   On 12/10, CT abdomen noted with multiple  dilated small bowel loops with related decompression of colon with findings consistent with partial obstruction/ adynamic ileus.  On 12/12, diet advanced to clears as pt seemed to be improving with +flatus and BM. Later, developed more N/V and NG placed.  On 12/14, WBC elevated. Repeat abdominal CT showed prior appendectomy with findings suggestive of peritonitis, developing rim enhancing pelvic fluid collections, abscesses possible; increasing dilation of proximal small bowel with transition point in the right lower quadrant, suggestive of partial mechanical obstruction, developing adhesion is possible, no evidence of nadine bowel ischemia or acute perforation; distended gallbladder without additional CT evidence of acute cholecystitis.   - IR placed drain into right pelvic fluid collection (2/15/23)   - CT sinegram 12/19/23 - with drain in good position, min residual fluid and no fistula.   - Completed 14 days of IV pipericillin-tazobactam   - started TPN 12/15 - continuing but rate has been titrated down  - NG tube and ESTEFANY drain removed 12/21/23  - Surgery team has been following the patient-and he is having bowel movements and tolerating diet and we do plan to transition the patient off TPN and the surgery team is okay with Plavix and Lovenox for chemoprophylaxis and they have signed off  - 1/1 appears to be taking in at least 50% of his nutritional needs orally, will stop TPN - will need to reach out to /CT 1/2 for TCU options. Discussed with patient and dtr in room.   - 1/2 doing well, TCU planning for 1/3     Acute hypoxic respiratory failure, suspect multifactorial related to atelectasis, recent abdominal surgery and CARINA, resolved.  CARINA on home CPA  - Initial presentation as above. CXR at OSH negative.  - On 12/8, CXR showed new left basilar bandlike opacity, most likely atelectasis.   - continue with home cpap  - On room air while awake      FATIMAH on CKD3, suspect prerenal, meds (including  sacubitril-valsartan, diuretics)-resolved  Hyperkalemia, resolved  - Baseline creatinine 1-1.1.   - Creatinine 1.39 --> 1.8 at OSH.  - Cr up to 2.27 and K up to 5.5 on 12/8.  - Patient did receive IV Lasix on 12/10 and 12/11  - Renal function at baseline at 1.1 12/29     Troponin elevation, suspect demand ischemia (type 2 NSTEMI)  Hypertension (benign essential)  CAD s/p CABG (1994), stenting (2002, 2003, 2005, 2021)  Chronic HFrEF (systolic and diastolic), s/p ICD  HLD  Afib with RVR  Abnormal stress test s/p LHC on 12/26/23 s/p MAX to vein graft to D1  [PTA: carvedilol 12.5 mg BID; sacubitril-valsartan 49-51 mg BID; furosemide 20 mg daily.]  *has ICD and h/o CAD s/p CABG 1994  *Last stress testing in [place] 7/2023, which was negative for ischemia  *Last cath 1/2021 at [facility] showed patent CLAUDIA-RCA, LIMA occluded, vein-diagonal patent with placement of stent,  LAD, 40% circumflex, RCA occluded. Last stress testing 7/2023 negative for ischemia. Echo 7/2023 showed LVEF 30-35%, global hypokinesis LV, moderate-severe cLVH, grade 1 diastolic dysfunction; RV systolic function mildly reduced.   - Initial presentation as above. BNP in ED at 2613, troponin 70. EKG w/ nonspecific t-w changes. PTA sacubitril-valsartan and furosemide held on admit given FATIMAH.  Briefly was  on IV diuretics.  - He was seen initially by cardiology on 12/8 and 12/9 for preop clearance and patient did had elevated troponin which was felt due to demand  - Patient did had RRT on 12/22 and his troponin was elevated  - Echo 12/22/23 - EF 20-25% with hyokinesis of the post wall.    - His prior echo in [facility] showed that his EF was 30 to 35% with moderate hypokinesis of the left ventricle  - stress test is abnormal  on 12/26/23  - s/p LHC on 12/26/23 which showed occluded LAD, occluded proximal RCA, 40% sent circumflex with patent CLAUDIA to the RCA occluded graft to the OM1 and nearly occluded graft to the D1.  Patient underwent lithotripsy  and drug-eluting stent to the vein graft to the D1.   - Cardiology on board and continue the patient with aspirin 81 mg, Plavix 75 mg, Entresto and he was restarted on Lasix 20 mg daily and further up titration of his medications as outpatient  - Uninterrupted aspirin and Plavix for 1 year  - Outpatient follow-up with cardiology and they have signed off as of 12/28/2023     Hyperglycemia while on TPN  *HbA1c is 6.8%  - Patient is started back on diet and we will continue with the TPN and we will continue with sliding scale insulin with meals and also will use Lantus 5 units at bedtime  - 12/31 - glucose mostly at goal mid/upper 100s     Anemia, suspect partial blood loss component with surgery [id number]  *Hgb 12/7/23 was 16.8, fluctuating in hospital, 11-15 range; 12/29 hgb 12.1  - follow CBC periodically or sooner if indicated     Thrombocytopenia, unclear etiology, possibly due to sepsis, medication effect or consumptive or other cause result-resolved     Hypokalemi-Resolved     Hx CVA  *We will continue with aspirin 81 mg, Plavix 75 mg daily, resume atorvastatin 80 mg and Zetia 10 mg  - CT scan of the head on 12/21 and 12/27  did not show any acute process and he has chronic ischemic changes     Depression/anxiety  - Continue to hold gabapentin for now   - Continue escitalopram daily - tolerating      Hypothyroidism  - Continue levothyroxine.     GERD  - Continue with PPI      BPH  - will continue finasteride      History of migraines  - Noted     Gallbladder distention  - This was found on the CT scan of the abdomen done on 12/14 and ultrasound was done on 12/15 without any evidence of cholecystitis                Diet: Snacks/Supplements Adult: Gelatein Plus; With Meals  Snacks/Supplements Adult: Tracey SeeMe Standard Oral Supplement; Between Meals  Regular Diet Adult Thin Liquids (level 0) (Sit at 90 degrees, stay up for 30 minutes, small bites/sips, alternate textures)  Room Service    DVT  "Prophylaxis: Pneumatic Compression Devices  Hameed Catheter: Not present  Lines: PRESENT      PICC 12/15/23 Double Lumen Left Brachial vein medial TPN-Site Assessment: WDL      Cardiac Monitoring: ACTIVE order. Indication: Acute decompensated heart failure (48 hours)  Code Status: Full Code      Clinically Significant Risk Factors              # Hypoalbuminemia: Lowest albumin = 2.8 g/dL at 12/11/2023  5:26 AM, will monitor as appropriate     # Hypertension: Noted on problem list  # Chronic heart failure with reduced ejection fraction: last echo with EF <40%      # DMII: A1C = 6.8 % (Ref range: <5.7 %) within past 6 months   # Overweight: Estimated body mass index is 25.9 kg/m  as calculated from the following:    Height as of this encounter: 1.702 m (5' 7\").    Weight as of this encounter: 75 kg (165 lb 5.5 oz).   # Moderate Malnutrition: based on nutrition assessment           Disposition Plan     Expected Discharge Date: 01/03/2024,  3:00 PM      Discharge Comments: OR 12/8 12/16 CT guided drainage, JPs leaking.  Confused and agitated at times.  12/26-Stress test  12/28 Tx from [facility] center. I[facility]  12/29 Calorie count started, plan to wean to TPN            Richard Sesay MD  Hospitalist Service  [facility]  Securely message with [employer] (more info)  Text page via [employer] Paging/Directory   ______________________________________________________________________    Interval History   Pt seen and examined. Doing well and no new complaints. Awaiting TCU.    Physical Exam   Vital Signs: Temp: 97.9  F (36.6  C) Temp src: Oral BP: (!) 141/56 Pulse: 74   Resp: 16 SpO2: 94 % O2 Device: BiPAP/CPAP    Weight: 165 lbs 5.52 oz    Gen: NAD, pleasant  HEENT: EOMI, MMM  Resp: no focal crackles, no wheezes, no increased work of resp  CV: S1S2 heard, reg rhythm, reg rate  Abdo: soft, nontender, nondistended, bowel sounds present  Ext: calves nontender, well perfused  Neuro: aa, conversant, moving ext, CN " grossly intact, no facial asymmetry      Medical Decision Making       37 MINUTES SPENT BY ME on the date of service doing chart review, history, exam, documentation & further activities per the note.      Data

## 2024-01-02 NOTE — PLAN OF CARE
Goal Outcome Evaluation:      Plan of Care Reviewed With: patient      Date & Time: 1/1/2024 1835  Surgery/POD#: Acute appendicitis with peritonitis and septic shock - s/p laparoscopic appendectomy 12/8/2023.  Pelvic fluid collections, question abscesses.  Postoperative ileus vs SBO, prolonged  Behavior & Aggression: calm, cooperative at times. Confused at times, redirected and reoriented.  Pt disorientated to place.  Fall Risk: yes, pt is a fall risk.  Pt is up with a gait belt and a walker.  Orientation:A/O x 3, disoriented to place.  ABNL VS/O2:VSS, pt is on RA  ABNL Labs: see computer with details  Pain Management:pt denies pain  Bowel/Bladder: Pt is continent of bladder, no BM today.  Drains: no drains noted  Diet:pt is on a regular diet, TPN discontinued per MD orders.  Activity Level: pt is up to the bathroom, to the chair and ambulated in the hallways SBA x 1 with a gait belt and walker.  Tests/Procedures: none  Anticipated  DC Date: possibly 1/2/2024 to a TCU  Significant Information: Pt's daughter Maylin here at the bedside most of the day.

## 2024-01-02 NOTE — PLAN OF CARE
urgery/POD#: 12/8 lap appe   Orientation: Disoriented to time   ABNL VS/O2: VSS on RA. Home CPAP on overnight.  ABNL Labs:  & 169  Pain Management: PRN Tylenol  Bowel/Bladder: Voiding in urinal. No BM overnight.  Drains: PICC SL.  Diet: Regular  Activity Level: Ax1 gb/walker  Anticipated  DC Date: TCU pending  Significant Information: Tele: Occasional PVCs w/ BBB.

## 2024-01-02 NOTE — PROGRESS NOTES
Care Management Follow Up    Length of Stay (days): 25    Expected Discharge Date: 01/03/2024     Concerns to be Addressed: discharge planning     Patient plan of care discussed at interdisciplinary rounds: Yes    Anticipated Discharge Disposition: Transitional Care     Anticipated Discharge Services:    Anticipated Discharge DME:      Patient/family educated on Medicare website which has current facility and service quality ratings: yes  Education Provided on the Discharge Plan: Yes  Patient/Family in Agreement with the Plan: yes    Referrals Placed by CM/SW: Post Acute Facilities  Private pay costs discussed: Not applicable    Additional Information:  Writer met with patient and daughter at bedside to follow up on TCU referrals. Patient accepted at [facility]. Which patient did not choose but it is acceptable. Patient preferred to be closer to [place] if possible. Patient may not be medically ready to discharge today, writer updating facility and staff. Patient undecided on transportation for now.     Addendum - no other accepting TCU facilities, patient ready to discharge Wednesday. [facility] [place]. kenyon Garcia liaison said patient can come anytime after 10 Wednesday.     FRANCHESKA Colon

## 2024-01-02 NOTE — PLAN OF CARE
Speech Language Therapy Discharge Summary    Reason for therapy discharge:    All goals and outcomes met, no further needs identified.    Progress towards therapy goal(s). See goals on Care Plan in [facility] electronic health record for goal details.  Goals met    Therapy recommendation(s):    No further therapy is recommended. Functional oropharyngeal swallow. Rec: regular diet and thin liquids with reminders to use safe reflux/swallow strategies. No further SLP services warranted.

## 2024-01-03 VITALS
OXYGEN SATURATION: 96 % | HEART RATE: 56 BPM | SYSTOLIC BLOOD PRESSURE: 135 MMHG | HEIGHT: 67 IN | TEMPERATURE: 97.7 F | WEIGHT: 165.34 LBS | RESPIRATION RATE: 16 BRPM | DIASTOLIC BLOOD PRESSURE: 71 MMHG | BODY MASS INDEX: 25.95 KG/M2

## 2024-01-03 PROBLEM — K59.00 CONSTIPATION: Status: ACTIVE | Noted: 2024-01-03

## 2024-01-03 LAB
GLUCOSE BLDC GLUCOMTR-MCNC: 110 MG/DL (ref 70–99)
GLUCOSE BLDC GLUCOMTR-MCNC: 133 MG/DL (ref 70–99)
GLUCOSE BLDC GLUCOMTR-MCNC: 186 MG/DL (ref 70–99)

## 2024-01-03 PROCEDURE — 99239 HOSP IP/OBS DSCHRG MGMT >30: CPT | Performed by: HOSPITALIST

## 2024-01-03 PROCEDURE — 250N000013 HC RX MED GY IP 250 OP 250 PS 637: Performed by: INTERNAL MEDICINE

## 2024-01-03 PROCEDURE — 250N000013 HC RX MED GY IP 250 OP 250 PS 637: Performed by: HOSPITALIST

## 2024-01-03 PROCEDURE — 250N000013 HC RX MED GY IP 250 OP 250 PS 637: Performed by: NURSE PRACTITIONER

## 2024-01-03 PROCEDURE — 250N000013 HC RX MED GY IP 250 OP 250 PS 637: Performed by: PHYSICIAN ASSISTANT

## 2024-01-03 PROCEDURE — 250N000013 HC RX MED GY IP 250 OP 250 PS 637: Performed by: STUDENT IN AN ORGANIZED HEALTH CARE EDUCATION/TRAINING PROGRAM

## 2024-01-03 PROCEDURE — 250N000013 HC RX MED GY IP 250 OP 250 PS 637

## 2024-01-03 PROCEDURE — 250N000011 HC RX IP 250 OP 636: Performed by: INTERNAL MEDICINE

## 2024-01-03 RX ORDER — ASPIRIN 81 MG/1
81 TABLET ORAL DAILY
DISCHARGE
Start: 2024-01-03

## 2024-01-03 RX ORDER — CLOPIDOGREL BISULFATE 75 MG/1
75 TABLET ORAL DAILY
DISCHARGE
Start: 2024-01-03

## 2024-01-03 RX ORDER — LANOLIN ALCOHOL/MO/W.PET/CERES
3 CREAM (GRAM) TOPICAL
DISCHARGE
Start: 2024-01-03

## 2024-01-03 RX ORDER — CALCIUM CARBONATE 500 MG/1
1 TABLET, CHEWABLE ORAL 4 TIMES DAILY PRN
DISCHARGE
Start: 2024-01-03

## 2024-01-03 RX ORDER — PANTOPRAZOLE SODIUM 40 MG/1
40 TABLET, DELAYED RELEASE ORAL
DISCHARGE
Start: 2024-01-04

## 2024-01-03 RX ORDER — BISACODYL 10 MG
10 SUPPOSITORY, RECTAL RECTAL DAILY PRN
DISCHARGE
Start: 2024-01-03

## 2024-01-03 RX ORDER — IPRATROPIUM BROMIDE AND ALBUTEROL SULFATE 2.5; .5 MG/3ML; MG/3ML
3 SOLUTION RESPIRATORY (INHALATION) EVERY 4 HOURS PRN
DISCHARGE
Start: 2024-01-03

## 2024-01-03 RX ADMIN — LEVOTHYROXINE SODIUM 100 MCG: 100 TABLET ORAL at 06:33

## 2024-01-03 RX ADMIN — ESCITALOPRAM OXALATE 20 MG: 10 TABLET ORAL at 09:15

## 2024-01-03 RX ADMIN — ASPIRIN 81 MG: 81 TABLET, COATED ORAL at 09:15

## 2024-01-03 RX ADMIN — SACUBITRIL AND VALSARTAN 1 TABLET: 24; 26 TABLET, FILM COATED ORAL at 09:15

## 2024-01-03 RX ADMIN — FUROSEMIDE 20 MG: 20 TABLET ORAL at 09:16

## 2024-01-03 RX ADMIN — CARVEDILOL 12.5 MG: 12.5 TABLET, FILM COATED ORAL at 09:16

## 2024-01-03 RX ADMIN — ACETAMINOPHEN 650 MG: 325 TABLET, FILM COATED ORAL at 11:39

## 2024-01-03 RX ADMIN — CLOPIDOGREL BISULFATE 75 MG: 75 TABLET ORAL at 09:15

## 2024-01-03 RX ADMIN — ENOXAPARIN SODIUM 40 MG: 40 INJECTION SUBCUTANEOUS at 12:54

## 2024-01-03 RX ADMIN — PANTOPRAZOLE SODIUM 40 MG: 40 TABLET, DELAYED RELEASE ORAL at 06:33

## 2024-01-03 RX ADMIN — MELATONIN TAB 3 MG 3 MG: 3 TAB at 00:53

## 2024-01-03 RX ADMIN — INSULIN ASPART 1 UNITS: 100 INJECTION, SOLUTION INTRAVENOUS; SUBCUTANEOUS at 12:50

## 2024-01-03 RX ADMIN — ACETAMINOPHEN 650 MG: 325 SUSPENSION ORAL at 04:28

## 2024-01-03 ASSESSMENT — ACTIVITIES OF DAILY LIVING (ADL)
ADLS_ACUITY_SCORE: 35
ADLS_ACUITY_SCORE: 37
ADLS_ACUITY_SCORE: 37
ADLS_ACUITY_SCORE: 35
ADLS_ACUITY_SCORE: 37
ADLS_ACUITY_SCORE: 35
ADLS_ACUITY_SCORE: 35

## 2024-01-03 NOTE — PLAN OF CARE
Physical Therapy Discharge Summary    Reason for therapy discharge:    Discharged to transitional care facility.    Progress towards therapy goal(s). See goals on Care Plan in [facility] electronic health record for goal details.  Goals not met.  Barriers to achieving goals:   discharge from facility.    Therapy recommendation(s):    Continued therapy is recommended.  Rationale/Recommendations:  To progress independence and safety with functional mobility.

## 2024-01-03 NOTE — PLAN OF CARE
Occupational Therapy Discharge Summary    Reason for therapy discharge:    Discharged to transitional care facility.    Progress towards therapy goal(s). See goals on Care Plan in [facility] electronic health record for goal details.  Goals not met.  Barriers to achieving goals:   discharge from facility.    Therapy recommendation(s):    Continued therapy is recommended.  Rationale/Recommendations:  To address safety and IND in I/ADLs.

## 2024-01-03 NOTE — DISCHARGE SUMMARY
"[facility]  Hospitalist Discharge Summary      Date of Admission:  12/8/2023  Date of Discharge:  1/3/2024  Discharging Provider: Richard Sesay MD  Discharge Service: Hospitalist Service    Discharge Diagnoses   Acute appendicitis with peritonitis and septic shock - s/p laparoscopic appendectomy 12/8/2023.  Pelvic fluid collections, question abscesses.  Postoperative ileus vs SBO, prolonged - improved/resolved  Acute Toxic Metabolic Encephalopathy - improved/resolved   Moderate protein malnutrition - improving  Acute hypoxic respiratory failure, suspect multifactorial related to atelectasis, recent abdominal surgery and CARINA, resolved.  CARINA on home CPA  Troponin elevation, suspect demand ischemia (type 2 NSTEMI)  Hypertension (benign essential)  CAD s/p CABG (1994), stenting (2002, 2003, 2005, 2021)  Chronic HFrEF (systolic and diastolic), s/p ICD  HLD  Afib with RVR  Abnormal stress test s/p LHC on 12/26/23 s/p MAX to vein graft to D1  FATIMAH on CKDIII  See below for further diagnoses and details      Clinically Significant Risk Factors     # DMII: A1C = 6.8 % (Ref range: <5.7 %) within past 6 months  # Overweight: Estimated body mass index is 25.9 kg/m  as calculated from the following:    Height as of this encounter: 1.702 m (5' 7\").    Weight as of this encounter: 75 kg (165 lb 5.5 oz).  # Moderate Malnutrition: based on nutrition assessment      Follow-ups Needed After Discharge   Follow-up Appointments     Follow Up and recommended labs and tests      1. TCU MD with BMP and CBC within 5 days  2. Cardiology   3. Surgery            Unresulted Labs Ordered in the Past 30 Days of this Admission       Date and Time Order Name Status Description    12/28/2023  1:55 PM Methylmalonic Acid In process         These results will be followed up by IM    Discharge Disposition   Discharged to short-term care facility  Condition at discharge: Stable    Hospital Course   88 year old male S/P Binu Church" for Perforated Appendicitis, and vascular assessment of questionable area on cecum through ICG administration.  He also has had issues with post op fluid collections that IR has placed a drain in and had a significant post-op ileus.  Initially underwent surgery on 12/8/2023 and required ICU management for septic shock.      Ultimately improved, and Hospitalist service assumed care on 12/11/2023.      Moderate Protein Malnutrition  Has lost 6.5% of weight during this admission  Plan  - Continue calorie counting via nutritionist. If not meeting adequate caloric intake of at least 2/3 of estimated needs by tomorrow (12/31/23), will need to proceed with NG tube. If able to meet, can wean off of TPN as able.  - encourage PO intake     Acute Toxic Metabolic Encephalopathy - improved/resolved  Likely multifactorial from shock, inadequate use of CPAP for CARINA, protein malnutrition and hospital acquired delirum  CT Head WO 12/21/2023 w/o any acute changes  Neurology signed off on 12/29/2023  Plan  - Encourage CPAP usage and PO intake  - Avoid sedating medications if feasible  - Minimize night time disturbances     Diarrhea - improved  Unsure if from TPN vs concern of infectious process  - C Diff test NEGATIVE 12/30  - can trial prn imodium     Acute appendicitis with peritonitis and septic shock - s/p laparoscopic appendectomy 12/8/2023.  Pelvic fluid collections, question abscesses.  Postoperative ileus vs SBO, prolonged  -Initial presentation to OSH 12/7 as above. CT abdomen noted with acute appendicitis with 0.7 cm appendicolith, no abscess. Started on ampicillin-sulbactam. Subsequently transferred to [facility] 12/8.  On 12/8, underwent above surgery. Weaned off pressors. Antibiotics changed to pipericillin-tazobactam.   On 12/10, CT abdomen noted with multiple dilated small bowel loops with related decompression of colon with findings consistent with partial obstruction/ adynamic ileus.  On 12/12, diet advanced to clears  as pt seemed to be improving with +flatus and BM. Later, developed more N/V and NG placed.  On 12/14, WBC elevated. Repeat abdominal CT showed prior appendectomy with findings suggestive of peritonitis, developing rim enhancing pelvic fluid collections, abscesses possible; increasing dilation of proximal small bowel with transition point in the right lower quadrant, suggestive of partial mechanical obstruction, developing adhesion is possible, no evidence of nadine bowel ischemia or acute perforation; distended gallbladder without additional CT evidence of acute cholecystitis.   - IR placed drain into right pelvic fluid collection (2/15/23)   - CT sinegram 12/19/23 - with drain in good position, min residual fluid and no fistula.   - Completed 14 days of IV pipericillin-tazobactam   - started TPN 12/15 - STOPPED 1/1/24  - NG tube and ESTEFANY drain removed 12/21/23  - Surgery team has been following the patient-and he is having bowel movements and tolerating diet and we do plan to transition the patient off TPN and the surgery team is okay with Plavix and Lovenox for chemoprophylaxis and they have signed off  - 1/1 appears to be taking in at least 50% of his nutritional needs orally, will stop TPN - will need to reach out to SW/CT 1/2 for TCU options. Discussed with patient and dtr in room.   - 1/2 doing well, TCU discharge 1/3     Acute hypoxic respiratory failure, suspect multifactorial related to atelectasis, recent abdominal surgery and CARINA, resolved.  CARINA on home CPA  - Initial presentation as above. CXR at OSH negative.  - On 12/8, CXR showed new left basilar bandlike opacity, most likely atelectasis.   - continue with home cpap  - On room air while awake      FATIMAH on CKD3, suspect prerenal, meds (including sacubitril-valsartan, diuretics)-resolved  Hyperkalemia, resolved  - Baseline creatinine 1-1.1.   - Creatinine 1.39 --> 1.8 at OSH.  - Cr up to 2.27 and K up to 5.5 on 12/8.  - Patient did receive IV Lasix on 12/10  and 12/11  - Renal function at baseline at 1.1 12/29  - BMP in 1 week after discharge at TCU     Troponin elevation, suspect demand ischemia (type 2 NSTEMI)  Hypertension (benign essential)  CAD s/p CABG (1994), stenting (2002, 2003, 2005, 2021)  Chronic HFrEF (systolic and diastolic), s/p ICD  HLD  Afib with RVR  Abnormal stress test s/p LHC on 12/26/23 s/p MAX to vein graft to D1  [PTA: carvedilol 12.5 mg BID; sacubitril-valsartan 49-51 mg BID; furosemide 20 mg daily.]  *has ICD and h/o CAD s/p CABG 1994  *Last stress testing in [place] 7/2023, which was negative for ischemia  *Last cath 1/2021 at [facility] showed patent CLAUDIA-RCA, LIMA occluded, vein-diagonal patent with placement of stent,  LAD, 40% circumflex, RCA occluded. Last stress testing 7/2023 negative for ischemia. Echo 7/2023 showed LVEF 30-35%, global hypokinesis LV, moderate-severe cLVH, grade 1 diastolic dysfunction; RV systolic function mildly reduced.   - Initial presentation as above. BNP in ED at 2613, troponin 70. EKG w/ nonspecific t-w changes. PTA sacubitril-valsartan and furosemide held on admit given FATIMAH.  Briefly was  on IV diuretics.  - He was seen initially by cardiology on 12/8 and 12/9 for preop clearance and patient did had elevated troponin which was felt due to demand  - Patient did had RRT on 12/22 and his troponin was elevated  - Echo 12/22/23 - EF 20-25% with hyokinesis of the post wall.    - His prior echo in [facility] showed that his EF was 30 to 35% with moderate hypokinesis of the left ventricle  - stress test is abnormal  on 12/26/23  - s/p LHC on 12/26/23 which showed occluded LAD, occluded proximal RCA, 40% sent circumflex with patent CLAUDIA to the RCA occluded graft to the OM1 and nearly occluded graft to the D1.  Patient underwent lithotripsy and drug-eluting stent to the vein graft to the D1.   - Cardiology on board and continue the patient with aspirin 81 mg, Plavix 75 mg, Entresto and he was restarted on Lasix 20  mg daily and further up titration of his medications as outpatient  - Uninterrupted aspirin and Plavix for 1 year  - Outpatient follow-up with cardiology and they have signed off as of 12/28/2023     Hyperglycemia while on TPN  *HbA1c is 6.8%  - Patient is started back on diet and we will continue with the TPN and we will continue with sliding scale insulin with meals and also will use Lantus 5 units at bedtime  - 12/31 - 1/3 glucose mostly at goal mid/upper 100s  - will discharge OFF insulin as glucose levels continue to be normal or almost low. Will recommend continue glucose checks at TCU and reconsider insulin with TCU MD team - would rather avoid hypoglycemia and therefore stopped insulin at discharge for now. Appreciate TCU evaluation after discharge.     Anemia, suspect partial blood loss component with surgery ewgjaj-juysmyi-Hnywtets  *Hgb 12/7/23 was 16.8, fluctuating in hospital, 11-15 range; 12/29 hgb 12.1  - follow CBC periodically or sooner if indicated  - recheck cbc within 1 week at TCU     Thrombocytopenia, unclear etiology, possibly due to sepsis, medication effect or consumptive or other cause result-resolved     Hypokalemi-Resolved     Hx CVA  *We will continue with aspirin 81 mg, Plavix 75 mg daily, resume atorvastatin 80 mg and Zetia 10 mg  - CT scan of the head on 12/21 and 12/27  did not show any acute process and he has chronic ischemic changes     Depression/anxiety  - Continue to hold gabapentin for now   - Continue escitalopram daily - tolerating      Hypothyroidism  - Continue levothyroxine.     GERD  - Continue with PPI      BPH  - will continue finasteride      History of migraines  - Noted     Gallbladder distention  - This was found on the CT scan of the abdomen done on 12/14 and ultrasound was done on 12/15 without any evidence of cholecystitis    Consultations This Hospital Stay   SURGERY GENERAL IP CONSULT  CARDIOLOGY IP CONSULT  INTENSIVIST IP CONSULT  PHYSICAL THERAPY ADULT IP  CONSULT  OCCUPATIONAL THERAPY ADULT IP CONSULT  CARE MANAGEMENT / SOCIAL WORK IP CONSULT  WOUND OSTOMY CONTINENCE NURSE  IP CONSULT  INTERVENTIONAL RADIOLOGY ADULT/PEDS IP CONSULT  PHARMACY/NUTRITION TO START AND MANAGE TPN  VASCULAR ACCESS ADULT IP CONSULT  PHARMACY IP CONSULT  VASCULAR ACCESS ADULT IP CONSULT  VASCULAR ACCESS ADULT IP CONSULT  VASCULAR ACCESS ADULT IP CONSULT  VASCULAR ACCESS ADULT IP CONSULT  PALLIATIVE CARE ADULT IP CONSULT  VASCULAR ACCESS ADULT IP CONSULT  PHARMACY IP CONSULT  CARDIOLOGY IP CONSULT  PHARMACY IP CONSULT  VASCULAR ACCESS ADULT IP CONSULT  PHARMACY IP CONSULT  HOSPITALIST IP CONSULT  NUTRITION SERVICES ADULT IP CONSULT  CARDIAC REHAB IP CONSULT  PHARMACY IP CONSULT  SPEECH LANGUAGE PATH ADULT IP CONSULT  NEUROLOGY IP CONSULT  CORE CLINIC EVALUATION IP CONSULT  VASCULAR ACCESS ADULT IP CONSULT  PHARMACY IP CONSULT  PHYSICAL THERAPY ADULT IP CONSULT  OCCUPATIONAL THERAPY ADULT IP CONSULT  VASCULAR ACCESS ADULT IP CONSULT  SMOKING CESSATION PROGRAM IP CONSULT    Code Status   Full Code    Time Spent on this Encounter   I, Richard Sesay MD, personally saw the patient today and spent greater than 30 minutes discharging this patient.       Richard Sesay MD  [address] [address]  Phone: [phone]  Fax: [phone]  ______________________________________________________________________    Physical Exam   Vital Signs: Temp: 97.7  F (36.5  C) Temp src: Oral BP: 135/71 Pulse: 56   Resp: 16 SpO2: 96 % O2 Device: None (Room air)    Weight: 165 lbs 5.52 oz    Gen: NAD, pleasant  HEENT: EOMI, MMM  Resp: no focal crackles,  no wheezes, no increased work of resp  CV: S1S2 heard, reg rhythm, reg rate  Abdo: soft, nontender, nondistended, bowel sounds present  Ext: calves nontender, well perfused  Neuro: aa, conversant, moving ext, CN grossly intact, no facial asymmetry         Primary Care Physician   [facility]    Discharge Orders      CARDIAC REHAB REFERRAL      General info for SNF    Length of Stay Estimate: Short Term Care: Estimated # of Days <30  Condition at Discharge: Stable/Improving  Level of care:skilled   Rehabilitation Potential: Good  Admission H&P remains valid and up-to-date: Yes  Recent Chemotherapy: N/A  Use Nursing Home Standing Orders: Yes     Mantoux instructions    Give two-step Mantoux (PPD) Per Facility Policy Yes     Follow Up and recommended labs and tests    1. TCU MD with BMP and CBC within 5 days  2. Cardiology   3. Surgery     Reason for your hospital stay    Abdominal pain     Glucose monitor nursing POCT    Before meals and at bedtime     Intake and output    Every shift     Daily weights    Call Provider for weight gain of more than 2 pounds per day or 5 pounds per week.     Activity - Up with nursing assistance     Additional Discharge Instructions    Follow blood sugars for 2-3 days, if WNL would not restart any insulin. Patient was needing very low dose insulin in hospital. TCU MD/PCP to follow.     Physical Therapy Adult Consult    Evaluate and treat as clinically indicated.    Reason:  deconditioning, adv aged, prolonged hospitalization     Occupational Therapy Adult Consult    Evaluate and treat as clinically indicated.    Reason:  deconditioning, adv aged, prolonged hospitalization     Fall precautions     Diet    Follow this diet upon discharge: Orders Placed This Encounter      Snacks/Supplements Adult: Gelatein Plus; With Meals      Calorie Counts      Snacks/Supplements Adult: Tracey Breen Standard Oral Supplement; Between Meals      Calorie Counts      Calorie Counts      Room Service      Regular Diet Adult Thin Liquids (level 0) (Sit at 90 degrees, stay up for 30 minutes, small bites/sips, alternate textures)       Significant Results and Procedures   Most Recent 3 CBC's:  Recent Labs   Lab Test 01/01/24  0537 12/29/23  0509 12/28/23  0628 12/27/23  1352   WBC  --  9.0  9.9 9.6   HGB  --  12.1* 12.3* 13.7   MCV  --  98 98 97    151  151 174 185     Most Recent 3 BMP's:  Recent Labs   Lab Test 01/03/24  0801 01/03/24  0227 01/02/24  2143 01/01/24  0802 01/01/24  0537 12/31/23  0854 12/31/23  0529 12/30/23  0754 12/30/23  0526 12/29/23  0623 12/29/23  0509 12/28/23  0854 12/28/23  0628 12/27/23  1703 12/27/23  1352   NA  --   --   --   --  134*  --   --   --   --   --   --   --  135  --  138   POTASSIUM  --   --   --   --  4.7  --  4.2  --  4.4  --  4.3  --  4.6  --  4.8   CHLORIDE  --   --   --   --  102  --   --   --   --   --   --   --  105  --  105   CO2  --   --   --   --  24  --   --   --   --   --   --   --  23  --  24   BUN  --   --   --   --  26.8*  --   --   --   --   --   --   --  31.2*  --  26.3*   CR  --   --   --   --  0.87  --   --   --   --   --  1.10  --  1.13  --  0.93   ANIONGAP  --   --   --   --  8  --   --   --   --   --   --   --  7  --  9   RHODA  --   --   --   --  9.2  --   --   --   --   --   --   --  9.3  --  9.4   * 110* 121*   < > 157*   < >  --    < >  --    < >  --    < > 192*   < > 181*    < > = values in this interval not displayed.     Most Recent 2 LFT's:  Recent Labs   Lab Test 01/01/24 0537 12/27/23  1352   AST 21 16   ALT 19 11   ALKPHOS 100 118   BILITOTAL 0.4 0.4     Most Recent 3 Troponin's:  Recent Labs   Lab Test 01/10/21  1020   TROPI 0.234*     Most Recent 3 BNP's:  Recent Labs   Lab Test 12/27/23  1052 12/21/23  2136 12/08/23  0047   NTBNPI 1,407 4,090* 3,859*     Most Recent Cholesterol Panel:  Recent Labs   Lab Test 12/31/23 2059 12/26/23  1644   CHOL  --  134   LDL  --  74   HDL  --  21*   TRIG 228* 196*     7-Day Micro Results       Collected Updated Procedure Result Status      12/30/2023 1413 12/30/2023 1753 C. difficile Toxin B PCR with reflex to C. difficile Antigen and Toxins A/B EIA [[id number]]    Stool from Per Rectum    Final result Component Value   C Difficile Toxin B by PCR Negative   A negative result  does not exclude actual disease due to C. difficile and may be due to improper collection, handling and storage of the specimen or the number of organisms in the specimen is below the detection limit of the assay.                  Most Recent Hemoglobin A1c:  Recent Labs   Lab Test 12/17/23  0522   A1C 6.8*   ,   Results for orders placed or performed during the hospital encounter of 12/08/23   XR Chest Port 1 View    Narrative    EXAM: XR CHEST PORT 1 VIEW  LOCATION: [facility]  DATE: 12/8/2023    INDICATION: shortness of breath  COMPARISON: 01/10/2021.    FINDINGS: Sternal wires, mediastinal clips and a left subclavian cardiac device. There is no pneumothorax. The heart is enlarged. There is no pulmonary edema. The lungs are clear.      Impression    IMPRESSION: No acute abnormality.   US Upper Extremity Venous Duplex Right    Narrative    US UPPER EXTREMITY VENOUS DUPLEX RIGHT  12/8/2023 10:15 AM     HISTORY: right arm swelling    COMPARISON: None.    TECHNIQUE: Examination of the upper extremity veins was performed with  graded compression and 2-D ultrasound and color doppler spectral  waveform analysis.     FINDINGS:  There is no evidence for DVT in the right upper extremity.  There is superficial venous thrombus in the right cephalic vein at the  antecubital fossa.      Impression    IMPRESSION: Superficial venous thrombus in the right cephalic vein.    COLLINS CERDA MD         SYSTEM ID:  [id number]   XR Chest Port 1 View    Narrative    EXAM: XR CHEST PORT 1 VIEW  LOCATION: [facility]  DATE: 12/8/2023    INDICATION: New IJ catheter placement.  COMPARISON: None.      Impression    IMPRESSION:     New left IJ catheter with tip near the confluence of the brachiocephalic veins, although likely coursing towards the right brachiocephalic vein. Recommend repositioning.    Redemonstrated left subclavian approach pacemaker/ICD, median sternotomy wires, and  mediastinal surgical clips.    New left basilar bandlike opacity, most likely atelectasis.    Otherwise, no focal airspace disease. No pleural effusion or pneumothorax.    Stable cardiomegaly.   XR Chest Port 1 View    Narrative    EXAM: XR CHEST PORT 1 VIEW  LOCATION: [facility]  DATE: 12/10/2023    INDICATION: hypoxia  COMPARISON: 12/8/2023      Impression    IMPRESSION: No acute cardiopulmonary abnormality. Improved aeration of the left lung base.    Stable enlarged cardiomediastinal silhouette. Left neck central venous catheter tip unchanged at the confluence of the brachiocephalic veins.   CT Abdomen Pelvis w/o Contrast    Narrative    EXAM: CT ABDOMEN PELVIS W/O CONTRAST  LOCATION: [facility]  DATE: 12/10/2023    INDICATION: increasing abdominal pain, pt s p appendectomy for perforated apendix  COMPARISON: 12/07/2023  TECHNIQUE: CT scan of the abdomen and pelvis was performed without IV contrast. Multiplanar reformats were obtained. Dose reduction techniques were used.  CONTRAST: None.    FINDINGS:     LOWER CHEST: Pleural effusions and bibasilar atelectasis. Partially visualized pacemaker wires.     HEPATOBILIARY: Normal liver parenchyma. No biliary dilation. Gallbladder is distended with layering sludge versus vicarious contrast excretion.     PANCREAS: Normal.     SPLEEN: Normal.     ADRENAL GLANDS: Normal.     KIDNEYS/BLADDER: Persistent nephrogram bilaterally. Benign renal cysts for which no further follow-up imaging is recommended. No hydronephrosis. Retained contrast is noted within the urinary bladder.      BOWEL: Dilated small bowel with air-fluid levels measuring up to 3.8 cm. Colon is predominantly decompressed with some fluid in the cecum. No discrete transition point identified, although there is some caliber change in the right lower quadrant.   Appendectomy. Surgical drain terminates in the right lower quadrant. Colonic diverticulosis.  Scattered free fluid and mesenteric stranding is seen throughout the lower abdomen and pelvis. No organized or drainable collection at this time.     LYMPH NODES: No pathologically enlarged lymph nodes.    VASCULATURE: Infrarenal abdominal aortic ectasia measuring up to 2.7 cm. severe aortoiliac calcifications.     PELVIC ORGANS: No pelvic masses.     MUSCULOSKELETAL: Multilevel degenerative disc disease of the thoracolumbar spine. Postsurgical changes are seen in the anterior abdominal wall. Mild anasarca.        Impression    IMPRESSION:  1.  Multiple dilated loops of small bowel with relative decompression of the colon. Although no discrete mechanical transition point is confidently identified, this configuration is most consistent with at least partial obstruction, with adynamic ileus   an additional consideration. Recommend general surgery consultation.  2.  Post surgical changes of recent appendectomy. Scattered free fluid and mesenteric stranding in the lower abdomen and pelvis. No organized or drainable collection at this time.  3.  Persistent bilateral nephrogram and retained contrast in the urinary bladder. Correlate with renal function.  4.  Mild anasarca and small bilateral pleural effusions.  5.  Chronic and ancillary findings as described.   CT Abdomen Pelvis w Contrast    Narrative    CT ABDOMEN PELVIS W CONTRAST 12/14/2023 1:34 PM    CLINICAL HISTORY: Worsening leukocytosis, concern for necrotic cecum  during appendectomy    TECHNIQUE: CT scan of the abdomen and pelvis was performed following  injection of IV contrast. Multiplanar reformats were obtained. Dose  reduction techniques were used.  CONTRAST: 89mL [id number]    COMPARISON: CT 12/10/2023    FINDINGS:   LOWER CHEST: Enlarged heart with pacemaker leads partially visualized.  Small bilateral pleural effusions with associated compressive  atelectasis. Calcified granulomas are unchanged.    HEPATOBILIARY: Likely hepatic steatosis. Gallbladder is  distended  without definite wall thickening or surrounding fat stranding. No  evidence of biliary obstruction.    PANCREAS: Normal.    SPLEEN: Normal.    ADRENAL GLANDS: Normal.    KIDNEYS/BLADDER: Bilateral renal cysts, no specific follow-up  recommended. No hydronephrosis. Urinary bladder is decompressed but  otherwise unremarkable.    BOWEL: Prior appendectomy with stable position of surgical drain in  the right lower quadrant. Increasing dilation of proximal loops of  small bowel with transition point in the right lower quadrant in area  of edematous small bowel (series 3 image 162). Mild mesenteric edema  without evidence of nadine bowel wall hyperenhancement or pneumatosis.  No large volume pneumoperitoneum. Nasogastric tube with tip and  sidehole in the stomach.    PELVIC ORGANS: Prostate and seminal vesicles are unremarkable.    ADDITIONAL FINDINGS: Diffuse peritoneal thickening with  hyperenhancement and associated fat stranding. Developing rim  enhancing fluid collections as follows:  - Right pelvic sidewall collection measures 6.4 x 2.8 cm (series 3  image 176).  - Left pelvic sidewall collection measures 3.3 x 1.9 cm (series 3  image 162).  - Dependent pelvic collection measures 4.6 x 2.9 cm (series 3 image  26). This likely communicates with right pelvic sidewall collection.    Moderate calcified atherosclerosis. Ectasia of the infrarenal  abdominal aorta without nadine aneurysmal dilation.    MUSCULOSKELETAL: No acute bony abnormality. Mild body wall edema.      Impression    IMPRESSION:   1.  Prior appendectomy with findings suggestive of peritonitis.  Developing rim enhancing pelvic fluid collections as above, abscesses  are possible.  2.  Increasing dilation of proximal small bowel with transition point  in the right lower quadrant, suggestive of partial mechanical  obstruction, developing adhesion is possible. No evidence of nadine  bowel ischemia or acute perforation.  3.  Distended gallbladder  without additional CT evidence of acute  cholecystitis.    QUIN VALENZUELA MD         SYSTEM ID:  [id number]   US Abdomen Limited    Narrative    US ABDOMEN LIMITED 12/15/2023 12:00 PM    CLINICAL HISTORY: Distended gallbladder.    TECHNIQUE: Limited abdominal ultrasound.    COMPARISON: CT 12/14/2023    FINDINGS:    GALLBLADDER: The gallbladder is mildly distended and there is sludge  in the gallbladder lumen. No gallbladder wall thickening or  pericholecystic fluid. Negative sonographic Shields's sign.    BILE DUCTS: There is no biliary dilatation. The common duct measures  3mm.    LIVER: Normal where seen.    RIGHT KIDNEY: No hydronephrosis. Benign cysts.    PANCREAS: The visualized portions of the pancreas are normal.    No ascites.      Impression    IMPRESSION: Gallbladder is mildly distended and contains sludge. No  signs of cholecystitis.     BETH HDZ MD         SYSTEM ID:  [id number]   CT Abdomen Peritoneum Abscess Drain w Cath Place    Narrative    EXAM:  1. PERCUTANEOUS DRAIN PLACEMENT PELVIC FLUID COLLECTION  2. CT GUIDANCE  3. CONSCIOUS SEDATION  LOCATION: [facility]  DATE/TIME: 12/15/2023 3:09 PM    INDICATION: <507:Reason For Study>  TECHNIQUE: Dose reduction techniques were used.    PROCEDURE: Informed consent obtained. Site marked. Prior images  reviewed. Required items made available. Patient identity confirmed  verbally and with arm band. Patient reevaluated immediately before  administering sedation. Universal protocol was followed. Time out  performed. The site was prepped and draped in sterile fashion. 10 mL  of 1% lidocaine was infused into the local soft tissues. Using  standard technique and under direct CT guidance, a 8 Sammarinese drainage  catheter was inserted into the fluid collection.      SPECIMEN: 5 mL of thin, cloudy fluid was aspirated and sent to lab for  cultures and Gram stain.    BLOOD LOSS: Minimal.    The patient tolerated the procedure well. No immediate  complications.    RADIOLOGIC SUPERVISION AND INTERPRETATION:   CT GUIDANCE: Images demonstrate the needle and subsequent catheter to  be in good position.    25 mcg fentanyl given for pain control.      Impression    IMPRESSION:  1.  Successful CT-guided drain placement into a small pelvic fluid  collection.    BETH HDZ MD         SYSTEM ID:  [id number]   XR Chest Port 1 View    Narrative    EXAM: XR CHEST PORT 1 VIEW  LOCATION: [facility]  DATE: 12/15/2023    INDICATION: PICC placement stat x ray per IV team  COMPARISON: 12/10/2023      Impression    IMPRESSION: Low lung volumes. Cardiomegaly. Congestion of the central vessels. Left lung base obscured by overlying defibrillator. Lungs otherwise appear clear. Prior median sternotomy. Left PICC line tip mid SVC, partially obscured by overlying   defibrillator wires.   XR Chest Port 1 View    Narrative    EXAM: XR CHEST PORT 1 VIEW  LOCATION: [facility]  DATE: 12/16/2023    INDICATION: Dyspnea and wheezing  COMPARISON: 12/15/2023      Impression    IMPRESSION: Cardiac enlargement. Cardiac leads. Coronary stent. Enteric tube. Mild vascular congestion. Atelectasis or infiltrate left lung base and small left effusion.   XR Abdomen Port 1 View    Narrative    EXAM: XR ABDOMEN PORT 1 VIEW  LOCATION: [facility]  DATE: 12/16/2023    INDICATION: eval bowel gas, increasing abd discomfort.  COMPARISON: CT abdomen and pelvis from 12/14/2023      Impression    IMPRESSION: Enteric tube terminates at the level gastric body. Percutaneous drains are seen in the right lower quadrant and pelvis. Persistent diffuse gaseous distention of the small greater than large bowel. No free air.   XR Abdomen 1 View    Narrative    EXAM: XR ABDOMEN 1 VIEW  LOCATION: [facility]  DATE: 12/17/2023    INDICATION: Recheck ileus following administration of Gastroview  COMPARISON: 12/16/2023       Impression    IMPRESSION: Supine view labeled at 5 hours after administration of oral contrast. There is now opacification of multiple dilated loops of small bowel consistent with distal small bowel obstruction. Air is seen throughout normal normal normal-sized colon   but there is no contrast yet within colon.  NG tube in place. Drains are present within right lower quadrant.   CT Sinogram Injection    Narrative    CT SINOGRAM INJECTION 12/19/2023 1:52 PM    CLINICAL HISTORY: History of lap appy for perforated appendicitis  early December, IR pelvic drain 12/15 with minimal outputs (surgical  drain in place, leaking). Check fluid resolution.    TECHNIQUE: CT scan of the pelvis was performed without IV contrast,  before and after administration of contrast through existing drain.  Multiplanar reformats were obtained. Dose reduction techniques were  used.    CONTRAST: None.    COMPARISON: 12/15/2023    FINDINGS: There is oral contrast material in the colon on the first  scan. The tube is in good position within the small amount of  remaining fluid in the right lower quadrant.    Injecting contrast through the tube, there is filling of the small  amount of fluid adjacent to the drain in the right lower quadrant and  in the pelvic cul-de-sac. No fistula to bowel.      Impression    IMPRESSION:   Percutaneous drain in good position in the pelvis with minimal  residual fluid. No fistula to bowel.    BETH HDZ MD         SYSTEM ID:  [id number]   XR Chest Port 1 View    Narrative    EXAM: XR CHEST PORT 1 VIEW  LOCATION: [facility]  DATE: 12/20/2023    INDICATION: SOB.  COMPARISON: Chest x-ray on 12/16/2023.      Impression    IMPRESSION: AP view of the chest was obtained. Left chest wall AICD and leads are in stable position. Postsurgical changes of cardiac surgery with median sternotomy wires and surgical clips. Enlarged cardiac silhouette and mild pulmonary vascular   congestion. No  significant pleural effusion or pneumothorax.   CT Head w/o Contrast    Narrative    EXAM: CT HEAD W/O CONTRAST  LOCATION: [facility]  DATE: 12/21/2023    INDICATION: Anisocoria, L>R, lethargic  COMPARISON: None.  TECHNIQUE: Routine CT Head without IV contrast. Multiplanar reformats. Dose reduction techniques were used.    FINDINGS:  INTRACRANIAL CONTENTS: No intracranial hemorrhage, extraaxial collection, or mass effect.  No CT evidence of acute infarct. Chronic bilateral caudate nucleus lacunar infarcts. Moderate presumed chronic small vessel ischemic changes. Moderate generalized   volume loss. No hydrocephalus. Dense basilar artery and carotid siphon calcifications.    VISUALIZED ORBITS/SINUSES/MASTOIDS: No intraorbital abnormality. No paranasal sinus mucosal disease. No middle ear or mastoid effusion.    BONES/SOFT TISSUES: No acute abnormality.      Impression    IMPRESSION:  1.  No CT evidence for acute intracranial process.  2.  Brain atrophy and presumed chronic microvascular ischemic changes as above.   CT Head w/o Contrast    Narrative    CT SCAN OF THE HEAD WITHOUT CONTRAST   12/27/2023 11:15 AM     HISTORY: Altered mental status.    TECHNIQUE:  Axial images of the head and coronal reformations without  IV contrast material. Radiation dose for this scan was reduced using  automated exposure control, adjustment of the mA and/or kV according  to patient size, or iterative reconstruction technique.    COMPARISON: CT of the head dated 12/21/2023.    FINDINGS: Mild asymmetric dilation of the atrium, occipital horn, and  temporal horn of the right lateral ventricle, likely due to ex vacuo  phenomenon. No definite hydrocephalus. There is mild to moderate  generalized brain parenchymal volume loss. Small chronic area of  cortical/subcortical gliosis and encephalomalacia involving the  lateral right temporal lobe, unchanged. Moderate patchy and confluent  nonspecific hypoattenuation in  the cerebral white matter, as before,  likely due to chronic small vessel ischemic disease. This extends into  the basal nuclei regions bilaterally. Small focus of hypoattenuation  in the left basal ganglia region may represent a small chronic lacunar  infarct. There is a probable tiny chronic infarct in the superior  right cerebellar hemisphere. Small patchy areas of calcification of  the bilateral cerebellar hemispheres, as before. No definite CT  findings of large transcortical acute or subacute infarct, acute  intracranial hemorrhage, extra axial fluid collection, or mass effect.  Prominent scattered atherosclerotic calcifications involving the  vertebrobasilar and the carotid siphons.    Bilateral lens implants. The paranasal sinuses are clear. The mastoid  air cells are grossly clear. The calvarium appears intact.      Impression    IMPRESSION:  1. No significant interval change.  2. Small area of gliosis/encephalomalacia involving the lateral right  temporal lobe with ex vacuo dilatation of the right lateral ventricle.  3. Brain atrophy and presumed chronic ischemic changes, as described.  Please see the body of report for details.    MORENO CRUZ MD         SYSTEM ID:  [id number]   XR Chest Port 1 View    Narrative    XR CHEST PORT 1 VIEW 2023 11:29 AM    HISTORY: evaluation for pulmonary edema    COMPARISON: 2023    FINDINGS: No consolidation. No pulmonary edema. No pleural effusions  or pneumothorax. Normal cardiac size. Median sternotomy. Left-sided  implantable cardiac defibrillator/pacer. Mild aortic atherosclerosis.  Small clips in the right neck.    ADDISON SCRUGGS MD         SYSTEM ID:  [id number]   Echocardiogram Complete     Value    LVEF  20-25%    Narrative    [id number]  [id number]  [id number]  [id number]^ORTIZ^KAYLA^[facility]  Echocardiography Laboratory  [address] [place] [address]     Name: JANELL FREDERICK  MRN: [id number]  : 1935  Study  Date: 12/22/2023 10:44 AM  Age: 88 yrs  Gender: Male  Patient Location: [facility]  Reason For Study: Other, Please Specify in Comments  Ordering Physician: KAYLA ALCANTAR  Referring Physician: Clinic, [facility]  Performed By: Maylin Flores     BSA: 1.9 m2  Height: 67 in  Weight: 178 lb  HR: 50  BP: 119/70 mmHg  ______________________________________________________________________________  Procedure  Complete Portable Echo Adult. Optison (NDC #[id number]) given intravenously.  ______________________________________________________________________________  Interpretation Summary     Technically difficult study, no subcostal views available  The left ventricle is moderately dilated.  There is mild concentric left ventricular hypertrophy.  The visual ejection fraction is 20-25%.  Severe hypokinesis with some areas of akinesid-mid to distal septum and apex.  Severe hypokinesis/akinesis inferior and posterior walls. Lateral wall and  prox septum move best  Asc aorta dilated 40mm  ______________________________________________________________________________  Left Ventricle  The left ventricle is moderately dilated. There is mild concentric left  ventricular hypertrophy. The visual ejection fraction is 20-25%. Diastolic  function not assessed due to arrhythmia. There are regional wall motion  abnormalities as specified. Severe hypokinesis with some areas of akinesid-mid  to distal septum and apex. Severe hypokinesis/akinesis inferior and posterior  walls. Lateral wall and prox septum move best. There is no thrombus seen in  the left ventricle.     Right Ventricle  There is a catheter/pacemaker lead seen in the right ventricle. The right  ventricle is not well visualized. The right ventricle is normal in size and  function.     Atria  The left atrium is moderately dilated. Right atrial size is normal.     Mitral Valve  There is mild (1+) mitral regurgitation.     Tricuspid Valve  Normal tricuspid valve. Right  ventricular systolic pressure could not be  approximated due to inadequate tricuspid regurgitation.     Aortic Valve  The aortic valve is trileaflet with aortic valve sclerosis. There is trace  aortic regurgitation. No hemodynamically significant valvular aortic stenosis.     Pulmonic Valve  The pulmonic valve is not well seen, but is grossly normal.     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 IVC diameter  >2.1 cm collapsing <50% with sniff suggests a high RA pressure estimated at 15  mmHg or greater.     Pericardium  The pericardium appears normal.     Rhythm  The rhythm was paced.  ______________________________________________________________________________  MMode/2D Measurements & Calculations  IVSd: 1.4 cm     LVIDd: 6.3 cm  LVIDs: 5.5 cm  LVPWd: 0.96 cm  FS: 13.1 %  LV mass(C)d: 332.5 grams  LV mass(C)dI: 172.8 grams/m2  Ao root diam: 3.6 cm  LA dimension: 5.0 cm  asc Aorta Diam: 4.0 cm  LA/Ao: 1.4  LVOT diam: 2.4 cm  LVOT area: 4.4 cm2  Ao root diam index Ht(cm/m): 2.1  Ao root diam index BSA (cm/m2): 1.9  Asc Ao diam index BSA (cm/m2): 2.1  Asc Ao diam index Ht(cm/m): 2.3  LA Volume (BP): 103.0 ml     LA Volume Index (BP): 53.6 ml/m2  RWT: 0.30     Doppler Measurements & Calculations  MV E max zachery: 55.8 cm/sec  MV A max zachery: 67.7 cm/sec  MV E/A: 0.82  MV dec slope: 233.2 cm/sec2  MV dec time: 0.24 sec  LV V1 max P.4 mmHg  LV V1 max: 58.7 cm/sec  LV V1 VTI: 12.8 cm  SV(LVOT): 55.8 ml  SI(LVOT): 29.0 ml/m2  PA acc time: 0.10 sec  E/E' avg: 10.4  Lateral E/e': 10.0  Medial E/e': 10.9  RV S Zachery: 11.4 cm/sec     ______________________________________________________________________________  Report approved by: Farshad Gramajo 2023 12:42 PM         Cardiac Catheterization    Narrative      Ost Cx to Prox Cx lesion is 40% stenosed.    Prox LAD lesion is 100% stenosed.    Ost RCA to Prox RCA lesion is 95% stenosed.    Prox RCA lesion is 100% stenosed.    Origin to  Prox Graft lesion is 100% stenosed.    Origin to Prox Graft lesion is 99% stenosed.    Lithotripsy and MAX to ostial SVG to Diagonal vessel.     NM Lexiscan stress test (nuc card)     Value    Target     Baseline Systolic     Baseline Diastolic BP 69    Last Stress Systolic     Last Stress Diastolic BP 73    Baseline HR 71    Max HR  80    Max Predicted HR  60    Rate Pressure Product 9,760.0    Narrative      The nuclear stress test is abnormal.    There is a medium sized area of mild ischemia in the distal apical,   anteroseptal and inferoseptal segment(s) of the left ventricle.    There is a medium sized area of a moderate degree of infarction in the   mid to basal inferior and inferolateral segment(s) of the left ventricle.    Left ventricular function is severely reduced, EF 17%.    A prior study was conducted on 7/6/2023.  In comparison with the prior   report, distal septal and apical ischemia is new.         Discharge Medications   Current Discharge Medication List        START taking these medications    Details   bisacodyl (DULCOLAX) 10 MG suppository Place 1 suppository (10 mg) rectally daily as needed for constipation    Associated Diagnoses: Other constipation      calcium carbonate (TUMS) 500 MG chewable tablet Take 1 tablet (500 mg) by mouth 4 times daily as needed for heartburn    Associated Diagnoses: Acute appendicitis with localized peritonitis, without perforation, abscess, or gangrene      ipratropium - albuterol 0.5 mg/2.5 mg/3 mL (DUONEB) 0.5-2.5 (3) MG/3ML neb solution Take 1 vial (3 mLs) by nebulization every 4 hours as needed for wheezing    Associated Diagnoses: Chronic cough      !! 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 - Potential duplicate medications found. Please discuss with provider.        CONTINUE these medications which have CHANGED    Details   aspirin 81 MG EC tablet Take 1 tablet (81 mg) by mouth daily    Associated Diagnoses: NSTEMI (non-ST elevated myocardial infarction) (H)      clopidogrel (PLAVIX) 75 MG tablet Take 1 tablet (75 mg) by mouth daily    Associated Diagnoses: NSTEMI (non-ST elevated myocardial infarction) (H)           CONTINUE these medications which have NOT CHANGED    Details   atorvastatin (LIPITOR) 80 MG tablet Take 80 mg by mouth At Bedtime      B Complex-C (SUPER B COMPLEX PO) Take 1 tablet by mouth daily      Botulinum Toxin Type A (BOTOX) 200 units injection every 3 months      carvedilol (COREG) 12.5 MG tablet Take 1 tablet by mouth 2 times daily      escitalopram (LEXAPRO) 20 MG tablet Take 20 mg by mouth daily      ezetimibe (ZETIA) 10 MG tablet Take 10 mg by mouth daily      finasteride (PROSCAR) 5 MG tablet Take 1 tablet by mouth At Bedtime       furosemide (LASIX) 20 MG tablet Take 20 mg by mouth daily      latanoprost (XALATAN) 0.005 % ophthalmic solution Place 1 drop into both eyes at bedtime      levothyroxine (SYNTHROID/LEVOTHROID) 100 MCG tablet Take 100 mcg by mouth daily      magnesium oxide (MAG-OX) 400 (241.3 Mg) MG tablet Take 1 tablet by mouth At Bedtime       !! melatonin 3 MG tablet Take 3 mg by mouth nightly as needed for sleep      multivitamin (CENTRUM SILVER) tablet Take 1 tablet by mouth daily      nitroGLYcerin (NITROSTAT) 0.4 MG sublingual tablet Place 1 tablet under the tongue every 5 minutes as needed for chest pain      Vitamin D3  (CHOLECALCIFEROL) 125 MCG (5000 UT) tablet Take 1 tablet by mouth daily       !! - Potential duplicate medications found. Please discuss with provider.        STOP taking these medications       ENTRESTO 49-51 MG per tablet Comments:   Reason for Stopping:         gabapentin (NEURONTIN) 100 MG capsule Comments:   Reason for Stopping:         gabapentin (NEURONTIN) 100 MG capsule Comments:   Reason for Stopping:         gabapentin (NEURONTIN) 300 MG capsule Comments:   Reason for Stopping:             Allergies   Allergies   Allergen Reactions    Avelox [Moxifloxacin] Itching    Cephalexin     Hydroxyzine     Oxycodone     Ultram [Tramadol]

## 2024-01-03 NOTE — PROGRESS NOTES
Date & Time: 0700-1930  Surgery/POD#: Perforated Appendicitis with post op ileus   Behavior & Aggression: Green  Fall Risk: Yes  Orientation:x4, can be forgetful   ABNL VS/O2:Room air  ABNL Labs: See chart  Pain Management:Denies  Bowel/Bladder: Continent   Drains: PICC double lumen   Diet:Regular  Activity Level: Ax1  Tests/Procedures: NA  Anticipated  DC Date: 1/3/24  Significant Information: Plan to discharge to TCU tmrw, possible  time at 10am

## 2024-01-03 NOTE — PROGRESS NOTES
Care Management Discharge Note    Discharge Date: 01/03/2024       Discharge Disposition: Skilled Nursing Facility    Discharge Services:      Discharge DME:      Discharge Transportation: family or friend will provide, car, drives self    Private pay costs discussed: Not applicable    Does the patient's insurance plan have a 3 day qualifying hospital stay waiver?  No    PAS Confirmation Code: [id number]  Patient/family educated on Medicare website which has current facility and service quality ratings: yes    Education Provided on the Discharge Plan: Yes  Persons Notified of Discharge Plans: nurse, patient, charge , huc   Patient/Family in Agreement with the Plan: yes    Handoff Referral Completed: No    Additional Information:  Writer completed discharge to [facility] and faxed [facility] for between [phone] patient aware of costs. Facility updated.     Safe discharge plan followed       FRANCHESKA Colon  ,

## 2024-01-03 NOTE — PROGRESS NOTES
Provider Notification      Person Name: Dr. Sesay    Date/Time: 1/3/24 1300    Interaction: Katiana    Purpose of Notification: PICC line removal order    Orders/Response Received:

## 2024-01-03 NOTE — PROGRESS NOTES
Care Management Follow Up    Length of Stay (days): 26    Expected Discharge Date: 01/03/2024     Concerns to be Addressed: discharge planning     Patient plan of care discussed at interdisciplinary rounds: Yes    Anticipated Discharge Disposition: Skilled Nursing Facility     Anticipated Discharge Services:    Anticipated Discharge DME:      Patient/family educated on Medicare website which has current facility and service quality ratings: yes  Education Provided on the Discharge Plan: Yes  Patient/Family in Agreement with the Plan: yes    Referrals Placed by CM/SW: Senior Linkage Line, Post Acute Facilities, Transportation  Private pay costs discussed: Not applicable    Additional Information:  SW completed PAS in anticipation of discharge  PAS-RR    D: Per DHS regulation, SW completed and submitted PAS-RR to [place] [employer] Direct Connect via the Senior Link[employer] Line.  PAS-RR confirmation # is : [id number]    I: SW spoke with Pt  and they are aware a PAS-RR has been submitted.  SW reviewed with Pt  that they may be contacted for a follow up appointment within 10 days of hospital discharge if their SNF stay is < 30 days.  Contact information for [facility] was also provided.    A: Pt  verbalized understanding.    P: Further questions may be directed to [facility] at #[phone], option #4 for PAS-RR staff.    FRANCHESKA Colon

## 2024-01-03 NOTE — PLAN OF CARE
Goal Outcome Evaluation:    Patient is A&O X4 forgetful. VSS on RA. Behavior & aggression on green zone. C/o Headache managed with PRN Tylenol. Double lumen PICC on left arm, SL. On Tele BBB with PVCs. On regular diet. BS present, passing gas, BM X1. Up to the bathroom with 1 assist GB/W. Discharge to TCU today.

## 2024-01-03 NOTE — PLAN OF CARE
Goal Outcome Evaluation:    Shift: [id number] 1/2/2024  Surgery/POD#: 12/8 lap appendectomy     Orientation: AO x4  Pain: PRN PO Tylenol for a headache   Vitals/Tele: VSS RA; CPAP Overnight   IV Access/drains: L PICC SL  Diet: Regular   Mobility: A1 GB/W  GI/:Continent  Wound/Skin: Scattered bruising; Pale  Consults: None this shift  Discharge Plan: Most likely tomorrow to a TCU      See Flow sheets for assessment

## 2024-01-04 ENCOUNTER — TELEPHONE (OUTPATIENT)
Dept: CARDIOLOGY | Facility: CLINIC | Age: 89
End: 2024-01-04
Payer: MEDICARE

## 2024-01-04 ENCOUNTER — PATIENT OUTREACH (OUTPATIENT)
Dept: CARE COORDINATION | Facility: CLINIC | Age: 89
End: 2024-01-04
Payer: MEDICARE

## 2024-01-04 LAB — METHYLMALONATE SERPL-SCNC: 0.25 UMOL/L (ref 0–0.4)

## 2024-01-04 NOTE — PROGRESS NOTES
[facility]    Background: Transitional Care Management program identified per system criteria and reviewed by [facility] team for possible outreach.    Assessment: Upon chart review, CCRC Team member will not proceed with patient outreach related to this episode of Transitional Care Management program due to reason below:    Non-[facility] TCU: CCRC team member noted patient discharged to TCU/ARU/LTACH. Patient is not established with a [facility] currently supported by Primary Care-Care Coordination therefore handoff to Primary Care-Care Coordination is not appropriate at this time.    Plan: Transitional Care Management episode addressed appropriately per reason noted above.      Janet Dhillon RN  Connected Care Resource Center, [facility]    *Connected Care Resource Team does NOT follow patient ongoing. Referrals are identified based on internal discharge reports and the outreach is to ensure patient has an understanding of their discharge instructions.

## 2024-01-04 NOTE — TELEPHONE ENCOUNTER
Patient was admitted to [facility] on 12/8/23 for septic shock associated with perforated appendicitis. Mild TN elevation. Echocardiogram showed severely reduced LV function. TN elevation likely demand ischemia in setting of septic shock.    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 who was admitted to [facility] on 12/8/2023 for septic shock associated with perforated appendicitis. Course c/b mild TN elevation. Echocardiogram showing severely reduced LV function.     12/8/23: Acute appendicitis with peritonitis and septic shock - s/p laparoscopic appendectomy 12/8/2023. Required ICU management for septic shock.     12/22/23: Echo showed EF of 20-25% with hypokinesis of the posterior wall.       12/26/23: NM Lexiscan showed positive ischemia in the distal apical, anteroseptal and inferoseptal segments (new).     12/26/23: Coronary angiogram via RFA resulted in:      Ost Cx to Prox Cx lesion is 40% stenosed.    Prox LAD lesion is 100% stenosed.    Ost RCA to Prox RCA lesion is 95% stenosed.    Prox RCA lesion is 100% stenosed.    Origin to Prox Graft lesion is 100% stenosed.    Origin to Prox Graft lesion is 99% stenosed.    Lithotripsy and MAX to ostial SVG to Diagonal vessel.     PTA Entresto was decreased. ASA and Plavix continued. NTG continued at time of discharge.    Pt is scheduled for an OV on 1/15/24 at 1340 with THO Maru Nieves at our [facility].    Pt was discharged to [facility].    Writer attempted to call TCU to verify OV as above, but no answer. VM left for Rajwinder, with reminder of appt and to include one weeks VS and daily wt's readings with pt to this appt. Writer's phone number provided for any further questions. ANTON Cano RN.

## 2024-01-09 ENCOUNTER — DOCUMENTATION ONLY (OUTPATIENT)
Dept: CARDIOLOGY | Facility: CLINIC | Age: 89
End: 2024-01-09
Payer: MEDICARE

## 2024-01-09 NOTE — PROGRESS NOTES
[facility]    Mr. Mckay is scheduled for a CORE enrollment with Maru Nieves PAC 1/15/24.    He needs pre-visit BMP and proBNP for diagnosis code I50.42.    Called [facility] to confirm fax number: ([phone], fax: [phone])    Will ask in clinic RN to fax above orders to SNF.        Future Appointments   Date Time Provider Department Center   1/15/2024  1:45 PM Madelaine Nieves PA-C [facility] TEQUILA Hughes RN BSN   10:21 AM 01/09/24  CORE nurse line M-F 8a-4p: [phone]

## 2024-01-22 ENCOUNTER — CARE COORDINATION (OUTPATIENT)
Dept: CARDIOLOGY | Facility: CLINIC | Age: 89
End: 2024-01-22
Payer: MEDICARE

## 2024-01-22 NOTE — PROGRESS NOTES
Received call from Mr. Mckay's daughter Maria Luisa with request for assistance with sending records to patient's cardiology care team in [facility].     No consent to communicate on file.     Told Maria Luisa without divulging PHI, that for records requests we refer people to HIM and I gave her the phone/fax number for that team. Maria Luisa told me they cancelled the patient's CORE enrollment given he's immanently returning to [place].     Shanti Hughes RN BSN   3:27 PM 01/22/24  Nurse line M-F 8a-4p: [phone]

## 2024-01-31 NOTE — PROGRESS NOTES
Garry called today and said he's returned home to [facility]. He politely requested that his records from  12/8/23-1/8/24 admission be sent to his primary cardiologist's office for continuity of care and prescription refilling purposes.    Will ask in clinic RN to please fax the following documents to [facility]:    --1/3/24 hospital discharge summary  --12/28/23 cardiology note from Carlita BARRY/Dr. Hdz  --12/22/23 echo  --12/26 stress test  --12/26/23 coronary angiogram    Phone: [phone]  Fax: [phone]           Shanti Hughes RN BSN   1:44 PM 01/31/24  Nurse line M-F 8a-4p: [phone]

## 2024-01-31 NOTE — PROGRESS NOTES
[facility] - CORE Clinic    Records faxed to [facility] at [phone].    Kenia Otero RN BAN   2:10 PM 1/31/2024    CORE nurse line M-F 8a-4p: [phone]

## 2025-01-12 ENCOUNTER — HEALTH MAINTENANCE LETTER (OUTPATIENT)
Age: 89
End: 2025-01-12

## (undated) DEVICE — CATH DIAG 4FR JL 4.5 538417

## (undated) DEVICE — STPL ENDO RELOAD 45X3.5MM 6R45B

## (undated) DEVICE — Device

## (undated) DEVICE — DEFIB PRO-PADZ LVP LQD GEL ADULT 8900-2105-01

## (undated) DEVICE — CATH BALLOON NC EUPHORA 4.00X12MM NCEUP4012X

## (undated) DEVICE — ENDO SCOPE WARMER LF TM500

## (undated) DEVICE — RAD INFLATOR BASIC COMPAK  IN4130

## (undated) DEVICE — LINEN TOWEL PACK X5 5464

## (undated) DEVICE — TOTE ANGIO CORP PC15AT SAN32CC83O

## (undated) DEVICE — ENDO TROCAR SLEEVE KII Z-THREADED 05X100MM CTS02

## (undated) DEVICE — GUIDEWIRE VASC 0.014INX180CM RUNTHROUGH 25-1011

## (undated) DEVICE — INTRO SHEATH 4FRX10CM PINNACLE RSS402

## (undated) DEVICE — ENDO TROCAR FIRST ENTRY KII FIOS Z-THRD 12X100MM CTF73

## (undated) DEVICE — CATH ANGIO INFINITI IM 4FRX100CM 538460

## (undated) DEVICE — CATH ANGIO INFINITI 3DRC 4FRX100CM 538476

## (undated) DEVICE — SU VICRYL 4-0 PS-2 18" UND J496H

## (undated) DEVICE — STPL RELOAD REG TISSUE ECHELON 45 X 3.6MM BLUE GST45B

## (undated) DEVICE — INTRODUCER SHEATH GREEN 6.5FRX11CM .038IN PSI-6F-11-038ACT

## (undated) DEVICE — PREP CHLORAPREP 26ML TINTED HI-LITE ORANGE 930815

## (undated) DEVICE — KIT HAND CONTROL ANGIOTOUCH ACIST 65CM AT-P65

## (undated) DEVICE — GLOVE BIOGEL PI MICRO SZ 6.5 48565

## (undated) DEVICE — ESU GROUND PAD UNIVERSAL W/O CORD

## (undated) DEVICE — KIT LG BORE TOUHY ACCESS PLUS MAP152

## (undated) DEVICE — ENDO TROCAR FIRST ENTRY KII FIOS Z-THRD 05X100MM CTF03

## (undated) DEVICE — SOL NACL 0.9% INJ 1000ML BAG 2B1324X

## (undated) DEVICE — PACK LAP CHOLE SLC15LCFSD

## (undated) DEVICE — CATH BALLO0N SHOCKWAVE C2+ 3.5 X12MM CORONARY C2PIVL3512

## (undated) DEVICE — DECANTER BAG 2002S

## (undated) DEVICE — MANIFOLD KIT ANGIO AUTOMATED 014613

## (undated) DEVICE — NDL PERC ENTRY THINWALL 18GA 7.0" G00166

## (undated) DEVICE — DRAIN JACKSON PRATT 15FR ROUND SU130-1323

## (undated) DEVICE — SOL WATER IRRIG 1000ML BOTTLE 2F7114

## (undated) DEVICE — CATH BALLOON EMERGE 3.5X15MM H7493918915350

## (undated) DEVICE — CATH LAUNCHER 6FR LCB LA6LCB

## (undated) RX ORDER — REGADENOSON 0.08 MG/ML
INJECTION, SOLUTION INTRAVENOUS
Status: DISPENSED
Start: 2023-12-26

## (undated) RX ORDER — BUPIVACAINE HYDROCHLORIDE 2.5 MG/ML
INJECTION, SOLUTION EPIDURAL; INFILTRATION; INTRACAUDAL
Status: DISPENSED
Start: 2023-12-08

## (undated) RX ORDER — FENTANYL CITRATE 0.05 MG/ML
INJECTION, SOLUTION INTRAMUSCULAR; INTRAVENOUS
Status: DISPENSED
Start: 2023-12-08

## (undated) RX ORDER — LIDOCAINE HYDROCHLORIDE 10 MG/ML
INJECTION, SOLUTION EPIDURAL; INFILTRATION; INTRACAUDAL; PERINEURAL
Status: DISPENSED
Start: 2023-12-08

## (undated) RX ORDER — HEPARIN SODIUM 200 [USP'U]/100ML
INJECTION, SOLUTION INTRAVENOUS
Status: DISPENSED
Start: 2023-12-26

## (undated) RX ORDER — FLUMAZENIL 0.1 MG/ML
INJECTION, SOLUTION INTRAVENOUS
Status: DISPENSED
Start: 2023-12-15

## (undated) RX ORDER — FENTANYL CITRATE 50 UG/ML
INJECTION, SOLUTION INTRAMUSCULAR; INTRAVENOUS
Status: DISPENSED
Start: 2023-12-26

## (undated) RX ORDER — BUPIVACAINE HYDROCHLORIDE 5 MG/ML
INJECTION, SOLUTION EPIDURAL; INTRACAUDAL
Status: DISPENSED
Start: 2023-12-08

## (undated) RX ORDER — HEPARIN SODIUM 1000 [USP'U]/ML
INJECTION, SOLUTION INTRAVENOUS; SUBCUTANEOUS
Status: DISPENSED
Start: 2023-12-26

## (undated) RX ORDER — ACETAMINOPHEN 325 MG/1
TABLET ORAL
Status: DISPENSED
Start: 2023-12-08

## (undated) RX ORDER — LIDOCAINE HYDROCHLORIDE 20 MG/ML
JELLY TOPICAL
Status: DISPENSED
Start: 2023-12-08

## (undated) RX ORDER — LIDOCAINE HYDROCHLORIDE 10 MG/ML
INJECTION, SOLUTION EPIDURAL; INFILTRATION; INTRACAUDAL; PERINEURAL
Status: DISPENSED
Start: 2023-12-26

## (undated) RX ORDER — FENTANYL CITRATE 50 UG/ML
INJECTION, SOLUTION INTRAMUSCULAR; INTRAVENOUS
Status: DISPENSED
Start: 2023-12-08

## (undated) RX ORDER — NALOXONE HYDROCHLORIDE 0.4 MG/ML
INJECTION, SOLUTION INTRAMUSCULAR; INTRAVENOUS; SUBCUTANEOUS
Status: DISPENSED
Start: 2023-12-15

## (undated) RX ORDER — CEFAZOLIN SODIUM/WATER 2 G/20 ML
SYRINGE (ML) INTRAVENOUS
Status: DISPENSED
Start: 2023-12-08

## (undated) RX ORDER — FENTANYL CITRATE 50 UG/ML
INJECTION, SOLUTION INTRAMUSCULAR; INTRAVENOUS
Status: DISPENSED
Start: 2023-12-15

## (undated) RX ORDER — EPINEPHRINE 1 MG/ML
INJECTION, SOLUTION INTRAMUSCULAR; SUBCUTANEOUS
Status: DISPENSED
Start: 2023-12-08

## (undated) RX ORDER — CLINDAMYCIN PHOSPHATE 900 MG/50ML
INJECTION, SOLUTION INTRAVENOUS
Status: DISPENSED
Start: 2023-12-08